# Patient Record
Sex: MALE | Race: WHITE | NOT HISPANIC OR LATINO | Employment: OTHER | URBAN - METROPOLITAN AREA
[De-identification: names, ages, dates, MRNs, and addresses within clinical notes are randomized per-mention and may not be internally consistent; named-entity substitution may affect disease eponyms.]

---

## 2018-05-08 ENCOUNTER — OFFICE VISIT (OUTPATIENT)
Dept: PODIATRY | Facility: CLINIC | Age: 68
End: 2018-05-08
Payer: MEDICARE

## 2018-05-08 VITALS
WEIGHT: 250 LBS | HEIGHT: 67 IN | DIASTOLIC BLOOD PRESSURE: 49 MMHG | BODY MASS INDEX: 39.24 KG/M2 | TEMPERATURE: 99 F | SYSTOLIC BLOOD PRESSURE: 117 MMHG | HEART RATE: 62 BPM

## 2018-05-08 DIAGNOSIS — E11.49 TYPE II DIABETES MELLITUS WITH NEUROLOGICAL MANIFESTATIONS: Primary | ICD-10-CM

## 2018-05-08 DIAGNOSIS — I87.2 VENOUS INSUFFICIENCY: ICD-10-CM

## 2018-05-08 DIAGNOSIS — L85.3 DRY SKIN DERMATITIS: ICD-10-CM

## 2018-05-08 PROCEDURE — 99999 PR PBB SHADOW E&M-NEW PATIENT-LVL III: CPT | Mod: PBBFAC,,, | Performed by: PODIATRIST

## 2018-05-08 PROCEDURE — 99203 OFFICE O/P NEW LOW 30 MIN: CPT | Mod: PBBFAC | Performed by: PODIATRIST

## 2018-05-08 PROCEDURE — 99214 OFFICE O/P EST MOD 30 MIN: CPT | Mod: S$PBB,,, | Performed by: PODIATRIST

## 2018-05-11 PROBLEM — E11.49 TYPE II DIABETES MELLITUS WITH NEUROLOGICAL MANIFESTATIONS: Status: ACTIVE | Noted: 2018-05-11

## 2018-05-11 NOTE — PROGRESS NOTES
Subjective:      Patient ID: Olu Gant is a 67 y.o. male.    Chief Complaint: Diabetic Foot Exam   patient presents for follow-up due to type 2 diabetes with neuropathy.  Reports he has been taking better care of his feet, applying lotion for dry skin.  Patient relates diabetic nerve pain in his feet is well-controlled with gabapentin majority of time.  States he was just recently diagnosed with cancer of the right kidney.  They noticed this while doing tests to put him on the kidney transplant list.  Patient has been on dialysis since 2013.  He is scheduled to have the right kidney removed along with the gallbladder and exploratory surgery on 5/22/18.    ROS     Constitutional   Constitutional: Well-developed, well-nourished, no distress, no fever, no night          sweats, no significant weight gain, no significant          weight loss, no exercise intolerance     Eyes         Eyes: Yes eye problems/glaucoma     ENMT   Ears: no difficulty hearing, no ear pain   Nose: no frequent nosebleeds, no nose/sinus problems   Mouth/Throat: no sore throat, no bleeding gums, no snoring, no dry mouth, no            mouth ulcers, no oral abnormalities, no teeth problems     Cardiovascular          Cardiovascular: no chest pain, no arm pain on exertion, no shortness of breath                  when walking, no palpitations    Respiratory           Respiratory: no cough, no wheezing, no congestion    Gastrointestinal   Gastrointestinal: no abdominal pain, no vomiting, normal appetite, no diarrhea,                no vomitting    Genitourinary   Genitourinary: DIALYSIS     Musculoskeletal        Musculoskeletal: no muscle aches, no muscle weakness, no arthralgias/joint pain,                   no back pain, no swelling in the extremities    Integumentary   Skin: no abnormal mole, no jaundice, no rashes     Neurologic          Neurologic: no loss of consciousness, no weakness, no seizures,                   no dizziness, no  headaches, NEUROPATHY     Psychiatric   Psych: no depression, no sleep disturbances    Endocrine   Endocrine: no fatigue, DIABETES     Hematologic/Lymphatic         No bruising     Allergic/Immunologic    Allergy/Immunologic: no runny nose, no sinus pressure, no itching, no hives,              no  frequent sneezing             Objective:      Physical Exam  Vascular   Arterial Pulses Right: dorsalis pedis 2/4, posterior tibialis nonpalpable   Arterial Pulses Left: dorsalis pedis 2/4, posterior tibialis nonpalpable   Chronic peripheral venous insufficiency, edema much improved    Varicosities Right: capillary refill test normal   Varicosities Left: capillary refill test normal (pedal skin temperature is warm bilateral)   Foot Right: shiny, atrophic skin changes,  Foot Left: shiny, atrophic skin changes     Integumentary   Skin dorsal feet and lower legs is discolored, scarred, fragile due to venous insufficiency, several years of chronic edema, varicose veins. Chronic dry skin dermatitis bilateral lower legs and feet, much improved. Decreased hyperkeratotic tissue, cracking, small fissures medial heels, stable.  Upon debridement no skin opening, no discoloration, no erythema or edema. Onychomycosis bilateral. No skin breaks, bruises, abrasions bilateral feet)     Neurological   Neurological Right: gross sensation intact, previous monofilament testing revealed diminished sensation right hallux, intact elsewhere bilateral feet.  Patient experiences paresthesias and symptoms of diabetic neuropathy on Lyrica and gabapentin)   Neurological Left: gross sensation intact, parasthesias   Manual Muscle Test Right: plantarflexors 5/5 (movement against resistance), dorsiflexors 5/5 (movement against resistance), invertors 5/5 (movement against resistance), evertors 5/5 (movement against resistance)   Manual Muscle Test Left: plantarflexors 5/5 (movement against resistance), dorsiflexors 5/5 (movement against resistance),  invertors 5/5 (movement against resistance), evertors 5/5 (movement against resistance)   Difficulty walking, using cane, obesity, limited mobility    Musculoskeletal   Muscle Strength and Tone Right: poor tone   Muscle Strength and Tone Left: poor tone   Joints, Bones, and Muscles Right: pes planus   Joints, Bones, and Muscles Left: pes planus           Assessment:       Encounter Diagnoses   Name Primary?    Type II diabetes mellitus with neurological manifestations Yes    Dry skin dermatitis     Venous insufficiency          Plan:       Olu was seen today for diabetic foot exam.    Diagnoses and all orders for this visit:    Type II diabetes mellitus with neurological manifestations    Dry skin dermatitis    Venous insufficiency        Diabetic pedal exam performed today.  Had a lengthy discussion involving diabetic education.  Reviewed lack of sensation right great toe.  Reviewed signs and symptoms and progression of neuropathy.  Discussed the need for continued tight control of glucose/diabetes to avoid further progression of neuropathy regarding feet.  Reviewed appropriate shoes for both indoors and outdoors.  Had a lengthy discussion regarding appropriate maintenance for skin and nails and potential complications.  Advised patient skin is much improved on both feet.  When it out to patient's callus skin on the heels needs special attention when applying lotion, monitor these areas closely.  Advised patient do condition of his skin on his feet and lower legs and previous history of ulcers he still remains at risk and needs to check his feet and legs daily. Instructed patient to contact the office with any area of redness or swelling which has not improved within 3 days.  I counseled the patient on his conditions, their implications and medical management.  Instructed patient to contact the office with any changes, questions, concerns, worsening of symptoms. Patient/family verbalized understanding.   Total  face to face time, exam, assessment, treatment, discussion, 25 minutes, more than half this time spent on consultation and coordination of care.   Follow up 3 months

## 2018-08-07 ENCOUNTER — OFFICE VISIT (OUTPATIENT)
Dept: PODIATRY | Facility: CLINIC | Age: 68
End: 2018-08-07
Payer: MEDICARE

## 2018-08-07 VITALS
DIASTOLIC BLOOD PRESSURE: 47 MMHG | OXYGEN SATURATION: 93 % | BODY MASS INDEX: 38.13 KG/M2 | HEIGHT: 67 IN | SYSTOLIC BLOOD PRESSURE: 84 MMHG | WEIGHT: 242.94 LBS | RESPIRATION RATE: 20 BRPM | HEART RATE: 71 BPM | TEMPERATURE: 98 F

## 2018-08-07 DIAGNOSIS — R23.4 FISSURE IN SKIN OF FOOT: ICD-10-CM

## 2018-08-07 DIAGNOSIS — E11.49 TYPE II DIABETES MELLITUS WITH NEUROLOGICAL MANIFESTATIONS: Primary | ICD-10-CM

## 2018-08-07 DIAGNOSIS — L85.3 DRY SKIN DERMATITIS: ICD-10-CM

## 2018-08-07 DIAGNOSIS — B35.1 ONYCHOMYCOSIS DUE TO DERMATOPHYTE: ICD-10-CM

## 2018-08-07 PROCEDURE — 99214 OFFICE O/P EST MOD 30 MIN: CPT | Mod: S$PBB,,, | Performed by: PODIATRIST

## 2018-08-07 PROCEDURE — 99214 OFFICE O/P EST MOD 30 MIN: CPT | Mod: PBBFAC | Performed by: PODIATRIST

## 2018-08-07 PROCEDURE — 99999 PR PBB SHADOW E&M-EST. PATIENT-LVL IV: CPT | Mod: PBBFAC,,, | Performed by: PODIATRIST

## 2018-08-13 NOTE — PROGRESS NOTES
Subjective:      Patient ID: Olu Gant is a 68 y.o. male.    Chief Complaint: Diabetic Foot Exam  Patient on dialysis presents for follow-up due to type II diabetes with neuropathy.  Reports he is applying lotion to his feet still on a regular basis but has noticed some arch cracks on both heels.  He had surgery in May to remove right kidney due to cancer, along with   Cholecystectomy and exploratory surgery and states surgery went very well, was told all cancer was removed.  He is still currently on the kidney transplant list and is slowly moving up, hopeful he will be getting a transplant soon.    ROS     Constitutional   well-developed, well-nourished, no distress     Cardiovascular          no chest pain, no shortness of breath    Respiratory          no cough, no congestion    Genitourinary   DIALYSIS     Musculoskeletal        SOME muscle aches, YES arthralgias/joint claude    Neurologic          NEUROPATHY     Endocrine           DIABETES            Objective:      Physical Exam  Vascular   Arterial Pulses Right: dorsalis pedis 2/4, posterior tibialis nonpalpable   Arterial Pulses Left: dorsalis pedis 2/4, posterior tibialis nonpalpable   Chronic peripheral venous insufficiency, edema much improved    Varicosities Right: capillary refill test normal   Varicosities Left: capillary refill test normal (pedal skin temperature is warm bilateral)   Foot Right: shiny, atrophic skin changes,  Foot Left: shiny, atrophic skin changes     Integumentary   Skin dorsal feet and lower legs is discolored, scarred, fragile due to venous insufficiency, chronic edema, varicose veins.  Edema has improved considerably over the last year with dialysis, however damage to the skin remains, stable at this time.  Chronic dry skin dermatitis bilateral lower legs and feet, improved.Hyperkeratotic tissue, cracking, increased fissures medial heels, stable.  Upon debridement no skin opening, no discoloration, no erythema or edema.    Onychomycosis bilateral, several tender due to thickness, some reduced, no evidence of ingrown nail or subungual abscess.      Neurological   Neurological: intact,  Parasthesias,  taking gabapentin    Difficulty walking, using cane, obesity, limited mobility    Musculoskeletal   Muscle Strength and Tone:  Strength and tome are poor    Joints, Bones, and Muscles: pes planus           Assessment:       Encounter Diagnoses   Name Primary?    Type II diabetes mellitus with neurological manifestations Yes    Dry skin dermatitis     Fissure in skin of foot     Onychomycosis due to dermatophyte          Plan:       Olu was seen today for diabetic foot exam.    Diagnoses and all orders for this visit:    Type II diabetes mellitus with neurological manifestations    Dry skin dermatitis    Fissure in skin of foot    Onychomycosis due to dermatophyte        Diabetic pedal exam performed.  Reviewed diabetic education.  Reviewed neuropathy.  Discussed the need for continued tight control of glucose/diabetes. Explained well overall improvement in skin since he has applied moisturizer fissures on the heels are slightly worse, longer, no deeper.  Reassured patient no evidence of redness or bleeding, but he has still apply a thicker, treatment type lotion to his heels.  Recommended anything specifically for diabetic /feet.  Encouraged patient to have his wife check his feet for him on a regular basis.  Reviewed potential complications due to color / damage/dryness of skin on feet and legs.  Reviewed better maintenance of nails and potential complications.  Reviewed appropriate shoes for both indoors and outdoors.  Instructed patient to contact the office with any area of redness or swelling which has not improved within 3 days.  I counseled the patient on his conditions, their implications and medical management.  Instructed patient to contact the office with any changes, questions, concerns, worsening of symptoms. Patient  verbalized understanding.   Total face to face time, exam, assessment, treatment, discussion, documentation 25 minutes, more than half this time spent on consultation and coordination of care.   Follow up 3 months.    This note was created using [employer] voice recognition software that occasionally misinterpreted phrases or words.

## 2018-10-08 ENCOUNTER — TELEPHONE (OUTPATIENT)
Dept: PODIATRY | Facility: CLINIC | Age: 68
End: 2018-10-08

## 2018-10-08 NOTE — TELEPHONE ENCOUNTER
----- Message from Trisha Galeana sent at 10/8/2018  2:07 PM CDT -----  Type:  Patient Returning Call    Who Called:  Patient  Who Left Message for Patient:  Not stated  Does the patient know what this is regarding?:  Rescheduling his appointment from tomorrow  Best Call Back Number:  [phone]  Additional Information:  Office needed to reschedule from tomorrow.

## 2018-10-11 ENCOUNTER — OFFICE VISIT (OUTPATIENT)
Dept: PODIATRY | Facility: CLINIC | Age: 68
End: 2018-10-11
Payer: MEDICARE

## 2018-10-11 VITALS
DIASTOLIC BLOOD PRESSURE: 54 MMHG | HEART RATE: 76 BPM | SYSTOLIC BLOOD PRESSURE: 97 MMHG | BODY MASS INDEX: 32.8 KG/M2 | HEIGHT: 67 IN | WEIGHT: 209 LBS

## 2018-10-11 DIAGNOSIS — B35.1 ONYCHOMYCOSIS DUE TO DERMATOPHYTE: ICD-10-CM

## 2018-10-11 DIAGNOSIS — R23.4 FISSURE IN SKIN OF FOOT: ICD-10-CM

## 2018-10-11 DIAGNOSIS — L85.3 DRY SKIN DERMATITIS: ICD-10-CM

## 2018-10-11 DIAGNOSIS — E11.49 TYPE II DIABETES MELLITUS WITH NEUROLOGICAL MANIFESTATIONS: Primary | ICD-10-CM

## 2018-10-11 PROCEDURE — 99213 OFFICE O/P EST LOW 20 MIN: CPT | Mod: PBBFAC | Performed by: PODIATRIST

## 2018-10-11 PROCEDURE — 99999 PR PBB SHADOW E&M-EST. PATIENT-LVL III: CPT | Mod: PBBFAC,,, | Performed by: PODIATRIST

## 2018-10-11 PROCEDURE — 99214 OFFICE O/P EST MOD 30 MIN: CPT | Mod: S$PBB,,, | Performed by: PODIATRIST

## 2018-10-17 NOTE — PROGRESS NOTES
"Subjective:      Patient ID: Olu Gant is a 68 y.o. male.    Chief Complaint: Follow-up; Diabetes Mellitus; and Nail Problem  Patient on dialysis presents for follow-up due to type II diabetes with neuropathy.    Reports neuropathy pain is getting "bad" in his fingers and feet, getting worse. Relates his neurologist,   Dr. Farmer, has him on the highest dose of Lyrica he can tolerate without "doping me up".  Feels   diabetes is fairly well controlled.  Relates glucose ranges between 125-175        ROS     Constitutional   Very pleasant, obese, well oriented, no distress     Cardiovascular          no chest pain, no shortness of breath    Respiratory          no cough, no congestion    Genitourinary   DIALYSIS     Musculoskeletal        SOME muscle aches, YES arthralgias/joint claude    Neurologic          NEUROPATHY     Endocrine           DIABETES            Objective:      Physical Exam  Vascular   Arterial Pulses Right: dorsalis pedis 1/4, posterior tibialis nonpalpable   Arterial Pulses Left: dorsalis pedis 1/4, posterior tibialis nonpalpable   Chronic peripheral venous insufficiency, edema stable  Capillary refill test normal   Pedal skin temperature is warm bilateral   Shiny, atrophic skin changes    Integumentary   Skin lower legs is discolored, scarred, fragile due to venous insufficiency, chronic edema,              varicose veins.   Chronic dry skin dermatitis bilateral lower legs and feet, unchanged. Hyperkeratotic tissue,              cracking, peeling, fissures medial heels, stable.  Upon debridement no skin opening, no              discoloration, no erythema or edema.   Onychomycosis bilateral, both hallux are severely dystrophic, several tender due to thickness,              few reduced, no evidence of ingrown nail or subungual abscess.      Neurological   Neurological: intact,  Parasthesias,  taking gabapentin    Difficulty walking, using cane, obesity, limited mobility    Musculoskeletal   Muscle " Strength and Tone:  Strength and tome are poor    Joints, Bones, and Muscles: pes planus   Limited mobility, difficulty walking, antalgic gait  Presents in appropriate tennis shoes          Assessment:       Encounter Diagnoses   Name Primary?    Type II diabetes mellitus with neurological manifestations Yes    Fissure in skin of foot     Dry skin dermatitis     Onychomycosis due to dermatophyte          Plan:       Olu was seen today for follow-up, diabetes mellitus and nail problem.    Diagnoses and all orders for this visit:    Type II diabetes mellitus with neurological manifestations    Fissure in skin of foot    Dry skin dermatitis    Onychomycosis due to dermatophyte        Diabetic pedal exam performed.  Reviewed diabetic education and neuropathy.    Discussed tighter control of glucose/diabetes.   Reviewed application of moisturizer,  preferably diabetic foot came cream to fissures on the heels.  Reviewed potential complications due to color / damage/dryness of skin on feet and legs.  Reviewed better maintenance of nails and skin and potential complications.  Reviewed appropriate shoes for both indoors and outdoors.  Instructed patient to contact the office with   any area of redness or swelling which has not improved within 3 days.  I counseled the patient on his conditions, their implications and medical management.  Instructed patient to contact the office with any changes, questions, concerns, worsening of symptoms.   Patient verbalized understanding.   Total face to face time, exam, assessment, treatment, discussion, documentation 25 minutes, more than   half this time spent on consultation and coordination of care.   Follow up 3 months.    This note was created using [employer] voice recognition software that occasionally misinterpreted phrases   or words.

## 2018-12-13 ENCOUNTER — OFFICE VISIT (OUTPATIENT)
Dept: PODIATRY | Facility: CLINIC | Age: 68
End: 2018-12-13
Payer: MEDICARE

## 2018-12-13 VITALS
SYSTOLIC BLOOD PRESSURE: 95 MMHG | TEMPERATURE: 98 F | OXYGEN SATURATION: 99 % | HEIGHT: 67 IN | DIASTOLIC BLOOD PRESSURE: 51 MMHG | HEART RATE: 72 BPM | BODY MASS INDEX: 32.8 KG/M2 | RESPIRATION RATE: 18 BRPM | WEIGHT: 209 LBS

## 2018-12-13 DIAGNOSIS — M79.2 CHRONIC PERIPHERAL NEUROPATHIC PAIN: Primary | ICD-10-CM

## 2018-12-13 DIAGNOSIS — L85.3 DRY SKIN DERMATITIS: ICD-10-CM

## 2018-12-13 DIAGNOSIS — G89.29 CHRONIC PERIPHERAL NEUROPATHIC PAIN: Primary | ICD-10-CM

## 2018-12-13 DIAGNOSIS — R52 PAIN MANAGEMENT: ICD-10-CM

## 2018-12-13 DIAGNOSIS — E11.49 TYPE II DIABETES MELLITUS WITH NEUROLOGICAL MANIFESTATIONS: ICD-10-CM

## 2018-12-13 PROCEDURE — 99213 OFFICE O/P EST LOW 20 MIN: CPT | Mod: PBBFAC | Performed by: PODIATRIST

## 2018-12-13 PROCEDURE — 99999 PR PBB SHADOW E&M-EST. PATIENT-LVL III: CPT | Mod: PBBFAC,,, | Performed by: PODIATRIST

## 2018-12-13 PROCEDURE — 99215 OFFICE O/P EST HI 40 MIN: CPT | Mod: S$PBB,,, | Performed by: PODIATRIST

## 2018-12-13 RX ORDER — HYDROCODONE BITARTRATE AND ACETAMINOPHEN 7.5; 325 MG/1; MG/1
1 TABLET ORAL NIGHTLY
Qty: 60 TABLET | Refills: 0 | Status: SHIPPED | OUTPATIENT
Start: 2018-12-13 | End: 2019-01-02 | Stop reason: SDUPTHER

## 2018-12-21 NOTE — PROGRESS NOTES
Subjective:      Patient ID: Oul Gant is a 68 y.o. male.    Chief Complaint: Diabetic Foot Exam  Patient presents for follow-up due to time to diabetes with neuropathy.  Patient relates nerve pain in   his feet has escalated.  He has been on his feet a lot recently going to hospital and taking care of   his wife who suffered a heart attack with coronary bypass surgery.  She is now at home and he has   continued to take care of her.  He also goes to dialysis 3 times a week.  Repeat reports increased   especially at night. States his nephrologists wanted his neurologist to discuss pain management.   Inquires about taking pain medication only at night. Sees Dr. Farmer who has him on 150 mg Lyrica   3 times a day.  Reports diabetes  remains well controlled.  No changes in his medications or health since last visit.        ROS     Constitutional   Very pleasant, obese, well oriented, no distress     Cardiovascular          no chest pain, no shortness of breath    Respiratory          no cough, no congestion    Genitourinary   DIALYSIS     Musculoskeletal        SOME muscle aches, YES arthralgias/joint pain    Neurologic         NEUROPATHY     Endocrine           DIABETES            Objective:      Physical Exam  Vascular   Arterial Pulses Right: dorsalis pedis 1/4, posterior tibialis nonpalpable   Arterial Pulses Left: dorsalis pedis 1/4, posterior tibialis nonpalpable   Chronic peripheral venous insufficiency, edema stable  Capillary refill test normal   Pedal skin temperature is warm bilatera    Integumentary   Skin lower legs is discolored, scarred, fragile due to venous insufficiency, chronic edema,              varicose veins.   Chronic dry skin dermatitis bilateral lower legs and feet, unchanged. Hyperkeratotic tissue,              cracking, peeling, fissures medial heels, stable.  Upon debridement no skin opening, no              discoloration, no erythema or edema.   Onychomycosis bilateral, both hallux are  severely dystrophic, no evidence of subungual abscess.      Neurological   Gross sensation intact, increased parasthesias      Musculoskeletal   Muscle Strength and Tone:  poor    Joints, Bones, and Muscles: pes planus   Limited mobility, difficulty walking, antalgic gait  Presents in appropriate tennis shoes  Dfficulty walking, using cane, obesity, limited mobility          Assessment:       Encounter Diagnoses   Name Primary?    Chronic peripheral neuropathic pain Yes    Type II diabetes mellitus with neurological manifestations     Pain management     Dry skin dermatitis          Plan:       Olu was seen today for diabetic foot exam.    Diagnoses and all orders for this visit:    Chronic peripheral neuropathic pain    Type II diabetes mellitus with neurological manifestations    Pain management    Dry skin dermatitis    Other orders  -     HYDROcodone-acetaminophen (NORCO) 7.5-325 mg per tablet; Take 1 tablet by mouth nightly.      Advised patient to make sure his nephrologist knows he is taking 450 mg of Lyrica day.   Advised patient   this is an excessive amount and if he takes pain medication at night and maybe he can remove the at 3rd  dose of Lyrica.  We had a lengthy discussion regarding pain management, taking hydrocodone at night  for increased nerve pain throughout the day.  We discussed dangers of these medications, overuse and   abuse.  Explained medication must be taken as directed and will not be filled sooner than 2 months.    Patient related he was in understanding and agreement with treatment plan and did want to pursue Pain   Management today.  Pain management contract was reviewed at length with patient  Diabetic pedal exam performed.  Reviewed diabetic education,  signs and symptoms of neuropathy.    Discussed benefit of tight control of glucose/diabetes regarding potential foot problems especially   neuropathy.  Reviewed appropriate shoes,  especially indoors, no flat shoes, slippers or  walking in sock   or bare feet.  Discussed maintenance of skin and nails and potential complications.  Reviewed need for   daily foot checks and instructed patient to contact the office with any area of redness or swelling which   has not improved within 3 days.  Counseled the patient on his conditions, their implications and medical management.  Instructed patient to contact the office with any changes, questions, concerns, worsening of symptoms.   Patient verbalized understanding.   Total face to face time, exam, assessment, treatment, discussion, documentation 40 minutes, more than   half this time spent on consultation and coordination of care.   Follow up 2 months.    This note was created using [employer] voice recognition software that occasionally misinterpreted phrases   or words.

## 2018-12-27 ENCOUNTER — OFFICE VISIT (OUTPATIENT)
Dept: PODIATRY | Facility: CLINIC | Age: 68
End: 2018-12-27
Payer: MEDICARE

## 2018-12-27 VITALS
SYSTOLIC BLOOD PRESSURE: 119 MMHG | HEIGHT: 67 IN | TEMPERATURE: 99 F | BODY MASS INDEX: 32.8 KG/M2 | HEART RATE: 61 BPM | WEIGHT: 209 LBS | DIASTOLIC BLOOD PRESSURE: 66 MMHG

## 2018-12-27 DIAGNOSIS — S99.922A INJURY OF TOE ON LEFT FOOT, INITIAL ENCOUNTER: ICD-10-CM

## 2018-12-27 DIAGNOSIS — M79.2 CHRONIC PERIPHERAL NEUROPATHIC PAIN: ICD-10-CM

## 2018-12-27 DIAGNOSIS — E11.49 TYPE II DIABETES MELLITUS WITH NEUROLOGICAL MANIFESTATIONS: ICD-10-CM

## 2018-12-27 DIAGNOSIS — G89.29 CHRONIC PERIPHERAL NEUROPATHIC PAIN: ICD-10-CM

## 2018-12-27 PROCEDURE — 99213 OFFICE O/P EST LOW 20 MIN: CPT | Mod: PBBFAC | Performed by: PODIATRIST

## 2018-12-27 PROCEDURE — 99999 PR PBB SHADOW E&M-EST. PATIENT-LVL III: ICD-10-PCS | Mod: PBBFAC,,, | Performed by: PODIATRIST

## 2018-12-27 PROCEDURE — 99999 PR PBB SHADOW E&M-EST. PATIENT-LVL III: CPT | Mod: PBBFAC,,, | Performed by: PODIATRIST

## 2018-12-27 PROCEDURE — 99213 PR OFFICE/OUTPT VISIT, EST, LEVL III, 20-29 MIN: ICD-10-PCS | Mod: S$PBB,,, | Performed by: PODIATRIST

## 2018-12-27 PROCEDURE — 99213 OFFICE O/P EST LOW 20 MIN: CPT | Mod: S$PBB,,, | Performed by: PODIATRIST

## 2018-12-27 RX ORDER — DICLOFENAC SODIUM 10 MG/G
2 GEL TOPICAL 2 TIMES DAILY
Qty: 100 G | Refills: 1 | Status: SHIPPED | OUTPATIENT
Start: 2018-12-27 | End: 2019-12-31 | Stop reason: SDUPTHER

## 2018-12-27 RX ORDER — HYDROCODONE BITARTRATE AND ACETAMINOPHEN 7.5; 325 MG/1; MG/1
1 TABLET ORAL EVERY 6 HOURS PRN
Qty: 7 TABLET | Refills: 0 | Status: SHIPPED | OUTPATIENT
Start: 2018-12-27 | End: 2019-01-03

## 2019-01-06 NOTE — PROGRESS NOTES
Subjective:      Patient ID: Olu Gant is a 68 y.o. male.    Chief Complaint: Diabetes Mellitus and Foot Pain  Patient presents with concern regarding bruise left 5th digit,  does not recall injury, noticed   discoloration, no drainage.  Also relates he has not been able to get his pain medications   since last visit due to prior authorization by his insurance.  Relates pain at night has been   moderate to severe.  Pain level at time of visit today 4/10      ROS     Constitutional   Very pleasant, obese, well oriented, no distress     Cardiovascular          no chest pain, no shortness of breath    Respiratory          no cough, no congestion    Genitourinary   DIALYSIS     Musculoskeletal        SOME muscle aches, YES arthralgias/joint pain    Neurologic         NEUROPATHY     Endocrine           DIABETES          Objective:      Physical Exam  Vascular   Arterial Pulses Right: dorsalis pedis 1/4, posterior tibialis nonpalpable   Arterial Pulses Left: dorsalis pedis 1/4, posterior tibialis nonpalpable   Chronic peripheral venous insufficiency, edema stable  Capillary refill test normal   Pedal skin temperature is warm bilatera    Integumentary   Small area of ecchymosis medial dorsal 5th digital, mildly tender, no skin break, edema or         erythema   Discolored skin lower legs, scarred, fragile due to venous insufficiency, chronic edema,              varicose veins.   Chronic dry skin dermatitis bilateral lower legs and feet      Neurological   Gross sensation intact, increased parasthesia's, painful  Diabetic neuropathy      Musculoskeletal   Muscle Strength and Tone:  poor    Joints, Bones, and Muscles: pes planus   Limited mobility, difficulty walking, antalgic gait  Presents in appropriate tennis shoes  Dfficulty walking, using cane, obesity, limited mobility          Assessment:       Encounter Diagnoses   Name Primary?    Injury of toe on left foot, initial encounter     Chronic peripheral neuropathic  pain     Type II diabetes mellitus with neurological manifestations          Plan:       Olu was seen today for diabetes mellitus and foot pain.    Diagnoses and all orders for this visit:    Injury of toe on left foot, initial encounter    Chronic peripheral neuropathic pain    Type II diabetes mellitus with neurological manifestations    Other orders  -     HYDROcodone-acetaminophen (NORCO) 7.5-325 mg per tablet; Take 1 tablet by mouth every 6 (six) hours as needed for Pain.  -     diclofenac sodium (VOLTAREN) 1 % Gel; Apply 2 g topically 2 (two) times daily.      Nurse contacted patient's pharmacy to okay 1 week of hydrocodone while he is waiting for prior authorization  on the previous prescription we dispensed.  Instructed patient to take as directed.  Reassured patient there no complications, but obviously injury occurred to 5th digit left foot.  Explained to  patient he needs to have his wife check this area daily.  Prescribed diclofenac, instructed patient to apply twice daily, 1 application should be before bed at night.    Again instructed patient to notify his nephrologist regarding medications.  Counseled the patient on his conditions, their implications and medical management.  Instructed patient to contact the office with any changes, questions, concerns, worsening of symptoms.   Patient verbalized understanding.   Total face to face time, exam, assessment, treatment, discussion, documentation 15 minutes, more than   half this time spent on consultation and coordination of care.   Follow up 2 months.    This note was created using [employer] voice recognition software that occasionally misinterpreted phrases   or words.

## 2019-02-14 ENCOUNTER — OFFICE VISIT (OUTPATIENT)
Dept: PODIATRY | Facility: CLINIC | Age: 69
End: 2019-02-14
Payer: MEDICARE

## 2019-02-14 VITALS
TEMPERATURE: 98 F | HEIGHT: 67 IN | RESPIRATION RATE: 18 BRPM | HEART RATE: 75 BPM | BODY MASS INDEX: 32.8 KG/M2 | DIASTOLIC BLOOD PRESSURE: 54 MMHG | SYSTOLIC BLOOD PRESSURE: 96 MMHG | WEIGHT: 209 LBS | OXYGEN SATURATION: 99 %

## 2019-02-14 DIAGNOSIS — L85.3 DRY SKIN DERMATITIS: ICD-10-CM

## 2019-02-14 DIAGNOSIS — E11.49 TYPE II DIABETES MELLITUS WITH NEUROLOGICAL MANIFESTATIONS: ICD-10-CM

## 2019-02-14 DIAGNOSIS — R52 PAIN MANAGEMENT: ICD-10-CM

## 2019-02-14 DIAGNOSIS — M79.2 CHRONIC PERIPHERAL NEUROPATHIC PAIN: Primary | ICD-10-CM

## 2019-02-14 DIAGNOSIS — B35.1 ONYCHOMYCOSIS DUE TO DERMATOPHYTE: ICD-10-CM

## 2019-02-14 DIAGNOSIS — G89.29 CHRONIC PERIPHERAL NEUROPATHIC PAIN: Primary | ICD-10-CM

## 2019-02-14 PROCEDURE — 99999 PR PBB SHADOW E&M-EST. PATIENT-LVL III: CPT | Mod: PBBFAC,,, | Performed by: PODIATRIST

## 2019-02-14 PROCEDURE — 99213 OFFICE O/P EST LOW 20 MIN: CPT | Mod: PBBFAC | Performed by: PODIATRIST

## 2019-02-14 PROCEDURE — 99999 PR PBB SHADOW E&M-EST. PATIENT-LVL III: ICD-10-PCS | Mod: PBBFAC,,, | Performed by: PODIATRIST

## 2019-02-14 PROCEDURE — 99214 OFFICE O/P EST MOD 30 MIN: CPT | Mod: S$PBB,,, | Performed by: PODIATRIST

## 2019-02-14 PROCEDURE — 99214 PR OFFICE/OUTPT VISIT, EST, LEVL IV, 30-39 MIN: ICD-10-PCS | Mod: S$PBB,,, | Performed by: PODIATRIST

## 2019-02-14 RX ORDER — HYDROCODONE BITARTRATE AND ACETAMINOPHEN 7.5; 325 MG/1; MG/1
1 TABLET ORAL EVERY 12 HOURS PRN
Qty: 40 TABLET | Refills: 0 | Status: SHIPPED | OUTPATIENT
Start: 2019-02-14 | End: 2019-03-26 | Stop reason: SDUPTHER

## 2019-02-15 ENCOUNTER — TELEPHONE (OUTPATIENT)
Dept: PODIATRY | Facility: CLINIC | Age: 69
End: 2019-02-15

## 2019-02-15 NOTE — PROGRESS NOTES
Subjective:      Patient ID: Olu Gant is a 68 y.o. male.    Chief Complaint: Follow-up  Patient presents  with complaint of pain in both feet.  He has history of diabetic neuropathy.  Patient   was started on pain management last visit and instructed to take 1 each evening.  Relates he was  Never able to get this filled, got 7 tablets while waiting on PA. As far as he knows it was never   approved from [facility].  He never received a phone call from his pharmacy.  He   relates days he goes to dialysis the pain is twice is bad, always increased at night, difficulty sleeping.  Saw L Cooksey, NP 1/2/2019      ROS     Constitutional   very pleasant, obese, well oriented, no distress     Cardiovascular          no chest pain, no shortness of breath    Respiratory          no cough, no congestion    Genitourinary   DIALYSIS     Musculoskeletal        SOME muscle aches, YES arthralgias/joint pain    Neurologic         NEUROPATHY     Endocrine          DIABETES          Objective:      Physical Exam  Vascular   Arterial Pulses Right: dorsalis pedis 1/4, posterior tibialis nonpalpable   Arterial Pulses Left: dorsalis pedis 1/4, posterior tibialis nonpalpable   Chronic peripheral venous insufficiency, edema stable  Capillary refill test normal   Pedal skin temperature is warm bilatera    Integumentary   Discolored very dry skin lower legs, scarred, fragile due to venous insufficiency, chronic edema,         varicose veins.   Chronic dry skin dermatitis bilateral lower legs and feet, mild fissures, cracking medial heels, no        complications at this time, at high risk      Neurological   Gross sensation intact, increased parasthesia's, painful diabetic neuropathy, taking Lyrica      Musculoskeletal   Muscle Strength and Tone:  poor    Joints, Bones, and Muscles: pes planus   Limited mobility, difficulty walking, antalgic gait  Presents in appropriate tennis shoes  Dfficulty walking, using cane, obesity,  limited mobility          Assessment:       Encounter Diagnoses   Name Primary?    Chronic peripheral neuropathic pain Yes    Type II diabetes mellitus with neurological manifestations     Pain management     Dry skin dermatitis     Onychomycosis due to dermatophyte          Plan:       Olu was seen today for follow-up.    Diagnoses and all orders for this visit:    Chronic peripheral neuropathic pain    Type II diabetes mellitus with neurological manifestations    Pain management    Dry skin dermatitis    Onychomycosis due to dermatophyte    Other orders  -     HYDROcodone-acetaminophen (NORCO) 7.5-325 mg per tablet; Take 1 tablet by mouth every 12 (twelve) hours as needed for Pain. Take 1 daily in the evening on non-dialysis days, take 1 every 12 hours on dialysis days      Diabetic pedal exam performed.  Reviewed diabetic education,  neuropathy. Discussed benefit   of continued tight control of glucose/diabetes regarding potential foot problems.  Reviewed   appropriate shoes,  especially indoors to protect feet, no flat shoes, slippers or walking in sock   or bare feet.  Discussed maintenance of dry skin and nails and potential complications.  Advised   patient he has to start applying lotion to both feet especially bottom and heels, at least every other   day avoid getting between the toes.  Reviewed need for daily foot checks and instructed patient to contact the office with any area of   redness or swelling which has not improved within 3 days.  Nurse tried a nurse verified a prior authorization was approved and would contact his pharmacy   at 10:00 a.m. When the open.  In the meantime I discussed with patient increasing his pain   medications slightly so he is able to take it every 12 hr on dialysis days.  I instructed patient on   how to take medication and new prescription was dispensed for 40 tabs monthly.  Patient was in   understanding and agreement with treatment plan  Counseled the patient on  his conditions, their implications and medical management.  Instructed patient to contact the office with any changes, questions, concerns, worsening of   symptoms. Patient verbalized understanding.   Total face to face time, exam, assessment, treatment, discussion, documentation 25 minutes,   more than half this time spent on consultation and coordination of care.   Follow up 2 months.    This note was created using [employer] voice recognition software that occasionally misinterpreted   phrases or words.

## 2019-02-15 NOTE — TELEPHONE ENCOUNTER
----- Message from Ashley Jacobsen sent at 2/15/2019  8:46 AM CST -----   [place]  With  [employer] calling about  Increase  The   quality  On narco  //please call  For  Details [phone]

## 2019-03-22 ENCOUNTER — TELEPHONE (OUTPATIENT)
Dept: PODIATRY | Facility: CLINIC | Age: 69
End: 2019-03-22

## 2019-03-22 NOTE — TELEPHONE ENCOUNTER
----- Message from Maribel Grider sent at 3/22/2019  3:14 PM CDT -----  Contact: Patient  Type:  RX Refill Request    Who Called:  Patient  Refill or New Rx:  Refill  RX Name and Strength:    HYDROcodone-acetaminophen (NORCO) 7.5-325 mg per tablet  How is the patient currently taking it? (ex. 1XDay):    Is this a 30 day or 90 day RX:    Preferred Pharmacy with phone number:    [facility] - [place], [place] - [address]  [address] [address]  Phone: [phone] Fax: [phone]  Local or Mail Order:  Local  Ordering Provider:  Cassandra Jackson Call Back Number:  [phone]  Additional Information:  If you call it in today it needs to be called in to [facility] in [place].

## 2019-03-26 DIAGNOSIS — M79.2 CHRONIC PERIPHERAL NEUROPATHIC PAIN: Primary | ICD-10-CM

## 2019-03-26 DIAGNOSIS — G89.29 CHRONIC PERIPHERAL NEUROPATHIC PAIN: Primary | ICD-10-CM

## 2019-03-26 RX ORDER — HYDROCODONE BITARTRATE AND ACETAMINOPHEN 7.5; 325 MG/1; MG/1
1 TABLET ORAL EVERY 12 HOURS PRN
Qty: 40 TABLET | Refills: 0 | Status: SHIPPED | OUTPATIENT
Start: 2019-03-26 | End: 2019-04-16 | Stop reason: SDUPTHER

## 2019-04-16 ENCOUNTER — OFFICE VISIT (OUTPATIENT)
Dept: PODIATRY | Facility: CLINIC | Age: 69
End: 2019-04-16
Payer: MEDICARE

## 2019-04-16 VITALS
HEART RATE: 54 BPM | RESPIRATION RATE: 18 BRPM | WEIGHT: 209 LBS | TEMPERATURE: 98 F | BODY MASS INDEX: 32.8 KG/M2 | SYSTOLIC BLOOD PRESSURE: 129 MMHG | DIASTOLIC BLOOD PRESSURE: 61 MMHG | OXYGEN SATURATION: 99 % | HEIGHT: 67 IN

## 2019-04-16 DIAGNOSIS — L85.3 DRY SKIN DERMATITIS: ICD-10-CM

## 2019-04-16 DIAGNOSIS — M79.2 CHRONIC PERIPHERAL NEUROPATHIC PAIN: ICD-10-CM

## 2019-04-16 DIAGNOSIS — G89.29 CHRONIC PERIPHERAL NEUROPATHIC PAIN: ICD-10-CM

## 2019-04-16 DIAGNOSIS — B35.1 ONYCHOMYCOSIS DUE TO DERMATOPHYTE: ICD-10-CM

## 2019-04-16 DIAGNOSIS — R52 PAIN MANAGEMENT: ICD-10-CM

## 2019-04-16 DIAGNOSIS — E11.49 TYPE II DIABETES MELLITUS WITH NEUROLOGICAL MANIFESTATIONS: Primary | ICD-10-CM

## 2019-04-16 PROCEDURE — 99214 OFFICE O/P EST MOD 30 MIN: CPT | Mod: S$PBB,,, | Performed by: PODIATRIST

## 2019-04-16 PROCEDURE — 99999 PR PBB SHADOW E&M-EST. PATIENT-LVL III: ICD-10-PCS | Mod: PBBFAC,,, | Performed by: PODIATRIST

## 2019-04-16 PROCEDURE — 99999 PR PBB SHADOW E&M-EST. PATIENT-LVL III: CPT | Mod: PBBFAC,,, | Performed by: PODIATRIST

## 2019-04-16 PROCEDURE — 99214 PR OFFICE/OUTPT VISIT, EST, LEVL IV, 30-39 MIN: ICD-10-PCS | Mod: S$PBB,,, | Performed by: PODIATRIST

## 2019-04-16 PROCEDURE — 99213 OFFICE O/P EST LOW 20 MIN: CPT | Mod: PBBFAC | Performed by: PODIATRIST

## 2019-04-16 RX ORDER — FLUTICASONE PROPIONATE 50 MCG
SPRAY, SUSPENSION (ML) NASAL
Refills: 0 | COMMUNITY
Start: 2019-03-03 | End: 2020-06-02

## 2019-04-16 RX ORDER — HYDROCODONE BITARTRATE AND ACETAMINOPHEN 7.5; 325 MG/1; MG/1
1 TABLET ORAL DAILY
Qty: 42 TABLET | Refills: 0 | Status: SHIPPED | OUTPATIENT
Start: 2019-04-16 | End: 2019-05-14

## 2019-04-16 NOTE — PROGRESS NOTES
"Subjective:      Patient ID: Olu Gant is a 68 y.o. male.    Chief Complaint: Diabetic Foot Exam  Patient presents for follow-up diabetic neuropathy pain.  He reports pain medication has been a "life saver".  He is taking the max dose agreed by his nephrologist and neurologist of Lyrica 150 mg 3 times a day.   Reports usual increased pain on days following dialysis.  On those days he starts to experience heightened pain around 2pm,  pain medication has helped him to remain active.   Patient reports diabetes has remained well controlled, goes to dialysis 3 times a week. Last saw L Cooksey, NP 1/2/2019  Was hospitalized for pneumonia 01/02/2019    ROS     Constitutional   very pleasant, obese, well oriented, no distress     Cardiovascular          no chest pain, no shortness of breath    Respiratory          no cough, no congestion    Genitourinary   DIALYSIS     Musculoskeletal        SOME muscle aches, YES arthralgias/joint pain    Neurologic         NEUROPATHY     Endocrine          DIABETES          Objective:      Physical Exam  Vascular   Arterial Pulses Right: dorsalis pedis 1/4, posterior tibialis 1/4, normal CFT   Arterial Pulses Left: dorsalis pedis 1/4, posterior tibialis 1/4, normal CFT   No lower extremity edema bilateral  Pedal skin temperature is warm bilateral    Integumentary   Chronic dry skin dermatitis bilateral lower legs and feet improved, stable, mild fissures, cracking medial heels, nocomplications   Tender dystrophic onychomycosis, several including hallux bilateral, thickness reduced, no evidence of infection, ingrown nail or subungual abscess       Neurological   Gross sensation intact, increased parasthesia's, painful diabetic neuropathy      Musculoskeletal   Muscle Strength and Tone:  poor    Joints, Bones, and Muscles: pes planus   Limited mobility, difficulty walking, antalgic gait  Presents in appropriate tennis shoes  Dfficulty walking, using cane, obesity, limited " mobility    Comprehensive metabolic panel    Ref Range & Units 3mo ago 5yr ago   Glucose 65 - 99 mg/dL 136High   162High  R      BUN, Bld 7 - 25 mg/dL 27High   64High  R   Creatinine 0.70 - 1.25 mg/dL 5.69High   5.9High  R      eGFR if non African American > OR = 60 mL/min/1.73m2 9Low   9.3Abnormal  R, CM   eGFR if African American > OR = 60 mL/min/1.73m2 11Low   10.7Abnormal  R   BUN/Creatinine Ratio 6 - 22 (calc) 5Low      Sodium 135 - 146 mmol/L 138  139 R   Potassium 3.5 - 5.3 mmol/L 4.5  3.7 R   Chloride 98 - 110 mmol/L 96Low   96 R   CO2 20 - 32 mmol/L 32  31High  R   Calcium 8.6 - 10.3 mg/dL 8.5Low   9.4 R   Total Protein 6.1 - 8.1 g/dL 6.3  7.6 R   Albumin 3.6 - 5.1 g/dL 3.6  3.2Low  R   Globulin, Total 1.9 - 3.7 g/dL (calc) 2.7     Albumin/Globulin Ratio 1.0 - 2.5 (calc) 1.3     Total Bilirubin 0.2 - 1.2 mg/dL 0.3  0.5 R, CM   Alkaline Phosphatase 40 - 115 U/L 108  123 R   AST 10 - 35 U/L 16  11 R   ALT 9 - 46 U/L 15  10 R                   Assessment:       Encounter Diagnoses   Name Primary?    Type II diabetes mellitus with neurological manifestations Yes    Chronic peripheral neuropathic pain     Pain management     Dry skin dermatitis     Onychomycosis due to dermatophyte          Plan:       Olu was seen today for diabetic foot exam.    Diagnoses and all orders for this visit:    Type II diabetes mellitus with neurological manifestations    Chronic peripheral neuropathic pain    Pain management    Dry skin dermatitis    Onychomycosis due to dermatophyte    Other orders  -     HYDROcodone-acetaminophen (NORCO) 7.5-325 mg per tablet; Take 1 tablet by mouth Daily. Take 1 daily in the evening on non-dialysis days, take 1 every 12 hours on dialysis days      Reviewed neuropathic pain with patient.  Advised Lyrica 150 mg 3 times a day is over the recommended dose and symptoms unfortunately cannot be any better controlled with addition of medication even if he were able to get approval through his  nephrologist.  We reviewed pain management, taking pain medication each evening and twice on dialysis days, one around 2:00 p.m. and then in the evening before bed.  Diabetic foot exam performed.  We reviewed diabetic education.  Encouraged patient to continue tight control of glucose / diabetes and benefits of this.  Advised patient he needs to take better care of skin on the heels of his feet, check his feet and legs daily.  Refill Norco 7.5 mg 1 tablet taken on non dialysis days, 2 on dialysis days.  We reviewed pain management, taking medication as prescribed   is consistent  Patient was in understanding and agreement with treatment plan  Counseled the patient on his conditions, their implications and medical management.  Instructed patient to contact the office with any changes, questions, concerns, worsening of symptoms. Patient verbalized understanding.   Total face to face time, exam, assessment, treatment, discussion, documentation 25 minutes, more than half this time spent on consultation and coordination of care.   Follow up 2 months.    This note was created using [employer] voice recognition software that occasionally misinterpreted phrases or words.

## 2019-06-20 ENCOUNTER — OFFICE VISIT (OUTPATIENT)
Dept: PODIATRY | Facility: CLINIC | Age: 69
End: 2019-06-20
Payer: MEDICARE

## 2019-06-20 VITALS
SYSTOLIC BLOOD PRESSURE: 145 MMHG | HEIGHT: 66 IN | OXYGEN SATURATION: 97 % | TEMPERATURE: 98 F | WEIGHT: 234.56 LBS | DIASTOLIC BLOOD PRESSURE: 64 MMHG | HEART RATE: 73 BPM | RESPIRATION RATE: 20 BRPM | BODY MASS INDEX: 37.69 KG/M2

## 2019-06-20 DIAGNOSIS — R52 PAIN MANAGEMENT: ICD-10-CM

## 2019-06-20 DIAGNOSIS — G89.29 CHRONIC PERIPHERAL NEUROPATHIC PAIN: Primary | ICD-10-CM

## 2019-06-20 DIAGNOSIS — R23.4 FISSURE IN SKIN OF FOOT: ICD-10-CM

## 2019-06-20 DIAGNOSIS — B35.1 ONYCHOMYCOSIS DUE TO DERMATOPHYTE: ICD-10-CM

## 2019-06-20 DIAGNOSIS — M79.2 CHRONIC PERIPHERAL NEUROPATHIC PAIN: Primary | ICD-10-CM

## 2019-06-20 DIAGNOSIS — L85.3 DRY SKIN DERMATITIS: ICD-10-CM

## 2019-06-20 DIAGNOSIS — E11.49 TYPE II DIABETES MELLITUS WITH NEUROLOGICAL MANIFESTATIONS: ICD-10-CM

## 2019-06-20 PROCEDURE — 99999 PR PBB SHADOW E&M-EST. PATIENT-LVL III: ICD-10-PCS | Mod: PBBFAC,,, | Performed by: PODIATRIST

## 2019-06-20 PROCEDURE — 99213 OFFICE O/P EST LOW 20 MIN: CPT | Mod: PBBFAC | Performed by: PODIATRIST

## 2019-06-20 PROCEDURE — 99999 PR PBB SHADOW E&M-EST. PATIENT-LVL III: CPT | Mod: PBBFAC,,, | Performed by: PODIATRIST

## 2019-06-20 PROCEDURE — 99214 OFFICE O/P EST MOD 30 MIN: CPT | Mod: S$PBB,,, | Performed by: PODIATRIST

## 2019-06-20 PROCEDURE — 99214 PR OFFICE/OUTPT VISIT, EST, LEVL IV, 30-39 MIN: ICD-10-PCS | Mod: S$PBB,,, | Performed by: PODIATRIST

## 2019-06-20 RX ORDER — HYDROCODONE BITARTRATE AND ACETAMINOPHEN 7.5; 325 MG/1; MG/1
1 TABLET ORAL EVERY 12 HOURS PRN
Qty: 45 TABLET | Refills: 0 | Status: SHIPPED | OUTPATIENT
Start: 2019-06-20 | End: 2019-08-04

## 2019-06-20 NOTE — LETTER
June 21, 2019      Brianna Elaine III, MD  [address]  [facility] [place] [address]           [facility]  [address] [facility] [place] [address]  Phone: [phone]  Fax: [phone]          Patient: Olu Gant   MR Number: [id number]   YOB: 1950   Date of Visit: 6/20/2019       Dear Dr. Brianna Elaine III:    Thank you for referring Olu Gant to me for evaluation. Attached you will find relevant portions of my assessment and plan of care.    If you have questions, please do not hesitate to call me. I look forward to following Olu Gant along with you.    Sincerely,    Anuradha Trujillo, DPGLORIA    Enclosure  CC:  No Recipients    If you would like to receive this communication electronically, please contact [email or web address] or [phone] to request more information on [employer] Link access.    For providers and/or their staff who would like to refer a patient to [facility], please contact us through our one-stop-shop provider referral line, [facility], at [phone].    If you feel you have received this communication in error or would no longer like to receive these types of communications, please e-mail [email or web address]

## 2019-06-22 NOTE — PROGRESS NOTES
Subjective:      Patient ID: Olu Gant is a 69 y.o. male.    Chief Complaint: Diabetic Foot Exam  Patient presents for follow-up diabetic neuropathy pain.  Patient reports no change, pain medication very helpful at night, otherwise he can get to sleep.  He has been taking 2 on dialysis days, feels the pain medication is only thing that helps.  He is taking 450 mg Lyrica daily, patient is not confident it is helping at all.  Does relate having pacemaker placement 2 weeks ago, due to low pulse, states procedure went well.  Reports no changes in medication, dialysis, diabetes, unchanged in the last 4 weeks  Saw Dr Bunch 4/23      ROS     Constitutional   very pleasant, obese, well oriented, no distress     Cardiovascular          no chest pain, no shortness of breath    Respiratory          no cough, no congestion    Genitourinary   DIALYSIS     Musculoskeletal        SOME muscle aches, YES arthralgias/joint pain    Neurologic         NEUROPATHY     Endocrine          DIABETES          Objective:      Physical Exam  Vascular   Arterial Pulses Right: dorsalis pedis 1/4, posterior tibialis 1/4, normal CFT   Arterial Pulses Left: dorsalis pedis 1/4, posterior tibialis 1/4, normal CFT   No lower extremity edema bilateral  Pedal skin temperature is warm bilateral    Integumentary   Chronic dry skin dermatitis bilateral lower legs and feet  Has progressed somewhat with small fissures plantar posterior aspect of both heels.  These are stable with no erythema, but high risk for complications.  Skin texture lower legs remains much improved  Tender dystrophic onychomycosis, several nails with damage to nail bed, thickness reduced, no evidence of ingrown nail or subungual abscess       Neurological   Gross sensation intact, increased parasthesia's, painful diabetic neuropathy      Musculoskeletal   Muscle Strength and Tone:  poor    Joints, Bones, and Muscles: pes planus   Limited mobility, difficulty walking, antalgic  gait  Limited mobility  Obisity  Presents in appropriate tennis shoes  Dfficulty walking, using cane      Comprehensive metabolic panel    Ref Range & Units 5mo ago   Glucose 65 - 99 mg/dL 136High        BUN, Bld 7 - 25 mg/dL 27High     Creatinine 0.70 - 1.25 mg/dL 5.69High        eGFR if non African American > OR = 60 mL/min/1.73m2 9Low     eGFR if African American > OR = 60 mL/min/1.73m2 11Low     BUN/Creatinine Ratio 6 - 22 (calc) 5Low     Sodium 135 - 146 mmol/L 138    Potassium 3.5 - 5.3 mmol/L 4.5    Chloride 98 - 110 mmol/L 96Low     CO2 20 - 32 mmol/L 32    Calcium 8.6 - 10.3 mg/dL 8.5Low     Total Protein 6.1 - 8.1 g/dL 6.3    Albumin 3.6 - 5.1 g/dL 3.6    Globulin, Total 1.9 - 3.7 g/dL (calc) 2.7    Albumin/Globulin Ratio 1.0 - 2.5 (calc) 1.3    Total Bilirubin 0.2 - 1.2 mg/dL 0.3    Alkaline Phosphatase 40 - 115 U/L 108    AST 10 - 35 U/L 16    ALT 9 - 46 U/L 15                    Assessment:       Encounter Diagnoses   Name Primary?    Chronic peripheral neuropathic pain Yes    Type II diabetes mellitus with neurological manifestations     Pain management     Dry skin dermatitis     Fissure in skin of foot     Onychomycosis due to dermatophyte          Plan:       Olu was seen today for diabetic foot exam.    Diagnoses and all orders for this visit:    Chronic peripheral neuropathic pain    Type II diabetes mellitus with neurological manifestations    Pain management    Dry skin dermatitis    Fissure in skin of foot    Onychomycosis due to dermatophyte    Other orders  -     HYDROcodone-acetaminophen (NORCO) 7.5-325 mg per tablet; Take 1 tablet by mouth every 12 (twelve) hours as needed for Pain (1 daily, 2 Dialysis days).      Reviewed neuropathy, diabetes pain management.  We discussed possibly decreasing the Lyrica and increasing pain medication.  Patient verbalized understanding, stated he would stay with current treatment plan for few more months.  Diabetic foot exam performed.   Reviewed  diabetic education.  Encouraged patient to continue tight control of glucose / diabetes and benefits of this.    Advised patient progression of dry skin and cracks in heels, he needs to start applying diabetic lotion ever other day to feet and legs, have his wife help and inspect his feet.    checked  Refill Norco 7.5 mg 1 tablet taken on non dialysis days, 2 on dialysis days.  Instructed patient to contact the office with any changes which have not improved in 2-3 days.  Patient was in understanding and agreement with treatment plan  Counseled the patient on his conditions, their implications and medical management.  Instructed patient to contact the office with any changes, questions, concerns, worsening of symptoms. Patient verbalized understanding.   Total face to face time, exam, assessment, treatment, discussion, documentation 25 minutes, more than half this time spent on consultation and coordination of care.   Follow up 2 months.      This note was created using [employer] voice recognition software that occasionally misinterpreted phrases or words.

## 2019-08-22 ENCOUNTER — OFFICE VISIT (OUTPATIENT)
Dept: PODIATRY | Facility: CLINIC | Age: 69
End: 2019-08-22
Payer: MEDICARE

## 2019-08-22 VITALS
RESPIRATION RATE: 19 BRPM | TEMPERATURE: 98 F | OXYGEN SATURATION: 98 % | BODY MASS INDEX: 37.93 KG/M2 | SYSTOLIC BLOOD PRESSURE: 116 MMHG | HEART RATE: 93 BPM | DIASTOLIC BLOOD PRESSURE: 67 MMHG | WEIGHT: 236 LBS | HEIGHT: 66 IN

## 2019-08-22 DIAGNOSIS — L85.3 DRY SKIN DERMATITIS: ICD-10-CM

## 2019-08-22 DIAGNOSIS — R52 PAIN MANAGEMENT: ICD-10-CM

## 2019-08-22 DIAGNOSIS — M79.2 CHRONIC PERIPHERAL NEUROPATHIC PAIN: ICD-10-CM

## 2019-08-22 DIAGNOSIS — E11.49 TYPE II DIABETES MELLITUS WITH NEUROLOGICAL MANIFESTATIONS: Primary | ICD-10-CM

## 2019-08-22 DIAGNOSIS — G89.29 CHRONIC PERIPHERAL NEUROPATHIC PAIN: ICD-10-CM

## 2019-08-22 PROCEDURE — 99999 PR PBB SHADOW E&M-EST. PATIENT-LVL III: CPT | Mod: PBBFAC,,, | Performed by: PODIATRIST

## 2019-08-22 PROCEDURE — 99213 OFFICE O/P EST LOW 20 MIN: CPT | Mod: PBBFAC | Performed by: PODIATRIST

## 2019-08-22 PROCEDURE — 99214 PR OFFICE/OUTPT VISIT, EST, LEVL IV, 30-39 MIN: ICD-10-PCS | Mod: S$PBB,,, | Performed by: PODIATRIST

## 2019-08-22 PROCEDURE — 99214 OFFICE O/P EST MOD 30 MIN: CPT | Mod: S$PBB,,, | Performed by: PODIATRIST

## 2019-08-22 PROCEDURE — 99999 PR PBB SHADOW E&M-EST. PATIENT-LVL III: ICD-10-PCS | Mod: PBBFAC,,, | Performed by: PODIATRIST

## 2019-08-22 RX ORDER — HYDROCODONE BITARTRATE AND ACETAMINOPHEN 7.5; 325 MG/1; MG/1
1 TABLET ORAL EVERY 12 HOURS PRN
Qty: 42 TABLET | Refills: 0 | Status: SHIPPED | OUTPATIENT
Start: 2019-08-22 | End: 2019-09-12

## 2019-08-22 NOTE — LETTER
August 24, 2019      MD Liz Ball III[address]  [facility] [place] [address]           [facility]ound [facility]  [address] [place] [address]  Phone: [phone]  Fax: [phone]          Patient: Olu Gant   MR Number: [id number]   YOB: 1950   Date of Visit: 8/22/2019       Dear Dr. Brianna Elaine III:    Thank you for referring Olu Gant to me for evaluation. Attached you will find relevant portions of my assessment and plan of care.    If you have questions, please do not hesitate to call me. I look forward to following Olu Gant along with you.    Sincerely,    Anuradha Trujillo, DPGLORIA    Enclosure  CC:  No Recipients    If you would like to receive this communication electronically, please contact [email or web address] or [phone] to request more information on [employer] Link access.    For providers and/or their staff who would like to refer a patient to [facility], please contact us through our one-stop-shop provider referral line, [facility], at [phone].    If you feel you have received this communication in error or would no longer like to receive these types of communications, please e-mail [email or web address]

## 2019-08-23 ENCOUNTER — HOSPITAL ENCOUNTER (OUTPATIENT)
Facility: HOSPITAL | Age: 69
Discharge: HOME | End: 2019-08-24
Attending: EMERGENCY MEDICINE | Admitting: INTERNAL MEDICINE
Payer: MEDICARE

## 2019-08-23 DIAGNOSIS — I63.9 ISCHEMIC CEREBROVASCULAR ACCIDENT (CVA): ICD-10-CM

## 2019-08-23 DIAGNOSIS — I63.9 STROKE: ICD-10-CM

## 2019-08-23 DIAGNOSIS — G45.9 TIA (TRANSIENT ISCHEMIC ATTACK): ICD-10-CM

## 2019-08-23 DIAGNOSIS — R47.01 APHASIA: ICD-10-CM

## 2019-08-23 LAB
ALBUMIN SERPL BCP-MCNC: 4.1 G/DL (ref 3.5–5.2)
ALP SERPL-CCNC: 107 U/L (ref 55–135)
ALT SERPL W/O P-5'-P-CCNC: 14 U/L (ref 10–44)
ANION GAP SERPL CALC-SCNC: 15 MMOL/L (ref 8–16)
APTT PPP: 34.9 SEC (ref 26.2–34.7)
AST SERPL-CCNC: 14 U/L (ref 10–40)
BASOPHILS # BLD AUTO: 0.09 K/UL (ref 0–0.2)
BASOPHILS NFR BLD: 1.5 % (ref 0–1.9)
BILIRUB SERPL-MCNC: 0.8 MG/DL (ref 0.1–1)
BNP SERPL-MCNC: 41 PG/ML (ref 0–99)
BUN SERPL-MCNC: 33 MG/DL (ref 8–23)
CALCIUM SERPL-MCNC: 9.1 MG/DL (ref 8.7–10.5)
CHLORIDE SERPL-SCNC: 91 MMOL/L (ref 95–110)
CHOLEST SERPL-MCNC: 200 MG/DL (ref 120–199)
CHOLEST/HDLC SERPL: 7.7 {RATIO} (ref 2–5)
CO2 SERPL-SCNC: 28 MMOL/L (ref 23–29)
CREAT SERPL-MCNC: 6.3 MG/DL (ref 0.5–1.4)
DIFFERENTIAL METHOD: ABNORMAL
EOSINOPHIL # BLD AUTO: 0.2 K/UL (ref 0–0.5)
EOSINOPHIL NFR BLD: 2.7 % (ref 0–8)
ERYTHROCYTE [DISTWIDTH] IN BLOOD BY AUTOMATED COUNT: 15.1 % (ref 11.5–14.5)
EST. GFR  (AFRICAN AMERICAN): 9.5 ML/MIN/1.73 M^2
EST. GFR  (NON AFRICAN AMERICAN): 8.2 ML/MIN/1.73 M^2
GLUCOSE SERPL-MCNC: 168 MG/DL (ref 70–110)
GLUCOSE SERPL-MCNC: 169 MG/DL (ref 70–110)
HCT VFR BLD AUTO: 38.4 % (ref 40–54)
HDLC SERPL-MCNC: 26 MG/DL (ref 40–75)
HDLC SERPL: 13 % (ref 20–50)
HGB BLD-MCNC: 12.3 G/DL (ref 14–18)
IMM GRANULOCYTES # BLD AUTO: 0.06 K/UL (ref 0–0.04)
IMM GRANULOCYTES NFR BLD AUTO: 1 % (ref 0–0.5)
INR PPP: 1
LDLC SERPL CALC-MCNC: ABNORMAL MG/DL (ref 63–159)
LYMPHOCYTES # BLD AUTO: 1.4 K/UL (ref 1–4.8)
LYMPHOCYTES NFR BLD: 24.2 % (ref 18–48)
MCH RBC QN AUTO: 31.7 PG (ref 27–31)
MCHC RBC AUTO-ENTMCNC: 32 G/DL (ref 32–36)
MCV RBC AUTO: 99 FL (ref 82–98)
MONOCYTES # BLD AUTO: 0.7 K/UL (ref 0.3–1)
MONOCYTES NFR BLD: 12.3 % (ref 4–15)
NEUTROPHILS # BLD AUTO: 3.5 K/UL (ref 1.8–7.7)
NEUTROPHILS NFR BLD: 58.3 % (ref 38–73)
NONHDLC SERPL-MCNC: 174 MG/DL
NRBC BLD-RTO: 0 /100 WBC
PLATELET # BLD AUTO: 164 K/UL (ref 150–350)
PMV BLD AUTO: 11 FL (ref 9.2–12.9)
POTASSIUM SERPL-SCNC: 4 MMOL/L (ref 3.5–5.1)
PROT SERPL-MCNC: 7.5 G/DL (ref 6–8.4)
PROTHROMBIN TIME: 13.2 SEC (ref 11.7–14)
RBC # BLD AUTO: 3.88 M/UL (ref 4.6–6.2)
SODIUM SERPL-SCNC: 134 MMOL/L (ref 136–145)
TRIGL SERPL-MCNC: 427 MG/DL (ref 30–150)
TROPONIN I SERPL DL<=0.01 NG/ML-MCNC: 0.05 NG/ML (ref 0.02–0.04)
TSH SERPL DL<=0.005 MIU/L-ACNC: 2.33 UIU/ML (ref 0.34–5.6)
WBC # BLD AUTO: 5.94 K/UL (ref 3.9–12.7)

## 2019-08-23 PROCEDURE — 83880 ASSAY OF NATRIURETIC PEPTIDE: CPT

## 2019-08-23 PROCEDURE — G0508 PR CRITICAL CARE TELEHLTH INITIAL CONSULT 60MIN: ICD-10-PCS | Mod: GT,,, | Performed by: PSYCHIATRY & NEUROLOGY

## 2019-08-23 PROCEDURE — 80061 LIPID PANEL: CPT

## 2019-08-23 PROCEDURE — 85025 COMPLETE CBC W/AUTO DIFF WBC: CPT

## 2019-08-23 PROCEDURE — 84484 ASSAY OF TROPONIN QUANT: CPT

## 2019-08-23 PROCEDURE — G0508 CRIT CARE TELEHEA CONSULT 60: HCPCS | Mod: GT,,, | Performed by: PSYCHIATRY & NEUROLOGY

## 2019-08-23 PROCEDURE — 99285 EMERGENCY DEPT VISIT HI MDM: CPT | Mod: 25

## 2019-08-23 PROCEDURE — 25500020 PHARM REV CODE 255: Performed by: EMERGENCY MEDICINE

## 2019-08-23 PROCEDURE — 85610 PROTHROMBIN TIME: CPT

## 2019-08-23 PROCEDURE — 84443 ASSAY THYROID STIM HORMONE: CPT

## 2019-08-23 PROCEDURE — 85730 THROMBOPLASTIN TIME PARTIAL: CPT

## 2019-08-23 PROCEDURE — 93005 ELECTROCARDIOGRAM TRACING: CPT

## 2019-08-23 PROCEDURE — 80053 COMPREHEN METABOLIC PANEL: CPT

## 2019-08-23 RX ORDER — IODIXANOL 320 MG/ML
100 INJECTION, SOLUTION INTRAVASCULAR
Status: COMPLETED | OUTPATIENT
Start: 2019-08-23 | End: 2019-08-23

## 2019-08-23 RX ADMIN — IODIXANOL 100 ML: 320 INJECTION, SOLUTION INTRAVASCULAR at 10:08

## 2019-08-24 ENCOUNTER — CLINICAL SUPPORT (OUTPATIENT)
Dept: CARDIOLOGY | Facility: HOSPITAL | Age: 69
End: 2019-08-24
Attending: FAMILY MEDICINE
Payer: MEDICARE

## 2019-08-24 VITALS
HEART RATE: 60 BPM | OXYGEN SATURATION: 95 % | BODY MASS INDEX: 36.09 KG/M2 | WEIGHT: 229.94 LBS | DIASTOLIC BLOOD PRESSURE: 74 MMHG | RESPIRATION RATE: 19 BRPM | TEMPERATURE: 96 F | SYSTOLIC BLOOD PRESSURE: 133 MMHG | HEIGHT: 67 IN

## 2019-08-24 VITALS — HEIGHT: 67 IN | BODY MASS INDEX: 36.09 KG/M2 | WEIGHT: 229.94 LBS

## 2019-08-24 PROBLEM — Z86.73 OLD LACUNAR STROKE WITHOUT LATE EFFECT: Status: ACTIVE | Noted: 2019-08-24

## 2019-08-24 PROBLEM — I63.9 STROKE: Status: ACTIVE | Noted: 2019-08-24

## 2019-08-24 PROBLEM — E04.1 RIGHT THYROID NODULE: Status: ACTIVE | Noted: 2019-08-24

## 2019-08-24 PROBLEM — G62.9 NEUROPATHY: Status: ACTIVE | Noted: 2019-08-24

## 2019-08-24 PROBLEM — I63.9 ISCHEMIC CEREBROVASCULAR ACCIDENT (CVA): Status: ACTIVE | Noted: 2019-08-24

## 2019-08-24 LAB
AORTIC ROOT ANNULUS: 3.6 CM
AORTIC VALVE CUSP SEPERATION: 1.87 CM
AV INDEX (PROSTH): 0.57
AV MEAN GRADIENT: 9 MMHG
AV PEAK GRADIENT: 17 MMHG
AV VALVE AREA: 2.21 CM2
AV VELOCITY RATIO: 52.63
BSA FOR ECHO PROCEDURE: 2.22 M2
CV ECHO LV RWT: 0.53 CM
DOP CALC AO PEAK VEL: 2.09 M/S
DOP CALC AO VTI: 45.02 CM
DOP CALC LVOT AREA: 3.9 CM2
DOP CALC LVOT DIAMETER: 2.23 CM
DOP CALC LVOT PEAK VEL: 110 M/S
DOP CALC LVOT STROKE VOLUME: 99.51 CM3
DOP CALCLVOT PEAK VEL VTI: 25.49 CM
E WAVE DECELERATION TIME: 256.92 MSEC
E/A RATIO: 0.69
E/E' RATIO: 10.29 M/S
ECHO LV POSTERIOR WALL: 1.43 CM (ref 0.6–1.1)
ESTIMATED AVG GLUCOSE: 143 MG/DL (ref 68–131)
FRACTIONAL SHORTENING: 31 % (ref 28–44)
GLUCOSE SERPL-MCNC: 157 MG/DL (ref 70–110)
GLUCOSE SERPL-MCNC: 184 MG/DL (ref 70–110)
GLUCOSE SERPL-MCNC: 199 MG/DL (ref 70–110)
HBA1C MFR BLD HPLC: 6.6 % (ref 4.5–6.2)
INTERVENTRICULAR SEPTUM: 1.42 CM (ref 0.6–1.1)
IVRT: 82.88 MSEC
LEFT ATRIUM SIZE: 4.93 CM
LEFT INTERNAL DIMENSION IN SYSTOLE: 3.72 CM (ref 2.1–4)
LEFT VENTRICLE MASS INDEX: 156 G/M2
LEFT VENTRICULAR INTERNAL DIMENSION IN DIASTOLE: 5.38 CM (ref 3.5–6)
LEFT VENTRICULAR MASS: 334.83 G
LV LATERAL E/E' RATIO: 9 M/S
LV SEPTAL E/E' RATIO: 12 M/S
MV PEAK A VEL: 1.04 M/S
MV PEAK E VEL: 0.72 M/S
PISA TR MAX VEL: 2.84 M/S
PV PEAK VELOCITY: 105.11 CM/S
RA PRESSURE: 3 MMHG
RIGHT VENTRICULAR END-DIASTOLIC DIMENSION: 261 CM
TDI LATERAL: 0.08 M/S
TDI SEPTAL: 0.06 M/S
TDI: 0.07 M/S
TR MAX PG: 32 MMHG
TV REST PULMONARY ARTERY PRESSURE: 35 MMHG

## 2019-08-24 PROCEDURE — 25000003 PHARM REV CODE 250: Performed by: INTERNAL MEDICINE

## 2019-08-24 PROCEDURE — 83036 HEMOGLOBIN GLYCOSYLATED A1C: CPT

## 2019-08-24 PROCEDURE — 94760 N-INVAS EAR/PLS OXIMETRY 1: CPT

## 2019-08-24 PROCEDURE — 96372 THER/PROPH/DIAG INJ SC/IM: CPT | Mod: 59

## 2019-08-24 PROCEDURE — 93306 TTE W/DOPPLER COMPLETE: CPT

## 2019-08-24 PROCEDURE — 82962 GLUCOSE BLOOD TEST: CPT | Mod: 91

## 2019-08-24 PROCEDURE — G0378 HOSPITAL OBSERVATION PER HR: HCPCS

## 2019-08-24 PROCEDURE — 94761 N-INVAS EAR/PLS OXIMETRY MLT: CPT

## 2019-08-24 PROCEDURE — 63600175 PHARM REV CODE 636 W HCPCS: Performed by: INTERNAL MEDICINE

## 2019-08-24 PROCEDURE — 36415 COLL VENOUS BLD VENIPUNCTURE: CPT

## 2019-08-24 PROCEDURE — 92523 SPEECH SOUND LANG COMPREHEN: CPT

## 2019-08-24 PROCEDURE — 82962 GLUCOSE BLOOD TEST: CPT

## 2019-08-24 RX ORDER — LEVOTHYROXINE SODIUM 100 UG/1
100 TABLET ORAL
Status: DISCONTINUED | OUTPATIENT
Start: 2019-08-24 | End: 2019-08-24 | Stop reason: HOSPADM

## 2019-08-24 RX ORDER — ROSUVASTATIN CALCIUM 10 MG/1
10 TABLET, COATED ORAL NIGHTLY
Status: DISCONTINUED | OUTPATIENT
Start: 2019-08-24 | End: 2019-08-24 | Stop reason: HOSPADM

## 2019-08-24 RX ORDER — LABETALOL HYDROCHLORIDE 5 MG/ML
10 INJECTION, SOLUTION INTRAVENOUS EVERY 4 HOURS PRN
Status: DISCONTINUED | OUTPATIENT
Start: 2019-08-24 | End: 2019-08-24 | Stop reason: HOSPADM

## 2019-08-24 RX ORDER — ROSUVASTATIN CALCIUM 10 MG/1
10 TABLET, COATED ORAL NIGHTLY
Qty: 30 TABLET | Refills: 0 | Status: SHIPPED | OUTPATIENT
Start: 2019-08-24 | End: 2019-12-23

## 2019-08-24 RX ORDER — AMLODIPINE BESYLATE 5 MG/1
5 TABLET ORAL 2 TIMES DAILY
Status: DISCONTINUED | OUTPATIENT
Start: 2019-08-24 | End: 2019-08-24 | Stop reason: HOSPADM

## 2019-08-24 RX ORDER — SODIUM CHLORIDE 0.9 % (FLUSH) 0.9 %
10 SYRINGE (ML) INJECTION
Status: DISCONTINUED | OUTPATIENT
Start: 2019-08-24 | End: 2019-08-24 | Stop reason: HOSPADM

## 2019-08-24 RX ORDER — ASPIRIN 81 MG/1
81 TABLET ORAL DAILY
Status: DISCONTINUED | OUTPATIENT
Start: 2019-08-24 | End: 2019-08-24 | Stop reason: HOSPADM

## 2019-08-24 RX ORDER — ATORVASTATIN CALCIUM 40 MG/1
40 TABLET, FILM COATED ORAL DAILY
Status: DISCONTINUED | OUTPATIENT
Start: 2019-08-24 | End: 2019-08-24 | Stop reason: SDUPTHER

## 2019-08-24 RX ORDER — HEPARIN SODIUM 5000 [USP'U]/ML
5000 INJECTION, SOLUTION INTRAVENOUS; SUBCUTANEOUS EVERY 8 HOURS
Status: DISCONTINUED | OUTPATIENT
Start: 2019-08-24 | End: 2019-08-24 | Stop reason: HOSPADM

## 2019-08-24 RX ORDER — PANTOPRAZOLE SODIUM 40 MG/1
40 TABLET, DELAYED RELEASE ORAL
Status: DISCONTINUED | OUTPATIENT
Start: 2019-08-24 | End: 2019-08-24 | Stop reason: HOSPADM

## 2019-08-24 RX ORDER — HYDROCODONE BITARTRATE AND ACETAMINOPHEN 7.5; 325 MG/1; MG/1
1 TABLET ORAL EVERY 8 HOURS PRN
Status: DISCONTINUED | OUTPATIENT
Start: 2019-08-24 | End: 2019-08-24 | Stop reason: HOSPADM

## 2019-08-24 RX ORDER — ALLOPURINOL 100 MG/1
100 TABLET ORAL DAILY
Status: DISCONTINUED | OUTPATIENT
Start: 2019-08-24 | End: 2019-08-24 | Stop reason: HOSPADM

## 2019-08-24 RX ORDER — ASPIRIN 81 MG/1
81 TABLET ORAL DAILY
Qty: 30 TABLET | Refills: 0 | Status: SHIPPED | OUTPATIENT
Start: 2019-08-25 | End: 2020-04-27

## 2019-08-24 RX ORDER — ASPIRIN 81 MG/1
81 TABLET ORAL DAILY
Status: DISCONTINUED | OUTPATIENT
Start: 2019-08-24 | End: 2019-08-24 | Stop reason: SDUPTHER

## 2019-08-24 RX ORDER — ASPIRIN 325 MG
325 TABLET ORAL
Status: ACTIVE | OUTPATIENT
Start: 2019-08-24 | End: 2019-08-24

## 2019-08-24 RX ORDER — ATORVASTATIN CALCIUM 40 MG/1
40 TABLET, FILM COATED ORAL DAILY
Status: DISCONTINUED | OUTPATIENT
Start: 2019-08-24 | End: 2019-08-24

## 2019-08-24 RX ORDER — PREGABALIN 75 MG/1
75 CAPSULE ORAL 2 TIMES DAILY
Status: DISCONTINUED | OUTPATIENT
Start: 2019-08-24 | End: 2019-08-24 | Stop reason: HOSPADM

## 2019-08-24 RX ORDER — SEVELAMER CARBONATE 800 MG/1
800 TABLET, FILM COATED ORAL
Status: DISCONTINUED | OUTPATIENT
Start: 2019-08-24 | End: 2019-08-24 | Stop reason: HOSPADM

## 2019-08-24 RX ADMIN — SEVELAMER CARBONATE 800 MG: 800 TABLET, FILM COATED ORAL at 04:08

## 2019-08-24 RX ADMIN — SEVELAMER CARBONATE 800 MG: 800 TABLET, FILM COATED ORAL at 12:08

## 2019-08-24 RX ADMIN — LEVOTHYROXINE SODIUM 100 MCG: 0.1 TABLET ORAL at 05:08

## 2019-08-24 RX ADMIN — ALLOPURINOL 100 MG: 100 TABLET ORAL at 09:08

## 2019-08-24 RX ADMIN — HEPARIN SODIUM 5000 UNITS: 5000 INJECTION INTRAVENOUS; SUBCUTANEOUS at 04:08

## 2019-08-24 RX ADMIN — AMLODIPINE BESYLATE 5 MG: 5 TABLET ORAL at 09:08

## 2019-08-24 RX ADMIN — ASPIRIN 81 MG: 81 TABLET, COATED ORAL at 09:08

## 2019-08-24 RX ADMIN — PANTOPRAZOLE SODIUM 40 MG: 40 TABLET, DELAYED RELEASE ORAL at 05:08

## 2019-08-24 RX ADMIN — PREGABALIN 75 MG: 75 CAPSULE ORAL at 09:08

## 2019-08-24 RX ADMIN — HEPARIN SODIUM 5000 UNITS: 5000 INJECTION INTRAVENOUS; SUBCUTANEOUS at 05:08

## 2019-08-24 RX ADMIN — ATORVASTATIN CALCIUM 40 MG: 40 TABLET, FILM COATED ORAL at 09:08

## 2019-08-24 NOTE — HOSPITAL COURSE
69 y.o. M with pmh of ESRD on HD,  type 2 DM, hypothyroidism, Obesity, sleep apnea, s/p pacemaker in situ, chronic anemia from ESRD admitted for suspected TIA with symptoms of aphasia.  During hospitalization symptoms improved.  Swallow study was unremarkable.  Imaging of the head did not find acute cause of patient's aphasia.  Neurology evaluated patient and did not recommend MRI at this time in the setting of patient's pacemaker.  Of note, echo with bubble study found patent foramen ovale.  Ultrasound lower extremities was negative.  Patient previously was on atorvastatin however developed myalgias of the lower extremities. Patient was stable at discharge with instructions to follow up with Neurology and PCP for follow-up of thyroid nodule.    General: Patient resting comfortably in no acute distress. Appears as stated age. Calm  Eyes: EOM intact. No conjunctivae injection. No scleral icterus.  ENT: Hearing grossly intact. No discharge from ears. No nasal discharge.   CVS: RRR. No LE edema BL.  Lungs: CTA BL, no wheezing or crackles. Good breath sounds. No accessory muscle use. No acute respiratory distress  Neuro: AOx3. GCS 15. Cranial nerves grossly intact. Moves all extremities equally. Follows commands. Responds appropriately

## 2019-08-24 NOTE — ED PROVIDER NOTES
"Encounter Date: 8/23/2019       History     Chief Complaint   Patient presents with    Aphasia     slurred speech starting today at 2030     Olu Gant is a 69 y.o. male with history of end-stage renal disease, diabetes, hypertension, hypothyroidism, atrial fibrillation requiring pacemaker presenting here today with slurred speech beginning early prior to arrival. Onset time at approx 20:00-20:30. No report of weakness, syncope, sensation loss, ataxia. His wife reports that he had dialysis today (Monday Wednesday Friday) without complication.  No previous history of strokes, no previous history of acute myocardial infarctions.  The patient does have a history of atrial fibrillation for which he has a pacemaker now.    The history is provided by the patient and the spouse.   Cerebrovascular Accident   The primary symptoms include speech change. Primary symptoms do not include headaches, syncope, loss of consciousness, seizures, dizziness, paresthesias, focal weakness, loss of sensation, fever, nausea or vomiting. The symptoms began just prior to arrival. The symptoms are unchanged.   The speech change is unchanged.   Additional symptoms do not include weakness.     Review of patient's allergies indicates:  No Known Allergies  Past Medical History:   Diagnosis Date    Asbestos exposure - 1973 2/18/2014    Benign hypertension with ESRD (end-stage renal disease) 2/18/2014    Diabetes type 2 - since 1996 2/18/2014    DIALYSIS 2013    ESRD (end stage renal disease) - initiated dialysis 05/29/2013 2/18/2014    Gout, arthritis 2/18/2014    Hypothyroidism 2/18/2014    Irregular heart rhythm - unsure of Afib vs. A flutter 2/18/2014    Obesity 2/18/2014    Secondary hyperparathyroidism, renal 2/18/2014    Sleep apnea on Bipap 2/18/2014     Past Surgical History:   Procedure Laterality Date    AV FISTULA PLACEMENT      COLON SURGERY  02/2017    resection for "ischemic colon"    INGUINAL HERNIA REPAIR      " bilaterally    pace maker      [email or web address]    TONSILLECTOMY       Family History   Problem Relation Age of Onset    Heart disease Mother     Diabetes Mother     Hypertension Mother     Diabetes Father     Stroke Father     Alcohol abuse Brother     Kidney disease Neg Hx     Cancer Neg Hx      Social History     Tobacco Use    Smoking status: Never Smoker    Smokeless tobacco: Never Used   Substance Use Topics    Alcohol use: No    Drug use: No     Review of Systems   Constitutional: Negative for fever.   HENT: Negative for congestion, facial swelling, mouth sores, sore throat and trouble swallowing.    Eyes: Negative for pain, discharge and visual disturbance.   Respiratory: Negative for chest tightness and shortness of breath.    Cardiovascular: Negative for chest pain and syncope.   Gastrointestinal: Negative for abdominal pain, nausea and vomiting.   Genitourinary:        Anuric   Musculoskeletal: Negative for back pain.   Skin: Negative for rash.   Neurological: Positive for speech change and speech difficulty. Negative for dizziness, tremors, focal weakness, seizures, loss of consciousness, syncope, weakness, light-headedness, numbness, headaches and paresthesias.   Hematological: Does not bruise/bleed easily.   Psychiatric/Behavioral: Positive for confusion.       Physical Exam     Initial Vitals [08/23/19 2155]   BP Pulse Resp Temp SpO2   125/68 85 18 98 °F (36.7 °C) 99 %      MAP       --         Physical Exam    Nursing note and vitals reviewed.  Constitutional: He appears well-developed and well-nourished. He is not diaphoretic. No distress.   HENT:   Head: Normocephalic and atraumatic.   Right Ear: External ear normal.   Left Ear: External ear normal.   Nose: Nose normal.   Mouth/Throat: Oropharynx is clear and moist.   Tongue appears to possibly be swollen   Eyes: EOM are normal. Right eye exhibits no discharge. Left eye exhibits no discharge.   Neck: Normal range of motion. Neck supple. No  JVD present.   Cardiovascular: Normal rate. Exam reveals distant heart sounds.    Left AV fistula with palpable thrill   Pulmonary/Chest: No stridor. No respiratory distress. He has no wheezes. He has no rales.   Pacemaker present to the right upper chest   Abdominal: Soft. He exhibits no distension. There is no tenderness. There is no guarding.   Musculoskeletal: Normal range of motion. He exhibits no edema or tenderness.   Neurological: He is alert. He has normal strength. No cranial nerve deficit. GCS score is 15. GCS eye subscore is 4. GCS verbal subscore is 5. GCS motor subscore is 6.   Dysarthric speech.  Word-finding issues.  Patient oriented to person.  Place.  Difficulty with year.    Skin: Skin is warm and dry.         ED Course   Procedures  Labs Reviewed   CBC W/ AUTO DIFFERENTIAL - Abnormal; Notable for the following components:       Result Value    RBC 3.88 (*)     Hemoglobin 12.3 (*)     Hematocrit 38.4 (*)     Mean Corpuscular Volume 99 (*)     Mean Corpuscular Hemoglobin 31.7 (*)     RDW 15.1 (*)     Immature Granulocytes 1.0 (*)     Immature Grans (Abs) 0.06 (*)     All other components within normal limits   COMPREHENSIVE METABOLIC PANEL - Abnormal; Notable for the following components:    Sodium 134 (*)     Chloride 91 (*)     Glucose 169 (*)     All other components within normal limits   APTT - Abnormal; Notable for the following components:    aPTT 34.9 (*)     All other components within normal limits   POCT GLUCOSE - Abnormal; Notable for the following components:    POC Glucose 168 (*)     All other components within normal limits   PROTIME-INR   TSH   LIPID PANEL   TROPONIN I   B-TYPE NATRIURETIC PEPTIDE   POCT GLUCOSE MONITORING CONTINUOUS     EKG Readings: (Independently Interpreted)   Initial Reading: No STEMI.   Paced rhythm at 60 at 60 beats per minute with T-wave inversions in 1 and aVL with no ST elevation or depression       Imaging Results          CTA Head and Neck (xpd) (In  process)                CT Head Without Contrast (In process)                  Medical Decision Making:   Initial Assessment:   Olu Gant is a 69 y.o. male end-stage renal disease here with dysarthria and partial aphasia concerning for possible CVA.  Vital signs are stable, borderline desats with oxygen satting 94% on room air.  Afebrile.  Differential Diagnosis:   Cerebrovascular accident, TIA, dysarthria, aphasia, less likely would be an allergic reaction or medication reaction.  The patient is not on an ACE-inhibitor.  Clinical Tests:   Lab Tests: Ordered and Reviewed  Radiological Study: Ordered and Reviewed  Medical Tests: Ordered and Reviewed  ED Management:  Telestroke activated. Dr Cardenas evaluated the patient. I give a NIH stroke score of 2 for dysarthria and aphasia. Decision made to not give TPA at this point.     Additional MDM:     NIH Stroke Scale:   Level of consciousness = 0 - alert  LOC commands = 0 - performs both correctly  Best gaze = 0 - normal  Visual = 0 - no visual loss  Facial palsy = 1 - minor  Motor left arm =  0 - no drift  Motor right arm =  0 - no drift  Motor left leg = 0 - no drift  Motor right leg =  0 - no drift  Limb ataxia = 0 - absent  Sensory = 0 - normal  Best language = 1 - mild to moderate aphasia  Dysarthria = 1 - mild to moderate dysarthria  Extinction and inattention = 0 - no neglect           Attending Attestation:   Physician Attestation Statement for Resident:  As the supervising MD   Physician Attestation Statement: I have personally seen and examined this patient.   I agree with the above history. -:   As the supervising MD I agree with the above PE.    As the supervising MD I agree with the above treatment, course, plan, and disposition.            I saw and examined the patient.  I have reviewed and agree with the resident's findings, including all diagnostic interpretations and plans as written.  I was present for the key portions of the separately billed  procedures.    69-year-old male presents with acute onset of aphasia.  No other focal neurological deficits on exam.  The patient was immediately stroke activated upon arrival to the emergency department.  Tele Stroke was contacted.  Dr. Cardenas evaluated the patient.  He states that he is not a tPA candidate given his end-stage renal disease status.  The patient was sent for CT as well as CTA head and neck.  Upon return from CT the patient's dysarthria and aphasia had completely resolved.  CT head and CTA is are negative for acute abnormalities.  Upon re-evaluation the patient has mild dysarthria but his aphasia has resolved.  Given his waxing and waning symptoms he again is not a candidate for tPA.  He will be admitted for further evaluation of his dysarthria and aphasia.  Hospitalist service to admit.    Brandie Maya MD  Emergency Medicine  08/24/2019 2:48 AM          ED Course as of Aug 23 2317   Fri Aug 23, 2019   2248 Cholesterol(!): 200 [NS]   2248 Triglycerides(!): 427 [NS]   2248 HDL(!): 26 [NS]   2248 Troponin I(!!): 0.052 [NS]   2303 There had been a brief improvement in his dysarthria however it is returning now after returning from the CAT scan a    [NS]      ED Course User Index  [NS] Yosi Major MD     Clinical Impression:       ICD-10-CM ICD-9-CM   1. Stroke I63.9 434.91   2. Aphasia R47.01 784.3   3. Ischemic cerebrovascular accident (CVA) I63.9 434.91                                Brandie Maya MD  08/24/19 0245

## 2019-08-24 NOTE — PLAN OF CARE
Problem: Adult Inpatient Plan of Care  Goal: Absence of Hospital-Acquired Illness or Injury  Outcome: Ongoing (interventions implemented as appropriate)  Pt has no injury at this time    Intervention: Prevent Skin Injury  Pt skin assesed  Intervention: Prevent Infection  Pt educated

## 2019-08-24 NOTE — ED NOTES
PT RESTING IN BED WITHOUT COMPLAINTS. PATIENT'S SPEECH A LITTLE SLURRED AT THIS TIME. SPEECH WAS CLEAR AFTER PT RETURNED FROM CT SCAN EARLIER. WILL CONTINUE TO MONITOR.

## 2019-08-24 NOTE — CONSULTS
[facility]  Neurology  Consult Note    Patient Name: Olu Gant  MRN: [id number]  Admission Date: 8/23/2019  Hospital Length of Stay: 0 days  Code Status: Full Code   Attending Provider: Thiago Arcos MD   Consulting Provider: TRAE Gill  Primary Care Physician: Brianna Elaine III, MD  Principal Problem:Ischemic cerebrovascular accident (CVA)    Consults  Subjective:     Chief Complaint:  Speech disturbance    HPI: Patient seen and examined    Patient is a 68 y/o male with a PMH of: ESRD on HD,  type 2 DM, hypothyroidism, Obesity, sleep apnea, s/p pacemaker in situ, chronic anemia, neuropathy        All medication reviewed      Patient presented with: speech difficulty.  wife describes as slurred speech and word finding difficulty, right facial droop, that was noticed at 8 PM on 8/23/19. No h/o weakness, vision changes, abnormal sensations, urinary or bladder problems.  Patient's symptoms initially improved in ED but recurred.   In the ER, tele stroke was activated, tpa was not recommended due to heparin that he received with dialysis.  Patient not on aspirin at home, was not on statin due to unclear reasons.  Upon exam spouse states pt is speaking better but still a little slurred. Spouse & pt states the event lasted about 6 hrs or so           CRT:6.3H  LDL:123  A1c:6.6    Brain imaging:  CT head: IMPRESSION:  1. No acute intracranial abnormality.  2. Chronic small vessel ischemic changes.  3. Old right caudate body lacunar infarct    CTA head/neck:   1. Mild calcified plaque involving bilateral carotid bulbs and proximal internal carotid arteries, but no internal carotid artery hemodynamically significant stenosis throughout the neck.  2. Patent bilateral vertebral arteries, with mild calcified plaque involving left vertebral artery V4 segment.  3. Mild calcified plaque involving intracranial bilateral internal carotid arteries, without other significant abnormality of the major  "intracranial arteries.  4. 11 x 5 mm exophytic relatively slightly hypodense focus along posterior aspect of right thyroid lobe. Although this could represent an exophytic thyroid nodule, in the appropriate clinical setting (hypercalcemia) parathyroid adenoma can be considered.    Echo: P        Past Medical History:   Diagnosis Date    Asbestos exposure - 1973 2/18/2014    Benign hypertension with ESRD (end-stage renal disease) 2/18/2014    Diabetes type 2 - since 1996 2/18/2014    DIALYSIS 2013    ESRD (end stage renal disease) - initiated dialysis 05/29/2013 2/18/2014    Gout, arthritis 2/18/2014    Hypothyroidism 2/18/2014    Irregular heart rhythm - unsure of Afib vs. A flutter 2/18/2014    Obesity 2/18/2014    Secondary hyperparathyroidism, renal 2/18/2014    Sleep apnea on Bipap 2/18/2014       Past Surgical History:   Procedure Laterality Date    AV FISTULA PLACEMENT      COLON SURGERY  02/2017    resection for "ischemic colon"    INGUINAL HERNIA REPAIR      bilaterally    pace maker      [email or web address]    TONSILLECTOMY         Review of patient's allergies indicates:  No Known Allergies    Current Neurological Medications: per MAR    No current facility-administered medications on file prior to encounter.      Current Outpatient Medications on File Prior to Encounter   Medication Sig    allopurinol (ZYLOPRIM) 100 MG tablet Take 100 mg by mouth once daily.    amLODIPine (NORVASC) 5 MG tablet Take 5 mg by mouth 2 (two) times daily.    levothyroxine (SYNTHROID) 100 MCG tablet Take 100 mcg by mouth once daily.    pantoprazole (PROTONIX) 40 MG tablet Take 40 mg by mouth once daily.    pregabalin (LYRICA) 150 MG capsule Take 150 mg by mouth 3 (three) times daily.    sevelamer HCl (RENAGEL) 800 MG Tab Take 800 mg by mouth 3 (three) times daily with meals.    diclofenac sodium (VOLTAREN) 1 % Gel Apply 2 g topically 2 (two) times daily.    ergocalciferol (ERGOCALCIFEROL) 50,000 unit Cap Take 50,000 " Units by mouth every 30 days.     fluticasone (FLONASE) 50 mcg/actuation nasal spray SHAKE LQ AND U 1 SPR IEN D    HYDROcodone-acetaminophen (NORCO) 7.5-325 mg per tablet Take 1 tablet by mouth every 12 (twelve) hours as needed for Pain. Take 1 daily, 2 on dialysis days    nitroGLYCERIN (NITROSTAT) 0.4 MG SL tablet Place 0.4 mg under the tongue every 5 (five) minutes as needed for Chest pain.      Family History     Problem Relation (Age of Onset)    Alcohol abuse Brother    Diabetes Mother, Father    Heart disease Mother    Hypertension Mother    Stroke Father        Tobacco Use    Smoking status: Never Smoker    Smokeless tobacco: Never Used   Substance and Sexual Activity    Alcohol use: No    Drug use: No    Sexual activity: Not on file     Review of Systems   Constitutional: Negative.    HENT: Negative.    Eyes: Negative.    Respiratory: Negative.    Cardiovascular: Negative.    Gastrointestinal: Negative.    Genitourinary: Negative.    Musculoskeletal: Negative.    Neurological: Positive for speech difficulty. Negative for dizziness, tremors, seizures, syncope, facial asymmetry, weakness, light-headedness, numbness and headaches.   Psychiatric/Behavioral: Negative.      Objective:     Vital Signs (Most Recent):  Temp: 98 °F (36.7 °C) (08/24/19 0737)  Pulse: 83 (08/24/19 0737)  Resp: 20 (08/24/19 0737)  BP: 137/78 (08/24/19 0737)  SpO2: 100 % (08/24/19 0737) Vital Signs (24h Range):  Temp:  [97.5 °F (36.4 °C)-98 °F (36.7 °C)] 98 °F (36.7 °C)  Pulse:  [60-85] 83  Resp:  [13-20] 20  SpO2:  [91 %-100 %] 100 %  BP: (104-143)/(50-78) 137/78     Weight: 104.3 kg (229 lb 15 oz)  Body mass index is 36.01 kg/m².    Physical Exam   Constitutional: He is oriented to person, place, and time. He appears well-developed and well-nourished.   HENT:   Head: Normocephalic and atraumatic.   Eyes: Pupils are equal, round, and reactive to light. EOM are normal.   Neck: Normal range of motion. Neck supple.   Cardiovascular:  Normal rate.   Pulmonary/Chest: Effort normal.   Abdominal: Soft.   Musculoskeletal: Normal range of motion.   Neurological: He is oriented to person, place, and time. He has normal strength.   Skin: Skin is warm and dry.   Psychiatric: He has a normal mood and affect. His speech is slurred.   Nursing note and vitals reviewed.      NEUROLOGICAL EXAMINATION:     MENTAL STATUS   Oriented to person, place, and time.   Speech: slurred (Mildly dysarthric - improving)    CRANIAL NERVES     CN II   Visual fields full to confrontation.     CN III, IV, VI   Pupils are equal, round, and reactive to light.  Extraocular motions are normal.     MOTOR EXAM     Strength   Strength 5/5 throughout.     SENSORY EXAM   Light touch normal.       Significant Labs: All pertinent lab results from the past 24 hours have been reviewed.    Significant Imaging: I have reviewed and interpreted all pertinent imaging results/findings within the past 24 hours.    Assessment and Plan:   CVA vs TIA   - r/o stroke   - pt presented with speech disturbance; much improved today   - not tPA candidate d/t recent heparin fropm HD   - unable to obtain MRI d/t pacer   - CT shows old lacunar stroke   - Echo with bubble   - Neuro CHks   - PT/OT/ST evals   - OK for ASA 81mg PO QD & statin for stroke prevention    H/O cva   - CT shows right caudate lacunar infarct   - without prev deficits; incidental   - ASA/statin for stroke prevention    Thyroid nodule   - incidental finding   - 11 x 5 mm exophytic relatively slightly hypodense focus along posterior aspect of right thyroid lobe.   - pt to f/u with MD as outpt      Stroke education was provided.  If patient has acute neurological changes including weakness, confusion, speech changes, facial droop, difficulty walking, and sensory changes immediately call 911.  Follow up Neurology in 2 weeks at [phone].  Medication side effects discussed with the patient and/or caregiver.                  Active Diagnoses:     Diagnosis Date Noted POA    PRINCIPAL PROBLEM:  Ischemic cerebrovascular accident (CVA) [I63.9] 08/24/2019 Yes    Neuropathy [G62.9] 08/24/2019 Yes    Old lacunar stroke without late effect [Z86.73] 08/24/2019 Not Applicable    Right thyroid nodule [E04.1] 08/24/2019 Yes      Problems Resolved During this Admission:       VTE Risk Mitigation (From admission, onward)        Ordered     heparin (porcine) injection 5,000 Units  Every 8 hours      08/24/19 0139     IP VTE HIGH RISK PATIENT  Once      08/24/19 0139     Place sequential compression device  Until discontinued      08/24/19 0139     Place JESSICA hose  Until discontinued      08/24/19 0139          Thank you for your consult. I will follow-up with patient. Please contact us if you have any additional questions.    Zoey Guadalupe, KAROLYNP  Neurology  [facility]

## 2019-08-24 NOTE — SUBJECTIVE & OBJECTIVE
"  Woke up with symptoms?: no    Recent bleeding noted: no  Does the patient take any Blood Thinners? yes  Medications: heparin on HD      Past Medical History: hypertension and kidney problems/dialysis    Past Surgical History: none    Family History: no relevant history    Social History: no smoking, no drinking, no drugs    Allergies: No Known Allergies     Review of Systems   Constitutional: Negative for chills and fatigue.   HENT: Negative for nosebleeds and sore throat.    Eyes: Negative for photophobia and visual disturbance.   Respiratory: Negative for cough and shortness of breath.    Cardiovascular: Negative for chest pain and palpitations.   Gastrointestinal: Negative for abdominal pain, blood in stool, constipation, diarrhea, nausea and vomiting.   Endocrine: Negative for cold intolerance and heat intolerance.   Genitourinary: Negative for hematuria.   Musculoskeletal: Negative for arthralgias, back pain, joint swelling and neck pain.   Skin: Negative for color change and rash.   Neurological: Negative for light-headedness and headaches.   Psychiatric/Behavioral: Negative for confusion and hallucinations.     Objective:   Vitals: Blood pressure 125/68, pulse 85, temperature 98 °F (36.7 °C), temperature source Oral, resp. rate 18, height 5' 7" (1.702 m), weight 107.5 kg (237 lb), SpO2 99 %.     CT READ: No    Physical Exam   Constitutional: He appears well-developed and well-nourished.   HENT:   Head: Normocephalic and atraumatic.   Right Ear: External ear normal.   Left Ear: External ear normal.   Eyes: Conjunctivae and EOM are normal.   Neck: Normal range of motion. No tracheal deviation present.   Pulmonary/Chest: Effort normal. No respiratory distress.   Abdominal: He exhibits no distension.   Musculoskeletal: Normal range of motion.   Neurological: He is alert.   Skin: Skin is dry.   Psychiatric: He has a normal mood and affect. His behavior is normal. Judgment and thought content normal.   Vitals " reviewed.

## 2019-08-24 NOTE — HPI
68 yo male with complaints of difficulty speaking that began suddenly at around 8:30 pm. No associated weakness or gaze deviation. No clear triggers, has not had in the past. Has not improved. Had HD today.

## 2019-08-24 NOTE — PT/OT/SLP EVAL
"Speech Language Pathology Evaluation      Patient Name:  Olu Gant   MRN:  [id number]  Admitting Diagnosis: Ischemic cerebrovascular accident (CVA)    Recommendations:                  General Recommendations:  Follow-up not indicated  General Precautions: Standard,    Communication strategies:  none    History:     Past Medical History:   Diagnosis Date    Asbestos exposure - 1973 2/18/2014    Benign hypertension with ESRD (end-stage renal disease) 2/18/2014    Diabetes type 2 - since 1996 2/18/2014    DIALYSIS 2013    ESRD (end stage renal disease) - initiated dialysis 05/29/2013 2/18/2014    Gout, arthritis 2/18/2014    Hypothyroidism 2/18/2014    Irregular heart rhythm - unsure of Afib vs. A flutter 2/18/2014    Obesity 2/18/2014    Secondary hyperparathyroidism, renal 2/18/2014    Sleep apnea on Bipap 2/18/2014       Past Surgical History:   Procedure Laterality Date    AV FISTULA PLACEMENT      COLON SURGERY  02/2017    resection for "ischemic colon"    INGUINAL HERNIA REPAIR      bilaterally    pace maker      [email or web address]    TONSILLECTOMY         Social History: Patient lives with spouse.    Prior Intubation HX:  None this admit    Modified Barium Swallow: none within epic system    Chest X-Rays: Completed 8/24- unremarkable    Prior diet: regular/thin.    Subjective     "I'm pretty much back to normal"  Wife at bedside promotes acute slurring which has since resolved   Patient goals: none stated      Pain/Comfort:  · Pain Rating 1: 0/10    Objective:     Cognitive Status:  Behavioral Observations: alert and appropriate  Memory   Immediate: Intact as demonstrated by Pt's ability to repeat 5/5 numerical items & 5/5 unrelated words    Short-term: Intact; Pt able to recall 3/3 unrelated items ind'ly.    Long-term: Intact; Pt provided detailed biographical and medical history   Orientation: orientedx4  Attention: Intact as indicated by Pt's engagement t/o session without redirection or cueing " warranted; No evident deficits   Problem Solving: Intact; Pt provided appropriate responses to judgement/safety situations and completed time and money word problems with 100% acc. Compare/contrast: 100% acc   Pragmatics: WNL  Executive Function: Appearing intact; no evident deficits noted which affected Pt's ability to initiate and complete tasks within evaluation     Receptive Language:   Comprehension: WFL  · Follows complex commands   · Answers complex Y/N questions     Pragmatics: WNL    Expressive Language:  Verbal:    · Automatic Speech: 100% acc across counting 1-10 and stating MARA  · Repetition: 3/3 unrelated items w/ 100% acc   · Naming: confrontational namin% acc of common objects present at bedside  · Conversational speech: Fluent and appropriate at the conversational level without evidence of word retrieval, semantic or syntactic error       Motor Speech:  · No evidence of Apraxia of Speech or Dysarthria  · Notable hyponasality which appears consistent with Pt dx of sleep apnea; Pt reports this is consistent with baseline     Voice: WNL    Visual-Spatial: WNL  · Attending to R and L planes w/out evidence of inattention, neglect or preferential gaze       Oral Musculature Evaluation  · Oral Musculature: WFL  · Dentition: present and adequate  · Secretion Management: adequate  · Mucosal Quality: good  · Mandibular Strength and Mobility: WFL  · Oral Labial Strength and Mobility: WFL  · Lingual Strength and Mobility: WFL  · Velar Elevation: WFL  · Buccal Strength and Mobility: WFL  · Volitional Cough: (Present; dry in nature)  · Volitional Swallow: (hyolaryngeal movement present to digital palpation)  · Voice Prior to PO Intake: (Clear; average intensity. No dysphonia )    Assessment:     Olu Gant is a 69 y.o. male with an SLP diagnosis of dysarthria which as since been resolved. Hyponasality which Pt reports is baseline noted which is consistent with Pt's dx of sleep apnea.     Plan:   · Plan of  Care reviewed with:  patient, spouse   · SLP Follow-Up:  No       Discharge recommendations:  Discharge Facility/Level of Care Needs: home     Time Tracking:     SLP Treatment Date:   08/24/19  Speech Start Time:  1222  Speech Stop Time:  1231     Speech Total Time (min):  9 min    Billable Minutes: Ashlee Newell CCC-SLP  08/24/2019

## 2019-08-24 NOTE — CONSULTS
[facility]  Vascular Neurology  Comprehensive Stroke Center  Tele-Consultation Note      Inpatient consult to Telemedicine-Stroke  Consult performed by: Landon Cardenas MD  Consult ordered by: Brandie Maya MD  Reason for consult: aphasia          Consulting Provider: BRANDIE MAYA  Current Providers  No providers found    Patient Location:  [facility] EMERGENCY DEPARTMENT Emergency Department  Spoke hospital nurse at bedside with patient assisting consultant.     Patient information was obtained from patient.         Assessment/Plan:     STROKE DOCUMENTATION     Acute Stroke Times:   Acute Stroke Times   Symptom Onset Time: 2030  Stroke Team Called Time: 2201  Stroke Team Arrival Time: 2205    NIH Scale:  1a. Level of Consciousness: 0-->Alert, keenly responsive  1b. LOC Questions: 1-->Answers one question correctly  1c. LOC Commands: 0-->Performs both tasks correctly  2. Best Gaze: 0-->Normal  3. Visual: 0-->No visual loss  4. Facial Palsy: 0-->Normal symmetrical movements  5a. Motor Arm, Left: 0-->No drift, limb holds 90 (or 45) degrees for full 10 secs  5b. Motor Arm, Right: 0-->No drift, limb holds 90 (or 45) degrees for full 10 secs  6a. Motor Leg, Left: 0-->No drift, leg holds 30 degree position for full 5 secs  6b. Motor Leg, Right: 0-->No drift, leg holds 30 degree position for full 5 secs  7. Limb Ataxia: 0-->Absent  8. Sensory: 0-->Normal, no sensory loss  9. Best Language: 2-->Severe aphasia, all communication is through fragmentary expression, great need for inference, questioning, and guessing by the listener. Range of information that can be exchanged is limited, listener carries burden of. . . (see row details)  10. Dysarthria: 0-->Normal  11. Extinction and Inattention (formerly Neglect): 0-->No abnormality  Total (NIH Stroke Scale): 3     Modified [place] Coma Scale:    ABCD2 Score:    [id number] Score:   HAS -BLED Score:   ICH Score:   Hunt  "& Hall Classification:       Diagnoses:   * Aphasia  68 yo male with severe difficulty expressing himself.  No associated weakness but concern for embolic event to distal MCA territory on the left.  Not tPA candidate since he received heparin today.  Plan for CTA to rule out any "intervenable" abnormalities, if none found admit for workup.        Blood pressure 125/68, pulse 85, temperature 98 °F (36.7 °C), temperature source Oral, resp. rate 18, height 5' 7" (1.702 m), weight 107.5 kg (237 lb), SpO2 99 %.  Alteplase Eligible?: No  Alteplase Recommendation: Alteplase not recommended due to Full dose anticoagulation   Possible Interventional Revascularization Candidate? No; No large vessel occlusion    Disposition Recommendation: do not transfer    Subjective:     History of Present Illness:  68 yo male with complaints of difficulty speaking that began suddenly at around 8:30 pm. No associated weakness or gaze deviation. No clear triggers, has not had in the past. Has not improved. Had HD today.      Woke up with symptoms?: no    Recent bleeding noted: no  Does the patient take any Blood Thinners? yes  Medications: heparin on HD      Past Medical History: hypertension and kidney problems/dialysis    Past Surgical History: none    Family History: no relevant history    Social History: no smoking, no drinking, no drugs    Allergies: No Known Allergies     Review of Systems   Constitutional: Negative for chills and fatigue.   HENT: Negative for nosebleeds and sore throat.    Eyes: Negative for photophobia and visual disturbance.   Respiratory: Negative for cough and shortness of breath.    Cardiovascular: Negative for chest pain and palpitations.   Gastrointestinal: Negative for abdominal pain, blood in stool, constipation, diarrhea, nausea and vomiting.   Endocrine: Negative for cold intolerance and heat intolerance.   Genitourinary: Negative for hematuria.   Musculoskeletal: Negative for arthralgias, back pain, joint " "swelling and neck pain.   Skin: Negative for color change and rash.   Neurological: Negative for light-headedness and headaches.   Psychiatric/Behavioral: Negative for confusion and hallucinations.     Objective:   Vitals: Blood pressure 125/68, pulse 85, temperature 98 °F (36.7 °C), temperature source Oral, resp. rate 18, height 5' 7" (1.702 m), weight 107.5 kg (237 lb), SpO2 99 %.     CT READ: No    Physical Exam   Constitutional: He appears well-developed and well-nourished.   HENT:   Head: Normocephalic and atraumatic.   Right Ear: External ear normal.   Left Ear: External ear normal.   Eyes: Conjunctivae and EOM are normal.   Neck: Normal range of motion. No tracheal deviation present.   Pulmonary/Chest: Effort normal. No respiratory distress.   Abdominal: He exhibits no distension.   Musculoskeletal: Normal range of motion.   Neurological: He is alert.   Skin: Skin is dry.   Psychiatric: He has a normal mood and affect. His behavior is normal. Judgment and thought content normal.   Vitals reviewed.            Recommended the emergency room physician to have a brief discussion with the patient and/or family if available regarding the risks and benefits of treatment, and to briefly document the occurrence of that discussion in his clinical encounter note.     The attending portion of this evaluation, treatment, and documentation was performed per Landon Cardenas MD via audiovisual.    Billing code:  (time dependent stroke, complex case, unstable patient, hemorrhages, any intervention, some mimics)    · This patient has a very critical neurological condition/illness, with very high morbidity and mortality.  · There is a very high probability for acute neurological change leading to clinical and possibly life-threatening deterioration requiring highest level of physician preparedness for urgent intervention.  · There is possibility that this condition will require treatment with high risk medications as " quickly as possible.  · There is also a possibility that the patient may benefit from further, more advance complex therapies (e.g. endovascular therapy) that will require prompt diagnosis and care.  · Care was coordinated with other physicians involved in the patient's care.  · Radiologic studies and laboratory data were reviewed and interpreted, and plan of care was re-assessed based on the results.  · Diagnosis, treatment options and prognosis may have been discussed with the patient and/or family members or caregiver.  · Further advanced medical management and further evaluation is warranted for his care.      In your opinion, this was a: Tier 1 Van Negative    Consult End Time: 10:20 PM     Landon Cardenas MD  [facility]  Vascular Neurology   [facility]

## 2019-08-24 NOTE — DISCHARGE SUMMARY
[facility] Medicine  Discharge Summary      Patient Name: Olu Gant  MRN: [id number]  Admission Date: 8/23/2019  Hospital Length of Stay: 0 days  Discharge Date and Time:  08/24/2019 4:00 PM  Attending Physician: Thiago Arcos MD   Discharging Provider: Thiago Arcos MD  Primary Care Provider: Brianna Elaine III, MD      HPI:   Mr. Gant is a 69 y.o. M with pmh of ESRD on HD,  type 2 DM, hypothyroidism, Obesity, sleep apnea, s/p pacemaker in situ, chronic anemia from ESRD, neuropathy presented with speech difficulty.  H/o speech difficulty, wife describes as slurred speech and word finding difficulty, right facial droop, that was noticed at 8 PM on 8/23/19, persistent, no known aggravating or relieving factors. No h/o weakness, vision changes, abnormal sensations, urinary or bladder problems.  No known prior h/o stroke, MI, PAD  Patient's symptoms initially improved in ED but recurred and now persistent.  In the ER, tele stroke was activated, tpa was not recommended due to heparin that he received with dialysis.  Patient not on aspirin at home, was not on statin due to unclear reasons.  CT head without acute abnormality, prior right caudate lacunar infarct.  Wife at bedside reports h/o a.fib, was on amio in the past, discontinued after pacemaker placement.  CTA head has atherosclerosis but no acute thrombosis or hemodynamically significant stenosis.    PSH: Bowel surgery for ischemic bowel, nephrectomy, left AV fistula.  No h/o tobacco, alcohol or drug abuse    * No surgery found *      Hospital Course:   69 y.o. M with pmh of ESRD on HD,  type 2 DM, hypothyroidism, Obesity, sleep apnea, s/p pacemaker in situ, chronic anemia from ESRD admitted for suspected TIA with symptoms of aphasia.  During hospitalization symptoms improved.  Swallow study was unremarkable.  Imaging of the head did not find acute cause of patient's aphasia.  Neurology evaluated patient and did not recommend MRI  at this time in the setting of patient's pacemaker.  Of note, echo with bubble study found patent foramen ovale.  Ultrasound lower extremities was negative.  Patient previously was on atorvastatin however developed myalgias of the lower extremities. Patient was stable at discharge with instructions to follow up with Neurology and PCP for follow-up of thyroid nodule.    General: Patient resting comfortably in no acute distress. Appears as stated age. Calm  Eyes: EOM intact. No conjunctivae injection. No scleral icterus.  ENT: Hearing grossly intact. No discharge from ears. No nasal discharge.   CVS: RRR. No LE edema BL.  Lungs: CTA BL, no wheezing or crackles. Good breath sounds. No accessory muscle use. No acute respiratory distress  Neuro: AOx3. GCS 15. Cranial nerves grossly intact. Moves all extremities equally. Follows commands. Responds appropriately      Consults:   Consults (From admission, onward)        Status Ordering Provider     Inpatient consult to Hospitalist  Once     Provider:  Nickie Atwood MD    Acknowledged SKY COONEY     Inpatient consult to Neurology  Once     Provider:  Vinod Angulo MD    Acknowledged ADILIA MCGINNIS     Inpatient consult to Telemedicine-Stroke  Once     Provider:  (Not yet assigned)    DONNA Linn          No new Assessment & Plan notes have been filed under this hospital service since the last note was generated.  Service: Hospital Medicine    Final Active Diagnoses:    Diagnosis Date Noted POA    PRINCIPAL PROBLEM:  Ischemic cerebrovascular accident (CVA) [I63.9] 08/24/2019 Yes    Neuropathy [G62.9] 08/24/2019 Yes    Old lacunar stroke without late effect [Z86.73] 08/24/2019 Not Applicable    Right thyroid nodule [E04.1] 08/24/2019 Yes      Problems Resolved During this Admission:       Discharged Condition: stable    Disposition: Home or Self Care    Follow Up:  Follow-up Information     Vinod Angulo MD In 2 weeks.     Specialties:  Vascular Neurology, Neurology  Why:  For check up s/p hospital discharge  Contact information:  [address] [facility]  [address]  [facility] [address]  [phone]             Brianna Elaine III, MD In 1 week.    Specialties:  Internal Medicine, Cardiology  Why:  Thyroid nodule, For check up s/p hospital discharge  Contact information:  [address]  [place] [address] [phone]                 Patient Instructions:      Diet Cardiac     Notify your health care provider if you experience any of the following:  temperature >100.4     Notify your health care provider if you experience any of the following:  persistent nausea and vomiting or diarrhea     Notify your health care provider if you experience any of the following:  severe uncontrolled pain     Notify your health care provider if you experience any of the following:  redness, tenderness, or signs of infection (pain, swelling, redness, odor or green/yellow discharge around incision site)     Notify your health care provider if you experience any of the following:  difficulty breathing or increased cough     Notify your health care provider if you experience any of the following:  severe persistent headache     Notify your health care provider if you experience any of the following:  worsening rash     Notify your health care provider if you experience any of the following:  persistent dizziness, light-headedness, or visual disturbances     Notify your health care provider if you experience any of the following:  increased confusion or weakness     Activity as tolerated       Significant Diagnostic Studies: Labs:   CMP   Recent Labs   Lab 08/23/19 2209   *   K 4.0   CL 91*   CO2 28   *   BUN 33*   CREATININE 6.3*   CALCIUM 9.1   PROT 7.5   ALBUMIN 4.1   BILITOT 0.8   ALKPHOS 107   AST 14   ALT 14   ANIONGAP 15   ESTGFRAFRICA 9.5*   EGFRNONAA 8.2*   , CBC   Recent Labs   Lab 08/23/19 2209   WBC 5.94   HGB 12.3*   HCT  38.4*      , Lipid Panel   Lab Results   Component Value Date    CHOL 200 (H) 08/23/2019    HDL 26 (L) 08/23/2019    LDLCALC Invalid, Trig>400.0 08/23/2019    TRIG 427 (H) 08/23/2019    CHOLHDL 13.0 (L) 08/23/2019    and A1C:   Recent Labs   Lab 08/07/19  1055 08/24/19  0322   HGBA1C 6.6* 6.6*       Pending Diagnostic Studies:     None         Medications:  Reconciled Home Medications:      Medication List      START taking these medications    aspirin 81 MG EC tablet  Commonly known as:  ECOTRIN  Take 1 tablet (81 mg total) by mouth once daily.  Start taking on:  8/25/2019     rosuvastatin 10 MG tablet  Commonly known as:  CRESTOR  Take 1 tablet (10 mg total) by mouth every evening.        CONTINUE taking these medications    allopurinol 100 MG tablet  Commonly known as:  ZYLOPRIM  Take 100 mg by mouth once daily.     amLODIPine 5 MG tablet  Commonly known as:  NORVASC  Take 5 mg by mouth 2 (two) times daily.     ergocalciferol 50,000 unit Cap  Commonly known as:  ERGOCALCIFEROL  Take 50,000 Units by mouth every 30 days.     fluticasone propionate 50 mcg/actuation nasal spray  Commonly known as:  FLONASE  SHAKE LQ AND U 1 SPR IEN D     HYDROcodone-acetaminophen 7.5-325 mg per tablet  Commonly known as:  NORCO  Take 1 tablet by mouth every 12 (twelve) hours as needed for Pain. Take 1 daily, 2 on dialysis days     levothyroxine 100 MCG tablet  Commonly known as:  SYNTHROID  Take 100 mcg by mouth once daily.     nitroGLYCERIN 0.4 MG SL tablet  Commonly known as:  NITROSTAT  Place 0.4 mg under the tongue every 5 (five) minutes as needed for Chest pain.     pantoprazole 40 MG tablet  Commonly known as:  PROTONIX  Take 40 mg by mouth once daily.     pregabalin 150 MG capsule  Commonly known as:  LYRICA  Take 150 mg by mouth 3 (three) times daily.     sevelamer HCl 800 MG Tab  Commonly known as:  RENAGEL  Take 800 mg by mouth 3 (three) times daily with meals.        ASK your doctor about these medications     diclofenac sodium 1 % Gel  Commonly known as:  VOLTAREN  Apply 2 g topically 2 (two) times daily.          CTA head and neck    CTA BRAIN:  VASCULAR FINDINGS:  Calcified plaque affects cavernous and supraclinoid segments of bilateral intracranial internal carotid arteries without significant stenosis. Bilateral anterior cerebral, middle cerebral, and posterior cerebral arteries are patent. Persistent fetal origin of left posterior cerebral artery, a normal variant, is noted. The basilar artery is patent    Visualized opacified dural venous sinuses appear unremarkable.    NONVASCULAR FINDINGS:  No midline shift. No abnormal intra-axial or extra-axial enhancement.    CTA NECK:  VASCULAR FINDINGS:  3 branch vessel aortic arch anatomy is present.    Left common carotid artery contains trace calcified plaque distally near the carotid bulb. Calcified plaque in left carotid bulb continues into proximal left internal and external carotid arteries resulting in less than 20% narrowing of left ICA origin. Cervical left ICA and left ECA and its branches are patent. Left vertebral artery is patent, with calcified plaque affecting the V4 segment near level of foramen magnum and resulting in no significant stenosis.    Right brachiocephalic artery is widely patent. Scattered calcified plaque in right common carotid artery continues into right carotid bulb and proximal right internal carotid artery. Partially calcified plaque in proximal right ICA results in up to 30% narrowing. Cervical right ICA is otherwise widely patent, as is the right ECA and its branches. Right vertebral artery is widely patent.    NONVASCULAR FINDINGS:  Cervical soft tissues show an 11 x 5 mm exophytic relatively slightly hypodense focus along posterior right thyroid lobe (series 4 image 95). Cervical soft tissues otherwise unremarkable. Visualized lung apices are clear. Degenerative changes affect the spine.    IMPRESSION:    1. Mild calcified plaque  involving bilateral carotid bulbs and proximal internal carotid arteries, but no internal carotid artery hemodynamically significant stenosis throughout the neck.  2. Patent bilateral vertebral arteries, with mild calcified plaque involving left vertebral artery V4 segment.  3. Mild calcified plaque involving intracranial bilateral internal carotid arteries, without other significant abnormality of the major intracranial arteries.  4. 11 x 5 mm exophytic relatively slightly hypodense focus along posterior aspect of right thyroid lobe. Although this could represent an exophytic thyroid nodule, in the appropriate clinical setting (hypercalcemia) parathyroid adenoma can be considered.    Indwelling Lines/Drains at time of discharge:   Lines/Drains/Airways          None          Time spent on the discharge of patient: 35 minutes  Patient was seen and examined on the date of discharge and determined to be suitable for discharge.         Thiago Arcos MD  Department of Hospital Medicine  [facility]

## 2019-08-24 NOTE — ASSESSMENT & PLAN NOTE
"70 yo male with severe difficulty expressing himself.  No associated weakness but concern for embolic event to distal MCA territory on the left.  Not tPA candidate since he received heparin today.  Plan for CTA to rule out any "intervenable" abnormalities, if none found admit for workup.  "

## 2019-08-24 NOTE — NURSING
Discharge instructions reviewed with pt and spouse. Questions answered. Discharge home via w/c to vehicle. Accompanied by spouse.

## 2019-08-24 NOTE — ASSESSMENT & PLAN NOTE
Presenting with aphasia and dysarthria - persistent.  R/o left MCA stroke  Repeat CT head in AM  Discussed with wife to bring details of pacemaker  Pacemaker interrogation for atrial flutter or fib  Continue aspirin 81 daily  Start lipitor 40 daily  Repeat lipid profile in AM  Consult neurology

## 2019-08-24 NOTE — PLAN OF CARE
Problem: SLP Goal  Goal: SLP Goal  Outcome: Outcome(s) achieved Date Met: 08/24/19  Pt currently with no acute changes in speech, language, cognition or swallow function. Pt and spouse report symptoms have resolved.

## 2019-08-24 NOTE — ED NOTES
PT PRESENTED TO ER WITH APHASIA AND SLURRED SPEECH. LAST KNOWN WELL TIME WAS 2030. NO OTHER DEFICITS NOTED. WIFE AT BEDSIDE. PT PLACED ON CARDIAC MONITOR. NO C/O PAIN OR DISCOMFORT AT THIS TIME. DR. ERICKSON AT BEDSIDE.

## 2019-08-24 NOTE — ED NOTES
PT RETURNED FROM CT SCAN. PLACED ON CARDIAC MONITOR. PT SPEECH NORMAL AT THIS TIME. APHASIA HAS RESOLVED.

## 2019-08-24 NOTE — PROGRESS NOTES
Subjective:      Patient ID: Olu Gant is a 69 y.o. male.    Chief Complaint: Diabetic Foot Exam  Patient presents for follow-up chronic neuropathic pain Secondary to type 2 diabetes.  Patient reports he has been doing fairly well taking two pain pills on dialysis days, 1 on other days along with 150mg three times/day.  Still reports quite a bit more pain on dialysis days. Has been on kidney transplant list for years.  Patient had pacemaker placement since last visit.  States his heart rate would drop quite a bit especially when on dialysis.  At one point  Heart rate was down in the 20s and he instructed nurse to disconnect him.  Reports feeling so much better since he received a pacemaker.      ROS  Constitutional   very pleasant, obese, well oriented, no distress     Cardiovascular          PACEMAKER, no chest pain, no shortness of breath    Respiratory          no cough, no congestion    Genitourinary   DIALYSIS     Musculoskeletal        SOME muscle aches, YES arthralgias/joint pain    Neurologic         NEUROPATHY     Endocrine          DIABETES          Objective:      Physical Exam   Cardiovascular:   Pulses:       Dorsalis pedis pulses are 1+ on the right side, and 1+ on the left side.        Posterior tibial pulses are 1+ on the right side, and 1+ on the left side.   Musculoskeletal:        Right foot: There is decreased range of motion.        Left foot: There is decreased range of motion.   Feet:   Right Foot:   Protective Sensation: 4 sites tested. 0 sites sensed.   Skin Integrity: Positive for dry skin.   Left Foot:   Protective Sensation: 4 sites tested. 0 sites sensed.   Skin Integrity: Positive for dry skin.   Vascular   Normal CFT bilateral   No lower extremity edema bilateral  Pedal skin temperature is warm bilateral    Integumentary   Chronic dry skin bilateral lower legs, increased plantar-posterior heels with small cracks and fissures, stable with no erythema, remain at high risk for  complications  Web spaces and heels are clear  Dystrophic, discolored onychomycosis, several nails with damage to nail bed, thickness reduced, no evidence of ingrown nail or subungual abscess       Neurological   Gross sensation intact, increased parasthesia's, painful diabetic neuropathy      Musculoskeletal   Muscle Strength and Tone:  poor    Joints, Bones, and Muscles: pes planus   Difficulty walking, antalgic gait, uses cane  Limited mobility, can not reach, treat or inspect feet  Presents in appropriate tennis shoes    Hemoglobin A1c       Ref Range & Units 2wk ago    Hemoglobin A1C <5.7 % of total Hgb 6.6High                    Assessment:       Encounter Diagnoses   Name Primary?    Type II diabetes mellitus with neurological manifestations Yes    Chronic peripheral neuropathic pain     Pain management     Dry skin dermatitis          Plan:       Olu was seen today for diabetic foot exam.    Diagnoses and all orders for this visit:    Type II diabetes mellitus with neurological manifestations    Chronic peripheral neuropathic pain    Pain management    Dry skin dermatitis    Other orders  -     HYDROcodone-acetaminophen (NORCO) 7.5-325 mg per tablet; Take 1 tablet by mouth every 12 (twelve) hours as needed for Pain. Take 1 daily, 2 on dialysis days      Long discussion and reviewed diabetic education, diabetes,  neuropathy pain, pain management.   Reviewed benefits ontinue tight control of glucose / diabetes and benefits of this.    Reviewed better care of dry skin have his wife help and inspect his feet.    checked  Refill Norco 7.5 mg 1 tablet taken on non dialysis days, 2 on dialysis days.  Instructed patient to contact the office with any changes which have not improved in 2-3 days.  Patient was in understanding and agreement with treatment plan  Counseled the patient on his conditions, their implications and medical management.  Instructed patient to contact the office with any changes,  questions, concerns, worsening of symptoms. Patient verbalized understanding.   Total face to face time, exam, assessment, treatment, discussion, documentation 25 minutes, more than half this time spent on consultation and coordination of care.   Follow up 2 months.      This note was created using [employer] voice recognition software that occasionally misinterpreted phrases or words.

## 2019-08-24 NOTE — HPI
Mr. Gant is a 69 y.o. M with pmh of ESRD on HD,  type 2 DM, hypothyroidism, Obesity, sleep apnea, s/p pacemaker in situ, chronic anemia from ESRD, neuropathy presented with speech difficulty.  H/o speech difficulty, wife describes as slurred speech and word finding difficulty, right facial droop, that was noticed at 8 PM on 8/23/19, persistent, no known aggravating or relieving factors. No h/o weakness, vision changes, abnormal sensations, urinary or bladder problems.  No known prior h/o stroke, MI, PAD  Patient's symptoms initially improved in ED but recurred and now persistent.  In the ER, tele stroke was activated, tpa was not recommended due to heparin that he received with dialysis.  Patient not on aspirin at home, was not on statin due to unclear reasons.  CT head without acute abnormality, prior right caudate lacunar infarct.  Wife at bedside reports h/o a.fib, was on amio in the past, discontinued after pacemaker placement.  CTA head has atherosclerosis but no acute thrombosis or hemodynamically significant stenosis.    PSH: Bowel surgery for ischemic bowel, nephrectomy, left AV fistula.  No h/o tobacco, alcohol or drug abuse

## 2019-08-24 NOTE — SUBJECTIVE & OBJECTIVE
"Past Medical History:   Diagnosis Date    Asbestos exposure - 1973 2/18/2014    Benign hypertension with ESRD (end-stage renal disease) 2/18/2014    Diabetes type 2 - since 1996 2/18/2014    DIALYSIS 2013    ESRD (end stage renal disease) - initiated dialysis 05/29/2013 2/18/2014    Gout, arthritis 2/18/2014    Hypothyroidism 2/18/2014    Irregular heart rhythm - unsure of Afib vs. A flutter 2/18/2014    Obesity 2/18/2014    Secondary hyperparathyroidism, renal 2/18/2014    Sleep apnea on Bipap 2/18/2014       Past Surgical History:   Procedure Laterality Date    AV FISTULA PLACEMENT      COLON SURGERY  02/2017    resection for "ischemic colon"    INGUINAL HERNIA REPAIR      bilaterally    pace maker      [email or web address]    TONSILLECTOMY         Review of patient's allergies indicates:  No Known Allergies    No current facility-administered medications on file prior to encounter.      Current Outpatient Medications on File Prior to Encounter   Medication Sig    allopurinol (ZYLOPRIM) 100 MG tablet Take 100 mg by mouth once daily.    amLODIPine (NORVASC) 5 MG tablet Take 5 mg by mouth 2 (two) times daily.    levothyroxine (SYNTHROID) 100 MCG tablet Take 100 mcg by mouth once daily.    pantoprazole (PROTONIX) 40 MG tablet Take 40 mg by mouth once daily.    pregabalin (LYRICA) 150 MG capsule Take 150 mg by mouth 3 (three) times daily.    sevelamer HCl (RENAGEL) 800 MG Tab Take 800 mg by mouth 3 (three) times daily with meals.    diclofenac sodium (VOLTAREN) 1 % Gel Apply 2 g topically 2 (two) times daily.    ergocalciferol (ERGOCALCIFEROL) 50,000 unit Cap Take 50,000 Units by mouth every 30 days.     fluticasone (FLONASE) 50 mcg/actuation nasal spray SHAKE LQ AND U 1 SPR IEN D    HYDROcodone-acetaminophen (NORCO) 7.5-325 mg per tablet Take 1 tablet by mouth every 12 (twelve) hours as needed for Pain. Take 1 daily, 2 on dialysis days    nitroGLYCERIN (NITROSTAT) 0.4 MG SL tablet Place 0.4 mg under " the tongue every 5 (five) minutes as needed for Chest pain.     Family History     Problem Relation (Age of Onset)    Alcohol abuse Brother    Diabetes Mother, Father    Heart disease Mother    Hypertension Mother    Stroke Father        Tobacco Use    Smoking status: Never Smoker    Smokeless tobacco: Never Used   Substance and Sexual Activity    Alcohol use: No    Drug use: No    Sexual activity: Not on file     Review of Systems  Objective:     Vital Signs (Most Recent):  Temp: 98 °F (36.7 °C) (08/23/19 2155)  Pulse: 66 (08/24/19 0018)  Resp: 17 (08/24/19 0018)  BP: (!) 121/58 (08/24/19 0000)  SpO2: (!) 94 % (08/24/19 0018) Vital Signs (24h Range):  Temp:  [98 °F (36.7 °C)] 98 °F (36.7 °C)  Pulse:  [60-85] 66  Resp:  [13-20] 17  SpO2:  [93 %-99 %] 94 %  BP: (104-143)/(50-75) 121/58     Weight: 107.5 kg (237 lb)  Body mass index is 37.12 kg/m².    Rest of the 10 point review of systems is negative except as mentioned above.      General: Patient resting comfortably in no acute distress.  Eyes: PERRLA. No conjunctivae pallor. No scleral icterus.  ENT: OMM. No pharyngeal erythema.  Neck: Supple. No adenopathy.  Lungs: CTA. Good air entry.  Cor: Regular rate and rhythm. No murmurs. No pedal edema.  Abd: Soft. Nontender. No HSM. BS (+)  Musculoskeletal: No arthropathy, deformity.  Skin: No rashes, swelling, or erythema.  Neuro: A&O x3. Patient moving all extremities equally  Ext: No clubbing. No cyanosis. Peripheral pulses +     Significant Labs:   BMP:   Recent Labs   Lab 08/23/19 2209   *   *   K 4.0   CL 91*   CO2 28   BUN 33*   CREATININE 6.3*   CALCIUM 9.1     CBC:   Recent Labs   Lab 08/23/19 2209   WBC 5.94   HGB 12.3*   HCT 38.4*        Lipid Panel:   Recent Labs   Lab 08/23/19 2209   CHOL 200*   HDL 26*   LDLCALC Invalid, Trig>400.0   TRIG 427*   CHOLHDL 13.0*       Significant Imaging: As mentioned above

## 2019-08-24 NOTE — ASSESSMENT & PLAN NOTE
- no evidence of hypercalcemia at this time on HD  - will need further workup outpatient with his PCP

## 2019-08-24 NOTE — H&P
[facility] Medicine  History & Physical    Patient Name: Olu Gant  MRN: [id number]  Admission Date: 8/23/2019  Attending Physician: Brandie Maya MD   Primary Care Provider: Brianna Elaine III, MD         Patient information was obtained from patient and ER records and wife.     Subjective:     Principal Problem:Ischemic cerebrovascular accident (CVA)    Chief Complaint:   Chief Complaint   Patient presents with    Aphasia     slurred speech starting today at 2030        HPI: Mr. Gant is a 69 y.o. M with pmh of ESRD on HD,  type 2 DM, hypothyroidism, Obesity, sleep apnea, s/p pacemaker in situ, chronic anemia from ESRD, neuropathy presented with speech difficulty.  H/o speech difficulty, wife describes as slurred speech and word finding difficulty, right facial droop, that was noticed at 8 PM on 8/23/19, persistent, no known aggravating or relieving factors. No h/o weakness, vision changes, abnormal sensations, urinary or bladder problems.  No known prior h/o stroke, MI, PAD  Patient's symptoms initially improved in ED but recurred and now persistent.  In the ER, tele stroke was activated, tpa was not recommended due to heparin that he received with dialysis.  Patient not on aspirin at home, was not on statin due to unclear reasons.  CT head without acute abnormality, prior right caudate lacunar infarct.  Wife at bedside reports h/o a.fib, was on amio in the past, discontinued after pacemaker placement.  CTA head has atherosclerosis but no acute thrombosis or hemodynamically significant stenosis.    PSH: Bowel surgery for ischemic bowel, nephrectomy, left AV fistula.  No h/o tobacco, alcohol or drug abuse    Past Medical History:   Diagnosis Date    Asbestos exposure - 1973 2/18/2014    Benign hypertension with ESRD (end-stage renal disease) 2/18/2014    Diabetes type 2 - since 1996 2/18/2014    DIALYSIS 2013    ESRD (end stage renal disease) - initiated dialysis 05/29/2013  "2/18/2014    Gout, arthritis 2/18/2014    Hypothyroidism 2/18/2014    Irregular heart rhythm - unsure of Afib vs. A flutter 2/18/2014    Obesity 2/18/2014    Secondary hyperparathyroidism, renal 2/18/2014    Sleep apnea on Bipap 2/18/2014       Past Surgical History:   Procedure Laterality Date    AV FISTULA PLACEMENT      COLON SURGERY  02/2017    resection for "ischemic colon"    INGUINAL HERNIA REPAIR      bilaterally    pace maker      [email or web address]    TONSILLECTOMY         Review of patient's allergies indicates:  No Known Allergies    No current facility-administered medications on file prior to encounter.      Current Outpatient Medications on File Prior to Encounter   Medication Sig    allopurinol (ZYLOPRIM) 100 MG tablet Take 100 mg by mouth once daily.    amLODIPine (NORVASC) 5 MG tablet Take 5 mg by mouth 2 (two) times daily.    levothyroxine (SYNTHROID) 100 MCG tablet Take 100 mcg by mouth once daily.    pantoprazole (PROTONIX) 40 MG tablet Take 40 mg by mouth once daily.    pregabalin (LYRICA) 150 MG capsule Take 150 mg by mouth 3 (three) times daily.    sevelamer HCl (RENAGEL) 800 MG Tab Take 800 mg by mouth 3 (three) times daily with meals.    diclofenac sodium (VOLTAREN) 1 % Gel Apply 2 g topically 2 (two) times daily.    ergocalciferol (ERGOCALCIFEROL) 50,000 unit Cap Take 50,000 Units by mouth every 30 days.     fluticasone (FLONASE) 50 mcg/actuation nasal spray SHAKE LQ AND U 1 SPR IEN D    HYDROcodone-acetaminophen (NORCO) 7.5-325 mg per tablet Take 1 tablet by mouth every 12 (twelve) hours as needed for Pain. Take 1 daily, 2 on dialysis days    nitroGLYCERIN (NITROSTAT) 0.4 MG SL tablet Place 0.4 mg under the tongue every 5 (five) minutes as needed for Chest pain.     Family History     Problem Relation (Age of Onset)    Alcohol abuse Brother    Diabetes Mother, Father    Heart disease Mother    Hypertension Mother    Stroke Father        Tobacco Use    Smoking status: Never " Smoker    Smokeless tobacco: Never Used   Substance and Sexual Activity    Alcohol use: No    Drug use: No    Sexual activity: Not on file     Review of Systems  Objective:     Vital Signs (Most Recent):  Temp: 98 °F (36.7 °C) (08/23/19 2155)  Pulse: 66 (08/24/19 0018)  Resp: 17 (08/24/19 0018)  BP: (!) 121/58 (08/24/19 0000)  SpO2: (!) 94 % (08/24/19 0018) Vital Signs (24h Range):  Temp:  [98 °F (36.7 °C)] 98 °F (36.7 °C)  Pulse:  [60-85] 66  Resp:  [13-20] 17  SpO2:  [93 %-99 %] 94 %  BP: (104-143)/(50-75) 121/58     Weight: 107.5 kg (237 lb)  Body mass index is 37.12 kg/m².    Rest of the 10 point review of systems is negative except as mentioned above.      General: Patient resting comfortably in no acute distress.  Eyes: PERRLA. No conjunctivae pallor. No scleral icterus.  ENT: OMM. No pharyngeal erythema.  Neck: Supple. No adenopathy.  Lungs: CTA. Good air entry.  Cor: Regular rate and rhythm. No murmurs. No pedal edema.  Abd: Soft. Nontender. No HSM. BS (+)  Musculoskeletal: No arthropathy, deformity.  Skin: No rashes, swelling, or erythema.  Neuro: A&O x3. Patient moving all extremities equally  Ext: No clubbing. No cyanosis. Peripheral pulses +     Significant Labs:   BMP:   Recent Labs   Lab 08/23/19 2209   *   *   K 4.0   CL 91*   CO2 28   BUN 33*   CREATININE 6.3*   CALCIUM 9.1     CBC:   Recent Labs   Lab 08/23/19 2209   WBC 5.94   HGB 12.3*   HCT 38.4*        Lipid Panel:   Recent Labs   Lab 08/23/19 2209   CHOL 200*   HDL 26*   LDLCALC Invalid, Trig>400.0   TRIG 427*   CHOLHDL 13.0*       Significant Imaging: As mentioned above    Assessment/Plan:     * Ischemic cerebrovascular accident (CVA)  Presenting with aphasia and dysarthria - persistent.  R/o left MCA stroke  Repeat CT head in AM  Discussed with wife to bring details of pacemaker  Pacemaker interrogation for atrial flutter or fib  Continue aspirin 81 daily  Start lipitor 40 daily  Repeat lipid profile in AM  Consult  neurology      Neuropathy  Patient taking high doses of lyrica. Decrease dose to 75 mg BID      Old lacunar stroke without late effect  Incidental finding from CT scan      VTE Risk Mitigation (From admission, onward)    None             Nickie Atwood MD  Department of Hospital Medicine   [facility]

## 2019-08-24 NOTE — PLAN OF CARE
08/24/19 1110   MACIAS Message   Medicare Outpatient and Observation Notification regarding financial responsibility Given to patient/caregiver;Signed/date by patient/caregiver;Explained to patient/caregiver   Date MACIAS was signed 08/24/19   Time MACIAS was signed 1109

## 2019-10-22 ENCOUNTER — OFFICE VISIT (OUTPATIENT)
Dept: PODIATRY | Facility: CLINIC | Age: 69
End: 2019-10-22
Payer: MEDICARE

## 2019-10-22 VITALS
BODY MASS INDEX: 36.57 KG/M2 | HEART RATE: 85 BPM | DIASTOLIC BLOOD PRESSURE: 60 MMHG | OXYGEN SATURATION: 97 % | HEIGHT: 67 IN | TEMPERATURE: 97 F | WEIGHT: 233 LBS | SYSTOLIC BLOOD PRESSURE: 106 MMHG

## 2019-10-22 DIAGNOSIS — G89.29 CHRONIC PERIPHERAL NEUROPATHIC PAIN: Primary | ICD-10-CM

## 2019-10-22 DIAGNOSIS — L85.3 DRY SKIN DERMATITIS: ICD-10-CM

## 2019-10-22 DIAGNOSIS — B35.1 ONYCHOMYCOSIS DUE TO DERMATOPHYTE: ICD-10-CM

## 2019-10-22 DIAGNOSIS — M79.2 CHRONIC PERIPHERAL NEUROPATHIC PAIN: Primary | ICD-10-CM

## 2019-10-22 DIAGNOSIS — E11.49 TYPE II DIABETES MELLITUS WITH NEUROLOGICAL MANIFESTATIONS: ICD-10-CM

## 2019-10-22 DIAGNOSIS — R52 PAIN MANAGEMENT: ICD-10-CM

## 2019-10-22 PROCEDURE — 99999 PR PBB SHADOW E&M-EST. PATIENT-LVL III: CPT | Mod: PBBFAC,,, | Performed by: PODIATRIST

## 2019-10-22 PROCEDURE — 99214 PR OFFICE/OUTPT VISIT, EST, LEVL IV, 30-39 MIN: ICD-10-PCS | Mod: S$PBB,,, | Performed by: PODIATRIST

## 2019-10-22 PROCEDURE — 99214 OFFICE O/P EST MOD 30 MIN: CPT | Mod: S$PBB,,, | Performed by: PODIATRIST

## 2019-10-22 PROCEDURE — 99213 OFFICE O/P EST LOW 20 MIN: CPT | Mod: PBBFAC | Performed by: PODIATRIST

## 2019-10-22 PROCEDURE — 99999 PR PBB SHADOW E&M-EST. PATIENT-LVL III: ICD-10-PCS | Mod: PBBFAC,,, | Performed by: PODIATRIST

## 2019-10-22 RX ORDER — HYDROCODONE BITARTRATE AND ACETAMINOPHEN 7.5; 325 MG/1; MG/1
1 TABLET ORAL EVERY 6 HOURS PRN
Qty: 42 TABLET | Refills: 0 | Status: SHIPPED | OUTPATIENT
Start: 2019-10-22 | End: 2019-10-22 | Stop reason: SDUPTHER

## 2019-10-22 RX ORDER — BLOOD-GLUCOSE METER
EACH MISCELLANEOUS
Refills: 0 | COMMUNITY
Start: 2019-08-26 | End: 2021-02-23 | Stop reason: SDUPTHER

## 2019-10-22 RX ORDER — MECLIZINE HYDROCHLORIDE 25 MG/1
TABLET ORAL
Refills: 0 | COMMUNITY
Start: 2019-08-26 | End: 2019-12-23

## 2019-10-22 RX ORDER — HYDROCODONE BITARTRATE AND ACETAMINOPHEN 7.5; 325 MG/1; MG/1
1 TABLET ORAL EVERY 6 HOURS PRN
Qty: 42 TABLET | Refills: 0 | Status: SHIPPED | OUTPATIENT
Start: 2019-10-22 | End: 2019-11-19

## 2019-10-22 NOTE — LETTER
October 23, 2019      MD Liz Ball III[address]  [facility] [place] [address]           [facility]  [address] [facility] [place] [address]  Phone: [phone]  Fax: [phone]          Patient: Olu Gant   MR Number: [id number]   YOB: 1950   Date of Visit: 10/22/2019       Dear Dr. Brianna Elaine III:    Thank you for referring Olu Gant to me for evaluation. Attached you will find relevant portions of my assessment and plan of care.    If you have questions, please do not hesitate to call me. I look forward to following Olu Gant along with you.    Sincerely,    Anuradha Trujillo, DPGLORIA    Enclosure  CC:  No Recipients    If you would like to receive this communication electronically, please contact [email or web address] or [phone] to request more information on [employer] Link access.    For providers and/or their staff who would like to refer a patient to [facility], please contact us through our one-stop-shop provider referral line, [facility], at [phone].    If you feel you have received this communication in error or would no longer like to receive these types of communications, please e-mail [email or web address]

## 2019-10-23 NOTE — PROGRESS NOTES
"Subjective:       Patient ID: Olu Gant is a 69 y.o. male.    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   Patient reports he is doing well, drained on days he goes to dialysis.  He reports pain continues to be more severe on days he goes to dialysis, 2 pain pills on those days has been very helpful.  He tried decreasing the Lyrica and had increased pain as well.  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    Past Medical History:   Diagnosis Date    Asbestos exposure - 1973 2/18/2014    Benign hypertension with ESRD (end-stage renal disease) 2/18/2014    Diabetes type 2 - since 1996 2/18/2014    DIALYSIS 2013    ESRD (end stage renal disease) - initiated dialysis 05/29/2013 2/18/2014    Gout, arthritis 2/18/2014    Hypothyroidism 2/18/2014    Irregular heart rhythm - unsure of Afib vs. A flutter 2/18/2014    Obesity 2/18/2014    Secondary hyperparathyroidism, renal 2/18/2014    Sleep apnea on Bipap 2/18/2014     Past Surgical History:   Procedure Laterality Date    AV FISTULA PLACEMENT      COLON SURGERY  02/2017    resection for "ischemic colon"    INGUINAL HERNIA REPAIR      bilaterally    pace maker      [email or web address]    TONSILLECTOMY           Current Outpatient Medications   Medication Sig Dispense Refill    allopurinol (ZYLOPRIM) 100 MG tablet Take 100 mg by mouth once daily.      amLODIPine (NORVASC) 5 MG tablet Take 5 mg by mouth 2 (two) times daily.      diclofenac sodium (VOLTAREN) 1 % Gel Apply 2 g topically 2 (two) times daily. 100 g 1    ergocalciferol (ERGOCALCIFEROL) 50,000 unit Cap Take 50,000 Units by mouth every 30 days.       fluticasone (FLONASE) 50 mcg/actuation nasal spray SHAKE LQ AND U 1 SPR IEN D  0    LEVEMIR U-100 INSULIN 100 unit/mL " "injection Inject 20 Units into the skin nightly as needed.  0    levothyroxine (SYNTHROID) 100 MCG tablet Take 1 tablet (100 mcg total) by mouth once daily. 90 tablet 3    meclizine (ANTIVERT) 25 mg tablet TAKE 1 TABLET BY MOUTH ONCE DAILY FOR DIZZINESS  0    nitroGLYCERIN (NITROSTAT) 0.4 MG SL tablet Place 0.4 mg under the tongue every 5 (five) minutes as needed for Chest pain.      NOVOLOG U-100 INSULIN ASPART 100 unit/mL injection Inject 14-20 Units into the skin 3 (three) times daily as needed.  0    ONETOUCH VERIO Strp USE 1 STRIP TO CHECK GLUCOSE 3 TO 4 TIMES DAILY AS DIRECTED  0    pantoprazole (PROTONIX) 40 MG tablet Take 1 tablet (40 mg total) by mouth once daily. 90 tablet 3    pregabalin (LYRICA) 150 MG capsule Take 150 mg by mouth 3 (three) times daily.      sevelamer HCl (RENAGEL) 800 MG Tab Take 800 mg by mouth 3 (three) times daily with meals.      aspirin (ECOTRIN) 81 MG EC tablet Take 1 tablet (81 mg total) by mouth once daily. 30 tablet 0    HYDROcodone-acetaminophen (NORCO) 7.5-325 mg per tablet Take 1 tablet by mouth every 6 (six) hours as needed for Pain. 42 tablet 0    rosuvastatin (CRESTOR) 10 MG tablet Take 1 tablet (10 mg total) by mouth every evening. 30 tablet 0     No current facility-administered medications for this visit.      Review of patient's allergies indicates:  No Known Allergies    Review of Systems   Constitutional: Negative for activity change and fatigue.   HENT: Negative for congestion.    Respiratory: Negative for cough and shortness of breath.    Cardiovascular: Negative for leg swelling.   Musculoskeletal: Positive for gait problem.   Neurological: Positive for numbness.       Objective:      Vitals:    10/22/19 0814   BP: 106/60   Pulse: 85   Temp: 97.3 °F (36.3 °C)   TempSrc: Oral   SpO2: 97%   Weight: 105.7 kg (233 lb)   Height: 5' 7" (1.702 m)     Physical Exam   Constitutional: He appears well-developed and well-nourished. No distress.   Cardiovascular: "   Pulses:       Dorsalis pedis pulses are 2+ on the right side, and 2+ on the left side.        Posterior tibial pulses are 1+ on the right side, and 1+ on the left side.   Musculoskeletal:        Right foot: There is decreased range of motion.        Left foot: There is decreased range of motion.   Feet:   Right Foot:   Protective Sensation: 6 sites tested. 1 site sensed.  Skin Integrity: Positive for dry skin.   Left Foot:   Protective Sensation: 6 sites tested. 1 site sensed.  Skin Integrity: Positive for dry skin.   Vitals reviewed.  Vascular   Normal CFT bilateral   No lower extremity edema bilateral  Pedal skin temperature is warm bilateral    Integumentary   Chronic dry skin bilateral lower legs, plantar-posterior heels with small cracks and fissures, stable with no erythema, remain at high risk for complications  Web spaces and heels are clear  Dystrophic, discolored onychomycosis, several nails with damage to nail bed, thickness reduced, no evidence of ingrown nail or subungual abscess, at risk       Neurological   Gross sensation intact, positive parasthesia's, painful diabetic neuropathy      Musculoskeletal   Muscle Strength and Tone:  poor    Joints, Bones, and Muscles: pes planus   Difficulty walking, antalgic gait, uses cane  Limited mobility, can not reach, treat or inspect feet  Presents in appropriate tennis shoes      Hemoglobin A1c    Ref Range & Units 2mo ago  (8/24/19) 2mo ago  (8/7/19)      Hemoglobin A1C 4.5 - 6.2 % 6.6High   6.6High  R, CM        Estimated Avg Glucose 68 - 131 mg/dL 143High                      Assessment:       1. Chronic peripheral neuropathic pain    2. Type II diabetes mellitus with neurological manifestations    3. Pain management    4. Onychomycosis due to dermatophyte    5. Dry skin dermatitis        Plan:           REFILL NORCO 7.5, 1 DAILY, 2 DIALYSIS DAYS    Lengthy review of diabetic education, diabetes, neuropathy pain, pain management.   Reviewed benefits  continue tight control of glucose / diabetes and benefits of this.    Reviewed better care of dry skin, it is crucial that he start treating this, have his wife apply a Gold Bond for diabetics every other day, avoid getting between the toes    Reviewed better maintenance of nails, discoloration, thickness, potential complications.  Reviewed possible complications specially due to lack toes   checked  Refill Norco 7.5 mg 1 tablet taken on non dialysis days, 2 on dialysis days.  Instructed patient to contact the office with any changes which have not improved in 2-3 days.  Patient was in understanding and agreement with treatment plan  Counseled the patient on his conditions, their implications and medical management.  Instructed patient to contact the office with any changes, questions, concerns, worsening of symptoms. Patient verbalized understanding.   Total face to face time, exam, assessment, treatment, discussion, documentation 25 minutes, more than half this time spent on consultation and coordination of care.   Follow up 2 months.      This note was created using [employer] voice recognition software that occasionally misinterpreted phrases or words.

## 2019-12-11 ENCOUNTER — TELEPHONE (OUTPATIENT)
Dept: PODIATRY | Facility: CLINIC | Age: 69
End: 2019-12-11

## 2019-12-11 DIAGNOSIS — G89.29 CHRONIC PERIPHERAL NEUROPATHIC PAIN: Primary | ICD-10-CM

## 2019-12-11 DIAGNOSIS — M79.2 CHRONIC PERIPHERAL NEUROPATHIC PAIN: Primary | ICD-10-CM

## 2019-12-11 RX ORDER — HYDROCODONE BITARTRATE AND ACETAMINOPHEN 7.5; 325 MG/1; MG/1
1 TABLET ORAL EVERY 12 HOURS PRN
Qty: 42 TABLET | Refills: 0 | Status: SHIPPED | OUTPATIENT
Start: 2019-12-11 | End: 2020-01-28 | Stop reason: SDUPTHER

## 2019-12-11 NOTE — TELEPHONE ENCOUNTER
----- Message from Krupa Peterson sent at 12/11/2019  4:34 PM CST -----  Contact: Maggie  wife  Type:  Patient Returning Call    Who Called:  Maggie  Who Left Message for Patient:  Maren  Does the patient know what this is regarding?:  refill  Best Call Back Number:  [phone]  Additional Information:  Sent skypchamp n/a.  Pls call pt to adv

## 2019-12-11 NOTE — TELEPHONE ENCOUNTER
Pt came into office requesting refill on pain medication. Pt last seen in October 2019. Pt need appt for med refill?

## 2019-12-17 ENCOUNTER — OFFICE VISIT (OUTPATIENT)
Dept: PODIATRY | Facility: CLINIC | Age: 69
End: 2019-12-17
Payer: MEDICARE

## 2019-12-17 VITALS
TEMPERATURE: 98 F | BODY MASS INDEX: 36.57 KG/M2 | HEART RATE: 69 BPM | WEIGHT: 233 LBS | DIASTOLIC BLOOD PRESSURE: 60 MMHG | SYSTOLIC BLOOD PRESSURE: 115 MMHG | OXYGEN SATURATION: 98 % | HEIGHT: 67 IN

## 2019-12-17 DIAGNOSIS — E11.49 TYPE II DIABETES MELLITUS WITH NEUROLOGICAL MANIFESTATIONS: Primary | ICD-10-CM

## 2019-12-17 DIAGNOSIS — B35.1 ONYCHOMYCOSIS DUE TO DERMATOPHYTE: ICD-10-CM

## 2019-12-17 DIAGNOSIS — M79.2 CHRONIC PERIPHERAL NEUROPATHIC PAIN: ICD-10-CM

## 2019-12-17 DIAGNOSIS — R23.4 FISSURE IN SKIN OF FOOT: ICD-10-CM

## 2019-12-17 DIAGNOSIS — G89.29 CHRONIC PERIPHERAL NEUROPATHIC PAIN: ICD-10-CM

## 2019-12-17 PROCEDURE — 99214 OFFICE O/P EST MOD 30 MIN: CPT | Mod: S$PBB,,, | Performed by: PODIATRIST

## 2019-12-17 PROCEDURE — 1159F PR MEDICATION LIST DOCUMENTED IN MEDICAL RECORD: ICD-10-PCS | Mod: ,,, | Performed by: PODIATRIST

## 2019-12-17 PROCEDURE — 99999 PR PBB SHADOW E&M-EST. PATIENT-LVL III: ICD-10-PCS | Mod: PBBFAC,,, | Performed by: PODIATRIST

## 2019-12-17 PROCEDURE — 1159F MED LIST DOCD IN RCRD: CPT | Mod: ,,, | Performed by: PODIATRIST

## 2019-12-17 PROCEDURE — 99213 OFFICE O/P EST LOW 20 MIN: CPT | Mod: PBBFAC | Performed by: PODIATRIST

## 2019-12-17 PROCEDURE — 99999 PR PBB SHADOW E&M-EST. PATIENT-LVL III: CPT | Mod: PBBFAC,,, | Performed by: PODIATRIST

## 2019-12-17 PROCEDURE — 1126F PR PAIN SEVERITY QUANTIFIED, NO PAIN PRESENT: ICD-10-PCS | Mod: ,,, | Performed by: PODIATRIST

## 2019-12-17 PROCEDURE — 1126F AMNT PAIN NOTED NONE PRSNT: CPT | Mod: ,,, | Performed by: PODIATRIST

## 2019-12-17 PROCEDURE — 99214 PR OFFICE/OUTPT VISIT, EST, LEVL IV, 30-39 MIN: ICD-10-PCS | Mod: S$PBB,,, | Performed by: PODIATRIST

## 2019-12-18 NOTE — PROGRESS NOTES
"Subjective:       Patient ID: Olu Gant is a 69 y.o. male.    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   Patient reports he has been doing well, takes two hydrocodone on dialysis days, 1 on days inbetween.  He states this has helped a lot, by no means has it resolved his pain, always has severe pain following dialysis.   He is not aware of his  A1c, glucose was 160 this morning.      Past Medical History:   Diagnosis Date    Asbestos exposure - 1973 2/18/2014    Benign hypertension with ESRD (end-stage renal disease) 2/18/2014    Diabetes type 2 - since 1996 2/18/2014    DIALYSIS 2013    ESRD (end stage renal disease) - initiated dialysis 05/29/2013 2/18/2014    Gout, arthritis 2/18/2014    Hypothyroidism 2/18/2014    Irregular heart rhythm - unsure of Afib vs. A flutter 2/18/2014    Obesity 2/18/2014    Secondary hyperparathyroidism, renal 2/18/2014    Sleep apnea on Bipap 2/18/2014     Past Surgical History:   Procedure Laterality Date    AV FISTULA PLACEMENT      COLON SURGERY  02/2017    resection for "ischemic colon"    INGUINAL HERNIA REPAIR      bilaterally    pace maker      [email or web address]    TONSILLECTOMY           Current Outpatient Medications   Medication Sig Dispense Refill    allopurinol (ZYLOPRIM) 100 MG tablet Take 100 mg by mouth once daily.      amLODIPine (NORVASC) 5 MG tablet Take 5 mg by mouth 2 (two) times daily.      diclofenac sodium (VOLTAREN) 1 % Gel Apply 2 g topically 2 (two) times daily. 100 g 1    ergocalciferol (ERGOCALCIFEROL) 50,000 unit Cap Take 50,000 Units by mouth every 30 days.       fluticasone (FLONASE) 50 mcg/actuation nasal spray SHAKE LQ AND U 1 SPR IEN D  0    HYDROcodone-acetaminophen (NORCO) 7.5-325 mg per tablet Take 1 tablet by mouth every 12 (twelve) hours as needed for Pain. 42 tablet 0    LEVEMIR U-100 INSULIN 100 unit/mL injection Inject 20 Units into the skin " "nightly as needed.  0    levothyroxine (SYNTHROID) 100 MCG tablet Take 1 tablet (100 mcg total) by mouth once daily. 90 tablet 3    meclizine (ANTIVERT) 25 mg tablet TAKE 1 TABLET BY MOUTH ONCE DAILY FOR DIZZINESS  0    nitroGLYCERIN (NITROSTAT) 0.4 MG SL tablet Place 0.4 mg under the tongue every 5 (five) minutes as needed for Chest pain.      NOVOLOG U-100 INSULIN ASPART 100 unit/mL injection Inject 14-20 Units into the skin 3 (three) times daily as needed.  0    ONETOUCH VERIO Strp USE 1 STRIP TO CHECK GLUCOSE 3 TO 4 TIMES DAILY AS DIRECTED  0    pantoprazole (PROTONIX) 40 MG tablet Take 1 tablet (40 mg total) by mouth once daily. 90 tablet 3    pregabalin (LYRICA) 150 MG capsule Take 150 mg by mouth 3 (three) times daily.      sevelamer HCl (RENAGEL) 800 MG Tab Take 800 mg by mouth 3 (three) times daily with meals.      venlafaxine (EFFEXOR) 37.5 MG Tab Take 1/2 tablet by mouth twice daily for neuropahty 30 tablet 1    aspirin (ECOTRIN) 81 MG EC tablet Take 1 tablet (81 mg total) by mouth once daily. 30 tablet 0    rosuvastatin (CRESTOR) 10 MG tablet Take 1 tablet (10 mg total) by mouth every evening. 30 tablet 0     No current facility-administered medications for this visit.      Review of patient's allergies indicates:  No Known Allergies    Review of Systems   Constitutional: Negative for fever.   HENT: Negative for congestion.    Respiratory: Negative for cough and shortness of breath.    Cardiovascular: Negative for leg swelling.   Musculoskeletal: Positive for gait problem.   Neurological: Positive for numbness.   All other systems reviewed and are negative.      Objective:      Vitals:    12/17/19 0807   BP: 115/60   Pulse: 69   Temp: 97.5 °F (36.4 °C)   TempSrc: Oral   SpO2: 98%   Weight: 105.7 kg (233 lb)   Height: 5' 7" (1.702 m)     Physical Exam   Constitutional: He appears well-developed and well-nourished. No distress.   Cardiovascular:   Pulses:       Dorsalis pedis pulses are 2+ on " the right side, and 2+ on the left side.        Posterior tibial pulses are 1+ on the right side, and 1+ on the left side.   Pulmonary/Chest: Effort normal.   Musculoskeletal:        Right foot: There is decreased range of motion.        Left foot: There is decreased range of motion.   Feet:   Right Foot:   Protective Sensation: 4 sites tested. 2 sites sensed.   Skin Integrity: Positive for dry skin.   Left Foot:   Protective Sensation: 4 sites tested. 2 sites sensed.   Skin Integrity: Positive for dry skin.   Skin: Skin is dry. Capillary refill takes 2 to 3 seconds.   Psychiatric: He has a normal mood and affect.   Vitals reviewed.  Vascular   Normal CFT bilateral   No lower extremity edema bilateral  Pedal skin temperature is warm, discolored skin lower extremities bilateral    Integumentary   Chronic dry skin bilateral lower legs, plantar-posterior and medial heels with small cracks, stable, no erythema, remain at risk for complications  Web spaces clear  Heels are clear  Dystrophic, discolored onychomycosis, several nails with damage to nail bed, thickness reduced, no evidence of ingrown nail or subungual abscess, at risk       Neurological   Gross sensation dimished, positive parasthesia's, painful diabetic neuropathy      Musculoskeletal   Muscle Strength and Tone:  poor    Joints, Bones, and Muscles: pes planus   Difficulty walking, antalgic gait, uses cane  Limited mobility, can not reach, treat or inspect feet  Presents in appropriate tennis shoes    Hemoglobin A1c    Ref Range & Units 3wk ago 3mo ago 4mo ago    Hemoglobin A1C <5.7 % of total Hgb 6.9High   6.6High  R, CM 6.6High  CM                 Assessment:       1. Type II diabetes mellitus with neurological manifestations    2. Chronic peripheral neuropathic pain    3. Fissure in skin of foot    4. Onychomycosis due to dermatophyte        Plan:         Reviewed A1c  Review of diabetic education, diabetes, neuropathy pain,  lack of sensation in feet and  condition of skin.   Reviewed benefits continue tight control of glucose / diabetes   Reviewed better care of dry skin, potential complications   Reviewed maintenance of nails, discoloration, thickness, potential complications.  Reviewed possible complications specially due to lack toes  Instructed patient to contact the office with any changes which have not improved in 2-3 days.  Patient was in understanding and agreement with treatment plan  Counseled the patient on his conditions, their implications and medical management.  Instructed patient to contact the office with any changes, questions, concerns, worsening of symptoms. Patient verbalized understanding.   Total face to face time, exam, assessment, treatment, discussion, documentation 25 minutes, more than half this time spent on consultation and coordination of care.   Follow up 3 months.      This note was created using [employer] voice recognition software that occasionally misinterpreted phrases or words.

## 2020-01-28 ENCOUNTER — TELEPHONE (OUTPATIENT)
Dept: PODIATRY | Facility: CLINIC | Age: 70
End: 2020-01-28

## 2020-01-28 DIAGNOSIS — G89.29 CHRONIC PERIPHERAL NEUROPATHIC PAIN: Primary | ICD-10-CM

## 2020-01-28 DIAGNOSIS — R52 PAIN MANAGEMENT: ICD-10-CM

## 2020-01-28 DIAGNOSIS — M79.2 CHRONIC PERIPHERAL NEUROPATHIC PAIN: Primary | ICD-10-CM

## 2020-01-28 RX ORDER — HYDROCODONE BITARTRATE AND ACETAMINOPHEN 7.5; 325 MG/1; MG/1
1 TABLET ORAL EVERY 12 HOURS PRN
Qty: 42 TABLET | Refills: 0 | Status: SHIPPED | OUTPATIENT
Start: 2020-01-28 | End: 2020-04-08 | Stop reason: SDUPTHER

## 2020-01-28 NOTE — TELEPHONE ENCOUNTER
Pt. Request refill Norco.    Last ordered on:    12/11/2019    Norco 7.5-325mg  One po q12 hours prn  #42    Pharmacy:  [facility] [place].

## 2020-01-28 NOTE — TELEPHONE ENCOUNTER
----- Message from Shira Mcneil sent at 1/28/2020 10:40 AM CST -----  Contact: Patient  Stated he needs a refill- [facility]

## 2020-02-26 ENCOUNTER — TELEPHONE (OUTPATIENT)
Dept: PODIATRY | Facility: CLINIC | Age: 70
End: 2020-02-26

## 2020-02-26 NOTE — TELEPHONE ENCOUNTER
----- Message from Rupa Horton sent at 2/26/2020 11:35 AM CST -----  Contact: Patient  Patient called in regards to his appt on 3/17. Says he needs to be seen much sooner- supposed to be seen every 2 months, not 3. I told him my next date for scheduling was 3/10 and he said that was still too far out and he needs to be seen this week or next    Please call Mr. Raines to discuss, thank you!  [phone]

## 2020-03-03 ENCOUNTER — OFFICE VISIT (OUTPATIENT)
Dept: PODIATRY | Facility: CLINIC | Age: 70
End: 2020-03-03
Payer: MEDICARE

## 2020-03-03 VITALS
HEIGHT: 67 IN | WEIGHT: 234 LBS | DIASTOLIC BLOOD PRESSURE: 70 MMHG | OXYGEN SATURATION: 95 % | HEART RATE: 82 BPM | BODY MASS INDEX: 36.73 KG/M2 | TEMPERATURE: 99 F | SYSTOLIC BLOOD PRESSURE: 131 MMHG

## 2020-03-03 DIAGNOSIS — R23.4 FISSURE IN SKIN OF FOOT: ICD-10-CM

## 2020-03-03 DIAGNOSIS — E11.49 TYPE II DIABETES MELLITUS WITH NEUROLOGICAL MANIFESTATIONS: Primary | ICD-10-CM

## 2020-03-03 DIAGNOSIS — G89.29 CHRONIC PERIPHERAL NEUROPATHIC PAIN: ICD-10-CM

## 2020-03-03 DIAGNOSIS — R52 PAIN MANAGEMENT: ICD-10-CM

## 2020-03-03 DIAGNOSIS — M79.2 CHRONIC PERIPHERAL NEUROPATHIC PAIN: ICD-10-CM

## 2020-03-03 DIAGNOSIS — B35.1 ONYCHOMYCOSIS OF TOENAIL: ICD-10-CM

## 2020-03-03 PROCEDURE — 99213 OFFICE O/P EST LOW 20 MIN: CPT | Mod: PBBFAC | Performed by: PODIATRIST

## 2020-03-03 PROCEDURE — 99999 PR PBB SHADOW E&M-EST. PATIENT-LVL III: CPT | Mod: PBBFAC,,, | Performed by: PODIATRIST

## 2020-03-03 PROCEDURE — 99214 PR OFFICE/OUTPT VISIT, EST, LEVL IV, 30-39 MIN: ICD-10-PCS | Mod: S$PBB,,, | Performed by: PODIATRIST

## 2020-03-03 PROCEDURE — 99999 PR PBB SHADOW E&M-EST. PATIENT-LVL III: ICD-10-PCS | Mod: PBBFAC,,, | Performed by: PODIATRIST

## 2020-03-03 PROCEDURE — 99214 OFFICE O/P EST MOD 30 MIN: CPT | Mod: S$PBB,,, | Performed by: PODIATRIST

## 2020-03-03 RX ORDER — HYDROCODONE BITARTRATE AND ACETAMINOPHEN 7.5; 325 MG/1; MG/1
1 TABLET ORAL EVERY 12 HOURS PRN
Qty: 42 TABLET | Refills: 0 | Status: CANCELLED | OUTPATIENT
Start: 2020-03-03

## 2020-03-03 RX ORDER — HYDROCODONE BITARTRATE AND ACETAMINOPHEN 7.5; 325 MG/1; MG/1
1 TABLET ORAL EVERY 12 HOURS PRN
Qty: 42 TABLET | Refills: 0 | Status: SHIPPED | OUTPATIENT
Start: 2020-03-03 | End: 2020-04-02

## 2020-03-03 NOTE — LETTER
March 4, 2020      MD Mayi Ball III, Dr  [facility] [place] [address]           [facility]  [address] [place] [address]  Phone: [phone]  Fax: [phone]          Patient: Olu Gant   MR Number: [id number]   YOB: 1950   Date of Visit: 3/3/2020       Dear Dr. Brianna Elaine III:    Thank you for referring Olu Gant to me for evaluation. Attached you will find relevant portions of my assessment and plan of care.    If you have questions, please do not hesitate to call me. I look forward to following Olu Gant along with you.    Sincerely,    Anuradha Trujillo, DPGLORIA    Enclosure  CC:  No Recipients    If you would like to receive this communication electronically, please contact [email or web address] or [phone] to request more information on [employer] [facility] access.    For providers and/or their staff who would like to refer a patient to [facility], please contact us through our one-stop-shop provider referral line, [facility], at [phone].    If you feel you have received this communication in error or would no longer like to receive these types of communications, please e-mail [email or web address]

## 2020-03-04 NOTE — PROGRESS NOTES
"Subjective:       Patient ID: Olu Gant is a 69 y.o. male.    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   Patient reports no changes in diabetes, fairly well controlled, glucose averages about 140.  Reports he has been doing well, has continued to take two hydrocodone on dialysis days, 1 on other days.  His neurologist has him on 150 mg Lyrica 3 times daily.   Patient and wife confirmed they feel these medications have finally controlled his pain specially on dialysis days.  He used to have so much pain and feel completely wiped out on dialysis days, he was not able to do anything reports pain medication Lyrica have helped significantly.       Past Medical History:   Diagnosis Date    Asbestos exposure - 1973 2/18/2014    Benign hypertension with ESRD (end-stage renal disease) 2/18/2014    Diabetes type 2 - since 1996 2/18/2014    DIALYSIS 2013    ESRD (end stage renal disease) - initiated dialysis 05/29/2013 2/18/2014    Gout, arthritis 2/18/2014    Hypothyroidism 2/18/2014    Irregular heart rhythm - unsure of Afib vs. A flutter 2/18/2014    Obesity 2/18/2014    Secondary hyperparathyroidism, renal 2/18/2014    Sleep apnea on Bipap 2/18/2014     Past Surgical History:   Procedure Laterality Date    AV FISTULA PLACEMENT      COLON SURGERY  02/2017    resection for "ischemic colon"    INGUINAL HERNIA REPAIR      bilaterally    pace maker      [email or web address]    TONSILLECTOMY           Current Outpatient Medications   Medication Sig Dispense Refill    allopurinol (ZYLOPRIM) 100 MG tablet Take 100 mg by mouth once daily.      amLODIPine (NORVASC) 5 MG tablet Take 5 mg by mouth 2 (two) times daily.      diclofenac sodium (VOLTAREN) 1 % Gel Apply very small amount to left foot once to twice daily as needed for pain 100 g 1    ergocalciferol (ERGOCALCIFEROL) 50,000 unit Cap Take 50,000 Units by mouth every 30 " days.       fluticasone (FLONASE) 50 mcg/actuation nasal spray SHAKE LQ AND U 1 SPR IEN D  0    HYDROcodone-acetaminophen (NORCO) 7.5-325 mg per tablet Take 1 tablet by mouth every 12 (twelve) hours as needed for Pain. 42 tablet 0    LEVEMIR U-100 INSULIN 100 unit/mL injection Inject 20 Units into the skin nightly as needed.  0    levothyroxine (SYNTHROID) 100 MCG tablet Take 1 tablet (100 mcg total) by mouth once daily. 90 tablet 3    nitroGLYCERIN (NITROSTAT) 0.4 MG SL tablet Place 0.4 mg under the tongue every 5 (five) minutes as needed for Chest pain.      NOVOLOG U-100 INSULIN ASPART 100 unit/mL injection Inject 14-20 Units into the skin 3 (three) times daily as needed.  0    ONETOUCH VERIO Strp USE 1 STRIP TO CHECK GLUCOSE 3 TO 4 TIMES DAILY AS DIRECTED  0    pantoprazole (PROTONIX) 40 MG tablet Take 1 tablet (40 mg total) by mouth once daily. 90 tablet 3    pregabalin (LYRICA) 150 MG capsule Take 150 mg by mouth 3 (three) times daily.      sevelamer HCl (RENAGEL) 800 MG Tab Take 800 mg by mouth 3 (three) times daily with meals.      venlafaxine (EFFEXOR) 37.5 MG Tab Take 1/2 tablet by mouth twice daily for neuropahty 90 tablet 1    aspirin (ECOTRIN) 81 MG EC tablet Take 1 tablet (81 mg total) by mouth once daily. 30 tablet 0    HYDROcodone-acetaminophen (NORCO) 7.5-325 mg per tablet Take 1 tablet by mouth every 12 (twelve) hours as needed for Pain. 1 daily, 2 dialysis days 42 tablet 0     No current facility-administered medications for this visit.      Review of patient's allergies indicates:  No Known Allergies    Review of Systems   Constitutional: Negative for fever.   HENT: Negative for congestion.    Respiratory: Negative for cough and shortness of breath.    Cardiovascular: Negative for leg swelling.   Musculoskeletal: Positive for gait problem.   Neurological: Positive for numbness.   All other systems reviewed and are negative.      Objective:      Vitals:    03/03/20 0849   BP: 131/70  "  Pulse: 82   Temp: 98.6 °F (37 °C)   TempSrc: Oral   SpO2: 95%   Weight: 106.1 kg (234 lb)   Height: 5' 7" (1.702 m)     Physical Exam   Constitutional: He appears well-developed and well-nourished. No distress.   Cardiovascular:   Pulses:       Dorsalis pedis pulses are 2+ on the right side, and 2+ on the left side.        Posterior tibial pulses are 1+ on the right side, and 1+ on the left side.   Pulmonary/Chest: Effort normal.   Musculoskeletal:        Right foot: There is decreased range of motion.        Left foot: There is decreased range of motion.   Feet:   Right Foot:   Protective Sensation: 6 sites tested. 3 sites sensed.   Skin Integrity: Positive for dry skin.   Left Foot:   Protective Sensation: 6 sites tested. 3 sites sensed.   Skin Integrity: Positive for dry skin.   Skin: Skin is dry. Capillary refill takes 2 to 3 seconds.   Psychiatric: He has a normal mood and affect.   Vitals reviewed.  Vascular   Normal CFT bilateral   No lower extremity edema bilateral  Pedal skin temperature is warm, discolored skin lower extremities bilateral    Integumentary   Chronic dry skin bilateral lower legs, plantar-posterior and medial heels with small cracks, no change, no erythema, remains at risk for complications  Web spaces clear  Heels are clear  Dystrophic, discolored, raised onychomycosis, with damage to nail bed, thickness reduced, no evidence of ingrown nail or subungual abscess       Neurological   Gross sensation diminished  throughout digits, intact mid and rear foot with monofilament testing today,  positive parasthesia's, painful diabetic neuropathy      Musculoskeletal   Muscle Strength and Tone:  poor    Joints, Bones, and Muscles: pes planus   Difficulty walking, antalgic gait, uses cane  Limited mobility, can not reach, treat or inspect feet  Presents in  New, light weight black leather  tennis shoes which fit very well    Hemoglobin A1c    Ref Range & Units 3mo ago   Hemoglobin A1C <5.7 % of " total Hgb 6.9High                  Assessment:       1. Type II diabetes mellitus with neurological manifestations    2. Chronic peripheral neuropathic pain    3. Pain management    4. Fissure in skin of foot    5. Onychomycosis of toenail        Plan:         HYDROCODONE 7.5 MG TAKE 1 DAILY, TWO ON DIALYSIS DAYS      Diabetic foot exam performed, results reviewed results monofilament testing, lack of sensation through to digits  Reviewed A1c  Review of diabetic education, neuropathy pain   Reviewed benefits of tighter control of glucose / diabetes through diet  Reviewed better care of dry skin, potential complications due to cracks in heels.  Instructed patient to apply [place] [employer] for diabetic to feet every other day, avoid getting between the toes   Reviewed maintenance of nails, discoloration, thickness, potential complications  Instructed patient/wife check feet daily, contact the office with any changes which have not improved in 2-3 days.  Reviewed hydrocodone 7 point fill a 7.5 mg, take to on dialysis days, 1 non dialysis days   is consistent  Refill dispensed today  Patient was in understanding and agreement with treatment plan  Counseled the patient on his conditions, their implications and medical management.  Instructed patient to contact the office with any changes, questions, concerns, worsening of symptoms. Patient verbalized understanding.   Total face to face time, exam, assessment, treatment, discussion, documentation 25 minutes, more than half this time spent on consultation and coordination of care.   Follow up 3 months.      This note was created using [employer] voice recognition software that occasionally misinterpreted phrases or words.

## 2020-04-07 ENCOUNTER — TELEPHONE (OUTPATIENT)
Dept: PODIATRY | Facility: CLINIC | Age: 70
End: 2020-04-07

## 2020-04-07 NOTE — TELEPHONE ENCOUNTER
----- Message from Shayla Sandoval sent at 4/7/2020  3:21 PM CDT -----  Type:  RX Refill Request    Who Called: Patient  RX Name and Strength:  HYDROcodone-acetaminophen (NORCO) 7.5-325 mg per tablet  Preferred Pharmacy with phone number:  [facility]  Best Call Back Number:  [phone]  Additional Information:

## 2020-04-08 DIAGNOSIS — G89.29 CHRONIC PERIPHERAL NEUROPATHIC PAIN: Primary | ICD-10-CM

## 2020-04-08 DIAGNOSIS — R52 PAIN MANAGEMENT: ICD-10-CM

## 2020-04-08 DIAGNOSIS — M79.2 CHRONIC PERIPHERAL NEUROPATHIC PAIN: Primary | ICD-10-CM

## 2020-04-08 RX ORDER — HYDROCODONE BITARTRATE AND ACETAMINOPHEN 7.5; 325 MG/1; MG/1
1 TABLET ORAL EVERY 12 HOURS PRN
Qty: 42 TABLET | Refills: 0 | Status: SHIPPED | OUTPATIENT
Start: 2020-04-08 | End: 2020-04-29

## 2020-05-15 ENCOUNTER — LAB VISIT (OUTPATIENT)
Dept: LAB | Facility: HOSPITAL | Age: 70
End: 2020-05-15
Attending: TRANSPLANT SURGERY
Payer: MEDICARE

## 2020-05-15 DIAGNOSIS — Z94.0 KIDNEY REPLACED BY TRANSPLANT: Primary | ICD-10-CM

## 2020-05-15 LAB
ALBUMIN SERPL BCP-MCNC: 3.7 G/DL (ref 3.5–5.2)
ALP SERPL-CCNC: 88 U/L (ref 55–135)
ALT SERPL W/O P-5'-P-CCNC: 24 U/L (ref 10–44)
ANION GAP SERPL CALC-SCNC: 12 MMOL/L (ref 8–16)
AST SERPL-CCNC: 20 U/L (ref 10–40)
BACTERIA #/AREA URNS HPF: ABNORMAL /HPF
BASOPHILS # BLD AUTO: 0 K/UL (ref 0–0.2)
BASOPHILS NFR BLD: 0 % (ref 0–1.9)
BILIRUB SERPL-MCNC: 0.6 MG/DL (ref 0.1–1)
BILIRUB UR QL STRIP: NEGATIVE
BUN SERPL-MCNC: 96 MG/DL (ref 8–23)
CALCIUM SERPL-MCNC: 9.3 MG/DL (ref 8.7–10.5)
CHLORIDE SERPL-SCNC: 100 MMOL/L (ref 95–110)
CLARITY UR: CLEAR
CO2 SERPL-SCNC: 25 MMOL/L (ref 23–29)
COLOR UR: YELLOW
CREAT SERPL-MCNC: 5.5 MG/DL (ref 0.5–1.4)
DIFFERENTIAL METHOD: ABNORMAL
EOSINOPHIL # BLD AUTO: 0 K/UL (ref 0–0.5)
EOSINOPHIL NFR BLD: 0.4 % (ref 0–8)
ERYTHROCYTE [DISTWIDTH] IN BLOOD BY AUTOMATED COUNT: 14.5 % (ref 11.5–14.5)
EST. GFR  (AFRICAN AMERICAN): 11.2 ML/MIN/1.73 M^2
EST. GFR  (NON AFRICAN AMERICAN): 9.7 ML/MIN/1.73 M^2
GLUCOSE SERPL-MCNC: 173 MG/DL (ref 70–110)
GLUCOSE UR QL STRIP: NEGATIVE
HCT VFR BLD AUTO: 30.3 % (ref 40–54)
HGB BLD-MCNC: 9.6 G/DL (ref 14–18)
HGB UR QL STRIP: ABNORMAL
HYALINE CASTS #/AREA URNS LPF: 0 /LPF
IMM GRANULOCYTES # BLD AUTO: 0.27 K/UL (ref 0–0.04)
IMM GRANULOCYTES NFR BLD AUTO: 5.5 % (ref 0–0.5)
KETONES UR QL STRIP: NEGATIVE
LEUKOCYTE ESTERASE UR QL STRIP: ABNORMAL
LYMPHOCYTES # BLD AUTO: 0.2 K/UL (ref 1–4.8)
LYMPHOCYTES NFR BLD: 3.1 % (ref 18–48)
MAGNESIUM SERPL-MCNC: 1.9 MG/DL (ref 1.6–2.6)
MCH RBC QN AUTO: 29.8 PG (ref 27–31)
MCHC RBC AUTO-ENTMCNC: 31.7 G/DL (ref 32–36)
MCV RBC AUTO: 94 FL (ref 82–98)
MICROSCOPIC COMMENT: ABNORMAL
MONOCYTES # BLD AUTO: 0.5 K/UL (ref 0.3–1)
MONOCYTES NFR BLD: 10.4 % (ref 4–15)
NEUTROPHILS # BLD AUTO: 4 K/UL (ref 1.8–7.7)
NEUTROPHILS NFR BLD: 80.6 % (ref 38–73)
NITRITE UR QL STRIP: NEGATIVE
NRBC BLD-RTO: 0 /100 WBC
PH UR STRIP: 7 [PH] (ref 5–8)
PHOSPHATE SERPL-MCNC: 6 MG/DL (ref 2.7–4.5)
PLATELET # BLD AUTO: 172 K/UL (ref 150–350)
PMV BLD AUTO: 10.9 FL (ref 9.2–12.9)
POTASSIUM SERPL-SCNC: 5.1 MMOL/L (ref 3.5–5.1)
PROT SERPL-MCNC: 6.3 G/DL (ref 6–8.4)
PROT UR QL STRIP: ABNORMAL
RBC # BLD AUTO: 3.22 M/UL (ref 4.6–6.2)
RBC #/AREA URNS HPF: 100 /HPF (ref 0–4)
SODIUM SERPL-SCNC: 137 MMOL/L (ref 136–145)
SP GR UR STRIP: 1.01 (ref 1–1.03)
TACROLIMUS BLD-MCNC: 21.5 NG/ML (ref 5–15)
URN SPEC COLLECT METH UR: ABNORMAL
UROBILINOGEN UR STRIP-ACNC: NEGATIVE EU/DL
WBC # BLD AUTO: 4.9 K/UL (ref 3.9–12.7)
WBC #/AREA URNS HPF: 6 /HPF (ref 0–5)

## 2020-05-15 PROCEDURE — 80197 ASSAY OF TACROLIMUS: CPT

## 2020-05-15 PROCEDURE — 85027 COMPLETE CBC AUTOMATED: CPT

## 2020-05-15 PROCEDURE — 36415 COLL VENOUS BLD VENIPUNCTURE: CPT

## 2020-05-15 PROCEDURE — 85007 BL SMEAR W/DIFF WBC COUNT: CPT

## 2020-05-15 PROCEDURE — 87522 HEPATITIS C REVRS TRNSCRPJ: CPT

## 2020-05-15 PROCEDURE — 83735 ASSAY OF MAGNESIUM: CPT

## 2020-05-15 PROCEDURE — 80053 COMPREHEN METABOLIC PANEL: CPT

## 2020-05-15 PROCEDURE — 81000 URINALYSIS NONAUTO W/SCOPE: CPT

## 2020-05-15 PROCEDURE — 84100 ASSAY OF PHOSPHORUS: CPT

## 2020-05-18 ENCOUNTER — LAB VISIT (OUTPATIENT)
Dept: LAB | Facility: HOSPITAL | Age: 70
End: 2020-05-18
Attending: FAMILY MEDICINE
Payer: MEDICARE

## 2020-05-18 DIAGNOSIS — Z94.0 KIDNEY REPLACED BY TRANSPLANT: Primary | ICD-10-CM

## 2020-05-18 LAB
ALBUMIN SERPL BCP-MCNC: 3.7 G/DL (ref 3.5–5.2)
ALP SERPL-CCNC: 96 U/L (ref 55–135)
ALT SERPL W/O P-5'-P-CCNC: 47 U/L (ref 10–44)
ANION GAP SERPL CALC-SCNC: 10 MMOL/L (ref 8–16)
AST SERPL-CCNC: 29 U/L (ref 10–40)
BACTERIA #/AREA URNS HPF: ABNORMAL /HPF
BASOPHILS # BLD AUTO: 0 K/UL (ref 0–0.2)
BASOPHILS NFR BLD: 0 % (ref 0–1.9)
BILIRUB SERPL-MCNC: 0.6 MG/DL (ref 0.1–1)
BILIRUB UR QL STRIP: NEGATIVE
BUN SERPL-MCNC: 66 MG/DL (ref 8–23)
CALCIUM SERPL-MCNC: 9.5 MG/DL (ref 8.7–10.5)
CHLORIDE SERPL-SCNC: 100 MMOL/L (ref 95–110)
CLARITY UR: CLEAR
CO2 SERPL-SCNC: 27 MMOL/L (ref 23–29)
COLOR UR: YELLOW
CREAT SERPL-MCNC: 3.9 MG/DL (ref 0.5–1.4)
DIFFERENTIAL METHOD: ABNORMAL
EOSINOPHIL # BLD AUTO: 0.1 K/UL (ref 0–0.5)
EOSINOPHIL NFR BLD: 0.6 % (ref 0–8)
ERYTHROCYTE [DISTWIDTH] IN BLOOD BY AUTOMATED COUNT: 14.6 % (ref 11.5–14.5)
EST. GFR  (AFRICAN AMERICAN): 16.9 ML/MIN/1.73 M^2
EST. GFR  (NON AFRICAN AMERICAN): 14.6 ML/MIN/1.73 M^2
GLUCOSE SERPL-MCNC: 180 MG/DL (ref 70–110)
GLUCOSE UR QL STRIP: ABNORMAL
HCT VFR BLD AUTO: 30.5 % (ref 40–54)
HCV RNA SERPL NAA+PROBE-LOG IU: 6.78 LOG (10) IU/ML
HCV RNA SERPL QL NAA+PROBE: DETECTED IU/ML
HCV RNA SPEC NAA+PROBE-ACNC: ABNORMAL IU/ML
HGB BLD-MCNC: 9.8 G/DL (ref 14–18)
HGB UR QL STRIP: ABNORMAL
HYALINE CASTS #/AREA URNS LPF: 0 /LPF
IMM GRANULOCYTES # BLD AUTO: 0.13 K/UL (ref 0–0.04)
IMM GRANULOCYTES NFR BLD AUTO: 1.6 % (ref 0–0.5)
KETONES UR QL STRIP: NEGATIVE
LEUKOCYTE ESTERASE UR QL STRIP: NEGATIVE
LYMPHOCYTES # BLD AUTO: 0.2 K/UL (ref 1–4.8)
LYMPHOCYTES NFR BLD: 2.9 % (ref 18–48)
MAGNESIUM SERPL-MCNC: 1.7 MG/DL (ref 1.6–2.6)
MCH RBC QN AUTO: 30.8 PG (ref 27–31)
MCHC RBC AUTO-ENTMCNC: 32.1 G/DL (ref 32–36)
MCV RBC AUTO: 96 FL (ref 82–98)
MICROSCOPIC COMMENT: ABNORMAL
MONOCYTES # BLD AUTO: 0.7 K/UL (ref 0.3–1)
MONOCYTES NFR BLD: 9 % (ref 4–15)
NEUTROPHILS # BLD AUTO: 7 K/UL (ref 1.8–7.7)
NEUTROPHILS NFR BLD: 85.9 % (ref 38–73)
NITRITE UR QL STRIP: NEGATIVE
NRBC BLD-RTO: 0 /100 WBC
PH UR STRIP: 8 [PH] (ref 5–8)
PHOSPHATE SERPL-MCNC: 4.4 MG/DL (ref 2.7–4.5)
PLATELET # BLD AUTO: 162 K/UL (ref 150–350)
PMV BLD AUTO: 11.1 FL (ref 9.2–12.9)
POTASSIUM SERPL-SCNC: 5.2 MMOL/L (ref 3.5–5.1)
PROT SERPL-MCNC: 6.5 G/DL (ref 6–8.4)
PROT UR QL STRIP: ABNORMAL
RBC # BLD AUTO: 3.18 M/UL (ref 4.6–6.2)
RBC #/AREA URNS HPF: 100 /HPF (ref 0–4)
SODIUM SERPL-SCNC: 137 MMOL/L (ref 136–145)
SP GR UR STRIP: 1.01 (ref 1–1.03)
SQUAMOUS #/AREA URNS HPF: 2 /HPF
URN SPEC COLLECT METH UR: ABNORMAL
UROBILINOGEN UR STRIP-ACNC: NEGATIVE EU/DL
WBC # BLD AUTO: 8.14 K/UL (ref 3.9–12.7)
WBC #/AREA URNS HPF: 3 /HPF (ref 0–5)

## 2020-05-18 PROCEDURE — 81000 URINALYSIS NONAUTO W/SCOPE: CPT

## 2020-05-18 PROCEDURE — 36415 COLL VENOUS BLD VENIPUNCTURE: CPT

## 2020-05-18 PROCEDURE — 84100 ASSAY OF PHOSPHORUS: CPT

## 2020-05-18 PROCEDURE — 80053 COMPREHEN METABOLIC PANEL: CPT

## 2020-05-18 PROCEDURE — 85025 COMPLETE CBC W/AUTO DIFF WBC: CPT

## 2020-05-18 PROCEDURE — 83735 ASSAY OF MAGNESIUM: CPT

## 2020-05-18 PROCEDURE — 80197 ASSAY OF TACROLIMUS: CPT

## 2020-05-18 PROCEDURE — 87522 HEPATITIS C REVRS TRNSCRPJ: CPT

## 2020-05-19 LAB — TACROLIMUS BLD-MCNC: 5.2 NG/ML (ref 5–15)

## 2020-05-20 ENCOUNTER — LAB VISIT (OUTPATIENT)
Dept: LAB | Facility: HOSPITAL | Age: 70
End: 2020-05-20
Attending: TRANSPLANT SURGERY
Payer: MEDICARE

## 2020-05-20 DIAGNOSIS — B19.20 UNSPECIFIED VIRAL HEPATITIS C WITHOUT HEPATIC COMA: Primary | ICD-10-CM

## 2020-05-20 PROCEDURE — 87902 NFCT AGT GNTYP ALYS HEP C: CPT

## 2020-05-22 ENCOUNTER — LAB VISIT (OUTPATIENT)
Dept: LAB | Facility: HOSPITAL | Age: 70
End: 2020-05-22
Attending: TRANSPLANT SURGERY
Payer: MEDICARE

## 2020-05-22 DIAGNOSIS — Z94.0 KIDNEY REPLACED BY TRANSPLANT: Primary | ICD-10-CM

## 2020-05-22 LAB
ALBUMIN SERPL BCP-MCNC: 3.5 G/DL (ref 3.5–5.2)
ALP SERPL-CCNC: 108 U/L (ref 55–135)
ALT SERPL W/O P-5'-P-CCNC: 55 U/L (ref 10–44)
ANION GAP SERPL CALC-SCNC: 11 MMOL/L (ref 8–16)
AST SERPL-CCNC: 29 U/L (ref 10–40)
BACTERIA #/AREA URNS HPF: ABNORMAL /HPF
BASOPHILS # BLD AUTO: 0.01 K/UL (ref 0–0.2)
BASOPHILS NFR BLD: 0.2 % (ref 0–1.9)
BILIRUB SERPL-MCNC: 0.4 MG/DL (ref 0.1–1)
BILIRUB UR QL STRIP: NEGATIVE
BUN SERPL-MCNC: 55 MG/DL (ref 8–23)
CALCIUM SERPL-MCNC: 9.2 MG/DL (ref 8.7–10.5)
CHLORIDE SERPL-SCNC: 98 MMOL/L (ref 95–110)
CLARITY UR: CLEAR
CO2 SERPL-SCNC: 27 MMOL/L (ref 23–29)
COLOR UR: YELLOW
CREAT SERPL-MCNC: 3.6 MG/DL (ref 0.5–1.4)
DIFFERENTIAL METHOD: ABNORMAL
EOSINOPHIL # BLD AUTO: 0.1 K/UL (ref 0–0.5)
EOSINOPHIL NFR BLD: 0.8 % (ref 0–8)
ERYTHROCYTE [DISTWIDTH] IN BLOOD BY AUTOMATED COUNT: 14.8 % (ref 11.5–14.5)
EST. GFR  (AFRICAN AMERICAN): 18.7 ML/MIN/1.73 M^2
EST. GFR  (NON AFRICAN AMERICAN): 16.1 ML/MIN/1.73 M^2
GLUCOSE SERPL-MCNC: 260 MG/DL (ref 70–110)
GLUCOSE UR QL STRIP: ABNORMAL
HCT VFR BLD AUTO: 28 % (ref 40–54)
HGB BLD-MCNC: 9 G/DL (ref 14–18)
HGB UR QL STRIP: ABNORMAL
HYALINE CASTS #/AREA URNS LPF: 0 /LPF
IMM GRANULOCYTES # BLD AUTO: 0.05 K/UL (ref 0–0.04)
IMM GRANULOCYTES NFR BLD AUTO: 0.8 % (ref 0–0.5)
KETONES UR QL STRIP: NEGATIVE
LEUKOCYTE ESTERASE UR QL STRIP: NEGATIVE
LYMPHOCYTES # BLD AUTO: 0.2 K/UL (ref 1–4.8)
LYMPHOCYTES NFR BLD: 2.9 % (ref 18–48)
MAGNESIUM SERPL-MCNC: 1.6 MG/DL (ref 1.6–2.6)
MCH RBC QN AUTO: 30.6 PG (ref 27–31)
MCHC RBC AUTO-ENTMCNC: 32.1 G/DL (ref 32–36)
MCV RBC AUTO: 95 FL (ref 82–98)
MICROSCOPIC COMMENT: ABNORMAL
MONOCYTES # BLD AUTO: 0.5 K/UL (ref 0.3–1)
MONOCYTES NFR BLD: 7.7 % (ref 4–15)
NEUTROPHILS # BLD AUTO: 5.9 K/UL (ref 1.8–7.7)
NEUTROPHILS NFR BLD: 87.6 % (ref 38–73)
NITRITE UR QL STRIP: NEGATIVE
NRBC BLD-RTO: 0 /100 WBC
PH UR STRIP: >8 [PH] (ref 5–8)
PHOSPHATE SERPL-MCNC: 3.6 MG/DL (ref 2.7–4.5)
PLATELET # BLD AUTO: 150 K/UL (ref 150–350)
PMV BLD AUTO: 10.8 FL (ref 9.2–12.9)
POTASSIUM SERPL-SCNC: 4 MMOL/L (ref 3.5–5.1)
PROT SERPL-MCNC: 6 G/DL (ref 6–8.4)
PROT UR QL STRIP: ABNORMAL
RBC # BLD AUTO: 2.94 M/UL (ref 4.6–6.2)
RBC #/AREA URNS HPF: 10 /HPF (ref 0–4)
SODIUM SERPL-SCNC: 136 MMOL/L (ref 136–145)
SP GR UR STRIP: 1.01 (ref 1–1.03)
SQUAMOUS #/AREA URNS HPF: ABNORMAL /HPF
TRICHOMONAS UR QL MICRO: ABNORMAL
URN SPEC COLLECT METH UR: ABNORMAL
UROBILINOGEN UR STRIP-ACNC: NEGATIVE EU/DL
WBC # BLD AUTO: 6.66 K/UL (ref 3.9–12.7)
WBC #/AREA URNS HPF: 12 /HPF (ref 0–5)

## 2020-05-22 PROCEDURE — 83735 ASSAY OF MAGNESIUM: CPT

## 2020-05-22 PROCEDURE — 81000 URINALYSIS NONAUTO W/SCOPE: CPT

## 2020-05-22 PROCEDURE — 36415 COLL VENOUS BLD VENIPUNCTURE: CPT

## 2020-05-22 PROCEDURE — 85025 COMPLETE CBC W/AUTO DIFF WBC: CPT

## 2020-05-22 PROCEDURE — 80197 ASSAY OF TACROLIMUS: CPT

## 2020-05-22 PROCEDURE — 84100 ASSAY OF PHOSPHORUS: CPT

## 2020-05-22 PROCEDURE — 80053 COMPREHEN METABOLIC PANEL: CPT

## 2020-05-22 PROCEDURE — 87522 HEPATITIS C REVRS TRNSCRPJ: CPT

## 2020-05-23 LAB
HCV RNA SERPL NAA+PROBE-LOG IU: 5.01 LOG (10) IU/ML
HCV RNA SERPL QL NAA+PROBE: DETECTED IU/ML
HCV RNA SPEC NAA+PROBE-ACNC: ABNORMAL IU/ML
MAYO MISCELLANEOUS RESULT (REF): NORMAL
TACROLIMUS BLD-MCNC: 8.6 NG/ML (ref 5–15)

## 2020-05-25 ENCOUNTER — LAB VISIT (OUTPATIENT)
Dept: LAB | Facility: HOSPITAL | Age: 70
End: 2020-05-25
Attending: TRANSPLANT SURGERY
Payer: MEDICARE

## 2020-05-25 DIAGNOSIS — Z94.0 KIDNEY REPLACED BY TRANSPLANT: ICD-10-CM

## 2020-05-25 LAB
ALBUMIN SERPL BCP-MCNC: 3.5 G/DL (ref 3.5–5.2)
ALP SERPL-CCNC: 102 U/L (ref 55–135)
ALT SERPL W/O P-5'-P-CCNC: 52 U/L (ref 10–44)
ANION GAP SERPL CALC-SCNC: 10 MMOL/L (ref 8–16)
AST SERPL-CCNC: 19 U/L (ref 10–40)
BACTERIA #/AREA URNS HPF: ABNORMAL /HPF
BASOPHILS # BLD AUTO: 0.01 K/UL (ref 0–0.2)
BASOPHILS NFR BLD: 0.2 % (ref 0–1.9)
BILIRUB SERPL-MCNC: 0.7 MG/DL (ref 0.1–1)
BILIRUB UR QL STRIP: NEGATIVE
BUN SERPL-MCNC: 59 MG/DL (ref 8–23)
CALCIUM SERPL-MCNC: 9.7 MG/DL (ref 8.7–10.5)
CHLORIDE SERPL-SCNC: 101 MMOL/L (ref 95–110)
CLARITY UR: CLEAR
CO2 SERPL-SCNC: 24 MMOL/L (ref 23–29)
COLOR UR: YELLOW
CREAT SERPL-MCNC: 3.4 MG/DL (ref 0.5–1.4)
DIFFERENTIAL METHOD: ABNORMAL
EOSINOPHIL # BLD AUTO: 0.1 K/UL (ref 0–0.5)
EOSINOPHIL NFR BLD: 1.3 % (ref 0–8)
ERYTHROCYTE [DISTWIDTH] IN BLOOD BY AUTOMATED COUNT: 14.6 % (ref 11.5–14.5)
EST. GFR  (AFRICAN AMERICAN): 20 ML/MIN/1.73 M^2
EST. GFR  (NON AFRICAN AMERICAN): 17.3 ML/MIN/1.73 M^2
GLUCOSE SERPL-MCNC: 210 MG/DL (ref 70–110)
GLUCOSE UR QL STRIP: ABNORMAL
HCT VFR BLD AUTO: 28.8 % (ref 40–54)
HGB BLD-MCNC: 9.4 G/DL (ref 14–18)
HGB UR QL STRIP: ABNORMAL
IMM GRANULOCYTES # BLD AUTO: 0.04 K/UL (ref 0–0.04)
IMM GRANULOCYTES NFR BLD AUTO: 0.7 % (ref 0–0.5)
KETONES UR QL STRIP: NEGATIVE
LEUKOCYTE ESTERASE UR QL STRIP: NEGATIVE
LYMPHOCYTES # BLD AUTO: 0.3 K/UL (ref 1–4.8)
LYMPHOCYTES NFR BLD: 4.5 % (ref 18–48)
MAGNESIUM SERPL-MCNC: 1.5 MG/DL (ref 1.6–2.6)
MCH RBC QN AUTO: 31 PG (ref 27–31)
MCHC RBC AUTO-ENTMCNC: 32.6 G/DL (ref 32–36)
MCV RBC AUTO: 95 FL (ref 82–98)
MICROSCOPIC COMMENT: ABNORMAL
MONOCYTES # BLD AUTO: 0.4 K/UL (ref 0.3–1)
MONOCYTES NFR BLD: 7.6 % (ref 4–15)
NEUTROPHILS # BLD AUTO: 4.7 K/UL (ref 1.8–7.7)
NEUTROPHILS NFR BLD: 85.7 % (ref 38–73)
NITRITE UR QL STRIP: NEGATIVE
NRBC BLD-RTO: 0 /100 WBC
PH UR STRIP: 7 [PH] (ref 5–8)
PHOSPHATE SERPL-MCNC: 4.1 MG/DL (ref 2.7–4.5)
PLATELET # BLD AUTO: 158 K/UL (ref 150–350)
PMV BLD AUTO: 10.6 FL (ref 9.2–12.9)
POTASSIUM SERPL-SCNC: 3.8 MMOL/L (ref 3.5–5.1)
PROT SERPL-MCNC: 6.2 G/DL (ref 6–8.4)
PROT UR QL STRIP: NEGATIVE
RBC # BLD AUTO: 3.03 M/UL (ref 4.6–6.2)
RBC #/AREA URNS HPF: 5 /HPF (ref 0–4)
SODIUM SERPL-SCNC: 135 MMOL/L (ref 136–145)
SP GR UR STRIP: 1.01 (ref 1–1.03)
SQUAMOUS #/AREA URNS HPF: 2 /HPF
URN SPEC COLLECT METH UR: ABNORMAL
UROBILINOGEN UR STRIP-ACNC: NEGATIVE EU/DL
WBC # BLD AUTO: 5.52 K/UL (ref 3.9–12.7)
WBC #/AREA URNS HPF: 2 /HPF (ref 0–5)

## 2020-05-25 PROCEDURE — 85025 COMPLETE CBC W/AUTO DIFF WBC: CPT

## 2020-05-25 PROCEDURE — 80053 COMPREHEN METABOLIC PANEL: CPT

## 2020-05-25 PROCEDURE — 81000 URINALYSIS NONAUTO W/SCOPE: CPT

## 2020-05-25 PROCEDURE — 83735 ASSAY OF MAGNESIUM: CPT

## 2020-05-25 PROCEDURE — 80197 ASSAY OF TACROLIMUS: CPT

## 2020-05-25 PROCEDURE — 36415 COLL VENOUS BLD VENIPUNCTURE: CPT

## 2020-05-25 PROCEDURE — 84100 ASSAY OF PHOSPHORUS: CPT

## 2020-05-25 PROCEDURE — 87522 HEPATITIS C REVRS TRNSCRPJ: CPT

## 2020-05-26 LAB — TACROLIMUS BLD-MCNC: 12.7 NG/ML (ref 5–15)

## 2020-05-27 ENCOUNTER — LAB VISIT (OUTPATIENT)
Dept: LAB | Facility: HOSPITAL | Age: 70
End: 2020-05-27
Attending: TRANSPLANT SURGERY
Payer: MEDICARE

## 2020-05-27 DIAGNOSIS — Z94.0 KIDNEY REPLACED BY TRANSPLANT: ICD-10-CM

## 2020-05-27 LAB
ALBUMIN SERPL BCP-MCNC: 3.7 G/DL (ref 3.5–5.2)
ALP SERPL-CCNC: 99 U/L (ref 55–135)
ALT SERPL W/O P-5'-P-CCNC: 55 U/L (ref 10–44)
ANION GAP SERPL CALC-SCNC: 11 MMOL/L (ref 8–16)
AST SERPL-CCNC: 27 U/L (ref 10–40)
BASOPHILS # BLD AUTO: 0 K/UL (ref 0–0.2)
BASOPHILS NFR BLD: 0 % (ref 0–1.9)
BILIRUB SERPL-MCNC: 0.7 MG/DL (ref 0.1–1)
BILIRUB UR QL STRIP: NEGATIVE
BUN SERPL-MCNC: 43 MG/DL (ref 8–23)
CALCIUM SERPL-MCNC: 9.7 MG/DL (ref 8.7–10.5)
CHLORIDE SERPL-SCNC: 100 MMOL/L (ref 95–110)
CLARITY UR: CLEAR
CO2 SERPL-SCNC: 26 MMOL/L (ref 23–29)
COLOR UR: YELLOW
CREAT SERPL-MCNC: 3 MG/DL (ref 0.5–1.4)
DIFFERENTIAL METHOD: ABNORMAL
EOSINOPHIL # BLD AUTO: 0.1 K/UL (ref 0–0.5)
EOSINOPHIL NFR BLD: 1.1 % (ref 0–8)
ERYTHROCYTE [DISTWIDTH] IN BLOOD BY AUTOMATED COUNT: 14.6 % (ref 11.5–14.5)
EST. GFR  (AFRICAN AMERICAN): 23.3 ML/MIN/1.73 M^2
EST. GFR  (NON AFRICAN AMERICAN): 20.1 ML/MIN/1.73 M^2
GLUCOSE SERPL-MCNC: 164 MG/DL (ref 70–110)
GLUCOSE UR QL STRIP: ABNORMAL
HCT VFR BLD AUTO: 29.6 % (ref 40–54)
HGB BLD-MCNC: 9.6 G/DL (ref 14–18)
HGB UR QL STRIP: ABNORMAL
IMM GRANULOCYTES # BLD AUTO: 0.03 K/UL (ref 0–0.04)
IMM GRANULOCYTES NFR BLD AUTO: 0.6 % (ref 0–0.5)
KETONES UR QL STRIP: NEGATIVE
LEUKOCYTE ESTERASE UR QL STRIP: NEGATIVE
LYMPHOCYTES # BLD AUTO: 0.3 K/UL (ref 1–4.8)
LYMPHOCYTES NFR BLD: 5.6 % (ref 18–48)
MAGNESIUM SERPL-MCNC: 1.7 MG/DL (ref 1.6–2.6)
MCH RBC QN AUTO: 30.8 PG (ref 27–31)
MCHC RBC AUTO-ENTMCNC: 32.4 G/DL (ref 32–36)
MCV RBC AUTO: 95 FL (ref 82–98)
MICROSCOPIC COMMENT: NORMAL
MONOCYTES # BLD AUTO: 0.5 K/UL (ref 0.3–1)
MONOCYTES NFR BLD: 10.3 % (ref 4–15)
NEUTROPHILS # BLD AUTO: 3.9 K/UL (ref 1.8–7.7)
NEUTROPHILS NFR BLD: 82.4 % (ref 38–73)
NITRITE UR QL STRIP: NEGATIVE
NRBC BLD-RTO: 0 /100 WBC
PH UR STRIP: 7 [PH] (ref 5–8)
PHOSPHATE SERPL-MCNC: 3.7 MG/DL (ref 2.7–4.5)
PLATELET # BLD AUTO: 170 K/UL (ref 150–350)
PMV BLD AUTO: 10.3 FL (ref 9.2–12.9)
POTASSIUM SERPL-SCNC: 3.6 MMOL/L (ref 3.5–5.1)
PROT SERPL-MCNC: 6.2 G/DL (ref 6–8.4)
PROT UR QL STRIP: NEGATIVE
RBC # BLD AUTO: 3.12 M/UL (ref 4.6–6.2)
RBC #/AREA URNS HPF: 3 /HPF (ref 0–4)
SODIUM SERPL-SCNC: 137 MMOL/L (ref 136–145)
SP GR UR STRIP: 1.01 (ref 1–1.03)
URN SPEC COLLECT METH UR: ABNORMAL
UROBILINOGEN UR STRIP-ACNC: NEGATIVE EU/DL
WBC # BLD AUTO: 4.68 K/UL (ref 3.9–12.7)

## 2020-05-27 PROCEDURE — 36415 COLL VENOUS BLD VENIPUNCTURE: CPT

## 2020-05-27 PROCEDURE — 81000 URINALYSIS NONAUTO W/SCOPE: CPT

## 2020-05-27 PROCEDURE — 84100 ASSAY OF PHOSPHORUS: CPT

## 2020-05-27 PROCEDURE — 80053 COMPREHEN METABOLIC PANEL: CPT

## 2020-05-27 PROCEDURE — 80197 ASSAY OF TACROLIMUS: CPT

## 2020-05-27 PROCEDURE — 87522 HEPATITIS C REVRS TRNSCRPJ: CPT

## 2020-05-27 PROCEDURE — 83735 ASSAY OF MAGNESIUM: CPT

## 2020-05-27 PROCEDURE — 85025 COMPLETE CBC W/AUTO DIFF WBC: CPT

## 2020-05-28 ENCOUNTER — TELEPHONE (OUTPATIENT)
Dept: PODIATRY | Facility: CLINIC | Age: 70
End: 2020-05-28

## 2020-05-28 LAB — TACROLIMUS BLD-MCNC: 11.7 NG/ML (ref 5–15)

## 2020-05-28 NOTE — TELEPHONE ENCOUNTER
----- Message from Kiah Pompa sent at 5/28/2020  4:01 PM CDT -----  Contact: wife  Type: Needs Medical Advice  Who Called:   Best Call Back Number: [phone]  Additional Information: The patient said she would like a call back to speak to the nurse about some issues her  is having thanks.

## 2020-05-29 ENCOUNTER — LAB VISIT (OUTPATIENT)
Dept: LAB | Facility: HOSPITAL | Age: 70
End: 2020-05-29
Attending: TRANSPLANT SURGERY
Payer: MEDICARE

## 2020-05-29 DIAGNOSIS — Z94.0 KIDNEY REPLACED BY TRANSPLANT: ICD-10-CM

## 2020-05-29 LAB
ALBUMIN SERPL BCP-MCNC: 3.5 G/DL (ref 3.5–5.2)
ALP SERPL-CCNC: 113 U/L (ref 55–135)
ALT SERPL W/O P-5'-P-CCNC: 61 U/L (ref 10–44)
ANION GAP SERPL CALC-SCNC: 11 MMOL/L (ref 8–16)
AST SERPL-CCNC: 25 U/L (ref 10–40)
BASOPHILS # BLD AUTO: 0.01 K/UL (ref 0–0.2)
BASOPHILS NFR BLD: 0.2 % (ref 0–1.9)
BILIRUB SERPL-MCNC: 0.6 MG/DL (ref 0.1–1)
BILIRUB UR QL STRIP: NEGATIVE
BUN SERPL-MCNC: 59 MG/DL (ref 8–23)
CALCIUM SERPL-MCNC: 9.6 MG/DL (ref 8.7–10.5)
CHLORIDE SERPL-SCNC: 98 MMOL/L (ref 95–110)
CLARITY UR: CLEAR
CO2 SERPL-SCNC: 26 MMOL/L (ref 23–29)
COLOR UR: YELLOW
CREAT SERPL-MCNC: 3.8 MG/DL (ref 0.5–1.4)
DIFFERENTIAL METHOD: ABNORMAL
EOSINOPHIL # BLD AUTO: 0.1 K/UL (ref 0–0.5)
EOSINOPHIL NFR BLD: 1.1 % (ref 0–8)
ERYTHROCYTE [DISTWIDTH] IN BLOOD BY AUTOMATED COUNT: 14.3 % (ref 11.5–14.5)
EST. GFR  (AFRICAN AMERICAN): 17.5 ML/MIN/1.73 M^2
EST. GFR  (NON AFRICAN AMERICAN): 15.1 ML/MIN/1.73 M^2
GLUCOSE SERPL-MCNC: 182 MG/DL (ref 70–110)
GLUCOSE UR QL STRIP: ABNORMAL
HCT VFR BLD AUTO: 29.4 % (ref 40–54)
HCV RNA SERPL NAA+PROBE-LOG IU: 5.78 LOG (10) IU/ML
HCV RNA SERPL NAA+PROBE-LOG IU: 6.3 LOG (10) IU/ML
HCV RNA SERPL QL NAA+PROBE: DETECTED IU/ML
HCV RNA SERPL QL NAA+PROBE: DETECTED IU/ML
HCV RNA SPEC NAA+PROBE-ACNC: ABNORMAL IU/ML
HCV RNA SPEC NAA+PROBE-ACNC: ABNORMAL IU/ML
HGB BLD-MCNC: 9.7 G/DL (ref 14–18)
HGB UR QL STRIP: ABNORMAL
IMM GRANULOCYTES # BLD AUTO: 0.04 K/UL (ref 0–0.04)
IMM GRANULOCYTES NFR BLD AUTO: 0.9 % (ref 0–0.5)
KETONES UR QL STRIP: NEGATIVE
LEUKOCYTE ESTERASE UR QL STRIP: NEGATIVE
LYMPHOCYTES # BLD AUTO: 0.2 K/UL (ref 1–4.8)
LYMPHOCYTES NFR BLD: 5.2 % (ref 18–48)
MAGNESIUM SERPL-MCNC: 1.5 MG/DL (ref 1.6–2.6)
MCH RBC QN AUTO: 30.7 PG (ref 27–31)
MCHC RBC AUTO-ENTMCNC: 33 G/DL (ref 32–36)
MCV RBC AUTO: 93 FL (ref 82–98)
MONOCYTES # BLD AUTO: 0.6 K/UL (ref 0.3–1)
MONOCYTES NFR BLD: 12 % (ref 4–15)
NEUTROPHILS # BLD AUTO: 3.7 K/UL (ref 1.8–7.7)
NEUTROPHILS NFR BLD: 80.6 % (ref 38–73)
NITRITE UR QL STRIP: NEGATIVE
NRBC BLD-RTO: 0 /100 WBC
PH UR STRIP: 8 [PH] (ref 5–8)
PHOSPHATE SERPL-MCNC: 4.2 MG/DL (ref 2.7–4.5)
PLATELET # BLD AUTO: 179 K/UL (ref 150–350)
PMV BLD AUTO: 10.6 FL (ref 9.2–12.9)
POTASSIUM SERPL-SCNC: 3.4 MMOL/L (ref 3.5–5.1)
PROT SERPL-MCNC: 5.8 G/DL (ref 6–8.4)
PROT UR QL STRIP: NEGATIVE
RBC # BLD AUTO: 3.16 M/UL (ref 4.6–6.2)
SODIUM SERPL-SCNC: 135 MMOL/L (ref 136–145)
SP GR UR STRIP: 1.01 (ref 1–1.03)
URN SPEC COLLECT METH UR: ABNORMAL
UROBILINOGEN UR STRIP-ACNC: NEGATIVE EU/DL
WBC # BLD AUTO: 4.6 K/UL (ref 3.9–12.7)

## 2020-05-29 PROCEDURE — 84100 ASSAY OF PHOSPHORUS: CPT

## 2020-05-29 PROCEDURE — 83735 ASSAY OF MAGNESIUM: CPT

## 2020-05-29 PROCEDURE — 81003 URINALYSIS AUTO W/O SCOPE: CPT

## 2020-05-29 PROCEDURE — 80197 ASSAY OF TACROLIMUS: CPT

## 2020-05-29 PROCEDURE — 87522 HEPATITIS C REVRS TRNSCRPJ: CPT

## 2020-05-29 PROCEDURE — 80053 COMPREHEN METABOLIC PANEL: CPT

## 2020-05-29 PROCEDURE — 85025 COMPLETE CBC W/AUTO DIFF WBC: CPT

## 2020-05-29 PROCEDURE — 36415 COLL VENOUS BLD VENIPUNCTURE: CPT

## 2020-05-30 LAB — TACROLIMUS BLD-MCNC: 11 NG/ML (ref 5–15)

## 2020-06-01 ENCOUNTER — LAB VISIT (OUTPATIENT)
Dept: LAB | Facility: HOSPITAL | Age: 70
End: 2020-06-01
Attending: TRANSPLANT SURGERY
Payer: MEDICARE

## 2020-06-01 DIAGNOSIS — Z94.0 KIDNEY REPLACED BY TRANSPLANT: ICD-10-CM

## 2020-06-01 LAB
ALBUMIN SERPL BCP-MCNC: 3.6 G/DL (ref 3.5–5.2)
ALP SERPL-CCNC: 114 U/L (ref 55–135)
ALT SERPL W/O P-5'-P-CCNC: 47 U/L (ref 10–44)
ANION GAP SERPL CALC-SCNC: 10 MMOL/L (ref 8–16)
AST SERPL-CCNC: 14 U/L (ref 10–40)
BASOPHILS # BLD AUTO: 0 K/UL (ref 0–0.2)
BASOPHILS NFR BLD: 0 % (ref 0–1.9)
BILIRUB SERPL-MCNC: 0.7 MG/DL (ref 0.1–1)
BILIRUB UR QL STRIP: NEGATIVE
BUN SERPL-MCNC: 69 MG/DL (ref 8–23)
CALCIUM SERPL-MCNC: 9.9 MG/DL (ref 8.7–10.5)
CHLORIDE SERPL-SCNC: 103 MMOL/L (ref 95–110)
CLARITY UR: CLEAR
CO2 SERPL-SCNC: 23 MMOL/L (ref 23–29)
COLOR UR: YELLOW
CREAT SERPL-MCNC: 3.6 MG/DL (ref 0.5–1.4)
DIFFERENTIAL METHOD: ABNORMAL
EOSINOPHIL # BLD AUTO: 0 K/UL (ref 0–0.5)
EOSINOPHIL NFR BLD: 0.8 % (ref 0–8)
ERYTHROCYTE [DISTWIDTH] IN BLOOD BY AUTOMATED COUNT: 14.2 % (ref 11.5–14.5)
EST. GFR  (AFRICAN AMERICAN): 18.7 ML/MIN/1.73 M^2
EST. GFR  (NON AFRICAN AMERICAN): 16.1 ML/MIN/1.73 M^2
GLUCOSE SERPL-MCNC: 207 MG/DL (ref 70–110)
GLUCOSE UR QL STRIP: ABNORMAL
HCT VFR BLD AUTO: 31.7 % (ref 40–54)
HCV RNA SERPL NAA+PROBE-LOG IU: 5.28 LOG (10) IU/ML
HCV RNA SERPL NAA+PROBE-LOG IU: 6.88 LOG (10) IU/ML
HCV RNA SERPL QL NAA+PROBE: DETECTED IU/ML
HCV RNA SERPL QL NAA+PROBE: DETECTED IU/ML
HCV RNA SPEC NAA+PROBE-ACNC: ABNORMAL IU/ML
HCV RNA SPEC NAA+PROBE-ACNC: ABNORMAL IU/ML
HGB BLD-MCNC: 10.2 G/DL (ref 14–18)
HGB UR QL STRIP: ABNORMAL
IMM GRANULOCYTES # BLD AUTO: 0.06 K/UL (ref 0–0.04)
IMM GRANULOCYTES NFR BLD AUTO: 1.2 % (ref 0–0.5)
KETONES UR QL STRIP: NEGATIVE
LEUKOCYTE ESTERASE UR QL STRIP: NEGATIVE
LYMPHOCYTES # BLD AUTO: 0.3 K/UL (ref 1–4.8)
LYMPHOCYTES NFR BLD: 5.5 % (ref 18–48)
MAGNESIUM SERPL-MCNC: 1.7 MG/DL (ref 1.6–2.6)
MCH RBC QN AUTO: 30.4 PG (ref 27–31)
MCHC RBC AUTO-ENTMCNC: 32.2 G/DL (ref 32–36)
MCV RBC AUTO: 95 FL (ref 82–98)
MONOCYTES # BLD AUTO: 0.5 K/UL (ref 0.3–1)
MONOCYTES NFR BLD: 9.7 % (ref 4–15)
NEUTROPHILS # BLD AUTO: 4.2 K/UL (ref 1.8–7.7)
NEUTROPHILS NFR BLD: 82.8 % (ref 38–73)
NITRITE UR QL STRIP: NEGATIVE
NRBC BLD-RTO: 0 /100 WBC
PH UR STRIP: 7 [PH] (ref 5–8)
PHOSPHATE SERPL-MCNC: 3.4 MG/DL (ref 2.7–4.5)
PLATELET # BLD AUTO: 213 K/UL (ref 150–350)
PMV BLD AUTO: 10.3 FL (ref 9.2–12.9)
POTASSIUM SERPL-SCNC: 4.1 MMOL/L (ref 3.5–5.1)
PROT SERPL-MCNC: 6.1 G/DL (ref 6–8.4)
PROT UR QL STRIP: NEGATIVE
RBC # BLD AUTO: 3.35 M/UL (ref 4.6–6.2)
SODIUM SERPL-SCNC: 136 MMOL/L (ref 136–145)
SP GR UR STRIP: 1.01 (ref 1–1.03)
URN SPEC COLLECT METH UR: ABNORMAL
UROBILINOGEN UR STRIP-ACNC: NEGATIVE EU/DL
WBC # BLD AUTO: 5.05 K/UL (ref 3.9–12.7)

## 2020-06-01 PROCEDURE — 80197 ASSAY OF TACROLIMUS: CPT

## 2020-06-01 PROCEDURE — 85025 COMPLETE CBC W/AUTO DIFF WBC: CPT

## 2020-06-01 PROCEDURE — 36415 COLL VENOUS BLD VENIPUNCTURE: CPT

## 2020-06-01 PROCEDURE — 81003 URINALYSIS AUTO W/O SCOPE: CPT

## 2020-06-01 PROCEDURE — 80053 COMPREHEN METABOLIC PANEL: CPT

## 2020-06-01 PROCEDURE — 83735 ASSAY OF MAGNESIUM: CPT

## 2020-06-01 PROCEDURE — 84100 ASSAY OF PHOSPHORUS: CPT

## 2020-06-01 PROCEDURE — 87522 HEPATITIS C REVRS TRNSCRPJ: CPT

## 2020-06-02 LAB — TACROLIMUS BLD-MCNC: 10.3 NG/ML (ref 5–15)

## 2020-06-03 ENCOUNTER — LAB VISIT (OUTPATIENT)
Dept: LAB | Facility: HOSPITAL | Age: 70
End: 2020-06-03
Attending: TRANSPLANT SURGERY
Payer: MEDICARE

## 2020-06-03 DIAGNOSIS — Z94.0 KIDNEY REPLACED BY TRANSPLANT: ICD-10-CM

## 2020-06-03 LAB
ALBUMIN SERPL BCP-MCNC: 3.7 G/DL (ref 3.5–5.2)
ALP SERPL-CCNC: 115 U/L (ref 55–135)
ALT SERPL W/O P-5'-P-CCNC: 56 U/L (ref 10–44)
ANION GAP SERPL CALC-SCNC: 10 MMOL/L (ref 8–16)
AST SERPL-CCNC: 24 U/L (ref 10–40)
BACTERIA #/AREA URNS HPF: ABNORMAL /HPF
BASOPHILS # BLD AUTO: 0.01 K/UL (ref 0–0.2)
BASOPHILS NFR BLD: 0.2 % (ref 0–1.9)
BILIRUB SERPL-MCNC: 0.6 MG/DL (ref 0.1–1)
BILIRUB UR QL STRIP: NEGATIVE
BUN SERPL-MCNC: 65 MG/DL (ref 8–23)
CALCIUM SERPL-MCNC: 10.1 MG/DL (ref 8.7–10.5)
CHLORIDE SERPL-SCNC: 106 MMOL/L (ref 95–110)
CLARITY UR: CLEAR
CO2 SERPL-SCNC: 21 MMOL/L (ref 23–29)
COLOR UR: YELLOW
CREAT SERPL-MCNC: 3.2 MG/DL (ref 0.5–1.4)
DIFFERENTIAL METHOD: ABNORMAL
EOSINOPHIL # BLD AUTO: 0 K/UL (ref 0–0.5)
EOSINOPHIL NFR BLD: 0.7 % (ref 0–8)
ERYTHROCYTE [DISTWIDTH] IN BLOOD BY AUTOMATED COUNT: 14.6 % (ref 11.5–14.5)
EST. GFR  (AFRICAN AMERICAN): 21.4 ML/MIN/1.73 M^2
EST. GFR  (NON AFRICAN AMERICAN): 18.5 ML/MIN/1.73 M^2
GLUCOSE SERPL-MCNC: 166 MG/DL (ref 70–110)
GLUCOSE UR QL STRIP: ABNORMAL
HCT VFR BLD AUTO: 32.1 % (ref 40–54)
HGB BLD-MCNC: 10.3 G/DL (ref 14–18)
HGB UR QL STRIP: ABNORMAL
IMM GRANULOCYTES # BLD AUTO: 0.08 K/UL (ref 0–0.04)
IMM GRANULOCYTES NFR BLD AUTO: 1.4 % (ref 0–0.5)
KETONES UR QL STRIP: NEGATIVE
LEUKOCYTE ESTERASE UR QL STRIP: NEGATIVE
LYMPHOCYTES # BLD AUTO: 0.3 K/UL (ref 1–4.8)
LYMPHOCYTES NFR BLD: 4.8 % (ref 18–48)
MAGNESIUM SERPL-MCNC: 1.6 MG/DL (ref 1.6–2.6)
MCH RBC QN AUTO: 30.7 PG (ref 27–31)
MCHC RBC AUTO-ENTMCNC: 32.1 G/DL (ref 32–36)
MCV RBC AUTO: 96 FL (ref 82–98)
MICROSCOPIC COMMENT: ABNORMAL
MONOCYTES # BLD AUTO: 0.5 K/UL (ref 0.3–1)
MONOCYTES NFR BLD: 8.9 % (ref 4–15)
NEUTROPHILS # BLD AUTO: 4.7 K/UL (ref 1.8–7.7)
NEUTROPHILS NFR BLD: 84 % (ref 38–73)
NITRITE UR QL STRIP: NEGATIVE
NRBC BLD-RTO: 0 /100 WBC
PH UR STRIP: 6 [PH] (ref 5–8)
PHOSPHATE SERPL-MCNC: 4 MG/DL (ref 2.7–4.5)
PLATELET # BLD AUTO: 229 K/UL (ref 150–350)
PMV BLD AUTO: 10 FL (ref 9.2–12.9)
POTASSIUM SERPL-SCNC: 4.3 MMOL/L (ref 3.5–5.1)
PROT SERPL-MCNC: 6.2 G/DL (ref 6–8.4)
PROT UR QL STRIP: NEGATIVE
RBC # BLD AUTO: 3.36 M/UL (ref 4.6–6.2)
RBC #/AREA URNS HPF: 5 /HPF (ref 0–4)
SODIUM SERPL-SCNC: 137 MMOL/L (ref 136–145)
SP GR UR STRIP: 1.01 (ref 1–1.03)
SQUAMOUS #/AREA URNS HPF: 1 /HPF
URN SPEC COLLECT METH UR: ABNORMAL
UROBILINOGEN UR STRIP-ACNC: NEGATIVE EU/DL
WBC # BLD AUTO: 5.61 K/UL (ref 3.9–12.7)
WBC #/AREA URNS HPF: 3 /HPF (ref 0–5)

## 2020-06-03 PROCEDURE — 83735 ASSAY OF MAGNESIUM: CPT

## 2020-06-03 PROCEDURE — 80053 COMPREHEN METABOLIC PANEL: CPT

## 2020-06-03 PROCEDURE — 84100 ASSAY OF PHOSPHORUS: CPT

## 2020-06-03 PROCEDURE — 87522 HEPATITIS C REVRS TRNSCRPJ: CPT

## 2020-06-03 PROCEDURE — 81000 URINALYSIS NONAUTO W/SCOPE: CPT

## 2020-06-03 PROCEDURE — 85025 COMPLETE CBC W/AUTO DIFF WBC: CPT

## 2020-06-03 PROCEDURE — 80197 ASSAY OF TACROLIMUS: CPT

## 2020-06-04 ENCOUNTER — OFFICE VISIT (OUTPATIENT)
Dept: PODIATRY | Facility: CLINIC | Age: 70
End: 2020-06-04
Payer: MEDICARE

## 2020-06-04 VITALS
DIASTOLIC BLOOD PRESSURE: 78 MMHG | WEIGHT: 216 LBS | HEIGHT: 67 IN | OXYGEN SATURATION: 98 % | SYSTOLIC BLOOD PRESSURE: 135 MMHG | RESPIRATION RATE: 19 BRPM | HEART RATE: 63 BPM | BODY MASS INDEX: 33.9 KG/M2 | TEMPERATURE: 98 F

## 2020-06-04 DIAGNOSIS — L85.3 DRY SKIN: ICD-10-CM

## 2020-06-04 DIAGNOSIS — M79.2 CHRONIC PERIPHERAL NEUROPATHIC PAIN: ICD-10-CM

## 2020-06-04 DIAGNOSIS — G89.29 CHRONIC PERIPHERAL NEUROPATHIC PAIN: ICD-10-CM

## 2020-06-04 DIAGNOSIS — B35.1 ONYCHOMYCOSIS OF TOENAIL: ICD-10-CM

## 2020-06-04 DIAGNOSIS — E11.49 TYPE II DIABETES MELLITUS WITH NEUROLOGICAL MANIFESTATIONS: Primary | ICD-10-CM

## 2020-06-04 LAB
HCV RNA SERPL NAA+PROBE-LOG IU: 6.97 LOG (10) IU/ML
HCV RNA SERPL QL NAA+PROBE: DETECTED IU/ML
HCV RNA SPEC NAA+PROBE-ACNC: ABNORMAL IU/ML
TACROLIMUS BLD-MCNC: 13.2 NG/ML (ref 5–15)

## 2020-06-04 PROCEDURE — 99999 PR PBB SHADOW E&M-EST. PATIENT-LVL III: ICD-10-PCS | Mod: PBBFAC,,, | Performed by: PODIATRIST

## 2020-06-04 PROCEDURE — 99214 PR OFFICE/OUTPT VISIT, EST, LEVL IV, 30-39 MIN: ICD-10-PCS | Mod: S$PBB,,, | Performed by: PODIATRIST

## 2020-06-04 PROCEDURE — 99213 OFFICE O/P EST LOW 20 MIN: CPT | Mod: PBBFAC | Performed by: PODIATRIST

## 2020-06-04 PROCEDURE — 99999 PR PBB SHADOW E&M-EST. PATIENT-LVL III: CPT | Mod: PBBFAC,,, | Performed by: PODIATRIST

## 2020-06-04 PROCEDURE — 99214 OFFICE O/P EST MOD 30 MIN: CPT | Mod: S$PBB,,, | Performed by: PODIATRIST

## 2020-06-04 RX ORDER — DOCUSATE SODIUM 100 MG/1
CAPSULE, LIQUID FILLED ORAL
COMMUNITY
Start: 2020-06-03 | End: 2021-05-03

## 2020-06-04 RX ORDER — POLYETHYLENE GLYCOL 3350 17 G/17G
POWDER, FOR SOLUTION ORAL
COMMUNITY
Start: 2020-06-03 | End: 2022-11-04

## 2020-06-04 RX ORDER — MULTIVITAMIN
1 TABLET ORAL DAILY
COMMUNITY
Start: 2020-06-03 | End: 2020-09-10

## 2020-06-04 RX ORDER — OXYCODONE AND ACETAMINOPHEN 5; 325 MG/1; MG/1
TABLET ORAL
COMMUNITY
Start: 2020-05-11 | End: 2021-02-12 | Stop reason: ALTCHOICE

## 2020-06-04 RX ORDER — CALCIUM ACETATE 667 MG/1
CAPSULE ORAL
COMMUNITY
Start: 2020-05-12 | End: 2020-09-10

## 2020-06-04 RX ORDER — ONDANSETRON 4 MG/1
TABLET, ORALLY DISINTEGRATING ORAL
COMMUNITY
Start: 2020-05-11 | End: 2021-02-12 | Stop reason: ALTCHOICE

## 2020-06-04 RX ORDER — ASPIRIN 81 MG/1
81 TABLET ORAL DAILY
COMMUNITY
Start: 2020-06-03 | End: 2022-03-27 | Stop reason: SDUPTHER

## 2020-06-04 NOTE — LETTER
June 7, 2020      Brianna Elaine III, MD  [address]  [facility] [place] [address]           [facility]  [address] [facility] [place] [address]  Phone: [phone]  Fax: [phone]          Patient: Olu Gant Jr.   MR Number: [id number]   YOB: 1950   Date of Visit: 6/4/2020       Dear Dr. Brianna Elaine III:    Thank you for referring Olu Gant to me for evaluation. Attached you will find relevant portions of my assessment and plan of care.    If you have questions, please do not hesitate to call me. I look forward to following Olu Gant along with you.    Sincerely,    Anuradha Trujillo, ROSIE    Enclosure  CC:  No Recipients    If you would like to receive this communication electronically, please contact [email or web address] or [phone] to request more information on [employer] access.    For providers and/or their staff who would like to refer a patient to [facility], please contact us through our one-stop-shop provider referral line, [facility], at [phone].    If you feel you have received this communication in error or would no longer like to receive these types of communications, please e-mail [email or web address]

## 2020-06-05 ENCOUNTER — LAB VISIT (OUTPATIENT)
Dept: LAB | Facility: HOSPITAL | Age: 70
End: 2020-06-05
Attending: TRANSPLANT SURGERY
Payer: MEDICARE

## 2020-06-05 DIAGNOSIS — Z94.0 KIDNEY REPLACED BY TRANSPLANT: Primary | ICD-10-CM

## 2020-06-05 LAB
ALBUMIN SERPL BCP-MCNC: 3.7 G/DL (ref 3.5–5.2)
ALP SERPL-CCNC: 110 U/L (ref 55–135)
ALT SERPL W/O P-5'-P-CCNC: 53 U/L (ref 10–44)
ANION GAP SERPL CALC-SCNC: 7 MMOL/L (ref 8–16)
AST SERPL-CCNC: 19 U/L (ref 10–40)
BACTERIA #/AREA URNS HPF: ABNORMAL /HPF
BASOPHILS # BLD AUTO: 0 K/UL (ref 0–0.2)
BASOPHILS NFR BLD: 0 % (ref 0–1.9)
BILIRUB SERPL-MCNC: 0.8 MG/DL (ref 0.1–1)
BILIRUB UR QL STRIP: NEGATIVE
BUN SERPL-MCNC: 62 MG/DL (ref 8–23)
CALCIUM SERPL-MCNC: 9.9 MG/DL (ref 8.7–10.5)
CHLORIDE SERPL-SCNC: 111 MMOL/L (ref 95–110)
CLARITY UR: CLEAR
CO2 SERPL-SCNC: 20 MMOL/L (ref 23–29)
COLOR UR: YELLOW
CREAT SERPL-MCNC: 3.4 MG/DL (ref 0.5–1.4)
DIFFERENTIAL METHOD: ABNORMAL
EOSINOPHIL # BLD AUTO: 0 K/UL (ref 0–0.5)
EOSINOPHIL NFR BLD: 0.7 % (ref 0–8)
ERYTHROCYTE [DISTWIDTH] IN BLOOD BY AUTOMATED COUNT: 14.6 % (ref 11.5–14.5)
EST. GFR  (AFRICAN AMERICAN): 19.8 ML/MIN/1.73 M^2
EST. GFR  (NON AFRICAN AMERICAN): 17.2 ML/MIN/1.73 M^2
GLUCOSE SERPL-MCNC: 164 MG/DL (ref 70–110)
GLUCOSE UR QL STRIP: ABNORMAL
HCT VFR BLD AUTO: 31.9 % (ref 40–54)
HGB BLD-MCNC: 10.1 G/DL (ref 14–18)
HGB UR QL STRIP: ABNORMAL
IMM GRANULOCYTES # BLD AUTO: 0.13 K/UL (ref 0–0.04)
IMM GRANULOCYTES NFR BLD AUTO: 2.2 % (ref 0–0.5)
KETONES UR QL STRIP: NEGATIVE
LEUKOCYTE ESTERASE UR QL STRIP: NEGATIVE
LYMPHOCYTES # BLD AUTO: 0.3 K/UL (ref 1–4.8)
LYMPHOCYTES NFR BLD: 5.5 % (ref 18–48)
MAGNESIUM SERPL-MCNC: 1.5 MG/DL (ref 1.6–2.6)
MCH RBC QN AUTO: 30.6 PG (ref 27–31)
MCHC RBC AUTO-ENTMCNC: 31.7 G/DL (ref 32–36)
MCV RBC AUTO: 97 FL (ref 82–98)
MICROSCOPIC COMMENT: ABNORMAL
MONOCYTES # BLD AUTO: 0.7 K/UL (ref 0.3–1)
MONOCYTES NFR BLD: 10.9 % (ref 4–15)
NEUTROPHILS # BLD AUTO: 4.9 K/UL (ref 1.8–7.7)
NEUTROPHILS NFR BLD: 80.7 % (ref 38–73)
NITRITE UR QL STRIP: NEGATIVE
NRBC BLD-RTO: 0 /100 WBC
PH UR STRIP: 7 [PH] (ref 5–8)
PHOSPHATE SERPL-MCNC: 3.7 MG/DL (ref 2.7–4.5)
PLATELET # BLD AUTO: 225 K/UL (ref 150–350)
PMV BLD AUTO: 10.1 FL (ref 9.2–12.9)
POTASSIUM SERPL-SCNC: 4.9 MMOL/L (ref 3.5–5.1)
PROT SERPL-MCNC: 6.1 G/DL (ref 6–8.4)
PROT UR QL STRIP: NEGATIVE
RBC # BLD AUTO: 3.3 M/UL (ref 4.6–6.2)
RBC #/AREA URNS HPF: 10 /HPF (ref 0–4)
SODIUM SERPL-SCNC: 138 MMOL/L (ref 136–145)
SP GR UR STRIP: 1.01 (ref 1–1.03)
URN SPEC COLLECT METH UR: ABNORMAL
UROBILINOGEN UR STRIP-ACNC: NEGATIVE EU/DL
WBC # BLD AUTO: 6.03 K/UL (ref 3.9–12.7)
WBC #/AREA URNS HPF: 2 /HPF (ref 0–5)
YEAST URNS QL MICRO: ABNORMAL

## 2020-06-05 PROCEDURE — 81000 URINALYSIS NONAUTO W/SCOPE: CPT

## 2020-06-05 PROCEDURE — 85025 COMPLETE CBC W/AUTO DIFF WBC: CPT

## 2020-06-05 PROCEDURE — 84100 ASSAY OF PHOSPHORUS: CPT

## 2020-06-05 PROCEDURE — 80053 COMPREHEN METABOLIC PANEL: CPT

## 2020-06-05 PROCEDURE — 83735 ASSAY OF MAGNESIUM: CPT

## 2020-06-05 PROCEDURE — 36415 COLL VENOUS BLD VENIPUNCTURE: CPT

## 2020-06-05 PROCEDURE — 80197 ASSAY OF TACROLIMUS: CPT

## 2020-06-05 PROCEDURE — 87522 HEPATITIS C REVRS TRNSCRPJ: CPT

## 2020-06-06 LAB — TACROLIMUS BLD-MCNC: 13.7 NG/ML (ref 5–15)

## 2020-06-08 ENCOUNTER — LAB VISIT (OUTPATIENT)
Dept: LAB | Facility: HOSPITAL | Age: 70
End: 2020-06-08
Attending: TRANSPLANT SURGERY
Payer: MEDICARE

## 2020-06-08 DIAGNOSIS — Z94.0 KIDNEY REPLACED BY TRANSPLANT: ICD-10-CM

## 2020-06-08 LAB
ALBUMIN SERPL BCP-MCNC: 3.8 G/DL (ref 3.5–5.2)
ALP SERPL-CCNC: 120 U/L (ref 55–135)
ALT SERPL W/O P-5'-P-CCNC: 50 U/L (ref 10–44)
ANION GAP SERPL CALC-SCNC: 7 MMOL/L (ref 8–16)
AST SERPL-CCNC: 22 U/L (ref 10–40)
BACTERIA #/AREA URNS HPF: ABNORMAL /HPF
BASOPHILS # BLD AUTO: 0.01 K/UL (ref 0–0.2)
BASOPHILS NFR BLD: 0.1 % (ref 0–1.9)
BILIRUB SERPL-MCNC: 0.6 MG/DL (ref 0.1–1)
BILIRUB UR QL STRIP: NEGATIVE
BUN SERPL-MCNC: 65 MG/DL (ref 8–23)
CALCIUM SERPL-MCNC: 10.2 MG/DL (ref 8.7–10.5)
CHLORIDE SERPL-SCNC: 112 MMOL/L (ref 95–110)
CLARITY UR: CLEAR
CO2 SERPL-SCNC: 18 MMOL/L (ref 23–29)
COLOR UR: YELLOW
CREAT SERPL-MCNC: 3.4 MG/DL (ref 0.5–1.4)
DIFFERENTIAL METHOD: ABNORMAL
EOSINOPHIL # BLD AUTO: 0 K/UL (ref 0–0.5)
EOSINOPHIL NFR BLD: 0.2 % (ref 0–8)
ERYTHROCYTE [DISTWIDTH] IN BLOOD BY AUTOMATED COUNT: 14.3 % (ref 11.5–14.5)
EST. GFR  (AFRICAN AMERICAN): 19.8 ML/MIN/1.73 M^2
EST. GFR  (NON AFRICAN AMERICAN): 17.2 ML/MIN/1.73 M^2
GLUCOSE SERPL-MCNC: 160 MG/DL (ref 70–110)
GLUCOSE UR QL STRIP: ABNORMAL
HCT VFR BLD AUTO: 32.5 % (ref 40–54)
HCV RNA SERPL NAA+PROBE-LOG IU: 5.26 LOG (10) IU/ML
HCV RNA SERPL QL NAA+PROBE: DETECTED IU/ML
HCV RNA SPEC NAA+PROBE-ACNC: ABNORMAL IU/ML
HGB BLD-MCNC: 10.6 G/DL (ref 14–18)
HGB UR QL STRIP: ABNORMAL
IMM GRANULOCYTES # BLD AUTO: 0.17 K/UL (ref 0–0.04)
IMM GRANULOCYTES NFR BLD AUTO: 2.1 % (ref 0–0.5)
KETONES UR QL STRIP: NEGATIVE
LEUKOCYTE ESTERASE UR QL STRIP: NEGATIVE
LYMPHOCYTES # BLD AUTO: 0.3 K/UL (ref 1–4.8)
LYMPHOCYTES NFR BLD: 3.4 % (ref 18–48)
MAGNESIUM SERPL-MCNC: 1.4 MG/DL (ref 1.6–2.6)
MCH RBC QN AUTO: 31.1 PG (ref 27–31)
MCHC RBC AUTO-ENTMCNC: 32.6 G/DL (ref 32–36)
MCV RBC AUTO: 95 FL (ref 82–98)
MICROSCOPIC COMMENT: ABNORMAL
MONOCYTES # BLD AUTO: 0.6 K/UL (ref 0.3–1)
MONOCYTES NFR BLD: 6.8 % (ref 4–15)
NEUTROPHILS # BLD AUTO: 7.2 K/UL (ref 1.8–7.7)
NEUTROPHILS NFR BLD: 87.4 % (ref 38–73)
NITRITE UR QL STRIP: POSITIVE
NON-SQ EPI CELLS #/AREA URNS HPF: 1 /HPF
NRBC BLD-RTO: 0 /100 WBC
OTHER ELEMENTS URNS MICRO: ABNORMAL
PH UR STRIP: 6 [PH] (ref 5–8)
PHOSPHATE SERPL-MCNC: 3.4 MG/DL (ref 2.7–4.5)
PLATELET # BLD AUTO: 197 K/UL (ref 150–350)
PMV BLD AUTO: 10.3 FL (ref 9.2–12.9)
POTASSIUM SERPL-SCNC: 5.7 MMOL/L (ref 3.5–5.1)
PROT SERPL-MCNC: 6.2 G/DL (ref 6–8.4)
PROT UR QL STRIP: NEGATIVE
RBC # BLD AUTO: 3.41 M/UL (ref 4.6–6.2)
RBC #/AREA URNS HPF: 30 /HPF (ref 0–4)
SODIUM SERPL-SCNC: 137 MMOL/L (ref 136–145)
SP GR UR STRIP: 1.01 (ref 1–1.03)
URN SPEC COLLECT METH UR: ABNORMAL
UROBILINOGEN UR STRIP-ACNC: NEGATIVE EU/DL
WBC # BLD AUTO: 8.24 K/UL (ref 3.9–12.7)
WBC #/AREA URNS HPF: 15 /HPF (ref 0–5)

## 2020-06-08 PROCEDURE — 87086 URINE CULTURE/COLONY COUNT: CPT

## 2020-06-08 PROCEDURE — 87186 SC STD MICRODIL/AGAR DIL: CPT

## 2020-06-08 PROCEDURE — 87077 CULTURE AEROBIC IDENTIFY: CPT

## 2020-06-08 PROCEDURE — 87088 URINE BACTERIA CULTURE: CPT

## 2020-06-08 PROCEDURE — 36415 COLL VENOUS BLD VENIPUNCTURE: CPT

## 2020-06-08 PROCEDURE — 80197 ASSAY OF TACROLIMUS: CPT

## 2020-06-08 PROCEDURE — 81000 URINALYSIS NONAUTO W/SCOPE: CPT

## 2020-06-08 PROCEDURE — 83735 ASSAY OF MAGNESIUM: CPT

## 2020-06-08 PROCEDURE — 80053 COMPREHEN METABOLIC PANEL: CPT

## 2020-06-08 PROCEDURE — 85025 COMPLETE CBC W/AUTO DIFF WBC: CPT

## 2020-06-08 PROCEDURE — 87522 HEPATITIS C REVRS TRNSCRPJ: CPT

## 2020-06-08 PROCEDURE — 84100 ASSAY OF PHOSPHORUS: CPT

## 2020-06-08 NOTE — PROGRESS NOTES
"Subjective:       Patient ID: Olu Gant Jr. is a 70 y.o. male.    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    Patient received a kidney 3 weeks ago, performed at [facility].  States it was pretty rough the last 2 weeks, slowly improving.  Reports kidney is working well, off dialysis, has lost some weight but feels pretty good today.  All medications have been taken over by his surgeon including pain wet occasion which she states is fairly well controlled at this time.       Past Medical History:   Diagnosis Date    Asbestos exposure - 1973 2/18/2014    Benign hypertension with ESRD (end-stage renal disease) 2/18/2014    Diabetes type 2 - since 1996 2/18/2014    DIALYSIS 2013    ESRD (end stage renal disease) - initiated dialysis 05/29/2013 2/18/2014    Gout, arthritis 2/18/2014    Hypothyroidism 2/18/2014    Irregular heart rhythm - unsure of Afib vs. A flutter 2/18/2014    Kidney transplanted 05/04/2020    Obesity 2/18/2014    Secondary hyperparathyroidism, renal 2/18/2014    Sleep apnea on Bipap 2/18/2014     Past Surgical History:   Procedure Laterality Date    AV FISTULA PLACEMENT      COLON SURGERY  02/2017    resection for "ischemic colon"    INGUINAL HERNIA REPAIR      bilaterally    pace maker      [email or web address]    TONSILLECTOMY           Current Outpatient Medications   Medication Sig Dispense Refill    allopurinol (ZYLOPRIM) 100 MG tablet Take 100 mg by mouth once daily.      amLODIPine (NORVASC) 5 MG tablet Take 5 mg by mouth 2 (two) times daily.      aspirin (ECOTRIN) 81 MG EC tablet Take 81 mg by mouth once daily.      calcium acetate,phosphat bind, (PHOSLO) 667 mg capsule       diclofenac sodium (VOLTAREN) 1 % Gel Apply very small amount to left foot once to twice daily as needed for pain 100 g 1    docusate sodium (COLACE) 100 MG capsule TAKE 1 CAPSULE BY MOUTH EVERY DAY FOR CONSTIPATION      " ergocalciferol (ERGOCALCIFEROL) 50,000 unit Cap Take 50,000 Units by mouth every 30 days.       famotidine (PEPCID) 20 MG tablet Take 20 mg by mouth once daily.      fluconazole (DIFLUCAN) 100 MG tablet Take 100 mg by mouth once daily.      furosemide (LASIX) 40 MG tablet Take 40 mg by mouth once daily.      insulin detemir U-100 (LEVEMIR FLEXTOUCH U-100 INSULN) 100 unit/mL (3 mL) SubQ InPn pen Inject 30 Units into the skin every evening. (Patient taking differently: Inject 25 Units into the skin every evening. ) 27 mL 3    levothyroxine (SYNTHROID) 100 MCG tablet Take 1 tablet (100 mcg total) by mouth once daily. 90 tablet 3    multivitamin (THERAGRAN) per tablet Take 1 tablet by mouth once daily.      multivitamin Tab TAKE 1 TABLET BY MOUTH EVERY DAY      mycophenolate (MYFORTIC) 360 MG TbEC Take 720 mg by mouth 2 (two) times daily.      nitroGLYCERIN (NITROSTAT) 0.4 MG SL tablet Place 0.4 mg under the tongue every 5 (five) minutes as needed for Chest pain.      NOVOLOG U-100 INSULIN ASPART 100 unit/mL injection Inject 14-20 Units into the skin as needed.   0    ondansetron (ZOFRAN-ODT) 4 MG TbDL       ONETOUCH VERIO Strp USE 1 STRIP TO CHECK GLUCOSE 3 TO 4 TIMES DAILY AS DIRECTED  0    oxyCODONE-acetaminophen (PERCOCET) 5-325 mg per tablet       polyethylene glycol (GLYCOLAX) 17 gram/dose powder MIX 1 CAPFUL (17 GRAMS) IN LIQUID AND DRINK ONCE DAILY AS DIRECTED FOR CONSTIPATION      predniSONE (DELTASONE) 5 MG tablet Take 15 mg by mouth once daily.      pregabalin (LYRICA) 150 MG capsule Take 150 mg by mouth 3 (three) times daily.      rosuvastatin (CRESTOR) 20 MG tablet Take 20 mg by mouth once daily.      sulfamethoxazole-trimethoprim 800-160mg (BACTRIM DS) 800-160 mg Tab Take 1 tablet by mouth once daily. Monday , Wednesday, Friday      tacrolimus XR, ENVARSUS, (TACROLIMUS XR, ENVARSUS, 1 MG ORAL TB24) 1 mg Tb24 Take 3 mg by mouth.      valGANciclovir (VALCYTE) 450 mg Tab Take 450 mg by  "mouth once daily. One pill on Monday and Thursday      venlafaxine (EFFEXOR) 37.5 MG Tab Take 1/2 tablet by mouth twice daily for neuropahty 90 tablet 1     No current facility-administered medications for this visit.      Review of patient's allergies indicates:  No Known Allergies    Review of Systems   Constitutional: Positive for appetite change. Negative for fever.        Weight loss   HENT: Negative for congestion.    Respiratory: Negative for cough and shortness of breath.    Cardiovascular: Negative for leg swelling.   Endocrine:        KIDNEY TRANSPLANT 5/4   Musculoskeletal: Positive for gait problem.        Walker   Neurological: Positive for numbness.   All other systems reviewed and are negative.      Objective:      Vitals:    06/04/20 1022   BP: 135/78   Pulse: 63   Resp: 19   Temp: 98.1 °F (36.7 °C)   TempSrc: Oral   SpO2: 98%   Weight: 98 kg (216 lb)   Height: 5' 7" (1.702 m)     Physical Exam   Constitutional: He appears well-developed and well-nourished. No distress.   Cardiovascular:   Pulses:       Dorsalis pedis pulses are 2+ on the right side, and 2+ on the left side.        Posterior tibial pulses are 1+ on the right side, and 1+ on the left side.   Pulmonary/Chest: Effort normal.   Musculoskeletal:        Right foot: There is decreased range of motion.        Left foot: There is decreased range of motion.   Feet:   Right Foot:   Protective Sensation: 4 sites tested. 2 sites sensed.   Skin Integrity: Positive for dry skin. Negative for skin breakdown or callus.   Left Foot:   Protective Sensation: 4 sites tested. 2 sites sensed.   Skin Integrity: Positive for dry skin. Negative for skin breakdown or callus.   Skin: Skin is dry. Capillary refill takes 2 to 3 seconds.   Psychiatric: He has a normal mood and affect.   Nursing note and vitals reviewed.  Vascular   Normal CFT bilateral   No lower extremity edema bilateral  Pedal skin temperature is warm, discolored skin lower extremities " bilateral    Integumentary         No skin breaks, bruises, abrasions bilateral feet  Chronic dry skin bilateral lower legs, plantar-posterior and medial heels with small cracks  Web spaces clear  Heels are clear  Dystrophic, discolored, raised onychomycosis.  Bilateral hallux most severe with damage to nail bed, thickness reduced, no evidence of ingrown nail or subungual abscess       Neurological   Gross sensation diminished bilateral feet     Musculoskeletal   Muscle Strength and Tone:  poor    Joints, Bones, and Muscles: pes planus   Difficulty walking, antalgic gait, uses cane  Limited mobility, can not reach, treat or inspect feet  Presents in appropriate tennis shoes     Hemoglobin A1c    Ref Range & Units 6mo ago 9mo ago     Hemoglobin A1C <5.7 % of total Hgb 6.9High   6.6High  R, CM                  Assessment:       1. Type II diabetes mellitus with neurological manifestations    2. Chronic peripheral neuropathic pain    3. Dry skin    4. Onychomycosis of toenail        Plan:         Review of diabetic education, neuropathy pain improved/controlled. Will monitor    Reviewed benefits of continued tight control of glucose / diabetes and we discussed how this is most likely much improved with his weight loss in change in diet  Reviewed better care of dry skin, potential complications  Reviewed maintenance of nails, thickness, potential complications   is consistent with 28 tabs dispensed treating msn  Advised patient obviously we want to control his pain with the least amount of medications possible and to contact our office if there is any change regarding his neuropathic pain  Patient was in understanding and agreement with treatment plan  Counseled the patient on his conditions, their implications and medical management.  Instructed patient to contact the office with any changes, questions, concerns, worsening of symptoms. Patient verbalized understanding.   Total face to face time, exam, assessment,  treatment, discussion, documentation 25 minutes, more than half this time spent on consultation and coordination of care.   Follow up 3 months.      This note was created using [employer] voice recognition software that occasionally misinterpreted phrases or words.

## 2020-06-09 LAB — TACROLIMUS BLD-MCNC: 15.4 NG/ML (ref 5–15)

## 2020-06-10 ENCOUNTER — LAB VISIT (OUTPATIENT)
Dept: LAB | Facility: HOSPITAL | Age: 70
End: 2020-06-10
Attending: TRANSPLANT SURGERY
Payer: MEDICARE

## 2020-06-10 DIAGNOSIS — Z94.0 KIDNEY REPLACED BY TRANSPLANT: ICD-10-CM

## 2020-06-10 LAB
ALBUMIN SERPL BCP-MCNC: 3.8 G/DL (ref 3.5–5.2)
ALP SERPL-CCNC: 112 U/L (ref 55–135)
ALT SERPL W/O P-5'-P-CCNC: 43 U/L (ref 10–44)
ANION GAP SERPL CALC-SCNC: 8 MMOL/L (ref 8–16)
AST SERPL-CCNC: 17 U/L (ref 10–40)
BACTERIA UR CULT: ABNORMAL
BASOPHILS # BLD AUTO: 0.01 K/UL (ref 0–0.2)
BASOPHILS NFR BLD: 0.1 % (ref 0–1.9)
BILIRUB SERPL-MCNC: 0.7 MG/DL (ref 0.1–1)
BILIRUB UR QL STRIP: NEGATIVE
BUN SERPL-MCNC: 78 MG/DL (ref 8–23)
CALCIUM SERPL-MCNC: 10.2 MG/DL (ref 8.7–10.5)
CHLORIDE SERPL-SCNC: 111 MMOL/L (ref 95–110)
CLARITY UR: CLEAR
CO2 SERPL-SCNC: 16 MMOL/L (ref 23–29)
COLOR UR: YELLOW
CREAT SERPL-MCNC: 3.3 MG/DL (ref 0.5–1.4)
DIFFERENTIAL METHOD: ABNORMAL
EOSINOPHIL # BLD AUTO: 0 K/UL (ref 0–0.5)
EOSINOPHIL NFR BLD: 0.2 % (ref 0–8)
ERYTHROCYTE [DISTWIDTH] IN BLOOD BY AUTOMATED COUNT: 14.1 % (ref 11.5–14.5)
EST. GFR  (AFRICAN AMERICAN): 20.6 ML/MIN/1.73 M^2
EST. GFR  (NON AFRICAN AMERICAN): 17.8 ML/MIN/1.73 M^2
GLUCOSE SERPL-MCNC: 159 MG/DL (ref 70–110)
GLUCOSE UR QL STRIP: ABNORMAL
HCT VFR BLD AUTO: 33.5 % (ref 40–54)
HGB BLD-MCNC: 10.8 G/DL (ref 14–18)
HGB UR QL STRIP: NEGATIVE
IMM GRANULOCYTES # BLD AUTO: 0.25 K/UL (ref 0–0.04)
IMM GRANULOCYTES NFR BLD AUTO: 2.7 % (ref 0–0.5)
KETONES UR QL STRIP: NEGATIVE
LEUKOCYTE ESTERASE UR QL STRIP: NEGATIVE
LYMPHOCYTES # BLD AUTO: 0.4 K/UL (ref 1–4.8)
LYMPHOCYTES NFR BLD: 4 % (ref 18–48)
MAGNESIUM SERPL-MCNC: 1.5 MG/DL (ref 1.6–2.6)
MCH RBC QN AUTO: 30.6 PG (ref 27–31)
MCHC RBC AUTO-ENTMCNC: 32.2 G/DL (ref 32–36)
MCV RBC AUTO: 95 FL (ref 82–98)
MONOCYTES # BLD AUTO: 0.6 K/UL (ref 0.3–1)
MONOCYTES NFR BLD: 6.3 % (ref 4–15)
NEUTROPHILS # BLD AUTO: 8.1 K/UL (ref 1.8–7.7)
NEUTROPHILS NFR BLD: 86.7 % (ref 38–73)
NITRITE UR QL STRIP: NEGATIVE
NRBC BLD-RTO: 0 /100 WBC
PH UR STRIP: 6 [PH] (ref 5–8)
PHOSPHATE SERPL-MCNC: 4.4 MG/DL (ref 2.7–4.5)
PLATELET # BLD AUTO: 192 K/UL (ref 150–350)
PMV BLD AUTO: 10.7 FL (ref 9.2–12.9)
POTASSIUM SERPL-SCNC: 6.3 MMOL/L (ref 3.5–5.1)
PROT SERPL-MCNC: 6.2 G/DL (ref 6–8.4)
PROT UR QL STRIP: NEGATIVE
RBC # BLD AUTO: 3.53 M/UL (ref 4.6–6.2)
SODIUM SERPL-SCNC: 135 MMOL/L (ref 136–145)
SP GR UR STRIP: 1.01 (ref 1–1.03)
URN SPEC COLLECT METH UR: ABNORMAL
UROBILINOGEN UR STRIP-ACNC: NEGATIVE EU/DL
WBC # BLD AUTO: 9.33 K/UL (ref 3.9–12.7)

## 2020-06-10 PROCEDURE — 83735 ASSAY OF MAGNESIUM: CPT

## 2020-06-10 PROCEDURE — 84100 ASSAY OF PHOSPHORUS: CPT

## 2020-06-10 PROCEDURE — 36415 COLL VENOUS BLD VENIPUNCTURE: CPT

## 2020-06-10 PROCEDURE — 87522 HEPATITIS C REVRS TRNSCRPJ: CPT

## 2020-06-10 PROCEDURE — 80197 ASSAY OF TACROLIMUS: CPT

## 2020-06-10 PROCEDURE — 80053 COMPREHEN METABOLIC PANEL: CPT

## 2020-06-10 PROCEDURE — 85025 COMPLETE CBC W/AUTO DIFF WBC: CPT

## 2020-06-10 PROCEDURE — 81003 URINALYSIS AUTO W/O SCOPE: CPT

## 2020-06-11 LAB
HCV RNA SERPL NAA+PROBE-LOG IU: 6.2 LOG (10) IU/ML
HCV RNA SERPL NAA+PROBE-LOG IU: 6.9 LOG (10) IU/ML
HCV RNA SERPL QL NAA+PROBE: DETECTED IU/ML
HCV RNA SERPL QL NAA+PROBE: DETECTED IU/ML
HCV RNA SPEC NAA+PROBE-ACNC: ABNORMAL IU/ML
HCV RNA SPEC NAA+PROBE-ACNC: ABNORMAL IU/ML
TACROLIMUS BLD-MCNC: 12.5 NG/ML (ref 5–15)

## 2020-06-12 ENCOUNTER — LAB VISIT (OUTPATIENT)
Dept: LAB | Facility: HOSPITAL | Age: 70
End: 2020-06-12
Attending: TRANSPLANT SURGERY
Payer: MEDICARE

## 2020-06-12 DIAGNOSIS — Z94.0 KIDNEY REPLACED BY TRANSPLANT: ICD-10-CM

## 2020-06-12 LAB
ALBUMIN SERPL BCP-MCNC: 3.7 G/DL (ref 3.5–5.2)
ALP SERPL-CCNC: 114 U/L (ref 55–135)
ALT SERPL W/O P-5'-P-CCNC: 39 U/L (ref 10–44)
ANION GAP SERPL CALC-SCNC: 8 MMOL/L (ref 8–16)
AST SERPL-CCNC: 18 U/L (ref 10–40)
BASOPHILS # BLD AUTO: 0.01 K/UL (ref 0–0.2)
BASOPHILS NFR BLD: 0.1 % (ref 0–1.9)
BILIRUB SERPL-MCNC: 0.7 MG/DL (ref 0.1–1)
BILIRUB UR QL STRIP: NEGATIVE
BUN SERPL-MCNC: 79 MG/DL (ref 8–23)
CALCIUM SERPL-MCNC: 10.1 MG/DL (ref 8.7–10.5)
CHLORIDE SERPL-SCNC: 109 MMOL/L (ref 95–110)
CLARITY UR: CLEAR
CO2 SERPL-SCNC: 17 MMOL/L (ref 23–29)
COLOR UR: YELLOW
CREAT SERPL-MCNC: 3.1 MG/DL (ref 0.5–1.4)
DIFFERENTIAL METHOD: ABNORMAL
EOSINOPHIL # BLD AUTO: 0 K/UL (ref 0–0.5)
EOSINOPHIL NFR BLD: 0.2 % (ref 0–8)
ERYTHROCYTE [DISTWIDTH] IN BLOOD BY AUTOMATED COUNT: 14.2 % (ref 11.5–14.5)
EST. GFR  (AFRICAN AMERICAN): 22.2 ML/MIN/1.73 M^2
EST. GFR  (NON AFRICAN AMERICAN): 19.2 ML/MIN/1.73 M^2
GLUCOSE SERPL-MCNC: 224 MG/DL (ref 70–110)
GLUCOSE UR QL STRIP: ABNORMAL
HCT VFR BLD AUTO: 32.8 % (ref 40–54)
HGB BLD-MCNC: 10.7 G/DL (ref 14–18)
HGB UR QL STRIP: NEGATIVE
IMM GRANULOCYTES # BLD AUTO: 0.18 K/UL (ref 0–0.04)
IMM GRANULOCYTES NFR BLD AUTO: 1.7 % (ref 0–0.5)
KETONES UR QL STRIP: NEGATIVE
LEUKOCYTE ESTERASE UR QL STRIP: NEGATIVE
LYMPHOCYTES # BLD AUTO: 0.3 K/UL (ref 1–4.8)
LYMPHOCYTES NFR BLD: 2.4 % (ref 18–48)
MAGNESIUM SERPL-MCNC: 1.6 MG/DL (ref 1.6–2.6)
MCH RBC QN AUTO: 30.4 PG (ref 27–31)
MCHC RBC AUTO-ENTMCNC: 32.6 G/DL (ref 32–36)
MCV RBC AUTO: 93 FL (ref 82–98)
MONOCYTES # BLD AUTO: 0.7 K/UL (ref 0.3–1)
MONOCYTES NFR BLD: 6.3 % (ref 4–15)
NEUTROPHILS # BLD AUTO: 9.3 K/UL (ref 1.8–7.7)
NEUTROPHILS NFR BLD: 89.3 % (ref 38–73)
NITRITE UR QL STRIP: NEGATIVE
NRBC BLD-RTO: 0 /100 WBC
PH UR STRIP: 6 [PH] (ref 5–8)
PHOSPHATE SERPL-MCNC: 4.6 MG/DL (ref 2.7–4.5)
PLATELET # BLD AUTO: 167 K/UL (ref 150–350)
PMV BLD AUTO: 10.5 FL (ref 9.2–12.9)
POTASSIUM SERPL-SCNC: 6 MMOL/L (ref 3.5–5.1)
PROT SERPL-MCNC: 6.3 G/DL (ref 6–8.4)
PROT UR QL STRIP: NEGATIVE
RBC # BLD AUTO: 3.52 M/UL (ref 4.6–6.2)
SODIUM SERPL-SCNC: 134 MMOL/L (ref 136–145)
SP GR UR STRIP: 1.01 (ref 1–1.03)
URN SPEC COLLECT METH UR: ABNORMAL
UROBILINOGEN UR STRIP-ACNC: NEGATIVE EU/DL
WBC # BLD AUTO: 10.45 K/UL (ref 3.9–12.7)

## 2020-06-12 PROCEDURE — 87522 HEPATITIS C REVRS TRNSCRPJ: CPT

## 2020-06-12 PROCEDURE — 80053 COMPREHEN METABOLIC PANEL: CPT

## 2020-06-12 PROCEDURE — 80197 ASSAY OF TACROLIMUS: CPT

## 2020-06-12 PROCEDURE — 84100 ASSAY OF PHOSPHORUS: CPT

## 2020-06-12 PROCEDURE — 85025 COMPLETE CBC W/AUTO DIFF WBC: CPT

## 2020-06-12 PROCEDURE — 83735 ASSAY OF MAGNESIUM: CPT

## 2020-06-12 PROCEDURE — 36415 COLL VENOUS BLD VENIPUNCTURE: CPT

## 2020-06-12 PROCEDURE — 81003 URINALYSIS AUTO W/O SCOPE: CPT

## 2020-06-13 LAB — TACROLIMUS BLD-MCNC: 11 NG/ML (ref 5–15)

## 2020-06-15 ENCOUNTER — LAB VISIT (OUTPATIENT)
Dept: LAB | Facility: HOSPITAL | Age: 70
End: 2020-06-15
Attending: TRANSPLANT SURGERY
Payer: MEDICARE

## 2020-06-15 DIAGNOSIS — Z94.0 KIDNEY REPLACED BY TRANSPLANT: ICD-10-CM

## 2020-06-15 LAB
ALBUMIN SERPL BCP-MCNC: 4.1 G/DL (ref 3.5–5.2)
ALP SERPL-CCNC: 119 U/L (ref 55–135)
ALT SERPL W/O P-5'-P-CCNC: 52 U/L (ref 10–44)
ANION GAP SERPL CALC-SCNC: 8 MMOL/L (ref 8–16)
AST SERPL-CCNC: 20 U/L (ref 10–40)
BASOPHILS # BLD AUTO: 0.01 K/UL (ref 0–0.2)
BASOPHILS NFR BLD: 0.1 % (ref 0–1.9)
BILIRUB SERPL-MCNC: 0.8 MG/DL (ref 0.1–1)
BUN SERPL-MCNC: 91 MG/DL (ref 8–23)
CALCIUM SERPL-MCNC: 10.5 MG/DL (ref 8.7–10.5)
CHLORIDE SERPL-SCNC: 108 MMOL/L (ref 95–110)
CO2 SERPL-SCNC: 17 MMOL/L (ref 23–29)
CREAT SERPL-MCNC: 4 MG/DL (ref 0.5–1.4)
DIFFERENTIAL METHOD: ABNORMAL
EOSINOPHIL # BLD AUTO: 0 K/UL (ref 0–0.5)
EOSINOPHIL NFR BLD: 0.1 % (ref 0–8)
ERYTHROCYTE [DISTWIDTH] IN BLOOD BY AUTOMATED COUNT: 14.2 % (ref 11.5–14.5)
EST. GFR  (AFRICAN AMERICAN): 16.3 ML/MIN/1.73 M^2
EST. GFR  (NON AFRICAN AMERICAN): 14.1 ML/MIN/1.73 M^2
GLUCOSE SERPL-MCNC: 139 MG/DL (ref 70–110)
HCT VFR BLD AUTO: 37 % (ref 40–54)
HGB BLD-MCNC: 11.8 G/DL (ref 14–18)
IMM GRANULOCYTES # BLD AUTO: 0.2 K/UL (ref 0–0.04)
IMM GRANULOCYTES NFR BLD AUTO: 1.5 % (ref 0–0.5)
LYMPHOCYTES # BLD AUTO: 0.4 K/UL (ref 1–4.8)
LYMPHOCYTES NFR BLD: 2.6 % (ref 18–48)
MAGNESIUM SERPL-MCNC: 1.7 MG/DL (ref 1.6–2.6)
MCH RBC QN AUTO: 30.3 PG (ref 27–31)
MCHC RBC AUTO-ENTMCNC: 31.9 G/DL (ref 32–36)
MCV RBC AUTO: 95 FL (ref 82–98)
MONOCYTES # BLD AUTO: 0.9 K/UL (ref 0.3–1)
MONOCYTES NFR BLD: 6.7 % (ref 4–15)
NEUTROPHILS # BLD AUTO: 11.9 K/UL (ref 1.8–7.7)
NEUTROPHILS NFR BLD: 89 % (ref 38–73)
NRBC BLD-RTO: 0 /100 WBC
PHOSPHATE SERPL-MCNC: 5.8 MG/DL (ref 2.7–4.5)
PLATELET # BLD AUTO: 173 K/UL (ref 150–350)
PMV BLD AUTO: 10.8 FL (ref 9.2–12.9)
POTASSIUM SERPL-SCNC: 6.1 MMOL/L (ref 3.5–5.1)
PROT SERPL-MCNC: 6.8 G/DL (ref 6–8.4)
RBC # BLD AUTO: 3.89 M/UL (ref 4.6–6.2)
SODIUM SERPL-SCNC: 133 MMOL/L (ref 136–145)
WBC # BLD AUTO: 13.38 K/UL (ref 3.9–12.7)

## 2020-06-15 PROCEDURE — 85025 COMPLETE CBC W/AUTO DIFF WBC: CPT

## 2020-06-15 PROCEDURE — 80197 ASSAY OF TACROLIMUS: CPT

## 2020-06-15 PROCEDURE — 87522 HEPATITIS C REVRS TRNSCRPJ: CPT

## 2020-06-15 PROCEDURE — 84100 ASSAY OF PHOSPHORUS: CPT

## 2020-06-15 PROCEDURE — 80053 COMPREHEN METABOLIC PANEL: CPT

## 2020-06-15 PROCEDURE — 36415 COLL VENOUS BLD VENIPUNCTURE: CPT

## 2020-06-15 PROCEDURE — 83735 ASSAY OF MAGNESIUM: CPT

## 2020-06-16 LAB
HCV RNA SERPL NAA+PROBE-LOG IU: 6.85 LOG (10) IU/ML
HCV RNA SERPL QL NAA+PROBE: DETECTED IU/ML
HCV RNA SPEC NAA+PROBE-ACNC: ABNORMAL IU/ML
TACROLIMUS BLD-MCNC: 13.1 NG/ML (ref 5–15)

## 2020-06-17 ENCOUNTER — LAB VISIT (OUTPATIENT)
Dept: LAB | Facility: HOSPITAL | Age: 70
End: 2020-06-17
Attending: TRANSPLANT SURGERY
Payer: MEDICARE

## 2020-06-17 DIAGNOSIS — Z94.0 KIDNEY REPLACED BY TRANSPLANT: ICD-10-CM

## 2020-06-17 LAB
ALBUMIN SERPL BCP-MCNC: 3.6 G/DL (ref 3.5–5.2)
ALP SERPL-CCNC: 104 U/L (ref 55–135)
ALT SERPL W/O P-5'-P-CCNC: 38 U/L (ref 10–44)
ANION GAP SERPL CALC-SCNC: 12 MMOL/L (ref 8–16)
AST SERPL-CCNC: 17 U/L (ref 10–40)
BACTERIA #/AREA URNS HPF: NORMAL /HPF
BASOPHILS # BLD AUTO: 0 K/UL (ref 0–0.2)
BASOPHILS NFR BLD: 0 % (ref 0–1.9)
BILIRUB SERPL-MCNC: 0.7 MG/DL (ref 0.1–1)
BILIRUB UR QL STRIP: NEGATIVE
BUN SERPL-MCNC: 69 MG/DL (ref 8–23)
CALCIUM SERPL-MCNC: 9.5 MG/DL (ref 8.7–10.5)
CHLORIDE SERPL-SCNC: 96 MMOL/L (ref 95–110)
CLARITY UR: ABNORMAL
CO2 SERPL-SCNC: 26 MMOL/L (ref 23–29)
COLOR UR: ABNORMAL
CREAT SERPL-MCNC: 4.4 MG/DL (ref 0.5–1.4)
DIFFERENTIAL METHOD: ABNORMAL
EOSINOPHIL # BLD AUTO: 0 K/UL (ref 0–0.5)
EOSINOPHIL NFR BLD: 0.2 % (ref 0–8)
ERYTHROCYTE [DISTWIDTH] IN BLOOD BY AUTOMATED COUNT: 14 % (ref 11.5–14.5)
EST. GFR  (AFRICAN AMERICAN): 14.5 ML/MIN/1.73 M^2
EST. GFR  (NON AFRICAN AMERICAN): 12.6 ML/MIN/1.73 M^2
GLUCOSE SERPL-MCNC: 127 MG/DL (ref 70–110)
GLUCOSE UR QL STRIP: NEGATIVE
HCT VFR BLD AUTO: 32.7 % (ref 40–54)
HCV RNA SERPL NAA+PROBE-LOG IU: 7.72 LOG (10) IU/ML
HCV RNA SERPL QL NAA+PROBE: DETECTED IU/ML
HCV RNA SPEC NAA+PROBE-ACNC: ABNORMAL IU/ML
HGB BLD-MCNC: 10.7 G/DL (ref 14–18)
HGB UR QL STRIP: NEGATIVE
IMM GRANULOCYTES # BLD AUTO: 0.09 K/UL (ref 0–0.04)
IMM GRANULOCYTES NFR BLD AUTO: 0.9 % (ref 0–0.5)
KETONES UR QL STRIP: NEGATIVE
LEUKOCYTE ESTERASE UR QL STRIP: ABNORMAL
LYMPHOCYTES # BLD AUTO: 0.4 K/UL (ref 1–4.8)
LYMPHOCYTES NFR BLD: 3.5 % (ref 18–48)
MAGNESIUM SERPL-MCNC: 1.6 MG/DL (ref 1.6–2.6)
MCH RBC QN AUTO: 30.4 PG (ref 27–31)
MCHC RBC AUTO-ENTMCNC: 32.7 G/DL (ref 32–36)
MCV RBC AUTO: 93 FL (ref 82–98)
MICROSCOPIC COMMENT: NORMAL
MONOCYTES # BLD AUTO: 0.8 K/UL (ref 0.3–1)
MONOCYTES NFR BLD: 7.8 % (ref 4–15)
NEUTROPHILS # BLD AUTO: 8.9 K/UL (ref 1.8–7.7)
NEUTROPHILS NFR BLD: 87.6 % (ref 38–73)
NITRITE UR QL STRIP: NEGATIVE
NRBC BLD-RTO: 0 /100 WBC
PH UR STRIP: 7 [PH] (ref 5–8)
PHOSPHATE SERPL-MCNC: 7.4 MG/DL (ref 2.7–4.5)
PLATELET # BLD AUTO: 158 K/UL (ref 150–350)
PMV BLD AUTO: 11.3 FL (ref 9.2–12.9)
POTASSIUM SERPL-SCNC: 4.6 MMOL/L (ref 3.5–5.1)
PROT SERPL-MCNC: 6.3 G/DL (ref 6–8.4)
PROT UR QL STRIP: NEGATIVE
RBC # BLD AUTO: 3.52 M/UL (ref 4.6–6.2)
RBC #/AREA URNS HPF: 1 /HPF (ref 0–4)
SODIUM SERPL-SCNC: 134 MMOL/L (ref 136–145)
SP GR UR STRIP: 1.01 (ref 1–1.03)
URN SPEC COLLECT METH UR: ABNORMAL
UROBILINOGEN UR STRIP-ACNC: NEGATIVE EU/DL
WBC # BLD AUTO: 10.1 K/UL (ref 3.9–12.7)
WBC #/AREA URNS HPF: 3 /HPF (ref 0–5)

## 2020-06-17 PROCEDURE — 80053 COMPREHEN METABOLIC PANEL: CPT

## 2020-06-17 PROCEDURE — 87522 HEPATITIS C REVRS TRNSCRPJ: CPT

## 2020-06-17 PROCEDURE — 80197 ASSAY OF TACROLIMUS: CPT

## 2020-06-17 PROCEDURE — 36415 COLL VENOUS BLD VENIPUNCTURE: CPT

## 2020-06-17 PROCEDURE — 84100 ASSAY OF PHOSPHORUS: CPT

## 2020-06-17 PROCEDURE — 85025 COMPLETE CBC W/AUTO DIFF WBC: CPT

## 2020-06-17 PROCEDURE — 83735 ASSAY OF MAGNESIUM: CPT

## 2020-06-17 PROCEDURE — 81000 URINALYSIS NONAUTO W/SCOPE: CPT

## 2020-06-18 LAB — TACROLIMUS BLD-MCNC: 12.2 NG/ML (ref 5–15)

## 2020-06-19 ENCOUNTER — LAB VISIT (OUTPATIENT)
Dept: LAB | Facility: HOSPITAL | Age: 70
End: 2020-06-19
Attending: TRANSPLANT SURGERY
Payer: MEDICARE

## 2020-06-19 DIAGNOSIS — Z94.0 KIDNEY REPLACED BY TRANSPLANT: ICD-10-CM

## 2020-06-19 LAB
ALBUMIN SERPL BCP-MCNC: 3.4 G/DL (ref 3.5–5.2)
ALP SERPL-CCNC: 98 U/L (ref 55–135)
ALT SERPL W/O P-5'-P-CCNC: 33 U/L (ref 10–44)
ANION GAP SERPL CALC-SCNC: 13 MMOL/L (ref 8–16)
AST SERPL-CCNC: 16 U/L (ref 10–40)
BACTERIA #/AREA URNS HPF: ABNORMAL /HPF
BASOPHILS # BLD AUTO: 0 K/UL (ref 0–0.2)
BASOPHILS NFR BLD: 0 % (ref 0–1.9)
BILIRUB SERPL-MCNC: 0.7 MG/DL (ref 0.1–1)
BILIRUB UR QL STRIP: NEGATIVE
BUN SERPL-MCNC: 68 MG/DL (ref 8–23)
CALCIUM SERPL-MCNC: 9.4 MG/DL (ref 8.7–10.5)
CHLORIDE SERPL-SCNC: 94 MMOL/L (ref 95–110)
CLARITY UR: CLEAR
CO2 SERPL-SCNC: 27 MMOL/L (ref 23–29)
COLOR UR: YELLOW
CREAT SERPL-MCNC: 5 MG/DL (ref 0.5–1.4)
DIFFERENTIAL METHOD: ABNORMAL
EOSINOPHIL # BLD AUTO: 0 K/UL (ref 0–0.5)
EOSINOPHIL NFR BLD: 0.4 % (ref 0–8)
ERYTHROCYTE [DISTWIDTH] IN BLOOD BY AUTOMATED COUNT: 13.9 % (ref 11.5–14.5)
EST. GFR  (AFRICAN AMERICAN): 12.5 ML/MIN/1.73 M^2
EST. GFR  (NON AFRICAN AMERICAN): 10.8 ML/MIN/1.73 M^2
GLUCOSE SERPL-MCNC: 146 MG/DL (ref 70–110)
GLUCOSE UR QL STRIP: NEGATIVE
HCT VFR BLD AUTO: 30.2 % (ref 40–54)
HCV RNA SERPL NAA+PROBE-LOG IU: 7.18 LOG (10) IU/ML
HCV RNA SERPL QL NAA+PROBE: DETECTED IU/ML
HCV RNA SPEC NAA+PROBE-ACNC: ABNORMAL IU/ML
HGB BLD-MCNC: 9.9 G/DL (ref 14–18)
HGB UR QL STRIP: NEGATIVE
HYALINE CASTS #/AREA URNS LPF: 0 /LPF
IMM GRANULOCYTES # BLD AUTO: 0.1 K/UL (ref 0–0.04)
IMM GRANULOCYTES NFR BLD AUTO: 1 % (ref 0–0.5)
KETONES UR QL STRIP: NEGATIVE
LEUKOCYTE ESTERASE UR QL STRIP: NEGATIVE
LYMPHOCYTES # BLD AUTO: 0.4 K/UL (ref 1–4.8)
LYMPHOCYTES NFR BLD: 3.7 % (ref 18–48)
MAGNESIUM SERPL-MCNC: 1.8 MG/DL (ref 1.6–2.6)
MCH RBC QN AUTO: 30.4 PG (ref 27–31)
MCHC RBC AUTO-ENTMCNC: 32.8 G/DL (ref 32–36)
MCV RBC AUTO: 93 FL (ref 82–98)
MICROSCOPIC COMMENT: ABNORMAL
MONOCYTES # BLD AUTO: 0.7 K/UL (ref 0.3–1)
MONOCYTES NFR BLD: 7.1 % (ref 4–15)
NEUTROPHILS # BLD AUTO: 8.4 K/UL (ref 1.8–7.7)
NEUTROPHILS NFR BLD: 87.8 % (ref 38–73)
NITRITE UR QL STRIP: NEGATIVE
NRBC BLD-RTO: 0 /100 WBC
PH UR STRIP: 7 [PH] (ref 5–8)
PHOSPHATE SERPL-MCNC: 7.8 MG/DL (ref 2.7–4.5)
PLATELET # BLD AUTO: 153 K/UL (ref 150–350)
PMV BLD AUTO: 10.7 FL (ref 9.2–12.9)
POTASSIUM SERPL-SCNC: 3.8 MMOL/L (ref 3.5–5.1)
PROT SERPL-MCNC: 5.8 G/DL (ref 6–8.4)
PROT UR QL STRIP: ABNORMAL
RBC # BLD AUTO: 3.26 M/UL (ref 4.6–6.2)
RBC #/AREA URNS HPF: 2 /HPF (ref 0–4)
SODIUM SERPL-SCNC: 134 MMOL/L (ref 136–145)
SP GR UR STRIP: 1.01 (ref 1–1.03)
URN SPEC COLLECT METH UR: ABNORMAL
UROBILINOGEN UR STRIP-ACNC: NEGATIVE EU/DL
WBC # BLD AUTO: 9.62 K/UL (ref 3.9–12.7)
WBC #/AREA URNS HPF: 2 /HPF (ref 0–5)

## 2020-06-19 PROCEDURE — 80197 ASSAY OF TACROLIMUS: CPT

## 2020-06-19 PROCEDURE — 36415 COLL VENOUS BLD VENIPUNCTURE: CPT

## 2020-06-19 PROCEDURE — 87522 HEPATITIS C REVRS TRNSCRPJ: CPT

## 2020-06-19 PROCEDURE — 85025 COMPLETE CBC W/AUTO DIFF WBC: CPT

## 2020-06-19 PROCEDURE — 81000 URINALYSIS NONAUTO W/SCOPE: CPT

## 2020-06-19 PROCEDURE — 80053 COMPREHEN METABOLIC PANEL: CPT

## 2020-06-19 PROCEDURE — 84100 ASSAY OF PHOSPHORUS: CPT

## 2020-06-19 PROCEDURE — 83735 ASSAY OF MAGNESIUM: CPT

## 2020-06-20 LAB — TACROLIMUS BLD-MCNC: 7.6 NG/ML (ref 5–15)

## 2020-06-22 LAB
HCV RNA SERPL NAA+PROBE-LOG IU: 7.71 LOG (10) IU/ML
HCV RNA SERPL QL NAA+PROBE: DETECTED IU/ML
HCV RNA SPEC NAA+PROBE-ACNC: ABNORMAL IU/ML

## 2020-06-23 LAB
HCV RNA SERPL NAA+PROBE-LOG IU: 7.55 LOG (10) IU/ML
HCV RNA SERPL QL NAA+PROBE: DETECTED IU/ML
HCV RNA SPEC NAA+PROBE-ACNC: ABNORMAL IU/ML

## 2020-07-03 ENCOUNTER — LAB VISIT (OUTPATIENT)
Dept: LAB | Facility: HOSPITAL | Age: 70
End: 2020-07-03
Attending: TRANSPLANT SURGERY
Payer: MEDICARE

## 2020-07-03 DIAGNOSIS — Z94.0 KIDNEY REPLACED BY TRANSPLANT: ICD-10-CM

## 2020-07-03 LAB
ALBUMIN SERPL BCP-MCNC: 2.8 G/DL (ref 3.5–5.2)
ALP SERPL-CCNC: 83 U/L (ref 55–135)
ALT SERPL W/O P-5'-P-CCNC: 23 U/L (ref 10–44)
AMORPH CRY URNS QL MICRO: ABNORMAL
ANION GAP SERPL CALC-SCNC: 8 MMOL/L (ref 8–16)
AST SERPL-CCNC: 20 U/L (ref 10–40)
BACTERIA #/AREA URNS HPF: ABNORMAL /HPF
BASOPHILS NFR BLD: 0 % (ref 0–1.9)
BILIRUB SERPL-MCNC: 0.4 MG/DL (ref 0.1–1)
BILIRUB UR QL STRIP: NEGATIVE
BUN SERPL-MCNC: 70 MG/DL (ref 8–23)
CALCIUM SERPL-MCNC: 9.2 MG/DL (ref 8.7–10.5)
CHLORIDE SERPL-SCNC: 110 MMOL/L (ref 95–110)
CLARITY UR: CLEAR
CO2 SERPL-SCNC: 20 MMOL/L (ref 23–29)
COLOR UR: YELLOW
CREAT SERPL-MCNC: 3.2 MG/DL (ref 0.5–1.4)
DIFFERENTIAL METHOD: ABNORMAL
EOSINOPHIL NFR BLD: 1 % (ref 0–8)
ERYTHROCYTE [DISTWIDTH] IN BLOOD BY AUTOMATED COUNT: 14.8 % (ref 11.5–14.5)
EST. GFR  (AFRICAN AMERICAN): 21.4 ML/MIN/1.73 M^2
EST. GFR  (NON AFRICAN AMERICAN): 18.5 ML/MIN/1.73 M^2
GLUCOSE SERPL-MCNC: 153 MG/DL (ref 70–110)
GLUCOSE UR QL STRIP: ABNORMAL
HCT VFR BLD AUTO: 24 % (ref 40–54)
HGB BLD-MCNC: 7.7 G/DL (ref 14–18)
HGB UR QL STRIP: NEGATIVE
IMM GRANULOCYTES # BLD AUTO: ABNORMAL K/UL
IMM GRANULOCYTES NFR BLD AUTO: ABNORMAL %
KETONES UR QL STRIP: NEGATIVE
LEUKOCYTE ESTERASE UR QL STRIP: ABNORMAL
LYMPHOCYTES NFR BLD: 9 % (ref 18–48)
MAGNESIUM SERPL-MCNC: 1.7 MG/DL (ref 1.6–2.6)
MCH RBC QN AUTO: 30.6 PG (ref 27–31)
MCHC RBC AUTO-ENTMCNC: 32.1 G/DL (ref 32–36)
MCV RBC AUTO: 95 FL (ref 82–98)
MICROSCOPIC COMMENT: ABNORMAL
MONOCYTES NFR BLD: 2 % (ref 4–15)
NEUTROPHILS NFR BLD: 87 % (ref 38–73)
NEUTS BAND NFR BLD MANUAL: 1 %
NITRITE UR QL STRIP: NEGATIVE
NRBC BLD-RTO: 0 /100 WBC
PH UR STRIP: 7 [PH] (ref 5–8)
PHOSPHATE SERPL-MCNC: 2.9 MG/DL (ref 2.7–4.5)
PLATELET # BLD AUTO: 157 K/UL (ref 150–350)
PMV BLD AUTO: 10.8 FL (ref 9.2–12.9)
POTASSIUM SERPL-SCNC: 4.2 MMOL/L (ref 3.5–5.1)
PROT SERPL-MCNC: 5 G/DL (ref 6–8.4)
PROT UR QL STRIP: NEGATIVE
RBC # BLD AUTO: 2.52 M/UL (ref 4.6–6.2)
RBC #/AREA URNS HPF: 2 /HPF (ref 0–4)
SODIUM SERPL-SCNC: 138 MMOL/L (ref 136–145)
SP GR UR STRIP: 1.01 (ref 1–1.03)
URN SPEC COLLECT METH UR: ABNORMAL
UROBILINOGEN UR STRIP-ACNC: NEGATIVE EU/DL
WBC # BLD AUTO: 6.9 K/UL (ref 3.9–12.7)
WBC #/AREA URNS HPF: 10 /HPF (ref 0–5)

## 2020-07-03 PROCEDURE — 80197 ASSAY OF TACROLIMUS: CPT

## 2020-07-03 PROCEDURE — 85027 COMPLETE CBC AUTOMATED: CPT

## 2020-07-03 PROCEDURE — 85007 BL SMEAR W/DIFF WBC COUNT: CPT

## 2020-07-03 PROCEDURE — 87522 HEPATITIS C REVRS TRNSCRPJ: CPT

## 2020-07-03 PROCEDURE — 84100 ASSAY OF PHOSPHORUS: CPT

## 2020-07-03 PROCEDURE — 81000 URINALYSIS NONAUTO W/SCOPE: CPT

## 2020-07-03 PROCEDURE — 80053 COMPREHEN METABOLIC PANEL: CPT

## 2020-07-03 PROCEDURE — 83735 ASSAY OF MAGNESIUM: CPT

## 2020-07-03 PROCEDURE — 36415 COLL VENOUS BLD VENIPUNCTURE: CPT

## 2020-07-04 LAB — TACROLIMUS BLD-MCNC: 5.6 NG/ML (ref 5–15)

## 2020-07-06 ENCOUNTER — LAB VISIT (OUTPATIENT)
Dept: LAB | Facility: HOSPITAL | Age: 70
End: 2020-07-06
Attending: TRANSPLANT SURGERY
Payer: MEDICARE

## 2020-07-06 DIAGNOSIS — Z94.0 KIDNEY REPLACED BY TRANSPLANT: ICD-10-CM

## 2020-07-06 LAB
ALBUMIN SERPL BCP-MCNC: 3 G/DL (ref 3.5–5.2)
ALP SERPL-CCNC: 103 U/L (ref 55–135)
ALT SERPL W/O P-5'-P-CCNC: 22 U/L (ref 10–44)
ANION GAP SERPL CALC-SCNC: 7 MMOL/L (ref 8–16)
ANISOCYTOSIS BLD QL SMEAR: SLIGHT
AST SERPL-CCNC: 17 U/L (ref 10–40)
BACTERIA #/AREA URNS HPF: ABNORMAL /HPF
BASOPHILS NFR BLD: 0 % (ref 0–1.9)
BILIRUB SERPL-MCNC: 0.5 MG/DL (ref 0.1–1)
BILIRUB UR QL STRIP: NEGATIVE
BUN SERPL-MCNC: 60 MG/DL (ref 8–23)
CALCIUM SERPL-MCNC: 9.5 MG/DL (ref 8.7–10.5)
CHLORIDE SERPL-SCNC: 110 MMOL/L (ref 95–110)
CLARITY UR: CLEAR
CO2 SERPL-SCNC: 21 MMOL/L (ref 23–29)
COLOR UR: YELLOW
CREAT SERPL-MCNC: 2.9 MG/DL (ref 0.5–1.4)
DIFFERENTIAL METHOD: ABNORMAL
EOSINOPHIL NFR BLD: 5 % (ref 0–8)
ERYTHROCYTE [DISTWIDTH] IN BLOOD BY AUTOMATED COUNT: 14.7 % (ref 11.5–14.5)
EST. GFR  (AFRICAN AMERICAN): 24.1 ML/MIN/1.73 M^2
EST. GFR  (NON AFRICAN AMERICAN): 20.8 ML/MIN/1.73 M^2
GLUCOSE SERPL-MCNC: 80 MG/DL (ref 70–110)
GLUCOSE UR QL STRIP: ABNORMAL
HCT VFR BLD AUTO: 24.6 % (ref 40–54)
HGB BLD-MCNC: 7.6 G/DL (ref 14–18)
HGB UR QL STRIP: ABNORMAL
HYALINE CASTS #/AREA URNS LPF: 0 /LPF
HYPOCHROMIA BLD QL SMEAR: ABNORMAL
IMM GRANULOCYTES # BLD AUTO: ABNORMAL K/UL (ref 0–0.04)
IMM GRANULOCYTES NFR BLD AUTO: ABNORMAL % (ref 0–0.5)
KETONES UR QL STRIP: NEGATIVE
LEUKOCYTE ESTERASE UR QL STRIP: ABNORMAL
LYMPHOCYTES NFR BLD: 8 % (ref 18–48)
MAGNESIUM SERPL-MCNC: 1.5 MG/DL (ref 1.6–2.6)
MCH RBC QN AUTO: 30.6 PG (ref 27–31)
MCHC RBC AUTO-ENTMCNC: 30.9 G/DL (ref 32–36)
MCV RBC AUTO: 99 FL (ref 82–98)
MICROSCOPIC COMMENT: ABNORMAL
MONOCYTES NFR BLD: 3 % (ref 4–15)
NEUTROPHILS NFR BLD: 79 % (ref 38–73)
NEUTS BAND NFR BLD MANUAL: 5 %
NITRITE UR QL STRIP: NEGATIVE
NRBC BLD-RTO: 0 /100 WBC
PH UR STRIP: 7 [PH] (ref 5–8)
PHOSPHATE SERPL-MCNC: 2.2 MG/DL (ref 2.7–4.5)
PLATELET # BLD AUTO: 146 K/UL (ref 150–350)
PLATELET BLD QL SMEAR: ABNORMAL
PMV BLD AUTO: 10.6 FL (ref 9.2–12.9)
POIKILOCYTOSIS BLD QL SMEAR: SLIGHT
POTASSIUM SERPL-SCNC: 4.3 MMOL/L (ref 3.5–5.1)
PROT SERPL-MCNC: 5 G/DL (ref 6–8.4)
PROT UR QL STRIP: ABNORMAL
RBC # BLD AUTO: 2.48 M/UL (ref 4.6–6.2)
RBC #/AREA URNS HPF: 3 /HPF (ref 0–4)
SCHISTOCYTES BLD QL SMEAR: PRESENT
SODIUM SERPL-SCNC: 138 MMOL/L (ref 136–145)
SP GR UR STRIP: 1.01 (ref 1–1.03)
SQUAMOUS #/AREA URNS HPF: 3 /HPF
TOXIC GRANULES BLD QL SMEAR: PRESENT
URN SPEC COLLECT METH UR: ABNORMAL
UROBILINOGEN UR STRIP-ACNC: NEGATIVE EU/DL
WBC # BLD AUTO: 3.33 K/UL (ref 3.9–12.7)
WBC #/AREA URNS HPF: 7 /HPF (ref 0–5)
WBC CLUMPS URNS QL MICRO: ABNORMAL

## 2020-07-06 PROCEDURE — 83735 ASSAY OF MAGNESIUM: CPT

## 2020-07-06 PROCEDURE — 81000 URINALYSIS NONAUTO W/SCOPE: CPT

## 2020-07-06 PROCEDURE — 36415 COLL VENOUS BLD VENIPUNCTURE: CPT

## 2020-07-06 PROCEDURE — 87522 HEPATITIS C REVRS TRNSCRPJ: CPT

## 2020-07-06 PROCEDURE — 80197 ASSAY OF TACROLIMUS: CPT

## 2020-07-06 PROCEDURE — 85027 COMPLETE CBC AUTOMATED: CPT

## 2020-07-06 PROCEDURE — 80053 COMPREHEN METABOLIC PANEL: CPT

## 2020-07-06 PROCEDURE — 84100 ASSAY OF PHOSPHORUS: CPT

## 2020-07-06 PROCEDURE — 85007 BL SMEAR W/DIFF WBC COUNT: CPT

## 2020-07-07 LAB — TACROLIMUS BLD-MCNC: 6.2 NG/ML (ref 5–15)

## 2020-07-08 ENCOUNTER — LAB VISIT (OUTPATIENT)
Dept: LAB | Facility: HOSPITAL | Age: 70
End: 2020-07-08
Attending: TRANSPLANT SURGERY
Payer: MEDICARE

## 2020-07-08 DIAGNOSIS — Z94.0 KIDNEY REPLACED BY TRANSPLANT: ICD-10-CM

## 2020-07-08 LAB
ALBUMIN SERPL BCP-MCNC: 3.1 G/DL (ref 3.5–5.2)
ALP SERPL-CCNC: 115 U/L (ref 55–135)
ALT SERPL W/O P-5'-P-CCNC: 25 U/L (ref 10–44)
ANION GAP SERPL CALC-SCNC: 7 MMOL/L (ref 8–16)
ANISOCYTOSIS BLD QL SMEAR: SLIGHT
AST SERPL-CCNC: 19 U/L (ref 10–40)
BASOPHILS # BLD AUTO: ABNORMAL K/UL (ref 0–0.2)
BASOPHILS NFR BLD: 2 % (ref 0–1.9)
BILIRUB SERPL-MCNC: 0.4 MG/DL (ref 0.1–1)
BILIRUB UR QL STRIP: NEGATIVE
BUN SERPL-MCNC: 52 MG/DL (ref 8–23)
CALCIUM SERPL-MCNC: 9.6 MG/DL (ref 8.7–10.5)
CHLORIDE SERPL-SCNC: 114 MMOL/L (ref 95–110)
CLARITY UR: CLEAR
CO2 SERPL-SCNC: 20 MMOL/L (ref 23–29)
COLOR UR: YELLOW
CREAT SERPL-MCNC: 2.8 MG/DL (ref 0.5–1.4)
DIFFERENTIAL METHOD: ABNORMAL
EOSINOPHIL # BLD AUTO: ABNORMAL K/UL (ref 0–0.5)
EOSINOPHIL NFR BLD: 2 % (ref 0–8)
ERYTHROCYTE [DISTWIDTH] IN BLOOD BY AUTOMATED COUNT: 14.6 % (ref 11.5–14.5)
EST. GFR  (AFRICAN AMERICAN): 25.1 ML/MIN/1.73 M^2
EST. GFR  (NON AFRICAN AMERICAN): 21.7 ML/MIN/1.73 M^2
GLUCOSE SERPL-MCNC: 116 MG/DL (ref 70–110)
GLUCOSE UR QL STRIP: ABNORMAL
HCT VFR BLD AUTO: 24.8 % (ref 40–54)
HCV RNA SERPL NAA+PROBE-LOG IU: 7.77 LOG (10) IU/ML
HCV RNA SERPL QL NAA+PROBE: DETECTED IU/ML
HCV RNA SPEC NAA+PROBE-ACNC: ABNORMAL IU/ML
HGB BLD-MCNC: 7.6 G/DL (ref 14–18)
HGB UR QL STRIP: NEGATIVE
IMM GRANULOCYTES # BLD AUTO: ABNORMAL K/UL (ref 0–0.04)
IMM GRANULOCYTES NFR BLD AUTO: ABNORMAL % (ref 0–0.5)
KETONES UR QL STRIP: NEGATIVE
LEUKOCYTE ESTERASE UR QL STRIP: ABNORMAL
LYMPHOCYTES # BLD AUTO: ABNORMAL K/UL (ref 1–4.8)
LYMPHOCYTES NFR BLD: 18 % (ref 18–48)
MAGNESIUM SERPL-MCNC: 1.5 MG/DL (ref 1.6–2.6)
MCH RBC QN AUTO: 30.8 PG (ref 27–31)
MCHC RBC AUTO-ENTMCNC: 30.6 G/DL (ref 32–36)
MCV RBC AUTO: 100 FL (ref 82–98)
METAMYELOCYTES NFR BLD MANUAL: 2 %
MICROSCOPIC COMMENT: NORMAL
MONOCYTES # BLD AUTO: ABNORMAL K/UL (ref 0.3–1)
MONOCYTES NFR BLD: 0 % (ref 4–15)
NEUTROPHILS NFR BLD: 74 % (ref 38–73)
NEUTS BAND NFR BLD MANUAL: 2 %
NITRITE UR QL STRIP: NEGATIVE
NRBC BLD-RTO: 0 /100 WBC
PH UR STRIP: 7 [PH] (ref 5–8)
PHOSPHATE SERPL-MCNC: 2.7 MG/DL (ref 2.7–4.5)
PLATELET # BLD AUTO: 155 K/UL (ref 150–350)
PMV BLD AUTO: 10.4 FL (ref 9.2–12.9)
POIKILOCYTOSIS BLD QL SMEAR: SLIGHT
POTASSIUM SERPL-SCNC: 4.7 MMOL/L (ref 3.5–5.1)
PROT SERPL-MCNC: 4.5 G/DL (ref 6–8.4)
PROT UR QL STRIP: ABNORMAL
RBC # BLD AUTO: 2.47 M/UL (ref 4.6–6.2)
SODIUM SERPL-SCNC: 141 MMOL/L (ref 136–145)
SP GR UR STRIP: 1.01 (ref 1–1.03)
URN SPEC COLLECT METH UR: ABNORMAL
UROBILINOGEN UR STRIP-ACNC: NEGATIVE EU/DL
WBC # BLD AUTO: 2 K/UL (ref 3.9–12.7)
WBC #/AREA URNS HPF: 2 /HPF (ref 0–5)

## 2020-07-08 PROCEDURE — 36415 COLL VENOUS BLD VENIPUNCTURE: CPT

## 2020-07-08 PROCEDURE — 83735 ASSAY OF MAGNESIUM: CPT

## 2020-07-08 PROCEDURE — 85007 BL SMEAR W/DIFF WBC COUNT: CPT

## 2020-07-08 PROCEDURE — 80053 COMPREHEN METABOLIC PANEL: CPT

## 2020-07-08 PROCEDURE — 80197 ASSAY OF TACROLIMUS: CPT

## 2020-07-08 PROCEDURE — 87522 HEPATITIS C REVRS TRNSCRPJ: CPT

## 2020-07-08 PROCEDURE — 85027 COMPLETE CBC AUTOMATED: CPT

## 2020-07-08 PROCEDURE — 81000 URINALYSIS NONAUTO W/SCOPE: CPT

## 2020-07-08 PROCEDURE — 84100 ASSAY OF PHOSPHORUS: CPT

## 2020-07-09 LAB
HCV RNA SERPL NAA+PROBE-LOG IU: 7.72 LOG (10) IU/ML
HCV RNA SERPL QL NAA+PROBE: DETECTED IU/ML
HCV RNA SPEC NAA+PROBE-ACNC: ABNORMAL IU/ML
TACROLIMUS BLD-MCNC: 6.9 NG/ML (ref 5–15)

## 2020-07-10 ENCOUNTER — LAB VISIT (OUTPATIENT)
Dept: LAB | Facility: HOSPITAL | Age: 70
End: 2020-07-10
Attending: TRANSPLANT SURGERY
Payer: MEDICARE

## 2020-07-10 DIAGNOSIS — Z94.0 KIDNEY REPLACED BY TRANSPLANT: ICD-10-CM

## 2020-07-10 LAB
ALBUMIN SERPL BCP-MCNC: 3.2 G/DL (ref 3.5–5.2)
ALP SERPL-CCNC: 114 U/L (ref 55–135)
ALT SERPL W/O P-5'-P-CCNC: 21 U/L (ref 10–44)
ANION GAP SERPL CALC-SCNC: 4 MMOL/L (ref 8–16)
AST SERPL-CCNC: 15 U/L (ref 10–40)
BASOPHILS # BLD AUTO: ABNORMAL K/UL (ref 0–0.2)
BASOPHILS NFR BLD: 0 % (ref 0–1.9)
BILIRUB SERPL-MCNC: 0.8 MG/DL (ref 0.1–1)
BILIRUB UR QL STRIP: NEGATIVE
BUN SERPL-MCNC: 47 MG/DL (ref 8–23)
CALCIUM SERPL-MCNC: 9.8 MG/DL (ref 8.7–10.5)
CHLORIDE SERPL-SCNC: 114 MMOL/L (ref 95–110)
CLARITY UR: CLEAR
CO2 SERPL-SCNC: 20 MMOL/L (ref 23–29)
COLOR UR: YELLOW
CREAT SERPL-MCNC: 2.6 MG/DL (ref 0.5–1.4)
DIFFERENTIAL METHOD: ABNORMAL
EOSINOPHIL # BLD AUTO: ABNORMAL K/UL (ref 0–0.5)
EOSINOPHIL NFR BLD: 1 % (ref 0–8)
ERYTHROCYTE [DISTWIDTH] IN BLOOD BY AUTOMATED COUNT: 14.5 % (ref 11.5–14.5)
EST. GFR  (AFRICAN AMERICAN): 27.5 ML/MIN/1.73 M^2
EST. GFR  (NON AFRICAN AMERICAN): 23.7 ML/MIN/1.73 M^2
GLUCOSE SERPL-MCNC: 115 MG/DL (ref 70–110)
GLUCOSE UR QL STRIP: ABNORMAL
HCT VFR BLD AUTO: 25 % (ref 40–54)
HGB BLD-MCNC: 7.8 G/DL (ref 14–18)
HGB UR QL STRIP: NEGATIVE
IMM GRANULOCYTES # BLD AUTO: ABNORMAL K/UL (ref 0–0.04)
IMM GRANULOCYTES NFR BLD AUTO: ABNORMAL % (ref 0–0.5)
KETONES UR QL STRIP: NEGATIVE
LEUKOCYTE ESTERASE UR QL STRIP: NEGATIVE
LYMPHOCYTES # BLD AUTO: ABNORMAL K/UL (ref 1–4.8)
LYMPHOCYTES NFR BLD: 14 % (ref 18–48)
MAGNESIUM SERPL-MCNC: 1.4 MG/DL (ref 1.6–2.6)
MCH RBC QN AUTO: 31 PG (ref 27–31)
MCHC RBC AUTO-ENTMCNC: 31.2 G/DL (ref 32–36)
MCV RBC AUTO: 99 FL (ref 82–98)
MONOCYTES # BLD AUTO: ABNORMAL K/UL (ref 0.3–1)
MONOCYTES NFR BLD: 5 % (ref 4–15)
NEUTROPHILS NFR BLD: 80 % (ref 38–73)
NITRITE UR QL STRIP: NEGATIVE
NRBC BLD-RTO: 0 /100 WBC
PH UR STRIP: 7 [PH] (ref 5–8)
PHOSPHATE SERPL-MCNC: 2.5 MG/DL (ref 2.7–4.5)
PLATELET # BLD AUTO: 169 K/UL (ref 150–350)
PMV BLD AUTO: 10.5 FL (ref 9.2–12.9)
POTASSIUM SERPL-SCNC: 4.7 MMOL/L (ref 3.5–5.1)
PROT SERPL-MCNC: 5.5 G/DL (ref 6–8.4)
PROT UR QL STRIP: NEGATIVE
RBC # BLD AUTO: 2.52 M/UL (ref 4.6–6.2)
SODIUM SERPL-SCNC: 138 MMOL/L (ref 136–145)
SP GR UR STRIP: 1.01 (ref 1–1.03)
URN SPEC COLLECT METH UR: ABNORMAL
UROBILINOGEN UR STRIP-ACNC: NEGATIVE EU/DL
WBC # BLD AUTO: 1.39 K/UL (ref 3.9–12.7)

## 2020-07-10 PROCEDURE — 85007 BL SMEAR W/DIFF WBC COUNT: CPT

## 2020-07-10 PROCEDURE — 85027 COMPLETE CBC AUTOMATED: CPT

## 2020-07-10 PROCEDURE — 83735 ASSAY OF MAGNESIUM: CPT

## 2020-07-10 PROCEDURE — 87522 HEPATITIS C REVRS TRNSCRPJ: CPT

## 2020-07-10 PROCEDURE — 81003 URINALYSIS AUTO W/O SCOPE: CPT

## 2020-07-10 PROCEDURE — 80197 ASSAY OF TACROLIMUS: CPT

## 2020-07-10 PROCEDURE — 36415 COLL VENOUS BLD VENIPUNCTURE: CPT

## 2020-07-10 PROCEDURE — 80053 COMPREHEN METABOLIC PANEL: CPT

## 2020-07-10 PROCEDURE — 84100 ASSAY OF PHOSPHORUS: CPT

## 2020-07-11 LAB — TACROLIMUS BLD-MCNC: 8.4 NG/ML (ref 5–15)

## 2020-07-13 ENCOUNTER — LAB VISIT (OUTPATIENT)
Dept: LAB | Facility: HOSPITAL | Age: 70
End: 2020-07-13
Attending: TRANSPLANT SURGERY
Payer: MEDICARE

## 2020-07-13 DIAGNOSIS — Z94.0 KIDNEY REPLACED BY TRANSPLANT: ICD-10-CM

## 2020-07-13 LAB
ALBUMIN SERPL BCP-MCNC: 3.4 G/DL (ref 3.5–5.2)
ALP SERPL-CCNC: 138 U/L (ref 55–135)
ALT SERPL W/O P-5'-P-CCNC: 22 U/L (ref 10–44)
ANION GAP SERPL CALC-SCNC: 9 MMOL/L (ref 8–16)
AST SERPL-CCNC: 15 U/L (ref 10–40)
BASOPHILS # BLD AUTO: ABNORMAL K/UL (ref 0–0.2)
BASOPHILS NFR BLD: 0 % (ref 0–1.9)
BILIRUB SERPL-MCNC: 0.5 MG/DL (ref 0.1–1)
BILIRUB UR QL STRIP: NEGATIVE
BUN SERPL-MCNC: 41 MG/DL (ref 8–23)
CALCIUM SERPL-MCNC: 10.1 MG/DL (ref 8.7–10.5)
CHLORIDE SERPL-SCNC: 113 MMOL/L (ref 95–110)
CLARITY UR: CLEAR
CO2 SERPL-SCNC: 19 MMOL/L (ref 23–29)
COLOR UR: YELLOW
CREAT SERPL-MCNC: 2.5 MG/DL (ref 0.5–1.4)
DIFFERENTIAL METHOD: ABNORMAL
EOSINOPHIL # BLD AUTO: ABNORMAL K/UL (ref 0–0.5)
EOSINOPHIL NFR BLD: 8 % (ref 0–8)
ERYTHROCYTE [DISTWIDTH] IN BLOOD BY AUTOMATED COUNT: 14.6 % (ref 11.5–14.5)
EST. GFR  (AFRICAN AMERICAN): 28.8 ML/MIN/1.73 M^2
EST. GFR  (NON AFRICAN AMERICAN): 24.9 ML/MIN/1.73 M^2
GLUCOSE SERPL-MCNC: 187 MG/DL (ref 70–110)
GLUCOSE UR QL STRIP: ABNORMAL
HCT VFR BLD AUTO: 27.5 % (ref 40–54)
HGB BLD-MCNC: 8.6 G/DL (ref 14–18)
HGB UR QL STRIP: NEGATIVE
IMM GRANULOCYTES # BLD AUTO: ABNORMAL K/UL (ref 0–0.04)
IMM GRANULOCYTES NFR BLD AUTO: ABNORMAL % (ref 0–0.5)
KETONES UR QL STRIP: NEGATIVE
LEUKOCYTE ESTERASE UR QL STRIP: NEGATIVE
LYMPHOCYTES # BLD AUTO: ABNORMAL K/UL (ref 1–4.8)
LYMPHOCYTES NFR BLD: 36 % (ref 18–48)
MAGNESIUM SERPL-MCNC: 1.2 MG/DL (ref 1.6–2.6)
MCH RBC QN AUTO: 30.6 PG (ref 27–31)
MCHC RBC AUTO-ENTMCNC: 31.3 G/DL (ref 32–36)
MCV RBC AUTO: 98 FL (ref 82–98)
METAMYELOCYTES NFR BLD MANUAL: 1 %
MONOCYTES # BLD AUTO: ABNORMAL K/UL (ref 0.3–1)
MONOCYTES NFR BLD: 3 % (ref 4–15)
NEUTROPHILS # BLD AUTO: ABNORMAL K/UL (ref 1.8–7.7)
NEUTROPHILS NFR BLD: 52 % (ref 38–73)
NITRITE UR QL STRIP: NEGATIVE
NRBC BLD-RTO: 0 /100 WBC
PH UR STRIP: 6 [PH] (ref 5–8)
PHOSPHATE SERPL-MCNC: 2 MG/DL (ref 2.7–4.5)
PLATELET # BLD AUTO: 175 K/UL (ref 150–350)
PMV BLD AUTO: 10 FL (ref 9.2–12.9)
POTASSIUM SERPL-SCNC: 4.5 MMOL/L (ref 3.5–5.1)
PROT SERPL-MCNC: 6.2 G/DL (ref 6–8.4)
PROT UR QL STRIP: NEGATIVE
RBC # BLD AUTO: 2.81 M/UL (ref 4.6–6.2)
SODIUM SERPL-SCNC: 141 MMOL/L (ref 136–145)
SP GR UR STRIP: 1.01 (ref 1–1.03)
URN SPEC COLLECT METH UR: ABNORMAL
UROBILINOGEN UR STRIP-ACNC: NEGATIVE EU/DL
WBC # BLD AUTO: 0.83 K/UL (ref 3.9–12.7)

## 2020-07-13 PROCEDURE — 83735 ASSAY OF MAGNESIUM: CPT

## 2020-07-13 PROCEDURE — 80053 COMPREHEN METABOLIC PANEL: CPT

## 2020-07-13 PROCEDURE — 84100 ASSAY OF PHOSPHORUS: CPT

## 2020-07-13 PROCEDURE — 85060 BLOOD SMEAR INTERPRETATION: CPT | Mod: ,,, | Performed by: PATHOLOGY

## 2020-07-13 PROCEDURE — 85007 BL SMEAR W/DIFF WBC COUNT: CPT

## 2020-07-13 PROCEDURE — 87522 HEPATITIS C REVRS TRNSCRPJ: CPT

## 2020-07-13 PROCEDURE — 81003 URINALYSIS AUTO W/O SCOPE: CPT

## 2020-07-13 PROCEDURE — 85060 PATHOLOGIST REVIEW: ICD-10-PCS | Mod: ,,, | Performed by: PATHOLOGY

## 2020-07-13 PROCEDURE — 80197 ASSAY OF TACROLIMUS: CPT

## 2020-07-13 PROCEDURE — 36415 COLL VENOUS BLD VENIPUNCTURE: CPT

## 2020-07-13 PROCEDURE — 85027 COMPLETE CBC AUTOMATED: CPT

## 2020-07-14 LAB
HCV RNA SERPL NAA+PROBE-LOG IU: 7.62 LOG (10) IU/ML
HCV RNA SERPL NAA+PROBE-LOG IU: 7.72 LOG (10) IU/ML
HCV RNA SERPL QL NAA+PROBE: DETECTED IU/ML
HCV RNA SERPL QL NAA+PROBE: DETECTED IU/ML
HCV RNA SPEC NAA+PROBE-ACNC: ABNORMAL IU/ML
HCV RNA SPEC NAA+PROBE-ACNC: ABNORMAL IU/ML
PATH REV BLD -IMP: NORMAL
PATH REV BLD -IMP: NORMAL
TACROLIMUS BLD-MCNC: 8.2 NG/ML (ref 5–15)

## 2020-07-16 LAB
HCV RNA SERPL NAA+PROBE-LOG IU: 7.47 LOG (10) IU/ML
HCV RNA SERPL QL NAA+PROBE: DETECTED IU/ML
HCV RNA SPEC NAA+PROBE-ACNC: ABNORMAL IU/ML

## 2020-07-27 ENCOUNTER — LAB VISIT (OUTPATIENT)
Dept: LAB | Facility: HOSPITAL | Age: 70
End: 2020-07-27
Attending: TRANSPLANT SURGERY
Payer: MEDICARE

## 2020-07-27 DIAGNOSIS — Z94.0 KIDNEY REPLACED BY TRANSPLANT: Primary | ICD-10-CM

## 2020-07-27 DIAGNOSIS — B19.20 UNSPECIFIED VIRAL HEPATITIS C WITHOUT HEPATIC COMA: ICD-10-CM

## 2020-07-27 LAB
ALBUMIN SERPL BCP-MCNC: 3 G/DL (ref 3.5–5.2)
ALP SERPL-CCNC: 119 U/L (ref 55–135)
ALT SERPL W/O P-5'-P-CCNC: 18 U/L (ref 10–44)
ANION GAP SERPL CALC-SCNC: 9 MMOL/L (ref 8–16)
ANISOCYTOSIS BLD QL SMEAR: SLIGHT
AST SERPL-CCNC: 16 U/L (ref 10–40)
BACTERIA #/AREA URNS HPF: ABNORMAL /HPF
BASOPHILS NFR BLD: 0 % (ref 0–1.9)
BILIRUB SERPL-MCNC: 0.6 MG/DL (ref 0.1–1)
BILIRUB UR QL STRIP: NEGATIVE
BUN SERPL-MCNC: 24 MG/DL (ref 8–23)
CALCIUM SERPL-MCNC: 8.5 MG/DL (ref 8.7–10.5)
CHLORIDE SERPL-SCNC: 109 MMOL/L (ref 95–110)
CLARITY UR: CLEAR
CO2 SERPL-SCNC: 22 MMOL/L (ref 23–29)
COLOR UR: YELLOW
CREAT SERPL-MCNC: 2.5 MG/DL (ref 0.5–1.4)
DACRYOCYTES BLD QL SMEAR: ABNORMAL
DIFFERENTIAL METHOD: ABNORMAL
EOSINOPHIL NFR BLD: 0 % (ref 0–8)
ERYTHROCYTE [DISTWIDTH] IN BLOOD BY AUTOMATED COUNT: 15.9 % (ref 11.5–14.5)
EST. GFR  (AFRICAN AMERICAN): 28.8 ML/MIN/1.73 M^2
EST. GFR  (NON AFRICAN AMERICAN): 24.9 ML/MIN/1.73 M^2
GIANT PLATELETS BLD QL SMEAR: PRESENT
GLUCOSE SERPL-MCNC: 148 MG/DL (ref 70–110)
GLUCOSE UR QL STRIP: ABNORMAL
HCT VFR BLD AUTO: 26.3 % (ref 40–54)
HGB BLD-MCNC: 7.9 G/DL (ref 14–18)
HGB UR QL STRIP: NEGATIVE
HYALINE CASTS #/AREA URNS LPF: ABNORMAL /LPF
IMM GRANULOCYTES # BLD AUTO: ABNORMAL K/UL (ref 0–0.04)
IMM GRANULOCYTES NFR BLD AUTO: ABNORMAL % (ref 0–0.5)
KETONES UR QL STRIP: NEGATIVE
LEUKOCYTE ESTERASE UR QL STRIP: NEGATIVE
LYMPHOCYTES NFR BLD: 12 % (ref 18–48)
MAGNESIUM SERPL-MCNC: 1.4 MG/DL (ref 1.6–2.6)
MCH RBC QN AUTO: 29.7 PG (ref 27–31)
MCHC RBC AUTO-ENTMCNC: 30 G/DL (ref 32–36)
MCV RBC AUTO: 99 FL (ref 82–98)
MICROSCOPIC COMMENT: ABNORMAL
MONOCYTES NFR BLD: 5 % (ref 4–15)
NEUTROPHILS NFR BLD: 66 % (ref 38–73)
NEUTS BAND NFR BLD MANUAL: 17 %
NITRITE UR QL STRIP: NEGATIVE
NON-SQ EPI CELLS #/AREA URNS HPF: 1 /HPF
NRBC BLD-RTO: 1 /100 WBC
OVALOCYTES BLD QL SMEAR: ABNORMAL
PH UR STRIP: 7 [PH] (ref 5–8)
PHOSPHATE SERPL-MCNC: 2.1 MG/DL (ref 2.7–4.5)
PLATELET # BLD AUTO: 187 K/UL (ref 150–350)
PLATELET BLD QL SMEAR: ABNORMAL
PMV BLD AUTO: 10.5 FL (ref 9.2–12.9)
POIKILOCYTOSIS BLD QL SMEAR: SLIGHT
POLYCHROMASIA BLD QL SMEAR: ABNORMAL
POTASSIUM SERPL-SCNC: 3.6 MMOL/L (ref 3.5–5.1)
PROT SERPL-MCNC: 5.7 G/DL (ref 6–8.4)
PROT UR QL STRIP: ABNORMAL
RBC # BLD AUTO: 2.66 M/UL (ref 4.6–6.2)
RBC #/AREA URNS HPF: 2 /HPF (ref 0–4)
SODIUM SERPL-SCNC: 140 MMOL/L (ref 136–145)
SP GR UR STRIP: 1.01 (ref 1–1.03)
SQUAMOUS #/AREA URNS HPF: 1 /HPF
URN SPEC COLLECT METH UR: ABNORMAL
UROBILINOGEN UR STRIP-ACNC: NEGATIVE EU/DL
WBC # BLD AUTO: 5.3 K/UL (ref 3.9–12.7)
WBC #/AREA URNS HPF: 4 /HPF (ref 0–5)
YEAST URNS QL MICRO: ABNORMAL

## 2020-07-27 PROCEDURE — 83735 ASSAY OF MAGNESIUM: CPT

## 2020-07-27 PROCEDURE — 85027 COMPLETE CBC AUTOMATED: CPT

## 2020-07-27 PROCEDURE — 87522 HEPATITIS C REVRS TRNSCRPJ: CPT

## 2020-07-27 PROCEDURE — 81000 URINALYSIS NONAUTO W/SCOPE: CPT

## 2020-07-27 PROCEDURE — 36415 COLL VENOUS BLD VENIPUNCTURE: CPT

## 2020-07-27 PROCEDURE — 85007 BL SMEAR W/DIFF WBC COUNT: CPT

## 2020-07-27 PROCEDURE — 80053 COMPREHEN METABOLIC PANEL: CPT

## 2020-07-27 PROCEDURE — 80197 ASSAY OF TACROLIMUS: CPT

## 2020-07-27 PROCEDURE — 84100 ASSAY OF PHOSPHORUS: CPT

## 2020-07-28 LAB — TACROLIMUS BLD-MCNC: 7.6 NG/ML (ref 5–15)

## 2020-07-30 LAB
HCV RNA SERPL NAA+PROBE-LOG IU: 7.72 LOG (10) IU/ML
HCV RNA SERPL QL NAA+PROBE: DETECTED IU/ML
HCV RNA SPEC NAA+PROBE-ACNC: ABNORMAL IU/ML

## 2020-07-31 ENCOUNTER — OFFICE VISIT (OUTPATIENT)
Dept: PODIATRY | Facility: CLINIC | Age: 70
End: 2020-07-31
Payer: MEDICARE

## 2020-07-31 VITALS
SYSTOLIC BLOOD PRESSURE: 117 MMHG | WEIGHT: 199 LBS | BODY MASS INDEX: 31.23 KG/M2 | HEART RATE: 80 BPM | DIASTOLIC BLOOD PRESSURE: 59 MMHG | TEMPERATURE: 98 F | HEIGHT: 67 IN

## 2020-07-31 DIAGNOSIS — M79.2 CHRONIC PERIPHERAL NEUROPATHIC PAIN: ICD-10-CM

## 2020-07-31 DIAGNOSIS — B35.1 ONYCHOMYCOSIS DUE TO DERMATOPHYTE: ICD-10-CM

## 2020-07-31 DIAGNOSIS — E11.49 TYPE II DIABETES MELLITUS WITH NEUROLOGICAL MANIFESTATIONS: ICD-10-CM

## 2020-07-31 DIAGNOSIS — G89.29 CHRONIC PERIPHERAL NEUROPATHIC PAIN: ICD-10-CM

## 2020-07-31 DIAGNOSIS — L97.511 SKIN ULCER OF RIGHT FOOT, LIMITED TO BREAKDOWN OF SKIN: Primary | ICD-10-CM

## 2020-07-31 PROCEDURE — 87070 CULTURE OTHR SPECIMN AEROBIC: CPT

## 2020-07-31 PROCEDURE — 87186 SC STD MICRODIL/AGAR DIL: CPT

## 2020-07-31 PROCEDURE — 87077 CULTURE AEROBIC IDENTIFY: CPT

## 2020-07-31 PROCEDURE — 99999 PR PBB SHADOW E&M-EST. PATIENT-LVL V: ICD-10-PCS | Mod: PBBFAC,,, | Performed by: PODIATRIST

## 2020-07-31 PROCEDURE — 99214 PR OFFICE/OUTPT VISIT, EST, LEVL IV, 30-39 MIN: ICD-10-PCS | Mod: S$PBB,,, | Performed by: PODIATRIST

## 2020-07-31 PROCEDURE — 99215 OFFICE O/P EST HI 40 MIN: CPT | Mod: PBBFAC | Performed by: PODIATRIST

## 2020-07-31 PROCEDURE — 99214 OFFICE O/P EST MOD 30 MIN: CPT | Mod: S$PBB,,, | Performed by: PODIATRIST

## 2020-07-31 PROCEDURE — 99999 PR PBB SHADOW E&M-EST. PATIENT-LVL V: CPT | Mod: PBBFAC,,, | Performed by: PODIATRIST

## 2020-07-31 RX ORDER — NYSTATIN 100000 [USP'U]/ML
SUSPENSION ORAL
COMMUNITY
Start: 2020-07-25 | End: 2021-02-12 | Stop reason: ALTCHOICE

## 2020-07-31 RX ORDER — SODIUM BICARBONATE 650 MG/1
650 TABLET ORAL 2 TIMES DAILY
COMMUNITY
Start: 2020-07-07 | End: 2022-03-27 | Stop reason: SDUPTHER

## 2020-07-31 RX ORDER — PANTOPRAZOLE SODIUM 20 MG/1
TABLET, DELAYED RELEASE ORAL
COMMUNITY
Start: 2020-07-25 | End: 2020-09-10

## 2020-07-31 RX ORDER — SODIUM POLYSTYRENE SULFONATE 15 G/60ML
SUSPENSION ORAL; RECTAL
COMMUNITY
Start: 2020-06-12 | End: 2023-04-13

## 2020-07-31 RX ORDER — LINEZOLID 600 MG/1
TABLET, FILM COATED ORAL
COMMUNITY
Start: 2020-07-25 | End: 2020-09-10

## 2020-07-31 RX ORDER — SUCRALFATE 1 G/10ML
SUSPENSION ORAL
COMMUNITY
Start: 2020-07-02 | End: 2020-09-10

## 2020-08-03 ENCOUNTER — LAB VISIT (OUTPATIENT)
Dept: LAB | Facility: HOSPITAL | Age: 70
End: 2020-08-03
Attending: TRANSPLANT SURGERY
Payer: MEDICARE

## 2020-08-03 DIAGNOSIS — Z94.0 KIDNEY REPLACED BY TRANSPLANT: Primary | ICD-10-CM

## 2020-08-03 LAB
ALBUMIN SERPL BCP-MCNC: 3.2 G/DL (ref 3.5–5.2)
ALBUMIN/CREAT UR: 71.8 UG/MG (ref 0–30)
ALP SERPL-CCNC: 117 U/L (ref 55–135)
ALT SERPL W/O P-5'-P-CCNC: 25 U/L (ref 10–44)
ANION GAP SERPL CALC-SCNC: 7 MMOL/L (ref 8–16)
AST SERPL-CCNC: 18 U/L (ref 10–40)
BACTERIA SPEC AEROBE CULT: ABNORMAL
BASOPHILS # BLD AUTO: 0.03 K/UL (ref 0–0.2)
BASOPHILS NFR BLD: 0.8 % (ref 0–1.9)
BILIRUB SERPL-MCNC: 0.4 MG/DL (ref 0.1–1)
BILIRUB UR QL STRIP: NEGATIVE
BUN SERPL-MCNC: 32 MG/DL (ref 8–23)
CALCIUM SERPL-MCNC: 9.6 MG/DL (ref 8.7–10.5)
CHLORIDE SERPL-SCNC: 112 MMOL/L (ref 95–110)
CLARITY UR: CLEAR
CO2 SERPL-SCNC: 20 MMOL/L (ref 23–29)
COLOR UR: YELLOW
CREAT SERPL-MCNC: 2.4 MG/DL (ref 0.5–1.4)
CREAT UR-MCNC: 62.7 MG/DL (ref 23–375)
CREAT UR-MCNC: 62.7 MG/DL (ref 23–375)
DIFFERENTIAL METHOD: ABNORMAL
EOSINOPHIL # BLD AUTO: 0 K/UL (ref 0–0.5)
EOSINOPHIL NFR BLD: 0.6 % (ref 0–8)
ERYTHROCYTE [DISTWIDTH] IN BLOOD BY AUTOMATED COUNT: 16.2 % (ref 11.5–14.5)
EST. GFR  (AFRICAN AMERICAN): 30.2 ML/MIN/1.73 M^2
EST. GFR  (NON AFRICAN AMERICAN): 26.2 ML/MIN/1.73 M^2
GLUCOSE SERPL-MCNC: 138 MG/DL (ref 70–110)
GLUCOSE UR QL STRIP: ABNORMAL
HCT VFR BLD AUTO: 24.7 % (ref 40–54)
HGB BLD-MCNC: 7.6 G/DL (ref 14–18)
HGB UR QL STRIP: NEGATIVE
IMM GRANULOCYTES # BLD AUTO: 0.03 K/UL (ref 0–0.04)
IMM GRANULOCYTES NFR BLD AUTO: 0.8 % (ref 0–0.5)
KETONES UR QL STRIP: NEGATIVE
LEUKOCYTE ESTERASE UR QL STRIP: NEGATIVE
LYMPHOCYTES # BLD AUTO: 0.3 K/UL (ref 1–4.8)
LYMPHOCYTES NFR BLD: 9.3 % (ref 18–48)
MAGNESIUM SERPL-MCNC: 1.4 MG/DL (ref 1.6–2.6)
MCH RBC QN AUTO: 30.2 PG (ref 27–31)
MCHC RBC AUTO-ENTMCNC: 30.8 G/DL (ref 32–36)
MCV RBC AUTO: 98 FL (ref 82–98)
MICROALBUMIN UR DL<=1MG/L-MCNC: 45 UG/ML
MONOCYTES # BLD AUTO: 0.3 K/UL (ref 0.3–1)
MONOCYTES NFR BLD: 7 % (ref 4–15)
NEUTROPHILS # BLD AUTO: 2.9 K/UL (ref 1.8–7.7)
NEUTROPHILS NFR BLD: 81.5 % (ref 38–73)
NITRITE UR QL STRIP: NEGATIVE
NRBC BLD-RTO: 1 /100 WBC
PH UR STRIP: 7 [PH] (ref 5–8)
PHOSPHATE SERPL-MCNC: 2.2 MG/DL (ref 2.7–4.5)
PLATELET # BLD AUTO: 202 K/UL (ref 150–350)
PMV BLD AUTO: 10.3 FL (ref 9.2–12.9)
POTASSIUM SERPL-SCNC: 5.1 MMOL/L (ref 3.5–5.1)
PROT SERPL-MCNC: 5.9 G/DL (ref 6–8.4)
PROT UR QL STRIP: ABNORMAL
PROT UR-MCNC: 32 MG/DL (ref 0–15)
PROT/CREAT UR: 0.51 MG/G{CREAT} (ref 0–0.2)
RBC # BLD AUTO: 2.52 M/UL (ref 4.6–6.2)
SODIUM SERPL-SCNC: 139 MMOL/L (ref 136–145)
SP GR UR STRIP: 1.01 (ref 1–1.03)
TACROLIMUS BLD-MCNC: 5.9 NG/ML (ref 5–15)
URN SPEC COLLECT METH UR: ABNORMAL
UROBILINOGEN UR STRIP-ACNC: NEGATIVE EU/DL
WBC # BLD AUTO: 3.56 K/UL (ref 3.9–12.7)

## 2020-08-03 PROCEDURE — 82043 UR ALBUMIN QUANTITATIVE: CPT

## 2020-08-03 PROCEDURE — 80197 ASSAY OF TACROLIMUS: CPT

## 2020-08-03 PROCEDURE — 80053 COMPREHEN METABOLIC PANEL: CPT

## 2020-08-03 PROCEDURE — 84100 ASSAY OF PHOSPHORUS: CPT

## 2020-08-03 PROCEDURE — 85025 COMPLETE CBC W/AUTO DIFF WBC: CPT

## 2020-08-03 PROCEDURE — 81003 URINALYSIS AUTO W/O SCOPE: CPT

## 2020-08-03 PROCEDURE — 82570 ASSAY OF URINE CREATININE: CPT

## 2020-08-03 PROCEDURE — 87799 DETECT AGENT NOS DNA QUANT: CPT

## 2020-08-03 PROCEDURE — 36415 COLL VENOUS BLD VENIPUNCTURE: CPT

## 2020-08-03 PROCEDURE — 83735 ASSAY OF MAGNESIUM: CPT

## 2020-08-03 NOTE — PROGRESS NOTES
"Subjective:       Patient ID: Olu Gant Jr. is a 70 y.o. male.    Chief Complaint: Diabetes Mellitus, Follow-up, and Foot Ulcer  Patient presents after his wife had come into the office enquiring the patient could be seen today due to a nonhealing ulcer on the right foot.   Patient feels this was caused by a tight hospital sock/ footie. He was hospitalized since our last visit 2 months ago.   His wife has been keeping the area clean and dry. Had kidney transplant at [facility] 5/4.  Has history of well-controlled type 2 diabetes, no previous ft sore/ulcers or infections        Past Medical History:   Diagnosis Date    Asbestos exposure - 1973 2/18/2014    Benign hypertension with ESRD (end-stage renal disease) 2/18/2014    Diabetes type 2 - since 1996 2/18/2014    DIALYSIS 2013    ESRD (end stage renal disease) - initiated dialysis 05/29/2013 2/18/2014    Gout, arthritis 2/18/2014    Hypothyroidism 2/18/2014    Irregular heart rhythm - unsure of Afib vs. A flutter 2/18/2014    Kidney transplanted 05/04/2020    Obesity 2/18/2014    Secondary hyperparathyroidism, renal 2/18/2014    Sleep apnea on Bipap 2/18/2014     Past Surgical History:   Procedure Laterality Date    AV FISTULA PLACEMENT      COLON SURGERY  02/2017    resection for "ischemic colon"    INGUINAL HERNIA REPAIR      bilaterally    pace maker      [email or web address]    TONSILLECTOMY           Current Outpatient Medications   Medication Sig Dispense Refill    allopurinoL (ZYLOPRIM) 100 MG tablet Take 1 tablet (100 mg total) by mouth once daily. 90 tablet 3    amLODIPine (NORVASC) 5 MG tablet Take 5 mg by mouth 2 (two) times daily.      aspirin (ECOTRIN) 81 MG EC tablet Take 81 mg by mouth once daily.      calcium acetate,phosphat bind, (PHOSLO) 667 mg capsule       diclofenac sodium (VOLTAREN) 1 % Gel Apply very small amount to left foot once to twice daily as needed for pain 100 g 1    docusate sodium (COLACE) 100 MG capsule TAKE 1 " CAPSULE BY MOUTH EVERY DAY FOR CONSTIPATION      ergocalciferol (ERGOCALCIFEROL) 50,000 unit Cap Take 50,000 Units by mouth every 30 days.       famotidine (PEPCID) 20 MG tablet Take 20 mg by mouth once daily.      fluconazole (DIFLUCAN) 100 MG tablet Take 100 mg by mouth once daily.      furosemide (LASIX) 40 MG tablet Take 40 mg by mouth once daily.      insulin detemir U-100 (LEVEMIR FLEXTOUCH U-100 INSULN) 100 unit/mL (3 mL) SubQ InPn pen Inject 30 Units into the skin every evening. (Patient taking differently: Inject 20 Units into the skin 2 (two) times a day. ) 27 mL 3    levothyroxine (SYNTHROID) 100 MCG tablet Take 1 tablet (100 mcg total) by mouth once daily. 90 tablet 3    linezolid (ZYVOX) 600 mg Tab       multivitamin (THERAGRAN) per tablet Take 1 tablet by mouth once daily.      multivitamin Tab TAKE 1 TABLET BY MOUTH EVERY DAY      mycophenolate (MYFORTIC) 360 MG TbEC Take 720 mg by mouth 2 (two) times daily.      nitroGLYCERIN (NITROSTAT) 0.4 MG SL tablet Place 0.4 mg under the tongue every 5 (five) minutes as needed for Chest pain.      NOVOLOG U-100 INSULIN ASPART 100 unit/mL injection Inject 14-20 Units into the skin as needed.   0    nystatin (MYCOSTATIN) 100,000 unit/mL suspension TAKE 5 ML BY MOUTH THREE TIMES DAILY      ondansetron (ZOFRAN-ODT) 4 MG TbDL       ONETOUCH VERIO Strp USE 1 STRIP TO CHECK GLUCOSE 3 TO 4 TIMES DAILY AS DIRECTED  0    oxyCODONE-acetaminophen (PERCOCET) 5-325 mg per tablet       pantoprazole (PROTONIX) 20 MG tablet       polyethylene glycol (GLYCOLAX) 17 gram/dose powder MIX 1 CAPFUL (17 GRAMS) IN LIQUID AND DRINK ONCE DAILY AS DIRECTED FOR CONSTIPATION      predniSONE (DELTASONE) 5 MG tablet Take 15 mg by mouth once daily.      pregabalin (LYRICA) 75 MG capsule Take 1 capsule (75 mg total) by mouth every evening. 90 capsule 1    rosuvastatin (CRESTOR) 20 MG tablet Take 20 mg by mouth once daily.      sodium bicarbonate 650 MG tablet Take 650 mg  "by mouth 2 (two) times daily.      sodium polystyrene (KAYEXALATE) 15 gram/60 mL Susp SHAKE LQ AND TK 60 ML PO NOW. REPEAT IN 6 H PO      SPS, WITH SORBITOL, 15-20 gram/60 mL Susp take 60 ML BY MOUTH as 1 DOSE now. REPEAT in 6 hours.      sucralfate (CARAFATE) 100 mg/mL suspension TAKE 1 GM BY MOUTH TWICE DAILY AT 10 00 AM AND 4 00 PM. PLEASE TAKE 2 HOURS BEFORE OR AFTER IMMUNOSUPPRESION      sulfamethoxazole-trimethoprim 800-160mg (BACTRIM DS) 800-160 mg Tab Take 1 tablet by mouth once daily. Monday , Wednesday, Friday      tacrolimus XR, ENVARSUS, (TACROLIMUS XR, ENVARSUS, 1 MG ORAL TB24) 1 mg Tb24 Take 3 mg by mouth.      valGANciclovir (VALCYTE) 450 mg Tab Take 450 mg by mouth once daily. One pill on Monday and Thursday      venlafaxine (EFFEXOR) 37.5 MG Tab Take 1/2 tablet by mouth once daily at bedtime 45 tablet 1     No current facility-administered medications for this visit.      Review of patient's allergies indicates:  No Known Allergies    Review of Systems   Constitutional: Negative for fever.   HENT: Negative for congestion.    Respiratory: Negative for cough and shortness of breath.    Cardiovascular: Negative for leg swelling.   Endocrine:        KIDNEY TRANSPLANT 5/4   Musculoskeletal: Positive for gait problem.        Walker   Skin: Positive for wound.   Neurological: Positive for numbness.   All other systems reviewed and are negative.      Objective:      Vitals:    07/31/20 1057   BP: (!) 117/59   Pulse: 80   Temp: 97.8 °F (36.6 °C)   Weight: 90.3 kg (199 lb)   Height: 5' 7" (1.702 m)     Physical Exam  Vitals signs and nursing note reviewed.   Constitutional:       General: He is not in acute distress.     Appearance: He is well-developed.   Cardiovascular:      Pulses:           Dorsalis pedis pulses are 1+ on the right side and 1+ on the left side.        Posterior tibial pulses are 1+ on the right side and 1+ on the left side.   Pulmonary:      Effort: Pulmonary effort is normal. "   Musculoskeletal:      Right foot: Decreased range of motion.      Left foot: Decreased range of motion.   Feet:      Right foot:      Protective Sensation: 4 sites tested. 3 sites sensed.      Skin integrity: Ulcer present. No erythema or warmth.      Toenail Condition: Fungal disease present.     Left foot:      Protective Sensation: 4 sites tested. 3 sites sensed.      Skin integrity: No erythema or warmth.      Toenail Condition: Fungal disease present.  Skin:     General: Skin is dry.      Capillary Refill: Capillary refill takes 2 to 3 seconds.   Neurological:      General: No focal deficit present.     Vascular   Normal CFT bilateral   No lower extremity edema bilateral  Pedal skin temperature is warm, discolored skin lower extremities bilateral    Integumentary   Ulcer approximately 4 cm diameter, scab is lifted off the proximal aspect of the wound with some granular and fibrous tissue.  Patient does have sensation intact in this region and wound is painful.  There is thick clear drainage in suspected Pseudomonas.  Upon debridement no tracking or undermining.   Wound localized erythema, no calor, superficial some peeling of surrounding skin.  Dystrophic, onychomycosis.  Bilateral hallux most severe with damage to nail bed, thickness reduced, no evidence of ingrown nail or subungual abscess       Neurological   Gross sensation diminished bilateral digits, intact area of ulcer right foot     Musculoskeletal   Muscle Strength and Tone:  poor    Joints, Bones, and Muscles: pes planus   Difficulty walking, antalgic gait, uses cane  Limited mobility, can not reach, treat or inspect feet  Presents in appropriate tennis shoes                 Assessment:       1. Skin ulcer of right foot, limited to breakdown of skin    2. Type II diabetes mellitus with neurological manifestations    3. Chronic peripheral neuropathic pain    4. Onychomycosis due to dermatophyte        Plan:         C&S WOUND/ULCER RIGHT  FOOT    Advised patient concern regarding infection, most likely due to moisture.  We discussed the need to culture the area and advised patient we will contact him with the results if he needs to start an oral antibiotic.  Wound Care today included debridement of ulcer, cleanse with saline, silver alginate well-padded dressing  Dispensed samples to patient of silver alginate, apply daily to keep the area dry  Explained to patient if the dressing id hears to the wound spray, dispensed sample today, to loosen up the bandage  Explained it is crucial he keep the area clean and dry and absolutely must use a water tight bag over the area to prevent any moisture from getting into the wound when showering/ bathing  Advised patient he is at risk for complications with his history of diabetes  Reviewed maintenance of nails, thickness, potential complications  Instructed patient have wife check feet daily and contact the office immediately with any changes  Patient was in understanding and agreement with treatment plan  Counseled the patient on his conditions, their implications and medical management.  Instructed patient to contact the office with any changes, questions, concerns, worsening of symptoms. Patient verbalized understanding.   Total face to face time, exam, assessment, treatment, discussion, documentation 25 minutes, more than half this time spent on consultation and coordination of care.   Follow up 1 week      This note was created using [employer] voice recognition software that occasionally misinterpreted phrases or words.

## 2020-08-04 ENCOUNTER — TELEPHONE (OUTPATIENT)
Dept: PODIATRY | Facility: CLINIC | Age: 70
End: 2020-08-04

## 2020-08-04 NOTE — TELEPHONE ENCOUNTER
Spoke with Sofia Hubbard from [facility] Transplant Coordinator.  Sofia stated we can call and they will let us know if medications are approved for his condition then doctor treating would prescribe and treat pt. Notified Dr. Anuradha Trujillo.

## 2020-08-04 NOTE — TELEPHONE ENCOUNTER
NATHANIELM for Sofia Hubbard transplant coordinator at [facility].  Dr. Anuradha Trujillo stated Doxy will not be effective for this culture, Cipro would be but due to his condition would be to harsh. She recommended (Invanz) Ertapenem. Instructed will continue to try and fax culture results and to please get back with us regarding the new antibiotic choice.

## 2020-08-04 NOTE — TELEPHONE ENCOUNTER
Spoke with patient he stated the wound to the right foot is much improved. Pt placed his wife on phone regarding culture results. All instructions reviewed and she verbalized understanding. She stated they are on the way to his kidney doctor right now and will find out if he is able to view in Epic or try portal. Instructed any problems to call office with information so we may contact kidney doctors nurse so that proper TX can be completed.

## 2020-08-04 NOTE — PROGRESS NOTES
Please call patient and ask how wound right foot is looking.  Advised him it did grow bacteria. If his nephrologist/ kidney transplant doctor is in epic they can see results or he can get it through the portal.  Advise patient to contact them so they can look at culture results and put him on appropriate antibiotic if necessary. Thanks

## 2020-08-04 NOTE — TELEPHONE ENCOUNTER
----- Message from Anuradha Trujillo DPM sent at 8/4/2020  8:41 AM CDT -----  Please call patient and ask how wound right foot is looking.  Advised him it did grow bacteria. If his nephrologist/ kidney transplant doctor is in epic they can see results or he can get it through the portal.  Advise patient to contact them so they can look at culture results and put him on appropriate antibiotic if necessary. Thanks

## 2020-08-04 NOTE — TELEPHONE ENCOUNTER
Spoke with Sofia she stated the results of the culture was received and she has spoke with one of the physicians regarding results. Patient is scheduled to be seen about this issue and culture results on 8/13/2021 for evaluation and treatment.

## 2020-08-04 NOTE — TELEPHONE ENCOUNTER
Wife stated they are seeing another provider today for kidney issues but did want us to reach out to his transplant coordinator Sofia Hubbard at [facility]  Phone number [phone]   Fax number [phone]    Left detailed voice message regarding pt and faxed culture results for review due to positive growth. (pt stated all treatments are to be prescribed by kidney doctors due to his condition)    LVM instructing Sofia to please call office so we are sure they received the lab results. Contact number provided.

## 2020-08-06 ENCOUNTER — LAB VISIT (OUTPATIENT)
Dept: LAB | Facility: HOSPITAL | Age: 70
End: 2020-08-06
Attending: TRANSPLANT SURGERY
Payer: MEDICARE

## 2020-08-06 DIAGNOSIS — Z94.0 KIDNEY REPLACED BY TRANSPLANT: Primary | ICD-10-CM

## 2020-08-06 LAB
ALBUMIN SERPL BCP-MCNC: 3.4 G/DL (ref 3.5–5.2)
ALP SERPL-CCNC: 133 U/L (ref 55–135)
ALT SERPL W/O P-5'-P-CCNC: 26 U/L (ref 10–44)
ANION GAP SERPL CALC-SCNC: 9 MMOL/L (ref 8–16)
AST SERPL-CCNC: 20 U/L (ref 10–40)
BASOPHILS # BLD AUTO: 0.03 K/UL (ref 0–0.2)
BASOPHILS NFR BLD: 1.1 % (ref 0–1.9)
BILIRUB SERPL-MCNC: 0.1 MG/DL (ref 0.1–1)
BKV DNA SERPL NAA+PROBE-ACNC: 461 COPIES/ML
BKV DNA SERPL NAA+PROBE-LOG#: 2.66 LOG (10) COPIES/ML
BKV DNA SERPL QL NAA+PROBE: DETECTED
BUN SERPL-MCNC: 42 MG/DL (ref 8–23)
CALCIUM SERPL-MCNC: 9.7 MG/DL (ref 8.7–10.5)
CHLORIDE SERPL-SCNC: 105 MMOL/L (ref 95–110)
CO2 SERPL-SCNC: 22 MMOL/L (ref 23–29)
CREAT SERPL-MCNC: 2.7 MG/DL (ref 0.5–1.4)
DIFFERENTIAL METHOD: ABNORMAL
EOSINOPHIL # BLD AUTO: 0.1 K/UL (ref 0–0.5)
EOSINOPHIL NFR BLD: 2.5 % (ref 0–8)
ERYTHROCYTE [DISTWIDTH] IN BLOOD BY AUTOMATED COUNT: 15.4 % (ref 11.5–14.5)
EST. GFR  (AFRICAN AMERICAN): 26.2 ML/MIN/1.73 M^2
EST. GFR  (NON AFRICAN AMERICAN): 22.7 ML/MIN/1.73 M^2
GLUCOSE SERPL-MCNC: 172 MG/DL (ref 70–110)
HCT VFR BLD AUTO: 22.9 % (ref 40–54)
HGB BLD-MCNC: 7.3 G/DL (ref 14–18)
IMM GRANULOCYTES # BLD AUTO: 0.02 K/UL (ref 0–0.04)
IMM GRANULOCYTES NFR BLD AUTO: 0.7 % (ref 0–0.5)
LYMPHOCYTES # BLD AUTO: 0.2 K/UL (ref 1–4.8)
LYMPHOCYTES NFR BLD: 8.1 % (ref 18–48)
MAGNESIUM SERPL-MCNC: 1.3 MG/DL (ref 1.6–2.6)
MCH RBC QN AUTO: 30.2 PG (ref 27–31)
MCHC RBC AUTO-ENTMCNC: 31.9 G/DL (ref 32–36)
MCV RBC AUTO: 95 FL (ref 82–98)
MONOCYTES # BLD AUTO: 0.4 K/UL (ref 0.3–1)
MONOCYTES NFR BLD: 13 % (ref 4–15)
NEUTROPHILS # BLD AUTO: 2.1 K/UL (ref 1.8–7.7)
NEUTROPHILS NFR BLD: 74.6 % (ref 38–73)
NRBC BLD-RTO: 1 /100 WBC
PHOSPHATE SERPL-MCNC: 1.8 MG/DL (ref 2.7–4.5)
PLATELET # BLD AUTO: 132 K/UL (ref 150–350)
PMV BLD AUTO: 10.5 FL (ref 9.2–12.9)
POTASSIUM SERPL-SCNC: 5.2 MMOL/L (ref 3.5–5.1)
PROT SERPL-MCNC: 6.2 G/DL (ref 6–8.4)
RBC # BLD AUTO: 2.42 M/UL (ref 4.6–6.2)
SODIUM SERPL-SCNC: 136 MMOL/L (ref 136–145)
WBC # BLD AUTO: 2.84 K/UL (ref 3.9–12.7)

## 2020-08-06 PROCEDURE — 80053 COMPREHEN METABOLIC PANEL: CPT

## 2020-08-06 PROCEDURE — 85025 COMPLETE CBC W/AUTO DIFF WBC: CPT

## 2020-08-06 PROCEDURE — 87799 DETECT AGENT NOS DNA QUANT: CPT

## 2020-08-06 PROCEDURE — 80197 ASSAY OF TACROLIMUS: CPT

## 2020-08-06 PROCEDURE — 84100 ASSAY OF PHOSPHORUS: CPT

## 2020-08-06 PROCEDURE — 83735 ASSAY OF MAGNESIUM: CPT

## 2020-08-07 ENCOUNTER — TELEPHONE (OUTPATIENT)
Dept: PODIATRY | Facility: CLINIC | Age: 70
End: 2020-08-07

## 2020-08-07 LAB — TACROLIMUS BLD-MCNC: 5.2 NG/ML (ref 5–15)

## 2020-08-07 NOTE — TELEPHONE ENCOUNTER
----- Message from Peter Stewart sent at 8/7/2020 10:06 AM CDT -----  Regarding: MISSED APPOINTMENT TODAY  PLEASE CALL ALEX LITTLE'S WIFE.  PATIENT'S PHYSICAL THERAPIST CAME TO THE HOUSE CONSEQUENTLY MISSED HIS APPOINTMENT TODAY.  PLEASE CALL WIFE @[phone]

## 2020-08-10 ENCOUNTER — LAB VISIT (OUTPATIENT)
Dept: LAB | Facility: HOSPITAL | Age: 70
End: 2020-08-10
Attending: TRANSPLANT SURGERY
Payer: MEDICARE

## 2020-08-10 DIAGNOSIS — Z94.0 KIDNEY REPLACED BY TRANSPLANT: ICD-10-CM

## 2020-08-10 DIAGNOSIS — Z94.0 TRANSPLANTED KIDNEY: Primary | ICD-10-CM

## 2020-08-10 LAB
ALBUMIN SERPL BCP-MCNC: 3.5 G/DL (ref 3.5–5.2)
ALP SERPL-CCNC: 163 U/L (ref 55–135)
ALT SERPL W/O P-5'-P-CCNC: 26 U/L (ref 10–44)
ANION GAP SERPL CALC-SCNC: 10 MMOL/L (ref 8–16)
AST SERPL-CCNC: 16 U/L (ref 10–40)
BASOPHILS # BLD AUTO: ABNORMAL K/UL (ref 0–0.2)
BASOPHILS NFR BLD: 1 % (ref 0–1.9)
BILIRUB SERPL-MCNC: 0.4 MG/DL (ref 0.1–1)
BILIRUB UR QL STRIP: NEGATIVE
BKV DNA SERPL NAA+PROBE-ACNC: 238 COPIES/ML
BKV DNA SERPL NAA+PROBE-LOG#: 2.38 LOG (10) COPIES/ML
BKV DNA SERPL QL NAA+PROBE: DETECTED
BUN SERPL-MCNC: 38 MG/DL (ref 8–23)
CALCIUM SERPL-MCNC: 10 MG/DL (ref 8.7–10.5)
CHLORIDE SERPL-SCNC: 106 MMOL/L (ref 95–110)
CLARITY UR: CLEAR
CO2 SERPL-SCNC: 22 MMOL/L (ref 23–29)
COLOR UR: YELLOW
CREAT SERPL-MCNC: 3.2 MG/DL (ref 0.5–1.4)
CREAT UR-MCNC: 66.2 MG/DL (ref 23–375)
DIFFERENTIAL METHOD: ABNORMAL
EOSINOPHIL # BLD AUTO: ABNORMAL K/UL (ref 0–0.5)
EOSINOPHIL NFR BLD: 1 % (ref 0–8)
ERYTHROCYTE [DISTWIDTH] IN BLOOD BY AUTOMATED COUNT: 15.8 % (ref 11.5–14.5)
EST. GFR  (AFRICAN AMERICAN): 21.4 ML/MIN/1.73 M^2
EST. GFR  (NON AFRICAN AMERICAN): 18.5 ML/MIN/1.73 M^2
GLUCOSE SERPL-MCNC: 255 MG/DL (ref 70–110)
GLUCOSE UR QL STRIP: ABNORMAL
HCT VFR BLD AUTO: 22.8 % (ref 40–54)
HGB BLD-MCNC: 7.2 G/DL (ref 14–18)
HGB UR QL STRIP: NEGATIVE
IMM GRANULOCYTES # BLD AUTO: ABNORMAL K/UL (ref 0–0.04)
IMM GRANULOCYTES NFR BLD AUTO: ABNORMAL % (ref 0–0.5)
KETONES UR QL STRIP: NEGATIVE
LEUKOCYTE ESTERASE UR QL STRIP: NEGATIVE
LYMPHOCYTES # BLD AUTO: ABNORMAL K/UL (ref 1–4.8)
LYMPHOCYTES NFR BLD: 17 % (ref 18–48)
MAGNESIUM SERPL-MCNC: 1.3 MG/DL (ref 1.6–2.6)
MCH RBC QN AUTO: 30.1 PG (ref 27–31)
MCHC RBC AUTO-ENTMCNC: 31.6 G/DL (ref 32–36)
MCV RBC AUTO: 95 FL (ref 82–98)
METAMYELOCYTES NFR BLD MANUAL: 3 %
MONOCYTES # BLD AUTO: ABNORMAL K/UL (ref 0.3–1)
MONOCYTES NFR BLD: 2 % (ref 4–15)
MYELOCYTES NFR BLD MANUAL: 2 %
NEUTROPHILS NFR BLD: 72 % (ref 38–73)
NEUTS BAND NFR BLD MANUAL: 2 %
NITRITE UR QL STRIP: NEGATIVE
NRBC BLD-RTO: 3 /100 WBC
PH UR STRIP: 7 [PH] (ref 5–8)
PHOSPHATE SERPL-MCNC: 2.1 MG/DL (ref 2.7–4.5)
PLATELET # BLD AUTO: 186 K/UL (ref 150–350)
PMV BLD AUTO: 10.6 FL (ref 9.2–12.9)
POTASSIUM SERPL-SCNC: 5.1 MMOL/L (ref 3.5–5.1)
PROT SERPL-MCNC: 5.7 G/DL (ref 6–8.4)
PROT UR QL STRIP: NEGATIVE
PROT UR-MCNC: 22 MG/DL (ref 0–15)
PROT/CREAT UR: 0.33 MG/G{CREAT} (ref 0–0.2)
RBC # BLD AUTO: 2.39 M/UL (ref 4.6–6.2)
SODIUM SERPL-SCNC: 138 MMOL/L (ref 136–145)
SP GR UR STRIP: 1.01 (ref 1–1.03)
URN SPEC COLLECT METH UR: ABNORMAL
UROBILINOGEN UR STRIP-ACNC: NEGATIVE EU/DL
WBC # BLD AUTO: 6.93 K/UL (ref 3.9–12.7)

## 2020-08-10 PROCEDURE — 80197 ASSAY OF TACROLIMUS: CPT

## 2020-08-10 PROCEDURE — 81003 URINALYSIS AUTO W/O SCOPE: CPT

## 2020-08-10 PROCEDURE — 85027 COMPLETE CBC AUTOMATED: CPT

## 2020-08-10 PROCEDURE — 87799 DETECT AGENT NOS DNA QUANT: CPT

## 2020-08-10 PROCEDURE — 84100 ASSAY OF PHOSPHORUS: CPT

## 2020-08-10 PROCEDURE — 36415 COLL VENOUS BLD VENIPUNCTURE: CPT

## 2020-08-10 PROCEDURE — 80053 COMPREHEN METABOLIC PANEL: CPT

## 2020-08-10 PROCEDURE — 83735 ASSAY OF MAGNESIUM: CPT

## 2020-08-10 PROCEDURE — 84156 ASSAY OF PROTEIN URINE: CPT

## 2020-08-10 PROCEDURE — 85007 BL SMEAR W/DIFF WBC COUNT: CPT

## 2020-08-11 LAB — TACROLIMUS BLD-MCNC: 6.5 NG/ML (ref 5–15)

## 2020-08-13 ENCOUNTER — LAB VISIT (OUTPATIENT)
Dept: LAB | Facility: HOSPITAL | Age: 70
End: 2020-08-13
Attending: TRANSPLANT SURGERY
Payer: MEDICARE

## 2020-08-13 DIAGNOSIS — Z94.0 KIDNEY REPLACED BY TRANSPLANT: ICD-10-CM

## 2020-08-13 LAB
ALBUMIN SERPL BCP-MCNC: 3.8 G/DL (ref 3.5–5.2)
ALP SERPL-CCNC: 141 U/L (ref 55–135)
ALT SERPL W/O P-5'-P-CCNC: 20 U/L (ref 10–44)
ANION GAP SERPL CALC-SCNC: 7 MMOL/L (ref 8–16)
AST SERPL-CCNC: 13 U/L (ref 10–40)
BASOPHILS # BLD AUTO: 0.02 K/UL (ref 0–0.2)
BASOPHILS NFR BLD: 0.5 % (ref 0–1.9)
BILIRUB SERPL-MCNC: 0.8 MG/DL (ref 0.1–1)
BILIRUB UR QL STRIP: NEGATIVE
BKV DNA SERPL NAA+PROBE-ACNC: 223 COPIES/ML
BKV DNA SERPL NAA+PROBE-ACNC: 223 COPIES/ML
BKV DNA SERPL NAA+PROBE-LOG#: 2.35 LOG (10) COPIES/ML
BKV DNA SERPL NAA+PROBE-LOG#: 2.35 LOG (10) COPIES/ML
BKV DNA SERPL QL NAA+PROBE: DETECTED
BKV DNA SERPL QL NAA+PROBE: DETECTED
BUN SERPL-MCNC: 40 MG/DL (ref 8–23)
CALCIUM SERPL-MCNC: 10 MG/DL (ref 8.7–10.5)
CHLORIDE SERPL-SCNC: 110 MMOL/L (ref 95–110)
CLARITY UR: CLEAR
CO2 SERPL-SCNC: 21 MMOL/L (ref 23–29)
COLOR UR: YELLOW
CREAT SERPL-MCNC: 3.2 MG/DL (ref 0.5–1.4)
CREAT UR-MCNC: 85.7 MG/DL (ref 23–375)
DIFFERENTIAL METHOD: ABNORMAL
EOSINOPHIL # BLD AUTO: 0.1 K/UL (ref 0–0.5)
EOSINOPHIL NFR BLD: 2.1 % (ref 0–8)
ERYTHROCYTE [DISTWIDTH] IN BLOOD BY AUTOMATED COUNT: 18.6 % (ref 11.5–14.5)
EST. GFR  (AFRICAN AMERICAN): 21.4 ML/MIN/1.73 M^2
EST. GFR  (NON AFRICAN AMERICAN): 18.5 ML/MIN/1.73 M^2
GLUCOSE SERPL-MCNC: 144 MG/DL (ref 70–110)
GLUCOSE UR QL STRIP: ABNORMAL
HCT VFR BLD AUTO: 25.5 % (ref 40–54)
HGB BLD-MCNC: 8.1 G/DL (ref 14–18)
HGB UR QL STRIP: NEGATIVE
IMM GRANULOCYTES # BLD AUTO: 0.16 K/UL (ref 0–0.04)
IMM GRANULOCYTES NFR BLD AUTO: 3.8 % (ref 0–0.5)
KETONES UR QL STRIP: NEGATIVE
LEUKOCYTE ESTERASE UR QL STRIP: NEGATIVE
LYMPHOCYTES # BLD AUTO: 0.5 K/UL (ref 1–4.8)
LYMPHOCYTES NFR BLD: 12.1 % (ref 18–48)
MAGNESIUM SERPL-MCNC: 1.3 MG/DL (ref 1.6–2.6)
MCH RBC QN AUTO: 31.2 PG (ref 27–31)
MCHC RBC AUTO-ENTMCNC: 31.8 G/DL (ref 32–36)
MCV RBC AUTO: 98 FL (ref 82–98)
MONOCYTES # BLD AUTO: 0.3 K/UL (ref 0.3–1)
MONOCYTES NFR BLD: 6.7 % (ref 4–15)
NEUTROPHILS # BLD AUTO: 3.1 K/UL (ref 1.8–7.7)
NEUTROPHILS NFR BLD: 74.8 % (ref 38–73)
NITRITE UR QL STRIP: NEGATIVE
NRBC BLD-RTO: 1 /100 WBC
PH UR STRIP: 7 [PH] (ref 5–8)
PHOSPHATE SERPL-MCNC: 3.1 MG/DL (ref 2.7–4.5)
PLATELET # BLD AUTO: 186 K/UL (ref 150–350)
PMV BLD AUTO: 10.5 FL (ref 9.2–12.9)
POTASSIUM SERPL-SCNC: 4.8 MMOL/L (ref 3.5–5.1)
PROT SERPL-MCNC: 5.8 G/DL (ref 6–8.4)
PROT UR QL STRIP: NEGATIVE
PROT UR-MCNC: 19 MG/DL (ref 0–15)
RBC # BLD AUTO: 2.6 M/UL (ref 4.6–6.2)
SODIUM SERPL-SCNC: 138 MMOL/L (ref 136–145)
SP GR UR STRIP: 1.02 (ref 1–1.03)
URN SPEC COLLECT METH UR: ABNORMAL
UROBILINOGEN UR STRIP-ACNC: NEGATIVE EU/DL
WBC # BLD AUTO: 4.2 K/UL (ref 3.9–12.7)

## 2020-08-13 PROCEDURE — 81003 URINALYSIS AUTO W/O SCOPE: CPT

## 2020-08-13 PROCEDURE — 82570 ASSAY OF URINE CREATININE: CPT

## 2020-08-13 PROCEDURE — 84100 ASSAY OF PHOSPHORUS: CPT

## 2020-08-13 PROCEDURE — 80053 COMPREHEN METABOLIC PANEL: CPT

## 2020-08-13 PROCEDURE — 80197 ASSAY OF TACROLIMUS: CPT

## 2020-08-13 PROCEDURE — 84156 ASSAY OF PROTEIN URINE: CPT

## 2020-08-13 PROCEDURE — 87799 DETECT AGENT NOS DNA QUANT: CPT

## 2020-08-13 PROCEDURE — 36415 COLL VENOUS BLD VENIPUNCTURE: CPT

## 2020-08-13 PROCEDURE — 83735 ASSAY OF MAGNESIUM: CPT

## 2020-08-13 PROCEDURE — 85025 COMPLETE CBC W/AUTO DIFF WBC: CPT

## 2020-08-14 ENCOUNTER — OFFICE VISIT (OUTPATIENT)
Dept: PODIATRY | Facility: CLINIC | Age: 70
End: 2020-08-14
Payer: MEDICARE

## 2020-08-14 VITALS
OXYGEN SATURATION: 99 % | BODY MASS INDEX: 31.23 KG/M2 | WEIGHT: 199 LBS | DIASTOLIC BLOOD PRESSURE: 77 MMHG | TEMPERATURE: 98 F | HEIGHT: 67 IN | HEART RATE: 99 BPM | SYSTOLIC BLOOD PRESSURE: 125 MMHG | RESPIRATION RATE: 19 BRPM

## 2020-08-14 DIAGNOSIS — M79.2 CHRONIC PERIPHERAL NEUROPATHIC PAIN: ICD-10-CM

## 2020-08-14 DIAGNOSIS — L97.511 SKIN ULCER OF RIGHT FOOT, LIMITED TO BREAKDOWN OF SKIN: Primary | ICD-10-CM

## 2020-08-14 DIAGNOSIS — G89.29 CHRONIC PERIPHERAL NEUROPATHIC PAIN: ICD-10-CM

## 2020-08-14 DIAGNOSIS — E11.49 TYPE II DIABETES MELLITUS WITH NEUROLOGICAL MANIFESTATIONS: ICD-10-CM

## 2020-08-14 LAB — TACROLIMUS BLD-MCNC: 6.3 NG/ML (ref 5–15)

## 2020-08-14 PROCEDURE — 99214 PR OFFICE/OUTPT VISIT, EST, LEVL IV, 30-39 MIN: ICD-10-PCS | Mod: S$PBB,,, | Performed by: PODIATRIST

## 2020-08-14 PROCEDURE — 99215 OFFICE O/P EST HI 40 MIN: CPT | Mod: PBBFAC | Performed by: PODIATRIST

## 2020-08-14 PROCEDURE — 99999 PR PBB SHADOW E&M-EST. PATIENT-LVL V: CPT | Mod: PBBFAC,,, | Performed by: PODIATRIST

## 2020-08-14 PROCEDURE — 99999 PR PBB SHADOW E&M-EST. PATIENT-LVL V: ICD-10-PCS | Mod: PBBFAC,,, | Performed by: PODIATRIST

## 2020-08-14 PROCEDURE — 99214 OFFICE O/P EST MOD 30 MIN: CPT | Mod: S$PBB,,, | Performed by: PODIATRIST

## 2020-08-14 NOTE — LETTER
August 16, 2020      MD Liz Ball III[address]  [facility] [place] [address]           [facility]  [address] [facility] [place] [address]  Phone: [phone]  Fax: [phone]          Patient: Olu Gant Jr.   MR Number: [id number]   YOB: 1950   Date of Visit: 8/14/2020       Dear Dr. Brianna Elaine III:    Thank you for referring Olu Gant to me for evaluation. Attached you will find relevant portions of my assessment and plan of care.    If you have questions, please do not hesitate to call me. I look forward to following Olu Gant along with you.    Sincerely,    Anuradha Trujillo, ROSIE    Enclosure  CC:  No Recipients    If you would like to receive this communication electronically, please contact [email or web address] or [phone] to request more information on [employer] Link access.    For providers and/or their staff who would like to refer a patient to [facility], please contact us through our one-stop-shop provider referral line, [facility], at [phone].    If you feel you have received this communication in error or would no longer like to receive these types of communications, please e-mail [email or web address]

## 2020-08-16 NOTE — PROGRESS NOTES
"Subjective:       Patient ID: Olu Gant Jr. is a 70 y.o. male.    Chief Complaint: Follow-up and Wound Check  Patient presents with his wife for follow-up ulcer dorsal right foot, been present for a few weeks.  We did contact patient's nephrologist regarding Pseudomonas infection, culture and sensitivity.  Relates they wanted to avoid oral antibiotics if possible and never prescribed anything for infection which seems to have resolved just using silver alginate in keeping the area clean and dry.  They have not noted any further redness around the ulceration.  Home health comes in once a week and wife changes bandage daily.  Patient states he has been doing very well after kidney transplant [facility] 5/4.  Had dialysis for a short period of time to "wake up" his kidney, was doing blood work 3 times a week and now is going twice a week.  Feeling well today      Past Medical History:   Diagnosis Date    Asbestos exposure - 1973 2/18/2014    Benign hypertension with ESRD (end-stage renal disease) 2/18/2014    Diabetes type 2 - since 1996 2/18/2014    DIALYSIS 2013    ESRD (end stage renal disease) - initiated dialysis 05/29/2013 2/18/2014    Gout, arthritis 2/18/2014    Hypothyroidism 2/18/2014    Irregular heart rhythm - unsure of Afib vs. A flutter 2/18/2014    Kidney transplanted 05/04/2020    Obesity 2/18/2014    Secondary hyperparathyroidism, renal 2/18/2014    Sleep apnea on Bipap 2/18/2014     Past Surgical History:   Procedure Laterality Date    AV FISTULA PLACEMENT      COLON SURGERY  02/2017    resection for "ischemic colon"    INGUINAL HERNIA REPAIR      bilaterally    pace maker      [email or web address]    TONSILLECTOMY           Current Outpatient Medications   Medication Sig Dispense Refill    allopurinoL (ZYLOPRIM) 100 MG tablet Take 1 tablet (100 mg total) by mouth once daily. 90 tablet 3    amLODIPine (NORVASC) 5 MG tablet Take 5 mg by mouth 2 (two) times daily.      aspirin (ECOTRIN) 81 " MG EC tablet Take 81 mg by mouth once daily.      calcium acetate,phosphat bind, (PHOSLO) 667 mg capsule       diclofenac sodium (VOLTAREN) 1 % Gel Apply very small amount to left foot once to twice daily as needed for pain 100 g 1    docusate sodium (COLACE) 100 MG capsule TAKE 1 CAPSULE BY MOUTH EVERY DAY FOR CONSTIPATION      ergocalciferol (ERGOCALCIFEROL) 50,000 unit Cap Take 50,000 Units by mouth every 30 days.       EUTHYROX 100 mcg tablet Take 1 tablet by mouth once daily 90 tablet 0    famotidine (PEPCID) 20 MG tablet Take 20 mg by mouth once daily.      fluconazole (DIFLUCAN) 100 MG tablet Take 100 mg by mouth once daily.      furosemide (LASIX) 40 MG tablet Take 40 mg by mouth once daily.      insulin detemir U-100 (LEVEMIR FLEXTOUCH U-100 INSULN) 100 unit/mL (3 mL) SubQ InPn pen Inject 30 Units into the skin every evening. (Patient taking differently: Inject 20 Units into the skin 2 (two) times a day. ) 27 mL 3    levothyroxine (SYNTHROID) 100 MCG tablet Take 1 tablet (100 mcg total) by mouth once daily. 90 tablet 3    linezolid (ZYVOX) 600 mg Tab       multivitamin (THERAGRAN) per tablet Take 1 tablet by mouth once daily.      multivitamin Tab TAKE 1 TABLET BY MOUTH EVERY DAY      mycophenolate (MYFORTIC) 360 MG TbEC Take 720 mg by mouth 2 (two) times daily.      nitroGLYCERIN (NITROSTAT) 0.4 MG SL tablet Place 0.4 mg under the tongue every 5 (five) minutes as needed for Chest pain.      NOVOLOG U-100 INSULIN ASPART 100 unit/mL injection Inject 14-20 Units into the skin as needed.   0    nystatin (MYCOSTATIN) 100,000 unit/mL suspension TAKE 5 ML BY MOUTH THREE TIMES DAILY      ondansetron (ZOFRAN-ODT) 4 MG TbDL       ONETOUCH VERIO Strp USE 1 STRIP TO CHECK GLUCOSE 3 TO 4 TIMES DAILY AS DIRECTED  0    oxyCODONE-acetaminophen (PERCOCET) 5-325 mg per tablet       pantoprazole (PROTONIX) 20 MG tablet       polyethylene glycol (GLYCOLAX) 17 gram/dose powder MIX 1 CAPFUL (17 GRAMS) IN  "LIQUID AND DRINK ONCE DAILY AS DIRECTED FOR CONSTIPATION      predniSONE (DELTASONE) 5 MG tablet Take 15 mg by mouth once daily.      pregabalin (LYRICA) 75 MG capsule Take 1 capsule (75 mg total) by mouth every evening. 90 capsule 1    rosuvastatin (CRESTOR) 20 MG tablet Take 20 mg by mouth once daily.      sodium bicarbonate 650 MG tablet Take 650 mg by mouth 2 (two) times daily.      sodium polystyrene (KAYEXALATE) 15 gram/60 mL Susp SHAKE LQ AND TK 60 ML PO NOW. REPEAT IN 6 H PO      SPS, WITH SORBITOL, 15-20 gram/60 mL Susp take 60 ML BY MOUTH as 1 DOSE now. REPEAT in 6 hours.      sucralfate (CARAFATE) 100 mg/mL suspension TAKE 1 GM BY MOUTH TWICE DAILY AT 10 00 AM AND 4 00 PM. PLEASE TAKE 2 HOURS BEFORE OR AFTER IMMUNOSUPPRESION      sulfamethoxazole-trimethoprim 800-160mg (BACTRIM DS) 800-160 mg Tab Take 1 tablet by mouth once daily. Monday , Wednesday, Friday      tacrolimus XR, ENVARSUS, (TACROLIMUS XR, ENVARSUS, 1 MG ORAL TB24) 1 mg Tb24 Take 3 mg by mouth.      valGANciclovir (VALCYTE) 450 mg Tab Take 450 mg by mouth once daily. One pill on Monday and Thursday      venlafaxine (EFFEXOR) 37.5 MG Tab Take 1/2 tablet by mouth once daily at bedtime 45 tablet 1     No current facility-administered medications for this visit.      Review of patient's allergies indicates:  No Known Allergies    Review of Systems   Constitutional: Negative for fever.   HENT: Negative for congestion.    Respiratory: Negative for cough and shortness of breath.    Cardiovascular: Negative for leg swelling.   Endocrine:        KIDNEY TRANSPLANT 5/4   Musculoskeletal: Positive for gait problem.        Walker   Skin: Positive for wound.   All other systems reviewed and are negative.      Objective:      Vitals:    08/14/20 0830   BP: 125/77   Pulse: 99   Resp: 19   Temp: 98 °F (36.7 °C)   TempSrc: Oral   SpO2: 99%   Weight: 90.3 kg (199 lb)   Height: 5' 7" (1.702 m)     Physical Exam  Vitals signs and nursing note reviewed. "   Constitutional:       General: He is not in acute distress.     Appearance: He is well-developed.   Cardiovascular:      Pulses:           Dorsalis pedis pulses are 1+ on the right side and 1+ on the left side.        Posterior tibial pulses are 1+ on the right side and 1+ on the left side.   Pulmonary:      Effort: Pulmonary effort is normal.   Musculoskeletal:      Right foot: Decreased range of motion.      Left foot: Decreased range of motion.   Feet:      Right foot:      Protective Sensation: 4 sites tested. 3 sites sensed.      Skin integrity: Ulcer present. No erythema or warmth.      Left foot:      Protective Sensation: 4 sites tested. 3 sites sensed.      Skin integrity: No erythema or warmth.   Skin:     General: Skin is dry.      Capillary Refill: Capillary refill takes 2 to 3 seconds.   Neurological:      General: No focal deficit present.     Vascular   Normal CFT bilateral   No lower extremity edema bilateral  Pedal skin temperature is warm, discolored skin lower extremities bilateral    Integumentary   Ulcer dorsal lateral right foot slightly smaller but much improved with resolved surrounding erythema, less edema, less peeling skin, minimal clear drainage and no calor.  Upon removing silver alginate this half of the ulceration is completely covered with fibrous tissue.  No tracking or undermining, no odor.  Remaining scab is intact.       Neurological   Gross sensation diminished bilateral digits, intact area of ulcer right foot     Musculoskeletal   Muscle Strength and Tone: Improving     Joints, Bones, and Muscles: pes planus   Difficulty walking, antalgic gait, uses cane  Limited mobility, can not reach, treat or inspect feet  Presents in appropriate tennis shoes           Assessment:       1. Skin ulcer of right foot, limited to breakdown of skin    2. Type II diabetes mellitus with neurological manifestations    3. Chronic peripheral neuropathic pain        Plan:         Advised patient  surrounding redness peeling have resolved, wound is dry and much improved.  Explained silver alginate is sticking to the fibrous tissue but not really cleaning the wound, in order to allow further scab and drying of the wound would just recommend gentle light cleansing with peroxide and a dry dressing.  If the peroxide is irritating the skin discontinue and just dry dressing daily.  If they notice some redness around the wound, resume silver alginate.  Have home health check weekly, wife will be checking and changing the bandage daily, contact the office immediately with any changes  Gauze, stockinette applied to ulcer   Reviewed diabetes, neuropathy, risks for complications  Patient was in understanding and agreement with treatment plan  Counseled the patient on his conditions, their implications and medical management.  Instructed patient to contact the office with any changes, questions, concerns, worsening of symptoms. Patient verbalized understanding.   Total face to face time, exam, assessment, treatment, discussion, documentation 25 minutes, more than half this time spent on consultation and coordination of care.   Follow up 2 months      This note was created using [employer] voice recognition software that occasionally misinterpreted phrases or words.

## 2020-08-17 ENCOUNTER — LAB VISIT (OUTPATIENT)
Dept: LAB | Facility: HOSPITAL | Age: 70
End: 2020-08-17
Attending: TRANSPLANT SURGERY
Payer: MEDICARE

## 2020-08-17 DIAGNOSIS — Z94.0 KIDNEY REPLACED BY TRANSPLANT: ICD-10-CM

## 2020-08-17 LAB
ALBUMIN SERPL BCP-MCNC: 3.8 G/DL (ref 3.5–5.2)
ALP SERPL-CCNC: 138 U/L (ref 55–135)
ALT SERPL W/O P-5'-P-CCNC: 15 U/L (ref 10–44)
ANION GAP SERPL CALC-SCNC: 8 MMOL/L (ref 8–16)
AST SERPL-CCNC: 11 U/L (ref 10–40)
BASOPHILS # BLD AUTO: 0.04 K/UL (ref 0–0.2)
BASOPHILS NFR BLD: 0.9 % (ref 0–1.9)
BILIRUB SERPL-MCNC: 0.7 MG/DL (ref 0.1–1)
BILIRUB UR QL STRIP: NEGATIVE
BKV DNA SERPL NAA+PROBE-ACNC: 223 COPIES/ML
BKV DNA SERPL NAA+PROBE-LOG#: 2.35 LOG (10) COPIES/ML
BKV DNA SERPL QL NAA+PROBE: DETECTED
BUN SERPL-MCNC: 35 MG/DL (ref 8–23)
CALCIUM SERPL-MCNC: 10.1 MG/DL (ref 8.7–10.5)
CHLORIDE SERPL-SCNC: 109 MMOL/L (ref 95–110)
CLARITY UR: CLEAR
CO2 SERPL-SCNC: 19 MMOL/L (ref 23–29)
COLOR UR: YELLOW
CREAT SERPL-MCNC: 2.2 MG/DL (ref 0.5–1.4)
CREAT UR-MCNC: 56 MG/DL (ref 23–375)
DIFFERENTIAL METHOD: ABNORMAL
EOSINOPHIL # BLD AUTO: 0.1 K/UL (ref 0–0.5)
EOSINOPHIL NFR BLD: 2.1 % (ref 0–8)
ERYTHROCYTE [DISTWIDTH] IN BLOOD BY AUTOMATED COUNT: 19.1 % (ref 11.5–14.5)
EST. GFR  (AFRICAN AMERICAN): 33.6 ML/MIN/1.73 M^2
EST. GFR  (NON AFRICAN AMERICAN): 29.1 ML/MIN/1.73 M^2
GLUCOSE SERPL-MCNC: 159 MG/DL (ref 70–110)
GLUCOSE UR QL STRIP: ABNORMAL
HCT VFR BLD AUTO: 27.3 % (ref 40–54)
HGB BLD-MCNC: 8.7 G/DL (ref 14–18)
HGB UR QL STRIP: NEGATIVE
IMM GRANULOCYTES # BLD AUTO: 0.03 K/UL (ref 0–0.04)
IMM GRANULOCYTES NFR BLD AUTO: 0.7 % (ref 0–0.5)
KETONES UR QL STRIP: NEGATIVE
LEUKOCYTE ESTERASE UR QL STRIP: NEGATIVE
LYMPHOCYTES # BLD AUTO: 0.4 K/UL (ref 1–4.8)
LYMPHOCYTES NFR BLD: 10.2 % (ref 18–48)
MAGNESIUM SERPL-MCNC: 1.5 MG/DL (ref 1.6–2.6)
MCH RBC QN AUTO: 31.6 PG (ref 27–31)
MCHC RBC AUTO-ENTMCNC: 31.9 G/DL (ref 32–36)
MCV RBC AUTO: 99 FL (ref 82–98)
MONOCYTES # BLD AUTO: 0.3 K/UL (ref 0.3–1)
MONOCYTES NFR BLD: 6.3 % (ref 4–15)
NEUTROPHILS # BLD AUTO: 3.4 K/UL (ref 1.8–7.7)
NEUTROPHILS NFR BLD: 79.8 % (ref 38–73)
NITRITE UR QL STRIP: NEGATIVE
NRBC BLD-RTO: 0 /100 WBC
PH UR STRIP: 6 [PH] (ref 5–8)
PHOSPHATE SERPL-MCNC: 3 MG/DL (ref 2.7–4.5)
PLATELET # BLD AUTO: 174 K/UL (ref 150–350)
PMV BLD AUTO: 10.5 FL (ref 9.2–12.9)
POTASSIUM SERPL-SCNC: 4.9 MMOL/L (ref 3.5–5.1)
PROT SERPL-MCNC: 6.5 G/DL (ref 6–8.4)
PROT UR QL STRIP: NEGATIVE
PROT UR-MCNC: 25 MG/DL (ref 0–15)
RBC # BLD AUTO: 2.75 M/UL (ref 4.6–6.2)
SODIUM SERPL-SCNC: 136 MMOL/L (ref 136–145)
SP GR UR STRIP: 1.01 (ref 1–1.03)
URN SPEC COLLECT METH UR: ABNORMAL
UROBILINOGEN UR STRIP-ACNC: NEGATIVE EU/DL
WBC # BLD AUTO: 4.31 K/UL (ref 3.9–12.7)

## 2020-08-17 PROCEDURE — 80197 ASSAY OF TACROLIMUS: CPT

## 2020-08-17 PROCEDURE — 83735 ASSAY OF MAGNESIUM: CPT

## 2020-08-17 PROCEDURE — 82570 ASSAY OF URINE CREATININE: CPT

## 2020-08-17 PROCEDURE — 85025 COMPLETE CBC W/AUTO DIFF WBC: CPT

## 2020-08-17 PROCEDURE — 84156 ASSAY OF PROTEIN URINE: CPT

## 2020-08-17 PROCEDURE — 87799 DETECT AGENT NOS DNA QUANT: CPT

## 2020-08-17 PROCEDURE — 80053 COMPREHEN METABOLIC PANEL: CPT

## 2020-08-17 PROCEDURE — 84100 ASSAY OF PHOSPHORUS: CPT

## 2020-08-17 PROCEDURE — 81003 URINALYSIS AUTO W/O SCOPE: CPT

## 2020-08-18 LAB — TACROLIMUS BLD-MCNC: 6.5 NG/ML (ref 5–15)

## 2020-08-20 ENCOUNTER — LAB VISIT (OUTPATIENT)
Dept: LAB | Facility: HOSPITAL | Age: 70
End: 2020-08-20
Attending: TRANSPLANT SURGERY
Payer: MEDICARE

## 2020-08-20 DIAGNOSIS — Z94.0 KIDNEY REPLACED BY TRANSPLANT: ICD-10-CM

## 2020-08-20 LAB
ALBUMIN SERPL BCP-MCNC: 3.8 G/DL (ref 3.5–5.2)
ALP SERPL-CCNC: 137 U/L (ref 55–135)
ALT SERPL W/O P-5'-P-CCNC: 15 U/L (ref 10–44)
ANION GAP SERPL CALC-SCNC: 5 MMOL/L (ref 8–16)
AST SERPL-CCNC: 12 U/L (ref 10–40)
BACTERIA #/AREA URNS HPF: ABNORMAL /HPF
BASOPHILS # BLD AUTO: 0.05 K/UL (ref 0–0.2)
BASOPHILS NFR BLD: 1.2 % (ref 0–1.9)
BILIRUB SERPL-MCNC: 0.3 MG/DL (ref 0.1–1)
BILIRUB UR QL STRIP: NEGATIVE
BKV DNA SERPL NAA+PROBE-ACNC: 187 COPIES/ML
BKV DNA SERPL NAA+PROBE-LOG#: 2.27 LOG (10) COPIES/ML
BKV DNA SERPL QL NAA+PROBE: DETECTED
BUN SERPL-MCNC: 36 MG/DL (ref 8–23)
CALCIUM SERPL-MCNC: 10.2 MG/DL (ref 8.7–10.5)
CHLORIDE SERPL-SCNC: 111 MMOL/L (ref 95–110)
CLARITY UR: CLEAR
CO2 SERPL-SCNC: 20 MMOL/L (ref 23–29)
COLOR UR: YELLOW
CREAT SERPL-MCNC: 2.5 MG/DL (ref 0.5–1.4)
CREAT UR-MCNC: 50.5 MG/DL (ref 23–375)
DIFFERENTIAL METHOD: ABNORMAL
EOSINOPHIL # BLD AUTO: 0.1 K/UL (ref 0–0.5)
EOSINOPHIL NFR BLD: 2.3 % (ref 0–8)
ERYTHROCYTE [DISTWIDTH] IN BLOOD BY AUTOMATED COUNT: 18.6 % (ref 11.5–14.5)
EST. GFR  (AFRICAN AMERICAN): 28.8 ML/MIN/1.73 M^2
EST. GFR  (NON AFRICAN AMERICAN): 24.9 ML/MIN/1.73 M^2
GLUCOSE SERPL-MCNC: 126 MG/DL (ref 70–110)
GLUCOSE UR QL STRIP: ABNORMAL
HCT VFR BLD AUTO: 26.8 % (ref 40–54)
HGB BLD-MCNC: 8.7 G/DL (ref 14–18)
HGB UR QL STRIP: NEGATIVE
IMM GRANULOCYTES # BLD AUTO: 0.02 K/UL (ref 0–0.04)
IMM GRANULOCYTES NFR BLD AUTO: 0.5 % (ref 0–0.5)
KETONES UR QL STRIP: NEGATIVE
LEUKOCYTE ESTERASE UR QL STRIP: NEGATIVE
LYMPHOCYTES # BLD AUTO: 0.6 K/UL (ref 1–4.8)
LYMPHOCYTES NFR BLD: 13.3 % (ref 18–48)
MAGNESIUM SERPL-MCNC: 1.3 MG/DL (ref 1.6–2.6)
MCH RBC QN AUTO: 31.9 PG (ref 27–31)
MCHC RBC AUTO-ENTMCNC: 32.5 G/DL (ref 32–36)
MCV RBC AUTO: 98 FL (ref 82–98)
MICROSCOPIC COMMENT: ABNORMAL
MONOCYTES # BLD AUTO: 0.2 K/UL (ref 0.3–1)
MONOCYTES NFR BLD: 5.6 % (ref 4–15)
NEUTROPHILS # BLD AUTO: 3.3 K/UL (ref 1.8–7.7)
NEUTROPHILS NFR BLD: 77.1 % (ref 38–73)
NITRITE UR QL STRIP: NEGATIVE
NRBC BLD-RTO: 0 /100 WBC
PH UR STRIP: 6 [PH] (ref 5–8)
PLATELET # BLD AUTO: 168 K/UL (ref 150–350)
PMV BLD AUTO: 10 FL (ref 9.2–12.9)
POTASSIUM SERPL-SCNC: 4.6 MMOL/L (ref 3.5–5.1)
PROT SERPL-MCNC: 6.4 G/DL (ref 6–8.4)
PROT UR QL STRIP: NEGATIVE
PROT UR-MCNC: 18 MG/DL (ref 0–15)
RBC # BLD AUTO: 2.73 M/UL (ref 4.6–6.2)
SODIUM SERPL-SCNC: 136 MMOL/L (ref 136–145)
SP GR UR STRIP: 1.01 (ref 1–1.03)
URN SPEC COLLECT METH UR: ABNORMAL
UROBILINOGEN UR STRIP-ACNC: NEGATIVE EU/DL
WBC # BLD AUTO: 4.27 K/UL (ref 3.9–12.7)
YEAST URNS QL MICRO: ABNORMAL

## 2020-08-20 PROCEDURE — 85025 COMPLETE CBC W/AUTO DIFF WBC: CPT

## 2020-08-20 PROCEDURE — 83735 ASSAY OF MAGNESIUM: CPT

## 2020-08-20 PROCEDURE — 84156 ASSAY OF PROTEIN URINE: CPT

## 2020-08-20 PROCEDURE — 80197 ASSAY OF TACROLIMUS: CPT

## 2020-08-20 PROCEDURE — 84100 ASSAY OF PHOSPHORUS: CPT

## 2020-08-20 PROCEDURE — 36415 COLL VENOUS BLD VENIPUNCTURE: CPT

## 2020-08-20 PROCEDURE — 82570 ASSAY OF URINE CREATININE: CPT

## 2020-08-20 PROCEDURE — 87799 DETECT AGENT NOS DNA QUANT: CPT

## 2020-08-20 PROCEDURE — 81000 URINALYSIS NONAUTO W/SCOPE: CPT

## 2020-08-20 PROCEDURE — 80053 COMPREHEN METABOLIC PANEL: CPT

## 2020-08-21 LAB
PHOSPHATE SERPL-MCNC: 2.6 MG/DL (ref 2.7–4.5)
TACROLIMUS BLD-MCNC: 6.4 NG/ML (ref 5–15)

## 2020-08-24 LAB
BKV DNA SERPL NAA+PROBE-ACNC: 320 COPIES/ML
BKV DNA SERPL NAA+PROBE-LOG#: 2.51 LOG (10) COPIES/ML
BKV DNA SERPL QL NAA+PROBE: DETECTED

## 2020-08-27 ENCOUNTER — LAB VISIT (OUTPATIENT)
Dept: LAB | Facility: HOSPITAL | Age: 70
End: 2020-08-27
Attending: TRANSPLANT SURGERY
Payer: MEDICARE

## 2020-08-27 DIAGNOSIS — Z94.0 KIDNEY REPLACED BY TRANSPLANT: ICD-10-CM

## 2020-08-27 LAB
ALBUMIN SERPL BCP-MCNC: 4 G/DL (ref 3.5–5.2)
ALP SERPL-CCNC: 155 U/L (ref 55–135)
ALT SERPL W/O P-5'-P-CCNC: 18 U/L (ref 10–44)
ANION GAP SERPL CALC-SCNC: 7 MMOL/L (ref 8–16)
AST SERPL-CCNC: 14 U/L (ref 10–40)
BASOPHILS # BLD AUTO: 0.03 K/UL (ref 0–0.2)
BASOPHILS NFR BLD: 0.8 % (ref 0–1.9)
BILIRUB SERPL-MCNC: 0.7 MG/DL (ref 0.1–1)
BILIRUB UR QL STRIP: NEGATIVE
BUN SERPL-MCNC: 38 MG/DL (ref 8–23)
CALCIUM SERPL-MCNC: 10.5 MG/DL (ref 8.7–10.5)
CHLORIDE SERPL-SCNC: 116 MMOL/L (ref 95–110)
CLARITY UR: CLEAR
CO2 SERPL-SCNC: 18 MMOL/L (ref 23–29)
COLOR UR: YELLOW
CREAT SERPL-MCNC: 2.3 MG/DL (ref 0.5–1.4)
CREAT UR-MCNC: 70.7 MG/DL (ref 23–375)
DIFFERENTIAL METHOD: ABNORMAL
EOSINOPHIL # BLD AUTO: 0.1 K/UL (ref 0–0.5)
EOSINOPHIL NFR BLD: 2.1 % (ref 0–8)
ERYTHROCYTE [DISTWIDTH] IN BLOOD BY AUTOMATED COUNT: 18.6 % (ref 11.5–14.5)
EST. GFR  (AFRICAN AMERICAN): 31.8 ML/MIN/1.73 M^2
EST. GFR  (NON AFRICAN AMERICAN): 27.5 ML/MIN/1.73 M^2
GLUCOSE SERPL-MCNC: 149 MG/DL (ref 70–110)
GLUCOSE UR QL STRIP: ABNORMAL
HCT VFR BLD AUTO: 29.5 % (ref 40–54)
HGB BLD-MCNC: 9.3 G/DL (ref 14–18)
HGB UR QL STRIP: NEGATIVE
IMM GRANULOCYTES # BLD AUTO: 0.03 K/UL (ref 0–0.04)
IMM GRANULOCYTES NFR BLD AUTO: 0.8 % (ref 0–0.5)
KETONES UR QL STRIP: NEGATIVE
LEUKOCYTE ESTERASE UR QL STRIP: NEGATIVE
LYMPHOCYTES # BLD AUTO: 0.5 K/UL (ref 1–4.8)
LYMPHOCYTES NFR BLD: 13.2 % (ref 18–48)
MAGNESIUM SERPL-MCNC: 1.4 MG/DL (ref 1.6–2.6)
MCH RBC QN AUTO: 31.7 PG (ref 27–31)
MCHC RBC AUTO-ENTMCNC: 31.5 G/DL (ref 32–36)
MCV RBC AUTO: 101 FL (ref 82–98)
MONOCYTES # BLD AUTO: 0.2 K/UL (ref 0.3–1)
MONOCYTES NFR BLD: 6 % (ref 4–15)
NEUTROPHILS # BLD AUTO: 3 K/UL (ref 1.8–7.7)
NEUTROPHILS NFR BLD: 77.1 % (ref 38–73)
NITRITE UR QL STRIP: NEGATIVE
NRBC BLD-RTO: 0 /100 WBC
PH UR STRIP: 6 [PH] (ref 5–8)
PHOSPHATE SERPL-MCNC: 3.1 MG/DL (ref 2.7–4.5)
PLATELET # BLD AUTO: 143 K/UL (ref 150–350)
PMV BLD AUTO: 10.5 FL (ref 9.2–12.9)
POTASSIUM SERPL-SCNC: 4.8 MMOL/L (ref 3.5–5.1)
PROT SERPL-MCNC: 6.3 G/DL (ref 6–8.4)
PROT UR QL STRIP: NEGATIVE
PROT UR-MCNC: 29 MG/DL (ref 0–15)
RBC # BLD AUTO: 2.93 M/UL (ref 4.6–6.2)
SODIUM SERPL-SCNC: 141 MMOL/L (ref 136–145)
SP GR UR STRIP: 1.02 (ref 1–1.03)
URN SPEC COLLECT METH UR: ABNORMAL
UROBILINOGEN UR STRIP-ACNC: NEGATIVE EU/DL
WBC # BLD AUTO: 3.86 K/UL (ref 3.9–12.7)

## 2020-08-27 PROCEDURE — 80053 COMPREHEN METABOLIC PANEL: CPT

## 2020-08-27 PROCEDURE — 80197 ASSAY OF TACROLIMUS: CPT

## 2020-08-27 PROCEDURE — 83735 ASSAY OF MAGNESIUM: CPT

## 2020-08-27 PROCEDURE — 85025 COMPLETE CBC W/AUTO DIFF WBC: CPT

## 2020-08-27 PROCEDURE — 81003 URINALYSIS AUTO W/O SCOPE: CPT

## 2020-08-27 PROCEDURE — 84100 ASSAY OF PHOSPHORUS: CPT

## 2020-08-27 PROCEDURE — 84156 ASSAY OF PROTEIN URINE: CPT

## 2020-08-27 PROCEDURE — 82570 ASSAY OF URINE CREATININE: CPT

## 2020-08-27 PROCEDURE — 87799 DETECT AGENT NOS DNA QUANT: CPT

## 2020-08-28 LAB — TACROLIMUS BLD-MCNC: 8.4 NG/ML (ref 5–15)

## 2020-08-31 LAB
BKV DNA SERPL NAA+PROBE-ACNC: <125 COPIES/ML
BKV DNA SERPL NAA+PROBE-LOG#: <2.1 LOG (10) COPIES/ML
BKV DNA SERPL QL NAA+PROBE: DETECTED

## 2020-09-03 ENCOUNTER — LAB VISIT (OUTPATIENT)
Dept: LAB | Facility: HOSPITAL | Age: 70
End: 2020-09-03
Attending: TRANSPLANT SURGERY
Payer: MEDICARE

## 2020-09-03 DIAGNOSIS — Z94.0 KIDNEY REPLACED BY TRANSPLANT: ICD-10-CM

## 2020-09-03 LAB
ALBUMIN SERPL BCP-MCNC: 4 G/DL (ref 3.5–5.2)
ALP SERPL-CCNC: 157 U/L (ref 55–135)
ALT SERPL W/O P-5'-P-CCNC: 22 U/L (ref 10–44)
ANION GAP SERPL CALC-SCNC: 6 MMOL/L (ref 8–16)
AST SERPL-CCNC: 17 U/L (ref 10–40)
BASOPHILS # BLD AUTO: 0.04 K/UL (ref 0–0.2)
BASOPHILS NFR BLD: 0.9 % (ref 0–1.9)
BILIRUB SERPL-MCNC: 0.2 MG/DL (ref 0.1–1)
BILIRUB UR QL STRIP: NEGATIVE
BUN SERPL-MCNC: 44 MG/DL (ref 8–23)
CALCIUM SERPL-MCNC: 10.4 MG/DL (ref 8.7–10.5)
CHLORIDE SERPL-SCNC: 113 MMOL/L (ref 95–110)
CLARITY UR: CLEAR
CO2 SERPL-SCNC: 18 MMOL/L (ref 23–29)
COLOR UR: YELLOW
CREAT SERPL-MCNC: 2.4 MG/DL (ref 0.5–1.4)
CREAT UR-MCNC: 76.7 MG/DL (ref 23–375)
DIFFERENTIAL METHOD: ABNORMAL
EOSINOPHIL # BLD AUTO: 0.1 K/UL (ref 0–0.5)
EOSINOPHIL NFR BLD: 1.3 % (ref 0–8)
ERYTHROCYTE [DISTWIDTH] IN BLOOD BY AUTOMATED COUNT: 17.7 % (ref 11.5–14.5)
EST. GFR  (AFRICAN AMERICAN): 30.2 ML/MIN/1.73 M^2
EST. GFR  (NON AFRICAN AMERICAN): 26.2 ML/MIN/1.73 M^2
GLUCOSE SERPL-MCNC: 133 MG/DL (ref 70–110)
GLUCOSE UR QL STRIP: ABNORMAL
HCT VFR BLD AUTO: 30.8 % (ref 40–54)
HGB BLD-MCNC: 9.9 G/DL (ref 14–18)
HGB UR QL STRIP: NEGATIVE
IMM GRANULOCYTES # BLD AUTO: 0.03 K/UL (ref 0–0.04)
IMM GRANULOCYTES NFR BLD AUTO: 0.7 % (ref 0–0.5)
KETONES UR QL STRIP: NEGATIVE
LEUKOCYTE ESTERASE UR QL STRIP: NEGATIVE
LYMPHOCYTES # BLD AUTO: 0.6 K/UL (ref 1–4.8)
LYMPHOCYTES NFR BLD: 12.6 % (ref 18–48)
MAGNESIUM SERPL-MCNC: 1.5 MG/DL (ref 1.6–2.6)
MCH RBC QN AUTO: 32 PG (ref 27–31)
MCHC RBC AUTO-ENTMCNC: 32.1 G/DL (ref 32–36)
MCV RBC AUTO: 100 FL (ref 82–98)
MONOCYTES # BLD AUTO: 0.3 K/UL (ref 0.3–1)
MONOCYTES NFR BLD: 6.4 % (ref 4–15)
NEUTROPHILS # BLD AUTO: 3.5 K/UL (ref 1.8–7.7)
NEUTROPHILS NFR BLD: 78.1 % (ref 38–73)
NITRITE UR QL STRIP: NEGATIVE
NRBC BLD-RTO: 0 /100 WBC
PH UR STRIP: 6 [PH] (ref 5–8)
PHOSPHATE SERPL-MCNC: 3.1 MG/DL (ref 2.7–4.5)
PLATELET # BLD AUTO: 158 K/UL (ref 150–350)
PMV BLD AUTO: 10.6 FL (ref 9.2–12.9)
POTASSIUM SERPL-SCNC: 4.6 MMOL/L (ref 3.5–5.1)
PROT SERPL-MCNC: 6.7 G/DL (ref 6–8.4)
PROT UR QL STRIP: NEGATIVE
PROT UR-MCNC: 32 MG/DL (ref 0–15)
RBC # BLD AUTO: 3.09 M/UL (ref 4.6–6.2)
SODIUM SERPL-SCNC: 137 MMOL/L (ref 136–145)
SP GR UR STRIP: 1.02 (ref 1–1.03)
URN SPEC COLLECT METH UR: ABNORMAL
UROBILINOGEN UR STRIP-ACNC: NEGATIVE EU/DL
WBC # BLD AUTO: 4.52 K/UL (ref 3.9–12.7)

## 2020-09-03 PROCEDURE — 85025 COMPLETE CBC W/AUTO DIFF WBC: CPT

## 2020-09-03 PROCEDURE — 84100 ASSAY OF PHOSPHORUS: CPT

## 2020-09-03 PROCEDURE — 36415 COLL VENOUS BLD VENIPUNCTURE: CPT

## 2020-09-03 PROCEDURE — 82570 ASSAY OF URINE CREATININE: CPT

## 2020-09-03 PROCEDURE — 80053 COMPREHEN METABOLIC PANEL: CPT

## 2020-09-03 PROCEDURE — 81003 URINALYSIS AUTO W/O SCOPE: CPT

## 2020-09-03 PROCEDURE — 87799 DETECT AGENT NOS DNA QUANT: CPT

## 2020-09-03 PROCEDURE — 83735 ASSAY OF MAGNESIUM: CPT

## 2020-09-03 PROCEDURE — 84156 ASSAY OF PROTEIN URINE: CPT

## 2020-09-03 PROCEDURE — 80197 ASSAY OF TACROLIMUS: CPT

## 2020-09-04 LAB — TACROLIMUS BLD-MCNC: 8.9 NG/ML (ref 5–15)

## 2020-09-10 ENCOUNTER — HOSPITAL ENCOUNTER (OUTPATIENT)
Dept: RADIOLOGY | Facility: HOSPITAL | Age: 70
Discharge: HOME | End: 2020-09-10
Attending: NURSE PRACTITIONER
Payer: MEDICARE

## 2020-09-10 DIAGNOSIS — G89.11 ACUTE PAIN OF RIGHT SHOULDER DUE TO TRAUMA: ICD-10-CM

## 2020-09-10 DIAGNOSIS — M25.511 ACUTE PAIN OF RIGHT SHOULDER DUE TO TRAUMA: ICD-10-CM

## 2020-09-10 DIAGNOSIS — Z04.3 ENCOUNTER FOR POST FALL EXAMINATION: ICD-10-CM

## 2020-09-24 ENCOUNTER — LAB VISIT (OUTPATIENT)
Dept: LAB | Facility: HOSPITAL | Age: 70
End: 2020-09-24
Attending: TRANSPLANT SURGERY
Payer: MEDICARE

## 2020-09-24 DIAGNOSIS — Z94.0 KIDNEY REPLACED BY TRANSPLANT: ICD-10-CM

## 2020-09-24 LAB
ALBUMIN SERPL BCP-MCNC: 4.4 G/DL (ref 3.5–5.2)
ALP SERPL-CCNC: 164 U/L (ref 55–135)
ALT SERPL W/O P-5'-P-CCNC: 17 U/L (ref 10–44)
ANION GAP SERPL CALC-SCNC: 10 MMOL/L (ref 8–16)
AST SERPL-CCNC: 13 U/L (ref 10–40)
BASOPHILS # BLD AUTO: 0.02 K/UL (ref 0–0.2)
BASOPHILS NFR BLD: 0.4 % (ref 0–1.9)
BILIRUB SERPL-MCNC: 0.7 MG/DL (ref 0.1–1)
BILIRUB UR QL STRIP: NEGATIVE
BUN SERPL-MCNC: 75 MG/DL (ref 8–23)
CALCIUM SERPL-MCNC: 11.1 MG/DL (ref 8.7–10.5)
CHLORIDE SERPL-SCNC: 107 MMOL/L (ref 95–110)
CLARITY UR: CLEAR
CO2 SERPL-SCNC: 18 MMOL/L (ref 23–29)
COLOR UR: YELLOW
CREAT SERPL-MCNC: 2.9 MG/DL (ref 0.5–1.4)
CREAT UR-MCNC: 78.4 MG/DL (ref 23–375)
DIFFERENTIAL METHOD: ABNORMAL
EOSINOPHIL # BLD AUTO: 0.1 K/UL (ref 0–0.5)
EOSINOPHIL NFR BLD: 1.4 % (ref 0–8)
ERYTHROCYTE [DISTWIDTH] IN BLOOD BY AUTOMATED COUNT: 15.3 % (ref 11.5–14.5)
EST. GFR  (AFRICAN AMERICAN): 24.1 ML/MIN/1.73 M^2
EST. GFR  (NON AFRICAN AMERICAN): 20.8 ML/MIN/1.73 M^2
GLUCOSE SERPL-MCNC: 200 MG/DL (ref 70–110)
GLUCOSE UR QL STRIP: ABNORMAL
HCT VFR BLD AUTO: 33.7 % (ref 40–54)
HGB BLD-MCNC: 11.2 G/DL (ref 14–18)
HGB UR QL STRIP: ABNORMAL
IMM GRANULOCYTES # BLD AUTO: 0.26 K/UL (ref 0–0.04)
IMM GRANULOCYTES NFR BLD AUTO: 5.1 % (ref 0–0.5)
KETONES UR QL STRIP: NEGATIVE
LEUKOCYTE ESTERASE UR QL STRIP: NEGATIVE
LYMPHOCYTES # BLD AUTO: 0.5 K/UL (ref 1–4.8)
LYMPHOCYTES NFR BLD: 9.8 % (ref 18–48)
MAGNESIUM SERPL-MCNC: 1.6 MG/DL (ref 1.6–2.6)
MCH RBC QN AUTO: 32.4 PG (ref 27–31)
MCHC RBC AUTO-ENTMCNC: 33.2 G/DL (ref 32–36)
MCV RBC AUTO: 97 FL (ref 82–98)
MONOCYTES # BLD AUTO: 0.4 K/UL (ref 0.3–1)
MONOCYTES NFR BLD: 8.2 % (ref 4–15)
NEUTROPHILS # BLD AUTO: 3.9 K/UL (ref 1.8–7.7)
NEUTROPHILS NFR BLD: 75.1 % (ref 38–73)
NITRITE UR QL STRIP: NEGATIVE
NRBC BLD-RTO: 0 /100 WBC
PH UR STRIP: 6 [PH] (ref 5–8)
PHOSPHATE SERPL-MCNC: 3.1 MG/DL (ref 2.7–4.5)
PLATELET # BLD AUTO: 225 K/UL (ref 150–350)
PMV BLD AUTO: 10.5 FL (ref 9.2–12.9)
POTASSIUM SERPL-SCNC: 5.6 MMOL/L (ref 3.5–5.1)
PROT SERPL-MCNC: 6.9 G/DL (ref 6–8.4)
PROT UR QL STRIP: NEGATIVE
PROT UR-MCNC: 27 MG/DL (ref 0–15)
RBC # BLD AUTO: 3.46 M/UL (ref 4.6–6.2)
SODIUM SERPL-SCNC: 135 MMOL/L (ref 136–145)
SP GR UR STRIP: 1.01 (ref 1–1.03)
URN SPEC COLLECT METH UR: ABNORMAL
UROBILINOGEN UR STRIP-ACNC: NEGATIVE EU/DL
WBC # BLD AUTO: 5.12 K/UL (ref 3.9–12.7)

## 2020-09-24 PROCEDURE — 80053 COMPREHEN METABOLIC PANEL: CPT

## 2020-09-24 PROCEDURE — 80197 ASSAY OF TACROLIMUS: CPT

## 2020-09-24 PROCEDURE — 84156 ASSAY OF PROTEIN URINE: CPT

## 2020-09-24 PROCEDURE — 83735 ASSAY OF MAGNESIUM: CPT

## 2020-09-24 PROCEDURE — 85007 BL SMEAR W/DIFF WBC COUNT: CPT

## 2020-09-24 PROCEDURE — 85027 COMPLETE CBC AUTOMATED: CPT

## 2020-09-24 PROCEDURE — 84100 ASSAY OF PHOSPHORUS: CPT

## 2020-09-24 PROCEDURE — 82570 ASSAY OF URINE CREATININE: CPT

## 2020-09-24 PROCEDURE — 81003 URINALYSIS AUTO W/O SCOPE: CPT

## 2020-09-24 PROCEDURE — 87799 DETECT AGENT NOS DNA QUANT: CPT

## 2020-09-25 LAB — TACROLIMUS BLD-MCNC: 8.2 NG/ML (ref 5–15)

## 2020-10-01 ENCOUNTER — LAB VISIT (OUTPATIENT)
Dept: LAB | Facility: HOSPITAL | Age: 70
End: 2020-10-01
Attending: TRANSPLANT SURGERY
Payer: MEDICARE

## 2020-10-01 DIAGNOSIS — Z94.0 KIDNEY REPLACED BY TRANSPLANT: ICD-10-CM

## 2020-10-01 LAB
ALBUMIN SERPL BCP-MCNC: 4.1 G/DL (ref 3.5–5.2)
ALP SERPL-CCNC: 135 U/L (ref 55–135)
ALT SERPL W/O P-5'-P-CCNC: 22 U/L (ref 10–44)
ANION GAP SERPL CALC-SCNC: 6 MMOL/L (ref 8–16)
AST SERPL-CCNC: 15 U/L (ref 10–40)
BACTERIA #/AREA URNS HPF: ABNORMAL /HPF
BASOPHILS # BLD AUTO: 0.02 K/UL (ref 0–0.2)
BASOPHILS NFR BLD: 0.4 % (ref 0–1.9)
BILIRUB SERPL-MCNC: 0.5 MG/DL (ref 0.1–1)
BILIRUB UR QL STRIP: NEGATIVE
BUN SERPL-MCNC: 57 MG/DL (ref 8–23)
CALCIUM SERPL-MCNC: 10.5 MG/DL (ref 8.7–10.5)
CHLORIDE SERPL-SCNC: 114 MMOL/L (ref 95–110)
CLARITY UR: CLEAR
CO2 SERPL-SCNC: 17 MMOL/L (ref 23–29)
COLOR UR: YELLOW
CREAT SERPL-MCNC: 2.4 MG/DL (ref 0.5–1.4)
CREAT UR-MCNC: 75.6 MG/DL (ref 23–375)
DIFFERENTIAL METHOD: ABNORMAL
EOSINOPHIL # BLD AUTO: 0 K/UL (ref 0–0.5)
EOSINOPHIL NFR BLD: 0.9 % (ref 0–8)
ERYTHROCYTE [DISTWIDTH] IN BLOOD BY AUTOMATED COUNT: 15.1 % (ref 11.5–14.5)
EST. GFR  (AFRICAN AMERICAN): 30.2 ML/MIN/1.73 M^2
EST. GFR  (NON AFRICAN AMERICAN): 26.2 ML/MIN/1.73 M^2
GLUCOSE SERPL-MCNC: 119 MG/DL (ref 70–110)
GLUCOSE UR QL STRIP: ABNORMAL
HCT VFR BLD AUTO: 34.2 % (ref 40–54)
HGB BLD-MCNC: 11.2 G/DL (ref 14–18)
HGB UR QL STRIP: NEGATIVE
HYALINE CASTS #/AREA URNS LPF: 0 /LPF
IMM GRANULOCYTES # BLD AUTO: 0.19 K/UL (ref 0–0.04)
IMM GRANULOCYTES NFR BLD AUTO: 4 % (ref 0–0.5)
KETONES UR QL STRIP: NEGATIVE
LEUKOCYTE ESTERASE UR QL STRIP: NEGATIVE
LYMPHOCYTES # BLD AUTO: 0.5 K/UL (ref 1–4.8)
LYMPHOCYTES NFR BLD: 10.9 % (ref 18–48)
MAGNESIUM SERPL-MCNC: 1.4 MG/DL (ref 1.6–2.6)
MCH RBC QN AUTO: 32.3 PG (ref 27–31)
MCHC RBC AUTO-ENTMCNC: 32.7 G/DL (ref 32–36)
MCV RBC AUTO: 99 FL (ref 82–98)
MICROSCOPIC COMMENT: ABNORMAL
MONOCYTES # BLD AUTO: 0.3 K/UL (ref 0.3–1)
MONOCYTES NFR BLD: 5.7 % (ref 4–15)
NEUTROPHILS # BLD AUTO: 3.7 K/UL (ref 1.8–7.7)
NEUTROPHILS NFR BLD: 78.1 % (ref 38–73)
NITRITE UR QL STRIP: NEGATIVE
NRBC BLD-RTO: 0 /100 WBC
PH UR STRIP: 6 [PH] (ref 5–8)
PHOSPHATE SERPL-MCNC: 2.9 MG/DL (ref 2.7–4.5)
PLATELET # BLD AUTO: 200 K/UL (ref 150–350)
PMV BLD AUTO: 10.2 FL (ref 9.2–12.9)
POTASSIUM SERPL-SCNC: 4.9 MMOL/L (ref 3.5–5.1)
PROT SERPL-MCNC: 6.7 G/DL (ref 6–8.4)
PROT UR QL STRIP: ABNORMAL
PROT UR-MCNC: 43 MG/DL (ref 0–15)
RBC # BLD AUTO: 3.47 M/UL (ref 4.6–6.2)
RBC #/AREA URNS HPF: 2 /HPF (ref 0–4)
SODIUM SERPL-SCNC: 137 MMOL/L (ref 136–145)
SP GR UR STRIP: 1.02 (ref 1–1.03)
URN SPEC COLLECT METH UR: ABNORMAL
UROBILINOGEN UR STRIP-ACNC: NEGATIVE EU/DL
WBC # BLD AUTO: 4.7 K/UL (ref 3.9–12.7)
WBC #/AREA URNS HPF: 3 /HPF (ref 0–5)

## 2020-10-01 PROCEDURE — 80197 ASSAY OF TACROLIMUS: CPT

## 2020-10-01 PROCEDURE — 82570 ASSAY OF URINE CREATININE: CPT

## 2020-10-01 PROCEDURE — 87799 DETECT AGENT NOS DNA QUANT: CPT

## 2020-10-01 PROCEDURE — 84156 ASSAY OF PROTEIN URINE: CPT

## 2020-10-01 PROCEDURE — 81000 URINALYSIS NONAUTO W/SCOPE: CPT

## 2020-10-01 PROCEDURE — 83735 ASSAY OF MAGNESIUM: CPT

## 2020-10-01 PROCEDURE — 80053 COMPREHEN METABOLIC PANEL: CPT

## 2020-10-01 PROCEDURE — 85025 COMPLETE CBC W/AUTO DIFF WBC: CPT

## 2020-10-01 PROCEDURE — 36415 COLL VENOUS BLD VENIPUNCTURE: CPT

## 2020-10-01 PROCEDURE — 84100 ASSAY OF PHOSPHORUS: CPT

## 2020-10-02 LAB — TACROLIMUS BLD-MCNC: 8.9 NG/ML (ref 5–15)

## 2020-10-08 ENCOUNTER — LAB VISIT (OUTPATIENT)
Dept: LAB | Facility: HOSPITAL | Age: 70
End: 2020-10-08
Attending: TRANSPLANT SURGERY
Payer: MEDICARE

## 2020-10-08 DIAGNOSIS — Z94.0 KIDNEY REPLACED BY TRANSPLANT: ICD-10-CM

## 2020-10-08 LAB
ALBUMIN SERPL BCP-MCNC: 4.1 G/DL (ref 3.5–5.2)
ALP SERPL-CCNC: 143 U/L (ref 55–135)
ALT SERPL W/O P-5'-P-CCNC: 23 U/L (ref 10–44)
ANION GAP SERPL CALC-SCNC: 8 MMOL/L (ref 8–16)
AST SERPL-CCNC: 17 U/L (ref 10–40)
BASOPHILS # BLD AUTO: 0.03 K/UL (ref 0–0.2)
BASOPHILS NFR BLD: 0.5 % (ref 0–1.9)
BILIRUB SERPL-MCNC: 0.5 MG/DL (ref 0.1–1)
BILIRUB UR QL STRIP: NEGATIVE
BUN SERPL-MCNC: 60 MG/DL (ref 8–23)
CALCIUM SERPL-MCNC: 10.3 MG/DL (ref 8.7–10.5)
CHLORIDE SERPL-SCNC: 109 MMOL/L (ref 95–110)
CLARITY UR: CLEAR
CO2 SERPL-SCNC: 16 MMOL/L (ref 23–29)
COLOR UR: YELLOW
CREAT SERPL-MCNC: 2.3 MG/DL (ref 0.5–1.4)
CREAT UR-MCNC: 73.2 MG/DL (ref 23–375)
DIFFERENTIAL METHOD: ABNORMAL
EOSINOPHIL # BLD AUTO: 0.1 K/UL (ref 0–0.5)
EOSINOPHIL NFR BLD: 1.2 % (ref 0–8)
ERYTHROCYTE [DISTWIDTH] IN BLOOD BY AUTOMATED COUNT: 15.2 % (ref 11.5–14.5)
EST. GFR  (AFRICAN AMERICAN): 31.8 ML/MIN/1.73 M^2
EST. GFR  (NON AFRICAN AMERICAN): 27.5 ML/MIN/1.73 M^2
GLUCOSE SERPL-MCNC: 177 MG/DL (ref 70–110)
GLUCOSE UR QL STRIP: ABNORMAL
HCT VFR BLD AUTO: 33.4 % (ref 40–54)
HGB BLD-MCNC: 11.3 G/DL (ref 14–18)
HGB UR QL STRIP: NEGATIVE
IMM GRANULOCYTES # BLD AUTO: 0.17 K/UL (ref 0–0.04)
IMM GRANULOCYTES NFR BLD AUTO: 2.9 % (ref 0–0.5)
KETONES UR QL STRIP: NEGATIVE
LEUKOCYTE ESTERASE UR QL STRIP: NEGATIVE
LYMPHOCYTES # BLD AUTO: 0.6 K/UL (ref 1–4.8)
LYMPHOCYTES NFR BLD: 10.6 % (ref 18–48)
MAGNESIUM SERPL-MCNC: 1.3 MG/DL (ref 1.6–2.6)
MCH RBC QN AUTO: 33.2 PG (ref 27–31)
MCHC RBC AUTO-ENTMCNC: 33.8 G/DL (ref 32–36)
MCV RBC AUTO: 98 FL (ref 82–98)
MONOCYTES # BLD AUTO: 0.3 K/UL (ref 0.3–1)
MONOCYTES NFR BLD: 5.7 % (ref 4–15)
NEUTROPHILS # BLD AUTO: 4.6 K/UL (ref 1.8–7.7)
NEUTROPHILS NFR BLD: 79.1 % (ref 38–73)
NITRITE UR QL STRIP: NEGATIVE
NRBC BLD-RTO: 0 /100 WBC
PH UR STRIP: 6 [PH] (ref 5–8)
PHOSPHATE SERPL-MCNC: 2.6 MG/DL (ref 2.7–4.5)
PLATELET # BLD AUTO: 188 K/UL (ref 150–350)
PMV BLD AUTO: 10.5 FL (ref 9.2–12.9)
POTASSIUM SERPL-SCNC: 5.3 MMOL/L (ref 3.5–5.1)
PROT SERPL-MCNC: 6.5 G/DL (ref 6–8.4)
PROT UR QL STRIP: ABNORMAL
PROT UR-MCNC: 33 MG/DL (ref 0–15)
RBC # BLD AUTO: 3.4 M/UL (ref 4.6–6.2)
SODIUM SERPL-SCNC: 133 MMOL/L (ref 136–145)
SP GR UR STRIP: 1.01 (ref 1–1.03)
URN SPEC COLLECT METH UR: ABNORMAL
UROBILINOGEN UR STRIP-ACNC: NEGATIVE EU/DL
WBC # BLD AUTO: 5.84 K/UL (ref 3.9–12.7)

## 2020-10-08 PROCEDURE — 36415 COLL VENOUS BLD VENIPUNCTURE: CPT

## 2020-10-08 PROCEDURE — 84156 ASSAY OF PROTEIN URINE: CPT

## 2020-10-08 PROCEDURE — 83735 ASSAY OF MAGNESIUM: CPT

## 2020-10-08 PROCEDURE — 82570 ASSAY OF URINE CREATININE: CPT

## 2020-10-08 PROCEDURE — 87799 DETECT AGENT NOS DNA QUANT: CPT

## 2020-10-08 PROCEDURE — 80053 COMPREHEN METABOLIC PANEL: CPT

## 2020-10-08 PROCEDURE — 85025 COMPLETE CBC W/AUTO DIFF WBC: CPT

## 2020-10-08 PROCEDURE — 80197 ASSAY OF TACROLIMUS: CPT

## 2020-10-08 PROCEDURE — 84100 ASSAY OF PHOSPHORUS: CPT

## 2020-10-08 PROCEDURE — 81003 URINALYSIS AUTO W/O SCOPE: CPT

## 2020-10-09 LAB — TACROLIMUS BLD-MCNC: 7.9 NG/ML (ref 5–15)

## 2020-10-16 ENCOUNTER — OFFICE VISIT (OUTPATIENT)
Dept: PODIATRY | Facility: CLINIC | Age: 70
End: 2020-10-16
Payer: MEDICARE

## 2020-10-16 VITALS
WEIGHT: 199 LBS | HEART RATE: 95 BPM | DIASTOLIC BLOOD PRESSURE: 85 MMHG | SYSTOLIC BLOOD PRESSURE: 146 MMHG | BODY MASS INDEX: 31.23 KG/M2 | HEIGHT: 67 IN | RESPIRATION RATE: 18 BRPM | TEMPERATURE: 98 F | OXYGEN SATURATION: 99 %

## 2020-10-16 DIAGNOSIS — B35.1 ONYCHOMYCOSIS OF TOENAIL: ICD-10-CM

## 2020-10-16 DIAGNOSIS — L85.3 DRY SKIN DERMATITIS: ICD-10-CM

## 2020-10-16 DIAGNOSIS — E11.49 TYPE II DIABETES MELLITUS WITH NEUROLOGICAL MANIFESTATIONS: Primary | ICD-10-CM

## 2020-10-16 PROCEDURE — 99214 PR OFFICE/OUTPT VISIT, EST, LEVL IV, 30-39 MIN: ICD-10-PCS | Mod: S$PBB,,, | Performed by: PODIATRIST

## 2020-10-16 PROCEDURE — 99999 PR PBB SHADOW E&M-EST. PATIENT-LVL V: ICD-10-PCS | Mod: PBBFAC,,, | Performed by: PODIATRIST

## 2020-10-16 PROCEDURE — 99999 PR PBB SHADOW E&M-EST. PATIENT-LVL V: CPT | Mod: PBBFAC,,, | Performed by: PODIATRIST

## 2020-10-16 PROCEDURE — 99215 OFFICE O/P EST HI 40 MIN: CPT | Mod: PBBFAC | Performed by: PODIATRIST

## 2020-10-16 PROCEDURE — 99214 OFFICE O/P EST MOD 30 MIN: CPT | Mod: S$PBB,,, | Performed by: PODIATRIST

## 2020-10-16 RX ORDER — MULTIVITAMIN
1 TABLET ORAL DAILY
COMMUNITY
Start: 2020-09-30

## 2020-10-16 RX ORDER — VELPATASVIR AND SOFOSBUVIR 100; 400 MG/1; MG/1
TABLET, FILM COATED ORAL
COMMUNITY
Start: 2020-09-23 | End: 2021-02-12 | Stop reason: ALTCHOICE

## 2020-10-16 NOTE — LETTER
October 19, 2020      Brianna Elaine III, MD  [address]  [facility] [place] [address]           [facility] - Podiatry/[facility]ound [facility]  [address] [facility] [place] [address]  Phone: [phone]  Fax: [phone]          Patient: Olu Gant Jr.   MR Number: [id number]   YOB: 1950   Date of Visit: 10/16/2020       Dear Dr. Brianna Elaine III:    Thank you for referring Olu Gant to me for evaluation. Attached you will find relevant portions of my assessment and plan of care.    If you have questions, please do not hesitate to call me. I look forward to following Olu Gant along with you.    Sincerely,    Anuradha Trujillo, ROSIE    Enclosure  CC:  No Recipients    If you would like to receive this communication electronically, please contact [email or web address] or [phone] to request more information on [employer] access.    For providers and/or their staff who would like to refer a patient to [facility], please contact us through our one-stop-shop provider referral line, [facility], at [phone].    If you feel you have received this communication in error or would no longer like to receive these types of communications, please e-mail [email or web address]

## 2020-10-19 NOTE — PROGRESS NOTES
"Subjective:       Patient ID: Olu Gant Jr. is a 70 y.o. male.    Chief Complaint: Follow-up and Diabetes Mellitus  Patient presents with his wife for follow-up ulcer dorsal right foot,   Type 2 diabetes with neuropathy.  Patient reports utilizing iodine and silver alginate, keeping the area clean and dry helped significantly, has healed without complication.  Reports kidney transplant has been "slowly waking up"  Since procedure 05/04/2020 which was performed at [facility].  Reports kidney function has improved, they have extended time frame between lab work, he has been feeling well.  He has noticed over the last few months neuropathy pain in his feet has slowly improved.  He has decreased dosage of Lyrica and has not taken pain medication in several months.  He feels diabetes has remained fairly well controlled.  Glucose checked at home this morning was 165      Past Medical History:   Diagnosis Date    Asbestos exposure - 1973 2/18/2014    Benign hypertension with ESRD (end-stage renal disease) 2/18/2014    Diabetes type 2 - since 1996 2/18/2014    DIALYSIS 2013    ESRD (end stage renal disease) - initiated dialysis 05/29/2013 2/18/2014    Gout, arthritis 2/18/2014    Hypothyroidism 2/18/2014    Irregular heart rhythm - unsure of Afib vs. A flutter 2/18/2014    Kidney transplanted 05/04/2020    Obesity 2/18/2014    Secondary hyperparathyroidism, renal 2/18/2014    Sleep apnea on Bipap 2/18/2014     Past Surgical History:   Procedure Laterality Date    AV FISTULA PLACEMENT      COLON SURGERY  02/2017    resection for "ischemic colon"    INGUINAL HERNIA REPAIR      bilaterally    KIDNEY TRANSPLANT      pace maker      [email or web address]    TONSILLECTOMY           Current Outpatient Medications   Medication Sig Dispense Refill    allopurinoL (ZYLOPRIM) 100 MG tablet Take 1 tablet (100 mg total) by mouth once daily. 90 tablet 3    amLODIPine (NORVASC) 5 MG tablet Take 5 mg by mouth 2 (two) times " daily.      aspirin (ECOTRIN) 81 MG EC tablet Take 81 mg by mouth once daily.      diclofenac sodium (VOLTAREN) 1 % Gel Apply very small amount to left foot once to twice daily as needed for pain 100 g 1    docusate sodium (COLACE) 100 MG capsule TAKE 1 CAPSULE BY MOUTH EVERY DAY FOR CONSTIPATION      EPCLUSA 400-100 mg Tab       ergocalciferol (ERGOCALCIFEROL) 50,000 unit Cap Take 50,000 Units by mouth every 30 days.       insulin detemir U-100 (LEVEMIR FLEXTOUCH U-100 INSULN) 100 unit/mL (3 mL) SubQ InPn pen Inject 30 Units into the skin every evening. 27 mL 3    levothyroxine (SYNTHROID) 100 MCG tablet Take 1 tablet (100 mcg total) by mouth once daily. 90 tablet 3    multivitamin (THERAGRAN) per tablet Take 1 tablet by mouth once daily.      multivitamin Tab TAKE 1 TABLET BY MOUTH EVERY DAY      mycophenolate (MYFORTIC) 360 MG TbEC Take 720 mg by mouth 2 (two) times daily.      nitroGLYCERIN (NITROSTAT) 0.4 MG SL tablet Place 0.4 mg under the tongue every 5 (five) minutes as needed for Chest pain.      NOVOLOG U-100 INSULIN ASPART 100 unit/mL injection Inject 14-20 Units into the skin as needed. 10 mL 11    nystatin (MYCOSTATIN) 100,000 unit/mL suspension TAKE 5 ML BY MOUTH THREE TIMES DAILY      omeprazole (PRILOSEC) 20 MG capsule Take 20 mg by mouth once daily.      ondansetron (ZOFRAN-ODT) 4 MG TbDL       ONETOUCH VERIO Strp USE 1 STRIP TO CHECK GLUCOSE 3 TO 4 TIMES DAILY AS DIRECTED  0    oxyCODONE-acetaminophen (PERCOCET) 5-325 mg per tablet       polyethylene glycol (GLYCOLAX) 17 gram/dose powder MIX 1 CAPFUL (17 GRAMS) IN LIQUID AND DRINK ONCE DAILY AS DIRECTED FOR CONSTIPATION      predniSONE (DELTASONE) 5 MG tablet Take 15 mg by mouth once daily.      pregabalin (LYRICA) 50 MG capsule Take 1 capsule (50 mg total) by mouth once daily. In the evening 30 capsule 5    rosuvastatin (CRESTOR) 20 MG tablet Take 20 mg by mouth once daily.      sodium bicarbonate 650 MG tablet Take 650 mg by  "mouth 2 (two) times daily.      SPS, WITH SORBITOL, 15-20 gram/60 mL Susp take 60 ML BY MOUTH as 1 DOSE now. REPEAT in 6 hours.      sulfamethoxazole-trimethoprim 800-160mg (BACTRIM DS) 800-160 mg Tab Take 1 tablet by mouth once daily. Monday , Wednesday, Friday      tacrolimus XR, ENVARSUS, (TACROLIMUS XR, ENVARSUS, 1 MG ORAL TB24) 1 mg Tb24 Take 3 mg by mouth.      valGANciclovir (VALCYTE) 450 mg Tab Take 450 mg by mouth once daily. One pill on Monday and Thursday      venlafaxine (EFFEXOR) 37.5 MG Tab Take 1/2 tablet by mouth once daily at bedtime 45 tablet 1    EUTHYROX 100 mcg tablet Take 1 tablet by mouth once daily (Patient not taking: Reported on 9/10/2020) 90 tablet 0     No current facility-administered medications for this visit.      Review of patient's allergies indicates:  No Known Allergies    Review of Systems   Constitutional: Negative for fever.   HENT: Negative for congestion.    Respiratory: Negative for cough and shortness of breath.    Cardiovascular: Negative for leg swelling.   Endocrine:        KIDNEY TRANSPLANT 5/4   Musculoskeletal: Positive for gait problem.        Walker   Skin: Negative for wound.   All other systems reviewed and are negative.      Objective:      Vitals:    10/16/20 0919   BP: (!) 146/85   Pulse: 95   Resp: 18   Temp: 97.9 °F (36.6 °C)   TempSrc: Temporal   SpO2: 99%   Weight: 90.3 kg (199 lb)   Height: 5' 7" (1.702 m)     Physical Exam  Vitals signs and nursing note reviewed.   Constitutional:       General: He is not in acute distress.     Appearance: Normal appearance. He is well-developed.   Cardiovascular:      Pulses:           Dorsalis pedis pulses are 1+ on the right side and 1+ on the left side.        Posterior tibial pulses are 1+ on the right side and 1+ on the left side.   Pulmonary:      Effort: Pulmonary effort is normal.   Musculoskeletal:         General: No swelling or tenderness.      Right foot: Decreased range of motion.      Left foot: " Decreased range of motion.   Feet:      Right foot:      Protective Sensation: 6 sites tested. 4 sites sensed.      Skin integrity: Dry skin present. No ulcer, erythema or warmth.      Toenail Condition: Right toenails are abnormally thick. Fungal disease present.     Left foot:      Protective Sensation: 6 sites tested. 4 sites sensed.      Skin integrity: Dry skin present. No erythema or warmth.      Toenail Condition: Left toenails are abnormally thick. Fungal disease present.  Skin:     General: Skin is dry.      Capillary Refill: Capillary refill takes 2 to 3 seconds.   Psychiatric:         Mood and Affect: Mood normal.         Behavior: Behavior normal.     Vascular   Normal CFT bilateral   No lower extremity edema bilateral  Pedal skin temperature is warm, discolored skin lower extremities bilateral    Integumentary   Ulcer dorsal lateral right foot  Completely healed with hardly any evidence of previous wound  Dry skin texture feet and lower legs.  No cracking, bleeding or erythema this time  Severely dystrophic onychomycosis.  Thick, raised, tented.  Thickness reduced with no evidence of subungual abscess or ingrown nail.  Patient is at risk with diminished sensation to digits       Neurological   Gross sensation diminished bilateral digits     Musculoskeletal   Muscle Strength and Tone: Improving     Joints, Bones, and Muscles: Pes planus   Difficulty walking  Walker   Limited mobility  Presents in appropriate tennis shoes    Hemoglobin A1c   Ref Range & Units 10mo ago  (11/25/19)      Hemoglobin A1C <5.7 % of total Hgb 6.9                   Assessment:       1. Type II diabetes mellitus with neurological manifestations    2. Dry skin dermatitis    3. Onychomycosis of toenail        Plan:             Reviewed diabetic education, changes in neuropathy and medications.   Discussed benefit of tight(er) control of glucose/diabetes regarding potential foot problems especially neuropathy.    Overdue A1c, order  in system/Dr Elaine  Reviewed appropriate shoes,  especially indoors to protect feet, no flat shoes, slippers or walking in sock or bare feet.    Discussed maintenance of dry skin and fungal nails and potential complications with lack of feeling to toes    Advised dry skin needs to be treated, skin can crack, bleed with potential for infection.  We discussed diabetic cream or lotion, apply just a few times a week, avoid getting between toes  Advised ulcer on the top of the foot healed very well.    We discussed importance of daily foot checks and instructed patient to contact the office with any area of redness or swelling which has not improved within 3 days.  Patient was in understanding and agreement with treatment plan  Counseled the patient on his conditions, their implications and medical management.  Instructed patient to contact the office with any changes, questions, concerns, worsening of symptoms. Patient verbalized understanding.   Total face to face time, exam, assessment, treatment, discussion, documentation 25 minutes, more than half this time spent on consultation and coordination of care.   Follow up 2 months      This note was created using M*Modal voice recognition software that occasionally misinterpreted phrases or words.

## 2020-10-29 ENCOUNTER — LAB VISIT (OUTPATIENT)
Dept: LAB | Facility: HOSPITAL | Age: 70
End: 2020-10-29
Attending: TRANSPLANT SURGERY
Payer: MEDICARE

## 2020-10-29 DIAGNOSIS — Z94.0 KIDNEY REPLACED BY TRANSPLANT: ICD-10-CM

## 2020-10-29 LAB
ALBUMIN SERPL BCP-MCNC: 4.1 G/DL (ref 3.5–5.2)
ALP SERPL-CCNC: 105 U/L (ref 55–135)
ALT SERPL W/O P-5'-P-CCNC: 31 U/L (ref 10–44)
ANION GAP SERPL CALC-SCNC: 5 MMOL/L (ref 8–16)
AST SERPL-CCNC: 17 U/L (ref 10–40)
BASOPHILS # BLD AUTO: 0.04 K/UL (ref 0–0.2)
BASOPHILS NFR BLD: 0.6 % (ref 0–1.9)
BILIRUB SERPL-MCNC: 0.8 MG/DL (ref 0.1–1)
BILIRUB UR QL STRIP: NEGATIVE
BILIRUB UR QL STRIP: NEGATIVE
BUN SERPL-MCNC: 47 MG/DL (ref 8–23)
CALCIUM SERPL-MCNC: 10.8 MG/DL (ref 8.7–10.5)
CHLORIDE SERPL-SCNC: 110 MMOL/L (ref 95–110)
CLARITY UR: CLEAR
CLARITY UR: CLEAR
CO2 SERPL-SCNC: 23 MMOL/L (ref 23–29)
COLOR UR: YELLOW
COLOR UR: YELLOW
CREAT SERPL-MCNC: 2.2 MG/DL (ref 0.5–1.4)
CREAT UR-MCNC: 77 MG/DL (ref 23–375)
DIFFERENTIAL METHOD: ABNORMAL
EOSINOPHIL # BLD AUTO: 0.1 K/UL (ref 0–0.5)
EOSINOPHIL NFR BLD: 0.9 % (ref 0–8)
ERYTHROCYTE [DISTWIDTH] IN BLOOD BY AUTOMATED COUNT: 14.6 % (ref 11.5–14.5)
EST. GFR  (AFRICAN AMERICAN): 33.6 ML/MIN/1.73 M^2
EST. GFR  (NON AFRICAN AMERICAN): 29.1 ML/MIN/1.73 M^2
GLUCOSE SERPL-MCNC: 132 MG/DL (ref 70–110)
GLUCOSE UR QL STRIP: ABNORMAL
GLUCOSE UR QL STRIP: ABNORMAL
HCT VFR BLD AUTO: 36.7 % (ref 40–54)
HGB BLD-MCNC: 11.6 G/DL (ref 14–18)
HGB UR QL STRIP: NEGATIVE
HGB UR QL STRIP: NEGATIVE
IMM GRANULOCYTES # BLD AUTO: 0.13 K/UL (ref 0–0.04)
IMM GRANULOCYTES NFR BLD AUTO: 2.1 % (ref 0–0.5)
KETONES UR QL STRIP: NEGATIVE
KETONES UR QL STRIP: NEGATIVE
LEUKOCYTE ESTERASE UR QL STRIP: NEGATIVE
LEUKOCYTE ESTERASE UR QL STRIP: NEGATIVE
LYMPHOCYTES # BLD AUTO: 0.7 K/UL (ref 1–4.8)
LYMPHOCYTES NFR BLD: 10.9 % (ref 18–48)
MAGNESIUM SERPL-MCNC: 1.4 MG/DL (ref 1.6–2.6)
MCH RBC QN AUTO: 32.1 PG (ref 27–31)
MCHC RBC AUTO-ENTMCNC: 31.6 G/DL (ref 32–36)
MCV RBC AUTO: 102 FL (ref 82–98)
MONOCYTES # BLD AUTO: 0.4 K/UL (ref 0.3–1)
MONOCYTES NFR BLD: 5.5 % (ref 4–15)
NEUTROPHILS # BLD AUTO: 5.1 K/UL (ref 1.8–7.7)
NEUTROPHILS NFR BLD: 80 % (ref 38–73)
NITRITE UR QL STRIP: NEGATIVE
NITRITE UR QL STRIP: NEGATIVE
NRBC BLD-RTO: 0 /100 WBC
PH UR STRIP: 7 [PH] (ref 5–8)
PH UR STRIP: 7 [PH] (ref 5–8)
PHOSPHATE SERPL-MCNC: 3 MG/DL (ref 2.7–4.5)
PLATELET # BLD AUTO: 154 K/UL (ref 150–350)
PMV BLD AUTO: 9.5 FL (ref 9.2–12.9)
POTASSIUM SERPL-SCNC: 5 MMOL/L (ref 3.5–5.1)
PROT SERPL-MCNC: 6.5 G/DL (ref 6–8.4)
PROT UR QL STRIP: ABNORMAL
PROT UR QL STRIP: ABNORMAL
PROT UR-MCNC: 30 MG/DL (ref 0–15)
RBC # BLD AUTO: 3.61 M/UL (ref 4.6–6.2)
SODIUM SERPL-SCNC: 138 MMOL/L (ref 136–145)
SP GR UR STRIP: 1.02 (ref 1–1.03)
SP GR UR STRIP: 1.02 (ref 1–1.03)
URN SPEC COLLECT METH UR: ABNORMAL
URN SPEC COLLECT METH UR: ABNORMAL
UROBILINOGEN UR STRIP-ACNC: NEGATIVE EU/DL
UROBILINOGEN UR STRIP-ACNC: NEGATIVE EU/DL
WBC # BLD AUTO: 6.34 K/UL (ref 3.9–12.7)

## 2020-10-29 PROCEDURE — 80053 COMPREHEN METABOLIC PANEL: CPT

## 2020-10-29 PROCEDURE — 87799 DETECT AGENT NOS DNA QUANT: CPT

## 2020-10-29 PROCEDURE — 82570 ASSAY OF URINE CREATININE: CPT

## 2020-10-29 PROCEDURE — 84100 ASSAY OF PHOSPHORUS: CPT

## 2020-10-29 PROCEDURE — 83735 ASSAY OF MAGNESIUM: CPT

## 2020-10-29 PROCEDURE — 84156 ASSAY OF PROTEIN URINE: CPT

## 2020-10-29 PROCEDURE — 36415 COLL VENOUS BLD VENIPUNCTURE: CPT

## 2020-10-29 PROCEDURE — 85025 COMPLETE CBC W/AUTO DIFF WBC: CPT

## 2020-10-29 PROCEDURE — 80197 ASSAY OF TACROLIMUS: CPT

## 2020-10-29 PROCEDURE — 81003 URINALYSIS AUTO W/O SCOPE: CPT

## 2020-10-30 LAB — TACROLIMUS BLD-MCNC: 7.7 NG/ML (ref 5–15)

## 2020-10-31 ENCOUNTER — LAB VISIT (OUTPATIENT)
Dept: LAB | Facility: HOSPITAL | Age: 70
End: 2020-10-31
Attending: TRANSPLANT SURGERY
Payer: MEDICARE

## 2020-10-31 DIAGNOSIS — B19.20 UNSPECIFIED VIRAL HEPATITIS C WITHOUT HEPATIC COMA: Primary | ICD-10-CM

## 2020-10-31 LAB
ALBUMIN SERPL BCP-MCNC: 3.9 G/DL (ref 3.5–5.2)
ALP SERPL-CCNC: 120 U/L (ref 55–135)
ALT SERPL W/O P-5'-P-CCNC: 33 U/L (ref 10–44)
ANION GAP SERPL CALC-SCNC: 6 MMOL/L (ref 8–16)
AST SERPL-CCNC: 21 U/L (ref 10–40)
BASOPHILS # BLD AUTO: 0.03 K/UL (ref 0–0.2)
BASOPHILS NFR BLD: 0.5 % (ref 0–1.9)
BILIRUB SERPL-MCNC: 0.6 MG/DL (ref 0.1–1)
BUN SERPL-MCNC: 48 MG/DL (ref 8–23)
CALCIUM SERPL-MCNC: 10.3 MG/DL (ref 8.7–10.5)
CHLORIDE SERPL-SCNC: 109 MMOL/L (ref 95–110)
CO2 SERPL-SCNC: 23 MMOL/L (ref 23–29)
CREAT SERPL-MCNC: 2.6 MG/DL (ref 0.5–1.4)
DIFFERENTIAL METHOD: ABNORMAL
EOSINOPHIL # BLD AUTO: 0 K/UL (ref 0–0.5)
EOSINOPHIL NFR BLD: 0.7 % (ref 0–8)
ERYTHROCYTE [DISTWIDTH] IN BLOOD BY AUTOMATED COUNT: 14.5 % (ref 11.5–14.5)
EST. GFR  (AFRICAN AMERICAN): 27.5 ML/MIN/1.73 M^2
EST. GFR  (NON AFRICAN AMERICAN): 23.7 ML/MIN/1.73 M^2
GLUCOSE SERPL-MCNC: 272 MG/DL (ref 70–110)
HCT VFR BLD AUTO: 35.5 % (ref 40–54)
HGB BLD-MCNC: 11.4 G/DL (ref 14–18)
IMM GRANULOCYTES # BLD AUTO: 0.06 K/UL (ref 0–0.04)
IMM GRANULOCYTES NFR BLD AUTO: 1.1 % (ref 0–0.5)
LYMPHOCYTES # BLD AUTO: 0.4 K/UL (ref 1–4.8)
LYMPHOCYTES NFR BLD: 7.8 % (ref 18–48)
MCH RBC QN AUTO: 32.7 PG (ref 27–31)
MCHC RBC AUTO-ENTMCNC: 32.1 G/DL (ref 32–36)
MCV RBC AUTO: 102 FL (ref 82–98)
MONOCYTES # BLD AUTO: 0.4 K/UL (ref 0.3–1)
MONOCYTES NFR BLD: 6.2 % (ref 4–15)
NEUTROPHILS # BLD AUTO: 4.7 K/UL (ref 1.8–7.7)
NEUTROPHILS NFR BLD: 83.7 % (ref 38–73)
NRBC BLD-RTO: 0 /100 WBC
PLATELET # BLD AUTO: 159 K/UL (ref 150–350)
PMV BLD AUTO: 9.8 FL (ref 9.2–12.9)
POTASSIUM SERPL-SCNC: 5.1 MMOL/L (ref 3.5–5.1)
PROT SERPL-MCNC: 6.2 G/DL (ref 6–8.4)
RBC # BLD AUTO: 3.49 M/UL (ref 4.6–6.2)
SODIUM SERPL-SCNC: 138 MMOL/L (ref 136–145)
WBC # BLD AUTO: 5.66 K/UL (ref 3.9–12.7)

## 2020-10-31 PROCEDURE — 36415 COLL VENOUS BLD VENIPUNCTURE: CPT

## 2020-10-31 PROCEDURE — 87902 NFCT AGT GNTYP ALYS HEP C: CPT

## 2020-10-31 PROCEDURE — 80053 COMPREHEN METABOLIC PANEL: CPT

## 2020-10-31 PROCEDURE — 85025 COMPLETE CBC W/AUTO DIFF WBC: CPT

## 2020-11-06 ENCOUNTER — TELEPHONE (OUTPATIENT)
Dept: PODIATRY | Facility: CLINIC | Age: 70
End: 2020-11-06

## 2020-11-06 NOTE — TELEPHONE ENCOUNTER
Spoke with pt will need to reschedule 12/22/2020 appointment, Dr. Anuradha Trujillo will not be office. Pt stated he is driving and will call to reschedule.

## 2020-11-08 LAB — HCV GENTYP SERPL NAA+PROBE: NOT DETECTED

## 2020-11-12 ENCOUNTER — LAB VISIT (OUTPATIENT)
Dept: LAB | Facility: HOSPITAL | Age: 70
End: 2020-11-12
Attending: TRANSPLANT SURGERY
Payer: MEDICARE

## 2020-11-12 DIAGNOSIS — Z94.0 KIDNEY REPLACED BY TRANSPLANT: ICD-10-CM

## 2020-11-12 LAB
ALBUMIN SERPL BCP-MCNC: 3.7 G/DL (ref 3.5–5.2)
ALP SERPL-CCNC: 123 U/L (ref 55–135)
ALT SERPL W/O P-5'-P-CCNC: 34 U/L (ref 10–44)
ANION GAP SERPL CALC-SCNC: 5 MMOL/L (ref 8–16)
AST SERPL-CCNC: 18 U/L (ref 10–40)
BASOPHILS # BLD AUTO: 0.02 K/UL (ref 0–0.2)
BASOPHILS NFR BLD: 0.3 % (ref 0–1.9)
BILIRUB SERPL-MCNC: 0.8 MG/DL (ref 0.1–1)
BILIRUB UR QL STRIP: NEGATIVE
BUN SERPL-MCNC: 56 MG/DL (ref 8–23)
CALCIUM SERPL-MCNC: 10.8 MG/DL (ref 8.7–10.5)
CHLORIDE SERPL-SCNC: 113 MMOL/L (ref 95–110)
CLARITY UR: CLEAR
CO2 SERPL-SCNC: 22 MMOL/L (ref 23–29)
COLOR UR: YELLOW
CREAT SERPL-MCNC: 2.1 MG/DL (ref 0.5–1.4)
CREAT UR-MCNC: 69.2 MG/DL (ref 23–375)
DIFFERENTIAL METHOD: ABNORMAL
EOSINOPHIL # BLD AUTO: 0 K/UL (ref 0–0.5)
EOSINOPHIL NFR BLD: 0.7 % (ref 0–8)
ERYTHROCYTE [DISTWIDTH] IN BLOOD BY AUTOMATED COUNT: 14.3 % (ref 11.5–14.5)
EST. GFR  (AFRICAN AMERICAN): 35.5 ML/MIN/1.73 M^2
EST. GFR  (NON AFRICAN AMERICAN): 30.7 ML/MIN/1.73 M^2
GLUCOSE SERPL-MCNC: 185 MG/DL (ref 70–110)
GLUCOSE UR QL STRIP: ABNORMAL
HCT VFR BLD AUTO: 34.6 % (ref 40–54)
HGB BLD-MCNC: 11.4 G/DL (ref 14–18)
HGB UR QL STRIP: NEGATIVE
IMM GRANULOCYTES # BLD AUTO: 0.11 K/UL (ref 0–0.04)
IMM GRANULOCYTES NFR BLD AUTO: 1.9 % (ref 0–0.5)
KETONES UR QL STRIP: NEGATIVE
LEUKOCYTE ESTERASE UR QL STRIP: NEGATIVE
LYMPHOCYTES # BLD AUTO: 0.6 K/UL (ref 1–4.8)
LYMPHOCYTES NFR BLD: 9.6 % (ref 18–48)
MAGNESIUM SERPL-MCNC: 1.4 MG/DL (ref 1.6–2.6)
MCH RBC QN AUTO: 33 PG (ref 27–31)
MCHC RBC AUTO-ENTMCNC: 32.9 G/DL (ref 32–36)
MCV RBC AUTO: 100 FL (ref 82–98)
MONOCYTES # BLD AUTO: 0.3 K/UL (ref 0.3–1)
MONOCYTES NFR BLD: 5.7 % (ref 4–15)
NEUTROPHILS # BLD AUTO: 4.8 K/UL (ref 1.8–7.7)
NEUTROPHILS NFR BLD: 81.8 % (ref 38–73)
NITRITE UR QL STRIP: NEGATIVE
NRBC BLD-RTO: 0 /100 WBC
PH UR STRIP: 7 [PH] (ref 5–8)
PHOSPHATE SERPL-MCNC: 2.8 MG/DL (ref 2.7–4.5)
PLATELET # BLD AUTO: 178 K/UL (ref 150–350)
PMV BLD AUTO: 10 FL (ref 9.2–12.9)
POTASSIUM SERPL-SCNC: 4.9 MMOL/L (ref 3.5–5.1)
PROT SERPL-MCNC: 6.3 G/DL (ref 6–8.4)
PROT UR QL STRIP: NEGATIVE
PROT UR-MCNC: 18 MG/DL (ref 0–15)
RBC # BLD AUTO: 3.45 M/UL (ref 4.6–6.2)
SODIUM SERPL-SCNC: 140 MMOL/L (ref 136–145)
SP GR UR STRIP: 1.02 (ref 1–1.03)
URN SPEC COLLECT METH UR: ABNORMAL
UROBILINOGEN UR STRIP-ACNC: NEGATIVE EU/DL
WBC # BLD AUTO: 5.83 K/UL (ref 3.9–12.7)

## 2020-11-12 PROCEDURE — 36415 COLL VENOUS BLD VENIPUNCTURE: CPT

## 2020-11-12 PROCEDURE — 81003 URINALYSIS AUTO W/O SCOPE: CPT

## 2020-11-12 PROCEDURE — 80053 COMPREHEN METABOLIC PANEL: CPT

## 2020-11-12 PROCEDURE — 85025 COMPLETE CBC W/AUTO DIFF WBC: CPT

## 2020-11-12 PROCEDURE — 84156 ASSAY OF PROTEIN URINE: CPT

## 2020-11-12 PROCEDURE — 83735 ASSAY OF MAGNESIUM: CPT

## 2020-11-12 PROCEDURE — 80197 ASSAY OF TACROLIMUS: CPT

## 2020-11-12 PROCEDURE — 87799 DETECT AGENT NOS DNA QUANT: CPT

## 2020-11-12 PROCEDURE — 84100 ASSAY OF PHOSPHORUS: CPT

## 2020-11-12 PROCEDURE — 82570 ASSAY OF URINE CREATININE: CPT

## 2020-11-13 LAB — TACROLIMUS BLD-MCNC: 7.1 NG/ML (ref 5–15)

## 2020-11-25 ENCOUNTER — LAB VISIT (OUTPATIENT)
Dept: LAB | Facility: HOSPITAL | Age: 70
End: 2020-11-25
Attending: TRANSPLANT SURGERY
Payer: MEDICARE

## 2020-11-25 DIAGNOSIS — Z94.0 KIDNEY REPLACED BY TRANSPLANT: Primary | ICD-10-CM

## 2020-11-25 LAB
ALBUMIN SERPL BCP-MCNC: 4 G/DL (ref 3.5–5.2)
ALP SERPL-CCNC: 107 U/L (ref 55–135)
ALT SERPL W/O P-5'-P-CCNC: 27 U/L (ref 10–44)
ANION GAP SERPL CALC-SCNC: 6 MMOL/L (ref 8–16)
AST SERPL-CCNC: 17 U/L (ref 10–40)
BASOPHILS # BLD AUTO: 0.03 K/UL (ref 0–0.2)
BASOPHILS NFR BLD: 0.5 % (ref 0–1.9)
BILIRUB SERPL-MCNC: 0.9 MG/DL (ref 0.1–1)
BILIRUB UR QL STRIP: NEGATIVE
BUN SERPL-MCNC: 40 MG/DL (ref 8–23)
CALCIUM SERPL-MCNC: 10.6 MG/DL (ref 8.7–10.5)
CHLORIDE SERPL-SCNC: 109 MMOL/L (ref 95–110)
CLARITY UR: CLEAR
CO2 SERPL-SCNC: 24 MMOL/L (ref 23–29)
COLOR UR: YELLOW
CREAT SERPL-MCNC: 2 MG/DL (ref 0.5–1.4)
CREAT UR-MCNC: 92.1 MG/DL (ref 23–375)
DIFFERENTIAL METHOD: ABNORMAL
EOSINOPHIL # BLD AUTO: 0.1 K/UL (ref 0–0.5)
EOSINOPHIL NFR BLD: 1.2 % (ref 0–8)
ERYTHROCYTE [DISTWIDTH] IN BLOOD BY AUTOMATED COUNT: 13.7 % (ref 11.5–14.5)
EST. GFR  (AFRICAN AMERICAN): 37.7 ML/MIN/1.73 M^2
EST. GFR  (NON AFRICAN AMERICAN): 32.6 ML/MIN/1.73 M^2
GLUCOSE SERPL-MCNC: 50 MG/DL (ref 70–110)
GLUCOSE UR QL STRIP: ABNORMAL
HCT VFR BLD AUTO: 36.3 % (ref 40–54)
HGB BLD-MCNC: 11.9 G/DL (ref 14–18)
HGB UR QL STRIP: NEGATIVE
IMM GRANULOCYTES # BLD AUTO: 0.1 K/UL (ref 0–0.04)
IMM GRANULOCYTES NFR BLD AUTO: 1.7 % (ref 0–0.5)
KETONES UR QL STRIP: NEGATIVE
LEUKOCYTE ESTERASE UR QL STRIP: NEGATIVE
LYMPHOCYTES # BLD AUTO: 0.8 K/UL (ref 1–4.8)
LYMPHOCYTES NFR BLD: 14.2 % (ref 18–48)
MAGNESIUM SERPL-MCNC: 1.5 MG/DL (ref 1.6–2.6)
MCH RBC QN AUTO: 33.6 PG (ref 27–31)
MCHC RBC AUTO-ENTMCNC: 32.8 G/DL (ref 32–36)
MCV RBC AUTO: 103 FL (ref 82–98)
MONOCYTES # BLD AUTO: 0.4 K/UL (ref 0.3–1)
MONOCYTES NFR BLD: 5.9 % (ref 4–15)
NEUTROPHILS # BLD AUTO: 4.5 K/UL (ref 1.8–7.7)
NEUTROPHILS NFR BLD: 76.5 % (ref 38–73)
NITRITE UR QL STRIP: NEGATIVE
NRBC BLD-RTO: 0 /100 WBC
PH UR STRIP: 7 [PH] (ref 5–8)
PHOSPHATE SERPL-MCNC: 2.3 MG/DL (ref 2.7–4.5)
PLATELET # BLD AUTO: 181 K/UL (ref 150–350)
PMV BLD AUTO: 9.9 FL (ref 9.2–12.9)
POTASSIUM SERPL-SCNC: 4.2 MMOL/L (ref 3.5–5.1)
PROT SERPL-MCNC: 6.2 G/DL (ref 6–8.4)
PROT UR QL STRIP: NEGATIVE
PROT UR-MCNC: 16 MG/DL (ref 0–15)
RBC # BLD AUTO: 3.54 M/UL (ref 4.6–6.2)
SODIUM SERPL-SCNC: 139 MMOL/L (ref 136–145)
SP GR UR STRIP: 1.02 (ref 1–1.03)
URN SPEC COLLECT METH UR: ABNORMAL
UROBILINOGEN UR STRIP-ACNC: NEGATIVE EU/DL
WBC # BLD AUTO: 5.9 K/UL (ref 3.9–12.7)

## 2020-11-25 PROCEDURE — 84100 ASSAY OF PHOSPHORUS: CPT

## 2020-11-25 PROCEDURE — 83735 ASSAY OF MAGNESIUM: CPT

## 2020-11-25 PROCEDURE — 84156 ASSAY OF PROTEIN URINE: CPT

## 2020-11-25 PROCEDURE — 80197 ASSAY OF TACROLIMUS: CPT

## 2020-11-25 PROCEDURE — 36415 COLL VENOUS BLD VENIPUNCTURE: CPT

## 2020-11-25 PROCEDURE — 81003 URINALYSIS AUTO W/O SCOPE: CPT

## 2020-11-25 PROCEDURE — 82570 ASSAY OF URINE CREATININE: CPT

## 2020-11-25 PROCEDURE — 80053 COMPREHEN METABOLIC PANEL: CPT

## 2020-11-25 PROCEDURE — 85025 COMPLETE CBC W/AUTO DIFF WBC: CPT

## 2020-11-25 PROCEDURE — 87799 DETECT AGENT NOS DNA QUANT: CPT

## 2020-11-26 LAB — TACROLIMUS BLD-MCNC: 7 NG/ML (ref 5–15)

## 2020-11-30 LAB
BKV DNA SERPL NAA+PROBE-ACNC: 164 COPIES/ML
BKV DNA SERPL NAA+PROBE-LOG#: 2.21 LOG (10) COPIES/ML
BKV DNA SERPL QL NAA+PROBE: DETECTED

## 2020-12-10 ENCOUNTER — LAB VISIT (OUTPATIENT)
Dept: LAB | Facility: HOSPITAL | Age: 70
End: 2020-12-10
Attending: TRANSPLANT SURGERY
Payer: MEDICARE

## 2020-12-10 DIAGNOSIS — E03.9 HYPOTHYROIDISM: ICD-10-CM

## 2020-12-10 DIAGNOSIS — E66.9 OBESITY: ICD-10-CM

## 2020-12-10 DIAGNOSIS — Z94.0 KIDNEY REPLACED BY TRANSPLANT: Primary | ICD-10-CM

## 2020-12-10 DIAGNOSIS — E11.21 TYPE II DIABETES MELLITUS WITH NEPHROPATHY: ICD-10-CM

## 2020-12-10 DIAGNOSIS — Z29.89 NEED FOR ISOLATION: ICD-10-CM

## 2020-12-10 LAB
ALBUMIN SERPL BCP-MCNC: 3.7 G/DL (ref 3.5–5.2)
ALP SERPL-CCNC: 110 U/L (ref 55–135)
ALT SERPL W/O P-5'-P-CCNC: 30 U/L (ref 10–44)
ANION GAP SERPL CALC-SCNC: 3 MMOL/L (ref 8–16)
AST SERPL-CCNC: 22 U/L (ref 10–40)
BASOPHILS # BLD AUTO: 0.04 K/UL (ref 0–0.2)
BASOPHILS NFR BLD: 0.7 % (ref 0–1.9)
BILIRUB SERPL-MCNC: 1 MG/DL (ref 0.1–1)
BUN SERPL-MCNC: 45 MG/DL (ref 8–23)
CALCIUM SERPL-MCNC: 10.1 MG/DL (ref 8.7–10.5)
CHLORIDE SERPL-SCNC: 113 MMOL/L (ref 95–110)
CO2 SERPL-SCNC: 22 MMOL/L (ref 23–29)
CREAT SERPL-MCNC: 1.9 MG/DL (ref 0.5–1.4)
DIFFERENTIAL METHOD: ABNORMAL
EOSINOPHIL # BLD AUTO: 0.1 K/UL (ref 0–0.5)
EOSINOPHIL NFR BLD: 1 % (ref 0–8)
ERYTHROCYTE [DISTWIDTH] IN BLOOD BY AUTOMATED COUNT: 13.4 % (ref 11.5–14.5)
EST. GFR  (AFRICAN AMERICAN): 40.1 ML/MIN/1.73 M^2
EST. GFR  (NON AFRICAN AMERICAN): 34.7 ML/MIN/1.73 M^2
GLUCOSE SERPL-MCNC: 137 MG/DL (ref 70–110)
HCT VFR BLD AUTO: 34.5 % (ref 40–54)
HGB BLD-MCNC: 11.1 G/DL (ref 14–18)
IMM GRANULOCYTES # BLD AUTO: 0.12 K/UL (ref 0–0.04)
IMM GRANULOCYTES NFR BLD AUTO: 2.1 % (ref 0–0.5)
LYMPHOCYTES # BLD AUTO: 0.7 K/UL (ref 1–4.8)
LYMPHOCYTES NFR BLD: 11.7 % (ref 18–48)
MAGNESIUM SERPL-MCNC: 1.6 MG/DL (ref 1.6–2.6)
MCH RBC QN AUTO: 32.9 PG (ref 27–31)
MCHC RBC AUTO-ENTMCNC: 32.2 G/DL (ref 32–36)
MCV RBC AUTO: 102 FL (ref 82–98)
MONOCYTES # BLD AUTO: 0.3 K/UL (ref 0.3–1)
MONOCYTES NFR BLD: 5.7 % (ref 4–15)
NEUTROPHILS # BLD AUTO: 4.6 K/UL (ref 1.8–7.7)
NEUTROPHILS NFR BLD: 78.8 % (ref 38–73)
NRBC BLD-RTO: 0 /100 WBC
PHOSPHATE SERPL-MCNC: 2.5 MG/DL (ref 2.7–4.5)
PLATELET # BLD AUTO: 163 K/UL (ref 150–350)
PMV BLD AUTO: 10.2 FL (ref 9.2–12.9)
POTASSIUM SERPL-SCNC: 4.6 MMOL/L (ref 3.5–5.1)
PROT SERPL-MCNC: 5.8 G/DL (ref 6–8.4)
RBC # BLD AUTO: 3.37 M/UL (ref 4.6–6.2)
SODIUM SERPL-SCNC: 138 MMOL/L (ref 136–145)
WBC # BLD AUTO: 5.81 K/UL (ref 3.9–12.7)

## 2020-12-10 PROCEDURE — 82306 VITAMIN D 25 HYDROXY: CPT

## 2020-12-10 PROCEDURE — 80197 ASSAY OF TACROLIMUS: CPT

## 2020-12-10 PROCEDURE — 83735 ASSAY OF MAGNESIUM: CPT

## 2020-12-10 PROCEDURE — 80053 COMPREHEN METABOLIC PANEL: CPT

## 2020-12-10 PROCEDURE — 36415 COLL VENOUS BLD VENIPUNCTURE: CPT

## 2020-12-10 PROCEDURE — 84100 ASSAY OF PHOSPHORUS: CPT

## 2020-12-10 PROCEDURE — 87799 DETECT AGENT NOS DNA QUANT: CPT

## 2020-12-10 PROCEDURE — 85025 COMPLETE CBC W/AUTO DIFF WBC: CPT

## 2020-12-11 LAB
25(OH)D3+25(OH)D2 SERPL-MCNC: 14 NG/ML (ref 30–96)
TACROLIMUS BLD-MCNC: 6.7 NG/ML (ref 5–15)

## 2020-12-14 LAB
BKV DNA SERPL NAA+PROBE-ACNC: 230 COPIES/ML
BKV DNA SERPL NAA+PROBE-LOG#: 2.36 LOG (10) COPIES/ML
BKV DNA SERPL QL NAA+PROBE: DETECTED

## 2020-12-24 ENCOUNTER — LAB VISIT (OUTPATIENT)
Dept: LAB | Facility: HOSPITAL | Age: 70
End: 2020-12-24
Attending: TRANSPLANT SURGERY
Payer: MEDICARE

## 2020-12-24 DIAGNOSIS — Z94.0 KIDNEY REPLACED BY TRANSPLANT: ICD-10-CM

## 2020-12-24 LAB
ALBUMIN SERPL BCP-MCNC: 3.9 G/DL (ref 3.5–5.2)
ALP SERPL-CCNC: 126 U/L (ref 55–135)
ALT SERPL W/O P-5'-P-CCNC: 25 U/L (ref 10–44)
ANION GAP SERPL CALC-SCNC: 6 MMOL/L (ref 8–16)
AST SERPL-CCNC: 16 U/L (ref 10–40)
BASOPHILS # BLD AUTO: 0.03 K/UL (ref 0–0.2)
BASOPHILS NFR BLD: 0.5 % (ref 0–1.9)
BILIRUB SERPL-MCNC: 0.8 MG/DL (ref 0.1–1)
BILIRUB UR QL STRIP: NEGATIVE
BUN SERPL-MCNC: 44 MG/DL (ref 8–23)
CALCIUM SERPL-MCNC: 9.6 MG/DL (ref 8.7–10.5)
CHLORIDE SERPL-SCNC: 107 MMOL/L (ref 95–110)
CLARITY UR: CLEAR
CO2 SERPL-SCNC: 24 MMOL/L (ref 23–29)
COLOR UR: YELLOW
CREAT SERPL-MCNC: 1.8 MG/DL (ref 0.5–1.4)
CREAT UR-MCNC: 119.1 MG/DL (ref 23–375)
DIFFERENTIAL METHOD: ABNORMAL
EOSINOPHIL # BLD AUTO: 0.1 K/UL (ref 0–0.5)
EOSINOPHIL NFR BLD: 1 % (ref 0–8)
ERYTHROCYTE [DISTWIDTH] IN BLOOD BY AUTOMATED COUNT: 13 % (ref 11.5–14.5)
EST. GFR  (AFRICAN AMERICAN): 42.8 ML/MIN/1.73 M^2
EST. GFR  (NON AFRICAN AMERICAN): 37 ML/MIN/1.73 M^2
GLUCOSE SERPL-MCNC: 144 MG/DL (ref 70–110)
GLUCOSE UR QL STRIP: ABNORMAL
HCT VFR BLD AUTO: 33.8 % (ref 40–54)
HGB BLD-MCNC: 11.1 G/DL (ref 14–18)
HGB UR QL STRIP: NEGATIVE
IMM GRANULOCYTES # BLD AUTO: 0.18 K/UL (ref 0–0.04)
IMM GRANULOCYTES NFR BLD AUTO: 3.1 % (ref 0–0.5)
KETONES UR QL STRIP: NEGATIVE
LEUKOCYTE ESTERASE UR QL STRIP: NEGATIVE
LYMPHOCYTES # BLD AUTO: 0.6 K/UL (ref 1–4.8)
LYMPHOCYTES NFR BLD: 9.6 % (ref 18–48)
MAGNESIUM SERPL-MCNC: 1.3 MG/DL (ref 1.6–2.6)
MCH RBC QN AUTO: 33.1 PG (ref 27–31)
MCHC RBC AUTO-ENTMCNC: 32.8 G/DL (ref 32–36)
MCV RBC AUTO: 101 FL (ref 82–98)
MONOCYTES # BLD AUTO: 0.3 K/UL (ref 0.3–1)
MONOCYTES NFR BLD: 5.8 % (ref 4–15)
NEUTROPHILS # BLD AUTO: 4.7 K/UL (ref 1.8–7.7)
NEUTROPHILS NFR BLD: 80 % (ref 38–73)
NITRITE UR QL STRIP: NEGATIVE
NRBC BLD-RTO: 0 /100 WBC
PH UR STRIP: 6 [PH] (ref 5–8)
PHOSPHATE SERPL-MCNC: 2.4 MG/DL (ref 2.7–4.5)
PLATELET # BLD AUTO: 172 K/UL (ref 150–350)
PMV BLD AUTO: 9.9 FL (ref 9.2–12.9)
POTASSIUM SERPL-SCNC: 4.5 MMOL/L (ref 3.5–5.1)
PROT SERPL-MCNC: 6.2 G/DL (ref 6–8.4)
PROT UR QL STRIP: NEGATIVE
PROT UR-MCNC: 26 MG/DL (ref 0–15)
RBC # BLD AUTO: 3.35 M/UL (ref 4.6–6.2)
SODIUM SERPL-SCNC: 137 MMOL/L (ref 136–145)
SP GR UR STRIP: 1.02 (ref 1–1.03)
URN SPEC COLLECT METH UR: ABNORMAL
UROBILINOGEN UR STRIP-ACNC: NEGATIVE EU/DL
WBC # BLD AUTO: 5.84 K/UL (ref 3.9–12.7)

## 2020-12-24 PROCEDURE — 85025 COMPLETE CBC W/AUTO DIFF WBC: CPT

## 2020-12-24 PROCEDURE — 80197 ASSAY OF TACROLIMUS: CPT

## 2020-12-24 PROCEDURE — 83735 ASSAY OF MAGNESIUM: CPT

## 2020-12-24 PROCEDURE — 81003 URINALYSIS AUTO W/O SCOPE: CPT

## 2020-12-24 PROCEDURE — 82570 ASSAY OF URINE CREATININE: CPT

## 2020-12-24 PROCEDURE — 84100 ASSAY OF PHOSPHORUS: CPT

## 2020-12-24 PROCEDURE — 84156 ASSAY OF PROTEIN URINE: CPT

## 2020-12-24 PROCEDURE — 80053 COMPREHEN METABOLIC PANEL: CPT

## 2020-12-24 PROCEDURE — 87799 DETECT AGENT NOS DNA QUANT: CPT

## 2020-12-25 LAB — TACROLIMUS BLD-MCNC: 8.2 NG/ML (ref 5–15)

## 2020-12-28 LAB
BKV DNA SERPL NAA+PROBE-ACNC: 151 COPIES/ML
BKV DNA SERPL NAA+PROBE-LOG#: 2.18 LOG (10) COPIES/ML
BKV DNA SERPL QL NAA+PROBE: DETECTED

## 2021-01-08 ENCOUNTER — OFFICE VISIT (OUTPATIENT)
Dept: PODIATRY | Facility: CLINIC | Age: 71
End: 2021-01-08
Payer: MEDICARE

## 2021-01-08 VITALS
OXYGEN SATURATION: 99 % | TEMPERATURE: 98 F | SYSTOLIC BLOOD PRESSURE: 146 MMHG | HEART RATE: 96 BPM | WEIGHT: 199 LBS | RESPIRATION RATE: 18 BRPM | HEIGHT: 67 IN | DIASTOLIC BLOOD PRESSURE: 74 MMHG | BODY MASS INDEX: 31.23 KG/M2

## 2021-01-08 DIAGNOSIS — G62.9 NEUROPATHY: ICD-10-CM

## 2021-01-08 DIAGNOSIS — L85.3 DRY SKIN DERMATITIS: ICD-10-CM

## 2021-01-08 DIAGNOSIS — E11.49 TYPE II DIABETES MELLITUS WITH NEUROLOGICAL MANIFESTATIONS: Primary | ICD-10-CM

## 2021-01-08 DIAGNOSIS — B35.1 ONYCHOMYCOSIS OF TOENAIL: ICD-10-CM

## 2021-01-08 PROCEDURE — 99999 PR PBB SHADOW E&M-EST. PATIENT-LVL V: ICD-10-PCS | Mod: PBBFAC,,, | Performed by: PODIATRIST

## 2021-01-08 PROCEDURE — 99213 PR OFFICE/OUTPT VISIT, EST, LEVL III, 20-29 MIN: ICD-10-PCS | Mod: S$PBB,,, | Performed by: PODIATRIST

## 2021-01-08 PROCEDURE — 99999 PR PBB SHADOW E&M-EST. PATIENT-LVL V: CPT | Mod: PBBFAC,,, | Performed by: PODIATRIST

## 2021-01-08 PROCEDURE — 99215 OFFICE O/P EST HI 40 MIN: CPT | Mod: PBBFAC | Performed by: PODIATRIST

## 2021-01-08 PROCEDURE — 99213 OFFICE O/P EST LOW 20 MIN: CPT | Mod: S$PBB,,, | Performed by: PODIATRIST

## 2021-01-08 RX ORDER — CINACALCET 30 MG/1
TABLET, FILM COATED ORAL
COMMUNITY
Start: 2020-12-30

## 2021-01-08 RX ORDER — OMEPRAZOLE 20 MG/1
20 TABLET, DELAYED RELEASE ORAL
COMMUNITY
Start: 2020-12-08 | End: 2021-05-03 | Stop reason: SDUPTHER

## 2021-01-25 ENCOUNTER — LAB VISIT (OUTPATIENT)
Dept: LAB | Facility: HOSPITAL | Age: 71
End: 2021-01-25
Attending: NURSE PRACTITIONER
Payer: MEDICARE

## 2021-01-25 DIAGNOSIS — B19.20 UNSPECIFIED VIRAL HEPATITIS C WITHOUT HEPATIC COMA: Primary | ICD-10-CM

## 2021-01-25 PROCEDURE — 87522 HEPATITIS C REVRS TRNSCRPJ: CPT

## 2021-01-27 LAB
HCV RNA SERPL NAA+PROBE-LOG IU: <1.08 LOG (10) IU/ML
HCV RNA SERPL QL NAA+PROBE: NOT DETECTED IU/ML
HCV RNA SPEC NAA+PROBE-ACNC: <12 IU/ML

## 2021-02-04 ENCOUNTER — LAB VISIT (OUTPATIENT)
Dept: LAB | Facility: HOSPITAL | Age: 71
End: 2021-02-04
Attending: TRANSPLANT SURGERY
Payer: MEDICARE

## 2021-02-04 DIAGNOSIS — Z94.0 KIDNEY REPLACED BY TRANSPLANT: ICD-10-CM

## 2021-02-04 LAB
ALBUMIN SERPL BCP-MCNC: 4.3 G/DL (ref 3.5–5.2)
ALP SERPL-CCNC: 147 U/L (ref 55–135)
ALT SERPL W/O P-5'-P-CCNC: 32 U/L (ref 10–44)
ANION GAP SERPL CALC-SCNC: 7 MMOL/L (ref 8–16)
AST SERPL-CCNC: 18 U/L (ref 10–40)
BASOPHILS # BLD AUTO: 0.05 K/UL (ref 0–0.2)
BASOPHILS NFR BLD: 0.6 % (ref 0–1.9)
BILIRUB SERPL-MCNC: 0.7 MG/DL (ref 0.1–1)
BILIRUB UR QL STRIP: NEGATIVE
BUN SERPL-MCNC: 60 MG/DL (ref 8–23)
CALCIUM SERPL-MCNC: 9.3 MG/DL (ref 8.7–10.5)
CHLORIDE SERPL-SCNC: 108 MMOL/L (ref 95–110)
CLARITY UR: CLEAR
CO2 SERPL-SCNC: 23 MMOL/L (ref 23–29)
COLOR UR: YELLOW
CREAT SERPL-MCNC: 1.8 MG/DL (ref 0.5–1.4)
CREAT UR-MCNC: 51.2 MG/DL (ref 23–375)
DIFFERENTIAL METHOD: ABNORMAL
EOSINOPHIL # BLD AUTO: 0.1 K/UL (ref 0–0.5)
EOSINOPHIL NFR BLD: 1.2 % (ref 0–8)
ERYTHROCYTE [DISTWIDTH] IN BLOOD BY AUTOMATED COUNT: 12.6 % (ref 11.5–14.5)
EST. GFR  (AFRICAN AMERICAN): 42.8 ML/MIN/1.73 M^2
EST. GFR  (NON AFRICAN AMERICAN): 37 ML/MIN/1.73 M^2
GLUCOSE SERPL-MCNC: 63 MG/DL (ref 70–110)
GLUCOSE UR QL STRIP: ABNORMAL
HCT VFR BLD AUTO: 36.6 % (ref 40–54)
HGB BLD-MCNC: 12.1 G/DL (ref 14–18)
HGB UR QL STRIP: ABNORMAL
IMM GRANULOCYTES # BLD AUTO: 0.28 K/UL (ref 0–0.04)
IMM GRANULOCYTES NFR BLD AUTO: 3.6 % (ref 0–0.5)
KETONES UR QL STRIP: NEGATIVE
LEUKOCYTE ESTERASE UR QL STRIP: NEGATIVE
LYMPHOCYTES # BLD AUTO: 0.9 K/UL (ref 1–4.8)
LYMPHOCYTES NFR BLD: 11.1 % (ref 18–48)
MAGNESIUM SERPL-MCNC: 1.6 MG/DL (ref 1.6–2.6)
MCH RBC QN AUTO: 34 PG (ref 27–31)
MCHC RBC AUTO-ENTMCNC: 33.1 G/DL (ref 32–36)
MCV RBC AUTO: 103 FL (ref 82–98)
MONOCYTES # BLD AUTO: 0.4 K/UL (ref 0.3–1)
MONOCYTES NFR BLD: 5.7 % (ref 4–15)
NEUTROPHILS # BLD AUTO: 6.1 K/UL (ref 1.8–7.7)
NEUTROPHILS NFR BLD: 77.8 % (ref 38–73)
NITRITE UR QL STRIP: NEGATIVE
NRBC BLD-RTO: 0 /100 WBC
PH UR STRIP: 6 [PH] (ref 5–8)
PHOSPHATE SERPL-MCNC: 3.5 MG/DL (ref 2.7–4.5)
PLATELET # BLD AUTO: 174 K/UL (ref 150–350)
PMV BLD AUTO: 10.1 FL (ref 9.2–12.9)
POTASSIUM SERPL-SCNC: 4.6 MMOL/L (ref 3.5–5.1)
PROT SERPL-MCNC: 6.9 G/DL (ref 6–8.4)
PROT UR QL STRIP: NEGATIVE
PROT UR-MCNC: 12 MG/DL (ref 0–15)
RBC # BLD AUTO: 3.56 M/UL (ref 4.6–6.2)
SODIUM SERPL-SCNC: 138 MMOL/L (ref 136–145)
SP GR UR STRIP: 1.02 (ref 1–1.03)
URN SPEC COLLECT METH UR: ABNORMAL
UROBILINOGEN UR STRIP-ACNC: NEGATIVE EU/DL
WBC # BLD AUTO: 7.77 K/UL (ref 3.9–12.7)

## 2021-02-04 PROCEDURE — 87799 DETECT AGENT NOS DNA QUANT: CPT

## 2021-02-04 PROCEDURE — 84156 ASSAY OF PROTEIN URINE: CPT

## 2021-02-04 PROCEDURE — 83735 ASSAY OF MAGNESIUM: CPT

## 2021-02-04 PROCEDURE — 84100 ASSAY OF PHOSPHORUS: CPT

## 2021-02-04 PROCEDURE — 82570 ASSAY OF URINE CREATININE: CPT

## 2021-02-04 PROCEDURE — 80053 COMPREHEN METABOLIC PANEL: CPT

## 2021-02-04 PROCEDURE — 36415 COLL VENOUS BLD VENIPUNCTURE: CPT

## 2021-02-04 PROCEDURE — 85025 COMPLETE CBC W/AUTO DIFF WBC: CPT

## 2021-02-04 PROCEDURE — 81003 URINALYSIS AUTO W/O SCOPE: CPT

## 2021-02-04 PROCEDURE — 80197 ASSAY OF TACROLIMUS: CPT

## 2021-02-05 LAB — TACROLIMUS BLD-MCNC: 7.1 NG/ML (ref 5–15)

## 2021-02-08 LAB
BKV DNA SERPL NAA+PROBE-ACNC: 209 COPIES/ML
BKV DNA SERPL NAA+PROBE-LOG#: 2.32 LOG (10) COPIES/ML
BKV DNA SERPL QL NAA+PROBE: DETECTED

## 2021-03-11 ENCOUNTER — LAB VISIT (OUTPATIENT)
Dept: LAB | Facility: HOSPITAL | Age: 71
End: 2021-03-11
Attending: TRANSPLANT SURGERY
Payer: MEDICARE

## 2021-03-11 DIAGNOSIS — Z94.0 KIDNEY REPLACED BY TRANSPLANT: ICD-10-CM

## 2021-03-11 LAB
ALBUMIN SERPL BCP-MCNC: 3.9 G/DL (ref 3.5–5.2)
ALP SERPL-CCNC: 189 U/L (ref 55–135)
ALT SERPL W/O P-5'-P-CCNC: 53 U/L (ref 10–44)
ANION GAP SERPL CALC-SCNC: 9 MMOL/L (ref 8–16)
AST SERPL-CCNC: 22 U/L (ref 10–40)
BACTERIA #/AREA URNS HPF: ABNORMAL /HPF
BASOPHILS # BLD AUTO: 0.04 K/UL (ref 0–0.2)
BASOPHILS NFR BLD: 0.6 % (ref 0–1.9)
BILIRUB SERPL-MCNC: 0.7 MG/DL (ref 0.1–1)
BILIRUB UR QL STRIP: NEGATIVE
BUN SERPL-MCNC: 47 MG/DL (ref 8–23)
CALCIUM SERPL-MCNC: 9.5 MG/DL (ref 8.7–10.5)
CHLORIDE SERPL-SCNC: 107 MMOL/L (ref 95–110)
CLARITY UR: CLEAR
CO2 SERPL-SCNC: 22 MMOL/L (ref 23–29)
COLOR UR: YELLOW
CREAT SERPL-MCNC: 1.9 MG/DL (ref 0.5–1.4)
CREAT UR-MCNC: 32.9 MG/DL (ref 23–375)
DIFFERENTIAL METHOD: ABNORMAL
EOSINOPHIL # BLD AUTO: 0.1 K/UL (ref 0–0.5)
EOSINOPHIL NFR BLD: 1.2 % (ref 0–8)
ERYTHROCYTE [DISTWIDTH] IN BLOOD BY AUTOMATED COUNT: 12.7 % (ref 11.5–14.5)
EST. GFR  (AFRICAN AMERICAN): 40.1 ML/MIN/1.73 M^2
EST. GFR  (NON AFRICAN AMERICAN): 34.7 ML/MIN/1.73 M^2
GLUCOSE SERPL-MCNC: 166 MG/DL (ref 70–110)
GLUCOSE UR QL STRIP: ABNORMAL
HCT VFR BLD AUTO: 36.4 % (ref 40–54)
HGB BLD-MCNC: 12.1 G/DL (ref 14–18)
HGB UR QL STRIP: NEGATIVE
IMM GRANULOCYTES # BLD AUTO: 0.29 K/UL (ref 0–0.04)
IMM GRANULOCYTES NFR BLD AUTO: 4.4 % (ref 0–0.5)
KETONES UR QL STRIP: NEGATIVE
LEUKOCYTE ESTERASE UR QL STRIP: NEGATIVE
LYMPHOCYTES # BLD AUTO: 0.8 K/UL (ref 1–4.8)
LYMPHOCYTES NFR BLD: 11.3 % (ref 18–48)
MAGNESIUM SERPL-MCNC: 1.8 MG/DL (ref 1.6–2.6)
MCH RBC QN AUTO: 33.3 PG (ref 27–31)
MCHC RBC AUTO-ENTMCNC: 33.2 G/DL (ref 32–36)
MCV RBC AUTO: 100 FL (ref 82–98)
MICROSCOPIC COMMENT: ABNORMAL
MONOCYTES # BLD AUTO: 0.4 K/UL (ref 0.3–1)
MONOCYTES NFR BLD: 6.1 % (ref 4–15)
NEUTROPHILS # BLD AUTO: 5.1 K/UL (ref 1.8–7.7)
NEUTROPHILS NFR BLD: 76.4 % (ref 38–73)
NITRITE UR QL STRIP: NEGATIVE
NRBC BLD-RTO: 0 /100 WBC
PH UR STRIP: 7 [PH] (ref 5–8)
PHOSPHATE SERPL-MCNC: 3 MG/DL (ref 2.7–4.5)
PLATELET # BLD AUTO: 183 K/UL (ref 150–350)
PMV BLD AUTO: 10.2 FL (ref 9.2–12.9)
POTASSIUM SERPL-SCNC: 4.8 MMOL/L (ref 3.5–5.1)
PROT SERPL-MCNC: 6.8 G/DL (ref 6–8.4)
PROT UR QL STRIP: NEGATIVE
PROT UR-MCNC: 7 MG/DL (ref 0–15)
RBC # BLD AUTO: 3.63 M/UL (ref 4.6–6.2)
SODIUM SERPL-SCNC: 138 MMOL/L (ref 136–145)
SP GR UR STRIP: 1.01 (ref 1–1.03)
URN SPEC COLLECT METH UR: ABNORMAL
UROBILINOGEN UR STRIP-ACNC: NEGATIVE EU/DL
WBC # BLD AUTO: 6.61 K/UL (ref 3.9–12.7)
YEAST URNS QL MICRO: ABNORMAL

## 2021-03-11 PROCEDURE — 82570 ASSAY OF URINE CREATININE: CPT | Performed by: TRANSPLANT SURGERY

## 2021-03-11 PROCEDURE — 80053 COMPREHEN METABOLIC PANEL: CPT | Performed by: TRANSPLANT SURGERY

## 2021-03-11 PROCEDURE — 84156 ASSAY OF PROTEIN URINE: CPT | Performed by: TRANSPLANT SURGERY

## 2021-03-11 PROCEDURE — 87799 DETECT AGENT NOS DNA QUANT: CPT | Performed by: TRANSPLANT SURGERY

## 2021-03-11 PROCEDURE — 36415 COLL VENOUS BLD VENIPUNCTURE: CPT | Performed by: TRANSPLANT SURGERY

## 2021-03-11 PROCEDURE — 83735 ASSAY OF MAGNESIUM: CPT | Performed by: TRANSPLANT SURGERY

## 2021-03-11 PROCEDURE — 85025 COMPLETE CBC W/AUTO DIFF WBC: CPT | Performed by: TRANSPLANT SURGERY

## 2021-03-11 PROCEDURE — 81000 URINALYSIS NONAUTO W/SCOPE: CPT | Performed by: TRANSPLANT SURGERY

## 2021-03-11 PROCEDURE — 80197 ASSAY OF TACROLIMUS: CPT | Performed by: TRANSPLANT SURGERY

## 2021-03-11 PROCEDURE — 84100 ASSAY OF PHOSPHORUS: CPT | Performed by: TRANSPLANT SURGERY

## 2021-03-12 ENCOUNTER — OFFICE VISIT (OUTPATIENT)
Dept: PODIATRY | Facility: CLINIC | Age: 71
End: 2021-03-12
Payer: MEDICARE

## 2021-03-12 VITALS
DIASTOLIC BLOOD PRESSURE: 74 MMHG | SYSTOLIC BLOOD PRESSURE: 126 MMHG | BODY MASS INDEX: 27.43 KG/M2 | HEART RATE: 85 BPM | WEIGHT: 181 LBS | TEMPERATURE: 98 F | HEIGHT: 68 IN

## 2021-03-12 DIAGNOSIS — E11.9 COMPREHENSIVE DIABETIC FOOT EXAMINATION, TYPE 2 DM, ENCOUNTER FOR: ICD-10-CM

## 2021-03-12 DIAGNOSIS — L85.3 DRY SKIN DERMATITIS: ICD-10-CM

## 2021-03-12 DIAGNOSIS — B35.1 ONYCHOMYCOSIS OF TOENAIL: ICD-10-CM

## 2021-03-12 DIAGNOSIS — E11.49 TYPE II DIABETES MELLITUS WITH NEUROLOGICAL MANIFESTATIONS: Primary | ICD-10-CM

## 2021-03-12 LAB — TACROLIMUS BLD-MCNC: 8.5 NG/ML (ref 5–15)

## 2021-03-12 PROCEDURE — 99999 PR PBB SHADOW E&M-EST. PATIENT-LVL IV: ICD-10-PCS | Mod: PBBFAC,,, | Performed by: PODIATRIST

## 2021-03-12 PROCEDURE — 99999 PR PBB SHADOW E&M-EST. PATIENT-LVL IV: CPT | Mod: PBBFAC,,, | Performed by: PODIATRIST

## 2021-03-12 PROCEDURE — 99214 PR OFFICE/OUTPT VISIT, EST, LEVL IV, 30-39 MIN: ICD-10-PCS | Mod: S$PBB,,, | Performed by: PODIATRIST

## 2021-03-12 PROCEDURE — 99214 OFFICE O/P EST MOD 30 MIN: CPT | Mod: PBBFAC | Performed by: PODIATRIST

## 2021-03-12 PROCEDURE — 99214 OFFICE O/P EST MOD 30 MIN: CPT | Mod: S$PBB,,, | Performed by: PODIATRIST

## 2021-03-12 RX ORDER — OMEPRAZOLE 20 MG/1
CAPSULE, DELAYED RELEASE ORAL
COMMUNITY
Start: 2021-02-22 | End: 2021-09-14 | Stop reason: SDUPTHER

## 2021-03-12 RX ORDER — CEFDINIR 300 MG/1
300 CAPSULE ORAL 2 TIMES DAILY
COMMUNITY
Start: 2021-03-08 | End: 2021-05-03 | Stop reason: ALTCHOICE

## 2021-03-15 LAB
BKV DNA SERPL NAA+PROBE-ACNC: 152 COPIES/ML
BKV DNA SERPL NAA+PROBE-LOG#: 2.18 LOG (10) COPIES/ML
BKV DNA SERPL QL NAA+PROBE: DETECTED

## 2021-03-25 ENCOUNTER — LAB VISIT (OUTPATIENT)
Dept: LAB | Facility: HOSPITAL | Age: 71
End: 2021-03-25
Attending: TRANSPLANT SURGERY
Payer: MEDICARE

## 2021-03-25 DIAGNOSIS — Z94.0 KIDNEY REPLACED BY TRANSPLANT: ICD-10-CM

## 2021-03-25 LAB
ALBUMIN SERPL BCP-MCNC: 4.1 G/DL (ref 3.5–5.2)
ALP SERPL-CCNC: 157 U/L (ref 55–135)
ALT SERPL W/O P-5'-P-CCNC: 30 U/L (ref 10–44)
ANION GAP SERPL CALC-SCNC: 8 MMOL/L (ref 8–16)
AST SERPL-CCNC: 20 U/L (ref 10–40)
BACTERIA #/AREA URNS HPF: NORMAL /HPF
BASOPHILS # BLD AUTO: 0.03 K/UL (ref 0–0.2)
BASOPHILS NFR BLD: 0.5 % (ref 0–1.9)
BILIRUB SERPL-MCNC: 0.4 MG/DL (ref 0.1–1)
BILIRUB UR QL STRIP: NEGATIVE
BUN SERPL-MCNC: 56 MG/DL (ref 8–23)
CALCIUM SERPL-MCNC: 9.1 MG/DL (ref 8.7–10.5)
CHLORIDE SERPL-SCNC: 109 MMOL/L (ref 95–110)
CLARITY UR: CLEAR
CO2 SERPL-SCNC: 21 MMOL/L (ref 23–29)
COLOR UR: YELLOW
CREAT SERPL-MCNC: 2.1 MG/DL (ref 0.5–1.4)
CREAT UR-MCNC: 73 MG/DL (ref 23–375)
DIFFERENTIAL METHOD: ABNORMAL
EOSINOPHIL # BLD AUTO: 0.1 K/UL (ref 0–0.5)
EOSINOPHIL NFR BLD: 1.2 % (ref 0–8)
ERYTHROCYTE [DISTWIDTH] IN BLOOD BY AUTOMATED COUNT: 12.9 % (ref 11.5–14.5)
EST. GFR  (AFRICAN AMERICAN): 35.5 ML/MIN/1.73 M^2
EST. GFR  (NON AFRICAN AMERICAN): 30.7 ML/MIN/1.73 M^2
GLUCOSE SERPL-MCNC: 92 MG/DL (ref 70–110)
GLUCOSE UR QL STRIP: ABNORMAL
HCT VFR BLD AUTO: 36.3 % (ref 40–54)
HGB BLD-MCNC: 12 G/DL (ref 14–18)
HGB UR QL STRIP: NEGATIVE
IMM GRANULOCYTES # BLD AUTO: 0.16 K/UL (ref 0–0.04)
IMM GRANULOCYTES NFR BLD AUTO: 2.8 % (ref 0–0.5)
KETONES UR QL STRIP: NEGATIVE
LEUKOCYTE ESTERASE UR QL STRIP: NEGATIVE
LYMPHOCYTES # BLD AUTO: 1 K/UL (ref 1–4.8)
LYMPHOCYTES NFR BLD: 17.8 % (ref 18–48)
MAGNESIUM SERPL-MCNC: 1.8 MG/DL (ref 1.6–2.6)
MCH RBC QN AUTO: 32.6 PG (ref 27–31)
MCHC RBC AUTO-ENTMCNC: 33.1 G/DL (ref 32–36)
MCV RBC AUTO: 99 FL (ref 82–98)
MICROSCOPIC COMMENT: NORMAL
MONOCYTES # BLD AUTO: 0.4 K/UL (ref 0.3–1)
MONOCYTES NFR BLD: 7.2 % (ref 4–15)
NEUTROPHILS # BLD AUTO: 4 K/UL (ref 1.8–7.7)
NEUTROPHILS NFR BLD: 70.5 % (ref 38–73)
NITRITE UR QL STRIP: NEGATIVE
NRBC BLD-RTO: 0 /100 WBC
PH UR STRIP: 6 [PH] (ref 5–8)
PHOSPHATE SERPL-MCNC: 3.5 MG/DL (ref 2.7–4.5)
PLATELET # BLD AUTO: 158 K/UL (ref 150–350)
PMV BLD AUTO: 9.9 FL (ref 9.2–12.9)
POTASSIUM SERPL-SCNC: 4.7 MMOL/L (ref 3.5–5.1)
PROT SERPL-MCNC: 6.9 G/DL (ref 6–8.4)
PROT UR QL STRIP: NEGATIVE
PROT UR-MCNC: 10 MG/DL (ref 0–15)
RBC # BLD AUTO: 3.68 M/UL (ref 4.6–6.2)
SODIUM SERPL-SCNC: 138 MMOL/L (ref 136–145)
SP GR UR STRIP: 1.01 (ref 1–1.03)
URN SPEC COLLECT METH UR: ABNORMAL
UROBILINOGEN UR STRIP-ACNC: NEGATIVE EU/DL
WBC # BLD AUTO: 5.67 K/UL (ref 3.9–12.7)
YEAST URNS QL MICRO: NORMAL

## 2021-03-25 PROCEDURE — 83735 ASSAY OF MAGNESIUM: CPT | Performed by: TRANSPLANT SURGERY

## 2021-03-25 PROCEDURE — 80053 COMPREHEN METABOLIC PANEL: CPT | Performed by: TRANSPLANT SURGERY

## 2021-03-25 PROCEDURE — 87799 DETECT AGENT NOS DNA QUANT: CPT | Performed by: TRANSPLANT SURGERY

## 2021-03-25 PROCEDURE — 84100 ASSAY OF PHOSPHORUS: CPT | Performed by: TRANSPLANT SURGERY

## 2021-03-25 PROCEDURE — 82570 ASSAY OF URINE CREATININE: CPT | Performed by: TRANSPLANT SURGERY

## 2021-03-25 PROCEDURE — 84156 ASSAY OF PROTEIN URINE: CPT | Performed by: TRANSPLANT SURGERY

## 2021-03-25 PROCEDURE — 80197 ASSAY OF TACROLIMUS: CPT | Performed by: TRANSPLANT SURGERY

## 2021-03-25 PROCEDURE — 81000 URINALYSIS NONAUTO W/SCOPE: CPT | Performed by: TRANSPLANT SURGERY

## 2021-03-25 PROCEDURE — 85025 COMPLETE CBC W/AUTO DIFF WBC: CPT | Performed by: TRANSPLANT SURGERY

## 2021-03-26 LAB — TACROLIMUS BLD-MCNC: 4.3 NG/ML (ref 5–15)

## 2021-03-29 LAB
BKV DNA SERPL NAA+PROBE-ACNC: 307 COPIES/ML
BKV DNA SERPL NAA+PROBE-LOG#: 2.49 LOG (10) COPIES/ML
BKV DNA SERPL QL NAA+PROBE: DETECTED

## 2021-03-30 ENCOUNTER — IMMUNIZATION (OUTPATIENT)
Dept: FAMILY MEDICINE | Facility: CLINIC | Age: 71
End: 2021-03-30
Payer: MEDICARE

## 2021-03-30 DIAGNOSIS — Z23 NEED FOR VACCINATION: Primary | ICD-10-CM

## 2021-03-30 PROCEDURE — 0011A COVID-19, MRNA, LNP-S, PF, 100 MCG/0.5 ML DOSE VACCINE: CPT | Mod: CV19,S$GLB,, | Performed by: FAMILY MEDICINE

## 2021-03-30 PROCEDURE — 91301 COVID-19, MRNA, LNP-S, PF, 100 MCG/0.5 ML DOSE VACCINE: ICD-10-PCS | Mod: S$GLB,,, | Performed by: FAMILY MEDICINE

## 2021-03-30 PROCEDURE — 91301 COVID-19, MRNA, LNP-S, PF, 100 MCG/0.5 ML DOSE VACCINE: CPT | Mod: S$GLB,,, | Performed by: FAMILY MEDICINE

## 2021-03-30 PROCEDURE — 0011A COVID-19, MRNA, LNP-S, PF, 100 MCG/0.5 ML DOSE VACCINE: ICD-10-PCS | Mod: CV19,S$GLB,, | Performed by: FAMILY MEDICINE

## 2021-04-08 ENCOUNTER — LAB VISIT (OUTPATIENT)
Dept: LAB | Facility: HOSPITAL | Age: 71
End: 2021-04-08
Attending: TRANSPLANT SURGERY
Payer: MEDICARE

## 2021-04-08 DIAGNOSIS — Z94.0 KIDNEY REPLACED BY TRANSPLANT: ICD-10-CM

## 2021-04-08 LAB
ALBUMIN SERPL BCP-MCNC: 3.6 G/DL (ref 3.5–5.2)
ALP SERPL-CCNC: 207 U/L (ref 55–135)
ALT SERPL W/O P-5'-P-CCNC: 80 U/L (ref 10–44)
ANION GAP SERPL CALC-SCNC: 6 MMOL/L (ref 8–16)
AST SERPL-CCNC: 27 U/L (ref 10–40)
BASOPHILS # BLD AUTO: 0.02 K/UL (ref 0–0.2)
BASOPHILS NFR BLD: 0.4 % (ref 0–1.9)
BILIRUB SERPL-MCNC: 0.4 MG/DL (ref 0.1–1)
BUN SERPL-MCNC: 46 MG/DL (ref 8–23)
CALCIUM SERPL-MCNC: 9.2 MG/DL (ref 8.7–10.5)
CHLORIDE SERPL-SCNC: 109 MMOL/L (ref 95–110)
CO2 SERPL-SCNC: 22 MMOL/L (ref 23–29)
CREAT SERPL-MCNC: 1.5 MG/DL (ref 0.5–1.4)
DIFFERENTIAL METHOD: ABNORMAL
EOSINOPHIL # BLD AUTO: 0.1 K/UL (ref 0–0.5)
EOSINOPHIL NFR BLD: 1.4 % (ref 0–8)
ERYTHROCYTE [DISTWIDTH] IN BLOOD BY AUTOMATED COUNT: 13.3 % (ref 11.5–14.5)
EST. GFR  (AFRICAN AMERICAN): 53.4 ML/MIN/1.73 M^2
EST. GFR  (NON AFRICAN AMERICAN): 46.2 ML/MIN/1.73 M^2
GLUCOSE SERPL-MCNC: 96 MG/DL (ref 70–110)
HCT VFR BLD AUTO: 35.5 % (ref 40–54)
HGB BLD-MCNC: 11.8 G/DL (ref 14–18)
IMM GRANULOCYTES # BLD AUTO: 0.22 K/UL (ref 0–0.04)
IMM GRANULOCYTES NFR BLD AUTO: 4.3 % (ref 0–0.5)
LYMPHOCYTES # BLD AUTO: 1.2 K/UL (ref 1–4.8)
LYMPHOCYTES NFR BLD: 22.9 % (ref 18–48)
MAGNESIUM SERPL-MCNC: 1.5 MG/DL (ref 1.6–2.6)
MCH RBC QN AUTO: 32.8 PG (ref 27–31)
MCHC RBC AUTO-ENTMCNC: 33.2 G/DL (ref 32–36)
MCV RBC AUTO: 99 FL (ref 82–98)
MONOCYTES # BLD AUTO: 0.3 K/UL (ref 0.3–1)
MONOCYTES NFR BLD: 6.4 % (ref 4–15)
NEUTROPHILS # BLD AUTO: 3.3 K/UL (ref 1.8–7.7)
NEUTROPHILS NFR BLD: 64.6 % (ref 38–73)
NRBC BLD-RTO: 0 /100 WBC
PHOSPHATE SERPL-MCNC: 2.8 MG/DL (ref 2.7–4.5)
PLATELET # BLD AUTO: 145 K/UL (ref 150–450)
PMV BLD AUTO: 10.2 FL (ref 9.2–12.9)
POTASSIUM SERPL-SCNC: 4.5 MMOL/L (ref 3.5–5.1)
PROT SERPL-MCNC: 6.6 G/DL (ref 6–8.4)
RBC # BLD AUTO: 3.6 M/UL (ref 4.6–6.2)
SODIUM SERPL-SCNC: 137 MMOL/L (ref 136–145)
WBC # BLD AUTO: 5.16 K/UL (ref 3.9–12.7)

## 2021-04-08 PROCEDURE — 36415 COLL VENOUS BLD VENIPUNCTURE: CPT | Performed by: TRANSPLANT SURGERY

## 2021-04-08 PROCEDURE — 83735 ASSAY OF MAGNESIUM: CPT | Performed by: TRANSPLANT SURGERY

## 2021-04-08 PROCEDURE — 80197 ASSAY OF TACROLIMUS: CPT | Performed by: TRANSPLANT SURGERY

## 2021-04-08 PROCEDURE — 87799 DETECT AGENT NOS DNA QUANT: CPT | Performed by: TRANSPLANT SURGERY

## 2021-04-08 PROCEDURE — 85025 COMPLETE CBC W/AUTO DIFF WBC: CPT | Performed by: TRANSPLANT SURGERY

## 2021-04-08 PROCEDURE — 84100 ASSAY OF PHOSPHORUS: CPT | Performed by: TRANSPLANT SURGERY

## 2021-04-08 PROCEDURE — 80053 COMPREHEN METABOLIC PANEL: CPT | Performed by: TRANSPLANT SURGERY

## 2021-04-09 LAB — TACROLIMUS BLD-MCNC: 3.2 NG/ML (ref 5–15)

## 2021-04-12 LAB
BKV DNA SERPL NAA+PROBE-ACNC: 156 COPIES/ML
BKV DNA SERPL NAA+PROBE-LOG#: 2.19 LOG (10) COPIES/ML
BKV DNA SERPL QL NAA+PROBE: DETECTED

## 2021-05-04 ENCOUNTER — IMMUNIZATION (OUTPATIENT)
Dept: FAMILY MEDICINE | Facility: CLINIC | Age: 71
End: 2021-05-04
Payer: MEDICARE

## 2021-05-04 DIAGNOSIS — Z23 NEED FOR VACCINATION: Primary | ICD-10-CM

## 2021-05-04 PROCEDURE — 0012A COVID-19, MRNA, LNP-S, PF, 100 MCG/0.5 ML DOSE VACCINE: CPT | Mod: CV19,S$GLB,, | Performed by: FAMILY MEDICINE

## 2021-05-04 PROCEDURE — 91301 COVID-19, MRNA, LNP-S, PF, 100 MCG/0.5 ML DOSE VACCINE: ICD-10-PCS | Mod: S$GLB,,, | Performed by: FAMILY MEDICINE

## 2021-05-04 PROCEDURE — 0012A COVID-19, MRNA, LNP-S, PF, 100 MCG/0.5 ML DOSE VACCINE: ICD-10-PCS | Mod: CV19,S$GLB,, | Performed by: FAMILY MEDICINE

## 2021-05-04 PROCEDURE — 91301 COVID-19, MRNA, LNP-S, PF, 100 MCG/0.5 ML DOSE VACCINE: CPT | Mod: S$GLB,,, | Performed by: FAMILY MEDICINE

## 2021-05-13 ENCOUNTER — OFFICE VISIT (OUTPATIENT)
Dept: PODIATRY | Facility: CLINIC | Age: 71
End: 2021-05-13
Payer: MEDICARE

## 2021-05-13 VITALS
SYSTOLIC BLOOD PRESSURE: 137 MMHG | TEMPERATURE: 97 F | RESPIRATION RATE: 18 BRPM | HEIGHT: 68 IN | WEIGHT: 183 LBS | DIASTOLIC BLOOD PRESSURE: 85 MMHG | BODY MASS INDEX: 27.74 KG/M2 | HEART RATE: 82 BPM

## 2021-05-13 DIAGNOSIS — G62.9 NEUROPATHY: ICD-10-CM

## 2021-05-13 DIAGNOSIS — M25.511 ACUTE PAIN OF RIGHT SHOULDER DUE TO TRAUMA: ICD-10-CM

## 2021-05-13 DIAGNOSIS — B35.1 ONYCHOMYCOSIS OF TOENAIL: ICD-10-CM

## 2021-05-13 DIAGNOSIS — L85.3 DRY SKIN DERMATITIS: ICD-10-CM

## 2021-05-13 DIAGNOSIS — G89.11 ACUTE PAIN OF RIGHT SHOULDER DUE TO TRAUMA: ICD-10-CM

## 2021-05-13 DIAGNOSIS — E11.49 TYPE II DIABETES MELLITUS WITH NEUROLOGICAL MANIFESTATIONS: Primary | ICD-10-CM

## 2021-05-13 PROCEDURE — 99999 PR PBB SHADOW E&M-EST. PATIENT-LVL V: ICD-10-PCS | Mod: PBBFAC,,, | Performed by: PODIATRIST

## 2021-05-13 PROCEDURE — 99213 OFFICE O/P EST LOW 20 MIN: CPT | Mod: S$PBB,,, | Performed by: PODIATRIST

## 2021-05-13 PROCEDURE — 99215 OFFICE O/P EST HI 40 MIN: CPT | Mod: PBBFAC | Performed by: PODIATRIST

## 2021-05-13 PROCEDURE — 99999 PR PBB SHADOW E&M-EST. PATIENT-LVL V: CPT | Mod: PBBFAC,,, | Performed by: PODIATRIST

## 2021-05-13 PROCEDURE — 99213 PR OFFICE/OUTPT VISIT, EST, LEVL III, 20-29 MIN: ICD-10-PCS | Mod: S$PBB,,, | Performed by: PODIATRIST

## 2021-05-13 RX ORDER — SULFAMETHOXAZOLE AND TRIMETHOPRIM 800; 160 MG/1; MG/1
1 TABLET ORAL
COMMUNITY
Start: 2021-03-25 | End: 2021-05-15

## 2021-05-13 RX ORDER — INSULIN ASPART 100 [IU]/ML
INJECTION, SOLUTION INTRAVENOUS; SUBCUTANEOUS
COMMUNITY
Start: 2021-04-04 | End: 2022-04-25 | Stop reason: SDUPTHER

## 2021-05-13 RX ORDER — DOXYCYCLINE 100 MG/1
100 CAPSULE ORAL 2 TIMES DAILY
COMMUNITY
Start: 2021-04-20 | End: 2022-03-27

## 2021-05-13 RX ORDER — ROSUVASTATIN CALCIUM 10 MG/1
10 TABLET, COATED ORAL DAILY
COMMUNITY
Start: 2021-04-22 | End: 2022-02-24 | Stop reason: SDUPTHER

## 2021-05-13 RX ORDER — LEVOFLOXACIN 250 MG/1
TABLET ORAL
COMMUNITY
Start: 2021-04-23 | End: 2022-03-27

## 2021-06-07 ENCOUNTER — LAB VISIT (OUTPATIENT)
Dept: LAB | Facility: HOSPITAL | Age: 71
End: 2021-06-07
Attending: TRANSPLANT SURGERY
Payer: MEDICARE

## 2021-06-07 DIAGNOSIS — Z94.0 KIDNEY REPLACED BY TRANSPLANT: ICD-10-CM

## 2021-06-07 LAB
BILIRUB UR QL STRIP: NEGATIVE
CLARITY UR: CLEAR
COLOR UR: YELLOW
CREAT UR-MCNC: 75.3 MG/DL (ref 23–375)
GLUCOSE UR QL STRIP: NEGATIVE
HGB UR QL STRIP: NEGATIVE
KETONES UR QL STRIP: NEGATIVE
LEUKOCYTE ESTERASE UR QL STRIP: NEGATIVE
NITRITE UR QL STRIP: NEGATIVE
PH UR STRIP: 6 [PH] (ref 5–8)
PROT UR QL STRIP: NEGATIVE
PROT UR-MCNC: 11 MG/DL (ref 0–15)
SP GR UR STRIP: 1.02 (ref 1–1.03)
URN SPEC COLLECT METH UR: NORMAL
UROBILINOGEN UR STRIP-ACNC: NEGATIVE EU/DL

## 2021-06-07 PROCEDURE — 84156 ASSAY OF PROTEIN URINE: CPT | Performed by: TRANSPLANT SURGERY

## 2021-06-07 PROCEDURE — 81003 URINALYSIS AUTO W/O SCOPE: CPT | Performed by: TRANSPLANT SURGERY

## 2021-06-07 PROCEDURE — 82570 ASSAY OF URINE CREATININE: CPT | Performed by: TRANSPLANT SURGERY

## 2021-06-09 ENCOUNTER — HOSPITAL ENCOUNTER (OUTPATIENT)
Dept: RADIOLOGY | Facility: HOSPITAL | Age: 71
Discharge: HOME | End: 2021-06-09
Attending: NURSE PRACTITIONER
Payer: MEDICARE

## 2021-06-09 DIAGNOSIS — R07.89 RIGHT-SIDED CHEST WALL PAIN: Primary | ICD-10-CM

## 2021-06-09 DIAGNOSIS — R07.89 RIGHT-SIDED CHEST WALL PAIN: ICD-10-CM

## 2021-06-09 PROCEDURE — 71046 X-RAY EXAM CHEST 2 VIEWS: CPT | Mod: TC,FY

## 2021-06-09 PROCEDURE — 71046 XR CHEST PA AND LATERAL: ICD-10-PCS | Mod: 26,,, | Performed by: RADIOLOGY

## 2021-06-09 PROCEDURE — 71046 X-RAY EXAM CHEST 2 VIEWS: CPT | Mod: 26,,, | Performed by: RADIOLOGY

## 2021-07-07 ENCOUNTER — LAB VISIT (OUTPATIENT)
Dept: LAB | Facility: HOSPITAL | Age: 71
End: 2021-07-07
Attending: TRANSPLANT SURGERY
Payer: MEDICARE

## 2021-07-07 DIAGNOSIS — Z94.0 KIDNEY REPLACED BY TRANSPLANT: ICD-10-CM

## 2021-07-07 LAB
ALBUMIN SERPL BCP-MCNC: 3.6 G/DL (ref 3.5–5.2)
ALP SERPL-CCNC: 245 U/L (ref 55–135)
ALT SERPL W/O P-5'-P-CCNC: 46 U/L (ref 10–44)
ANION GAP SERPL CALC-SCNC: 8 MMOL/L (ref 8–16)
AST SERPL-CCNC: 21 U/L (ref 10–40)
BASOPHILS # BLD AUTO: 0.04 K/UL (ref 0–0.2)
BASOPHILS NFR BLD: 0.7 % (ref 0–1.9)
BILIRUB SERPL-MCNC: 0.7 MG/DL (ref 0.1–1)
BUN SERPL-MCNC: 50 MG/DL (ref 8–23)
CALCIUM SERPL-MCNC: 9.7 MG/DL (ref 8.7–10.5)
CHLORIDE SERPL-SCNC: 107 MMOL/L (ref 95–110)
CO2 SERPL-SCNC: 25 MMOL/L (ref 23–29)
CREAT SERPL-MCNC: 1.7 MG/DL (ref 0.5–1.4)
DIFFERENTIAL METHOD: ABNORMAL
EOSINOPHIL # BLD AUTO: 0.1 K/UL (ref 0–0.5)
EOSINOPHIL NFR BLD: 1.6 % (ref 0–8)
ERYTHROCYTE [DISTWIDTH] IN BLOOD BY AUTOMATED COUNT: 12.9 % (ref 11.5–14.5)
EST. GFR  (AFRICAN AMERICAN): 45.6 ML/MIN/1.73 M^2
EST. GFR  (NON AFRICAN AMERICAN): 39.4 ML/MIN/1.73 M^2
GLUCOSE SERPL-MCNC: 149 MG/DL (ref 70–110)
HCT VFR BLD AUTO: 40.7 % (ref 40–54)
HGB BLD-MCNC: 13.2 G/DL (ref 14–18)
IMM GRANULOCYTES # BLD AUTO: 0.1 K/UL (ref 0–0.04)
IMM GRANULOCYTES NFR BLD AUTO: 1.8 % (ref 0–0.5)
LYMPHOCYTES # BLD AUTO: 1.4 K/UL (ref 1–4.8)
LYMPHOCYTES NFR BLD: 25.7 % (ref 18–48)
MAGNESIUM SERPL-MCNC: 1.6 MG/DL (ref 1.6–2.6)
MCH RBC QN AUTO: 32 PG (ref 27–31)
MCHC RBC AUTO-ENTMCNC: 32.4 G/DL (ref 32–36)
MCV RBC AUTO: 99 FL (ref 82–98)
MONOCYTES # BLD AUTO: 0.5 K/UL (ref 0.3–1)
MONOCYTES NFR BLD: 8.9 % (ref 4–15)
NEUTROPHILS # BLD AUTO: 3.4 K/UL (ref 1.8–7.7)
NEUTROPHILS NFR BLD: 61.3 % (ref 38–73)
NRBC BLD-RTO: 0 /100 WBC
PHOSPHATE SERPL-MCNC: 3.5 MG/DL (ref 2.7–4.5)
PLATELET # BLD AUTO: 127 K/UL (ref 150–450)
PMV BLD AUTO: 9.8 FL (ref 9.2–12.9)
POTASSIUM SERPL-SCNC: 4.4 MMOL/L (ref 3.5–5.1)
PROT SERPL-MCNC: 6.7 G/DL (ref 6–8.4)
RBC # BLD AUTO: 4.13 M/UL (ref 4.6–6.2)
SODIUM SERPL-SCNC: 140 MMOL/L (ref 136–145)
WBC # BLD AUTO: 5.52 K/UL (ref 3.9–12.7)

## 2021-07-07 PROCEDURE — 87799 DETECT AGENT NOS DNA QUANT: CPT | Performed by: TRANSPLANT SURGERY

## 2021-07-07 PROCEDURE — 84100 ASSAY OF PHOSPHORUS: CPT | Performed by: TRANSPLANT SURGERY

## 2021-07-07 PROCEDURE — 83735 ASSAY OF MAGNESIUM: CPT | Performed by: TRANSPLANT SURGERY

## 2021-07-07 PROCEDURE — 36415 COLL VENOUS BLD VENIPUNCTURE: CPT | Performed by: TRANSPLANT SURGERY

## 2021-07-07 PROCEDURE — 85025 COMPLETE CBC W/AUTO DIFF WBC: CPT | Performed by: TRANSPLANT SURGERY

## 2021-07-07 PROCEDURE — 80053 COMPREHEN METABOLIC PANEL: CPT | Performed by: TRANSPLANT SURGERY

## 2021-07-07 PROCEDURE — 80197 ASSAY OF TACROLIMUS: CPT | Performed by: TRANSPLANT SURGERY

## 2021-07-08 LAB
TACROLIMUS BLD-MCNC: 4.6 NG/ML (ref 5–15)
TACROLIMUS, NORMALIZED: 4.3 NG/ML (ref 5–15)

## 2021-07-13 ENCOUNTER — OFFICE VISIT (OUTPATIENT)
Dept: PODIATRY | Facility: CLINIC | Age: 71
End: 2021-07-13
Payer: MEDICARE

## 2021-07-13 VITALS
WEIGHT: 196.13 LBS | DIASTOLIC BLOOD PRESSURE: 80 MMHG | SYSTOLIC BLOOD PRESSURE: 129 MMHG | HEIGHT: 68 IN | RESPIRATION RATE: 14 BRPM | BODY MASS INDEX: 29.72 KG/M2 | OXYGEN SATURATION: 98 % | HEART RATE: 72 BPM

## 2021-07-13 DIAGNOSIS — L85.3 DRY SKIN DERMATITIS: ICD-10-CM

## 2021-07-13 DIAGNOSIS — E11.49 TYPE II DIABETES MELLITUS WITH NEUROLOGICAL MANIFESTATIONS: Primary | ICD-10-CM

## 2021-07-13 DIAGNOSIS — B35.1 ONYCHOMYCOSIS OF TOENAIL: ICD-10-CM

## 2021-07-13 DIAGNOSIS — L60.8 ACQUIRED DYSMORPHIC TOENAIL: ICD-10-CM

## 2021-07-13 PROCEDURE — 99999 PR PBB SHADOW E&M-EST. PATIENT-LVL V: ICD-10-PCS | Mod: PBBFAC,,, | Performed by: PODIATRIST

## 2021-07-13 PROCEDURE — 99213 OFFICE O/P EST LOW 20 MIN: CPT | Mod: S$PBB,,, | Performed by: PODIATRIST

## 2021-07-13 PROCEDURE — 99215 OFFICE O/P EST HI 40 MIN: CPT | Mod: PBBFAC | Performed by: PODIATRIST

## 2021-07-13 PROCEDURE — 99999 PR PBB SHADOW E&M-EST. PATIENT-LVL V: CPT | Mod: PBBFAC,,, | Performed by: PODIATRIST

## 2021-07-13 PROCEDURE — 99213 PR OFFICE/OUTPT VISIT, EST, LEVL III, 20-29 MIN: ICD-10-PCS | Mod: S$PBB,,, | Performed by: PODIATRIST

## 2021-08-17 ENCOUNTER — IMMUNIZATION (OUTPATIENT)
Dept: FAMILY MEDICINE | Facility: CLINIC | Age: 71
End: 2021-08-17
Payer: MEDICARE

## 2021-08-17 DIAGNOSIS — Z23 NEED FOR VACCINATION: Primary | ICD-10-CM

## 2021-08-17 PROCEDURE — 0013A COVID-19, MRNA, LNP-S, PF, 100 MCG/0.5 ML DOSE VACCINE: ICD-10-PCS | Mod: CV19,S$GLB,, | Performed by: FAMILY MEDICINE

## 2021-08-17 PROCEDURE — 0013A COVID-19, MRNA, LNP-S, PF, 100 MCG/0.5 ML DOSE VACCINE: CPT | Mod: CV19,S$GLB,, | Performed by: FAMILY MEDICINE

## 2021-08-17 PROCEDURE — 91301 COVID-19, MRNA, LNP-S, PF, 100 MCG/0.5 ML DOSE VACCINE: CPT | Mod: S$GLB,,, | Performed by: FAMILY MEDICINE

## 2021-08-17 PROCEDURE — 91301 COVID-19, MRNA, LNP-S, PF, 100 MCG/0.5 ML DOSE VACCINE: ICD-10-PCS | Mod: S$GLB,,, | Performed by: FAMILY MEDICINE

## 2021-09-02 ENCOUNTER — LAB VISIT (OUTPATIENT)
Dept: LAB | Facility: HOSPITAL | Age: 71
End: 2021-09-02
Attending: TRANSPLANT SURGERY
Payer: MEDICARE

## 2021-09-02 DIAGNOSIS — Z94.0 KIDNEY REPLACED BY TRANSPLANT: ICD-10-CM

## 2021-09-14 ENCOUNTER — OFFICE VISIT (OUTPATIENT)
Dept: PODIATRY | Facility: CLINIC | Age: 71
End: 2021-09-14
Payer: MEDICARE

## 2021-09-14 VITALS
SYSTOLIC BLOOD PRESSURE: 121 MMHG | WEIGHT: 196 LBS | HEART RATE: 67 BPM | DIASTOLIC BLOOD PRESSURE: 76 MMHG | RESPIRATION RATE: 18 BRPM | BODY MASS INDEX: 29.7 KG/M2 | HEIGHT: 68 IN

## 2021-09-14 DIAGNOSIS — L85.3 DRY SKIN DERMATITIS: ICD-10-CM

## 2021-09-14 DIAGNOSIS — E11.49 TYPE II DIABETES MELLITUS WITH NEUROLOGICAL MANIFESTATIONS: Primary | ICD-10-CM

## 2021-09-14 DIAGNOSIS — B35.1 ONYCHOMYCOSIS OF TOENAIL: ICD-10-CM

## 2021-09-14 PROCEDURE — 99213 PR OFFICE/OUTPT VISIT, EST, LEVL III, 20-29 MIN: ICD-10-PCS | Mod: S$PBB,,, | Performed by: PODIATRIST

## 2021-09-14 PROCEDURE — 99213 OFFICE O/P EST LOW 20 MIN: CPT | Mod: S$PBB,,, | Performed by: PODIATRIST

## 2021-09-14 PROCEDURE — 99213 OFFICE O/P EST LOW 20 MIN: CPT | Mod: PBBFAC | Performed by: PODIATRIST

## 2021-09-14 PROCEDURE — 99999 PR PBB SHADOW E&M-EST. PATIENT-LVL III: CPT | Mod: PBBFAC,,, | Performed by: PODIATRIST

## 2021-09-14 PROCEDURE — 99999 PR PBB SHADOW E&M-EST. PATIENT-LVL III: ICD-10-PCS | Mod: PBBFAC,,, | Performed by: PODIATRIST

## 2021-09-14 RX ORDER — AMOXICILLIN AND CLAVULANATE POTASSIUM 875; 125 MG/1; MG/1
TABLET, FILM COATED ORAL
COMMUNITY
Start: 2021-08-24 | End: 2022-01-19

## 2021-09-14 RX ORDER — SODIUM BICARBONATE 650 MG/1
1950 TABLET ORAL
COMMUNITY
Start: 2021-08-21 | End: 2021-09-14 | Stop reason: SDUPTHER

## 2021-09-14 RX ORDER — OMEPRAZOLE 20 MG/1
20 TABLET, DELAYED RELEASE ORAL
COMMUNITY
Start: 2021-08-21 | End: 2021-11-21 | Stop reason: SDUPTHER

## 2021-09-14 RX ORDER — PREDNISOLONE 5 MG/1
5 TABLET ORAL
COMMUNITY
Start: 2021-08-21 | End: 2021-11-21 | Stop reason: SDUPTHER

## 2021-09-14 RX ORDER — HYDROCODONE BITARTRATE AND ACETAMINOPHEN 5; 325 MG/1; MG/1
TABLET ORAL
COMMUNITY
Start: 2021-08-24 | End: 2022-04-05

## 2021-09-14 RX ORDER — TACROLIMUS 0.75 MG/1
1.5 TABLET, EXTENDED RELEASE ORAL DAILY
COMMUNITY
Start: 2021-09-08 | End: 2021-09-14 | Stop reason: SDUPTHER

## 2021-10-04 ENCOUNTER — LAB VISIT (OUTPATIENT)
Dept: LAB | Facility: HOSPITAL | Age: 71
End: 2021-10-04
Attending: TRANSPLANT SURGERY
Payer: MEDICARE

## 2021-10-04 DIAGNOSIS — E03.9 HYPOTHYROIDISM, UNSPECIFIED TYPE: ICD-10-CM

## 2021-10-04 DIAGNOSIS — R73.09 HEMOGLOBIN A1C GREATER THAN 9.0%: ICD-10-CM

## 2021-10-04 DIAGNOSIS — R73.9 HYPERGLYCEMIA: ICD-10-CM

## 2021-10-04 DIAGNOSIS — N18.6 ESRD (END STAGE RENAL DISEASE): ICD-10-CM

## 2021-10-04 DIAGNOSIS — I63.9 ISCHEMIC CEREBROVASCULAR ACCIDENT (CVA): ICD-10-CM

## 2021-10-04 DIAGNOSIS — T86.19 RECEIVED KIDNEY FROM DONOR WITH HEPATITIS C: ICD-10-CM

## 2021-10-04 DIAGNOSIS — E78.00 HYPERCHOLESTEREMIA: ICD-10-CM

## 2021-10-04 DIAGNOSIS — E03.4 HYPOTHYROIDISM DUE TO ACQUIRED ATROPHY OF THYROID: ICD-10-CM

## 2021-10-04 DIAGNOSIS — Z79.52 LONG TERM SYSTEMIC STEROID USER: ICD-10-CM

## 2021-10-04 DIAGNOSIS — E11.49 TYPE II DIABETES MELLITUS WITH NEUROLOGICAL MANIFESTATIONS: ICD-10-CM

## 2021-10-04 DIAGNOSIS — Z94.0 HISTORY OF KIDNEY TRANSPLANT: ICD-10-CM

## 2021-10-04 DIAGNOSIS — Z94.0 KIDNEY REPLACED BY TRANSPLANT: ICD-10-CM

## 2021-10-04 LAB
ALBUMIN SERPL BCP-MCNC: 3.2 G/DL (ref 3.5–5.2)
ALP SERPL-CCNC: 163 U/L (ref 55–135)
ALT SERPL W/O P-5'-P-CCNC: 17 U/L (ref 10–44)
ANION GAP SERPL CALC-SCNC: 9 MMOL/L (ref 8–16)
AST SERPL-CCNC: 13 U/L (ref 10–40)
BASOPHILS # BLD AUTO: 0.02 K/UL (ref 0–0.2)
BASOPHILS # BLD AUTO: 0.02 K/UL (ref 0–0.2)
BASOPHILS NFR BLD: 0.3 % (ref 0–1.9)
BASOPHILS NFR BLD: 0.3 % (ref 0–1.9)
BILIRUB SERPL-MCNC: 0.8 MG/DL (ref 0.1–1)
BUN SERPL-MCNC: 27 MG/DL (ref 8–23)
CALCIUM SERPL-MCNC: 9.8 MG/DL (ref 8.7–10.5)
CHLORIDE SERPL-SCNC: 112 MMOL/L (ref 95–110)
CO2 SERPL-SCNC: 21 MMOL/L (ref 23–29)
CREAT SERPL-MCNC: 1.5 MG/DL (ref 0.5–1.4)
DIFFERENTIAL METHOD: ABNORMAL
DIFFERENTIAL METHOD: ABNORMAL
EOSINOPHIL # BLD AUTO: 0.1 K/UL (ref 0–0.5)
EOSINOPHIL # BLD AUTO: 0.1 K/UL (ref 0–0.5)
EOSINOPHIL NFR BLD: 1.7 % (ref 0–8)
EOSINOPHIL NFR BLD: 1.7 % (ref 0–8)
ERYTHROCYTE [DISTWIDTH] IN BLOOD BY AUTOMATED COUNT: 13.6 % (ref 11.5–14.5)
ERYTHROCYTE [DISTWIDTH] IN BLOOD BY AUTOMATED COUNT: 13.6 % (ref 11.5–14.5)
EST. GFR  (AFRICAN AMERICAN): 53 ML/MIN/1.73 M^2
EST. GFR  (NON AFRICAN AMERICAN): 45.9 ML/MIN/1.73 M^2
GLUCOSE SERPL-MCNC: 70 MG/DL (ref 70–110)
HCT VFR BLD AUTO: 36.5 % (ref 40–54)
HCT VFR BLD AUTO: 36.5 % (ref 40–54)
HGB BLD-MCNC: 11.8 G/DL (ref 14–18)
HGB BLD-MCNC: 11.8 G/DL (ref 14–18)
IMM GRANULOCYTES # BLD AUTO: 0.1 K/UL (ref 0–0.04)
IMM GRANULOCYTES # BLD AUTO: 0.1 K/UL (ref 0–0.04)
IMM GRANULOCYTES NFR BLD AUTO: 1.7 % (ref 0–0.5)
IMM GRANULOCYTES NFR BLD AUTO: 1.7 % (ref 0–0.5)
LYMPHOCYTES # BLD AUTO: 1.4 K/UL (ref 1–4.8)
LYMPHOCYTES # BLD AUTO: 1.4 K/UL (ref 1–4.8)
LYMPHOCYTES NFR BLD: 23.2 % (ref 18–48)
LYMPHOCYTES NFR BLD: 23.2 % (ref 18–48)
MAGNESIUM SERPL-MCNC: 1.7 MG/DL (ref 1.6–2.6)
MCH RBC QN AUTO: 31.2 PG (ref 27–31)
MCH RBC QN AUTO: 31.2 PG (ref 27–31)
MCHC RBC AUTO-ENTMCNC: 32.3 G/DL (ref 32–36)
MCHC RBC AUTO-ENTMCNC: 32.3 G/DL (ref 32–36)
MCV RBC AUTO: 97 FL (ref 82–98)
MCV RBC AUTO: 97 FL (ref 82–98)
MONOCYTES # BLD AUTO: 0.5 K/UL (ref 0.3–1)
MONOCYTES # BLD AUTO: 0.5 K/UL (ref 0.3–1)
MONOCYTES NFR BLD: 8.1 % (ref 4–15)
MONOCYTES NFR BLD: 8.1 % (ref 4–15)
NEUTROPHILS # BLD AUTO: 3.8 K/UL (ref 1.8–7.7)
NEUTROPHILS # BLD AUTO: 3.8 K/UL (ref 1.8–7.7)
NEUTROPHILS NFR BLD: 65 % (ref 38–73)
NEUTROPHILS NFR BLD: 65 % (ref 38–73)
NRBC BLD-RTO: 0 /100 WBC
NRBC BLD-RTO: 0 /100 WBC
PHOSPHATE SERPL-MCNC: 3 MG/DL (ref 2.7–4.5)
PLATELET # BLD AUTO: 134 K/UL (ref 150–450)
PLATELET # BLD AUTO: 134 K/UL (ref 150–450)
PMV BLD AUTO: 9.3 FL (ref 9.2–12.9)
PMV BLD AUTO: 9.3 FL (ref 9.2–12.9)
POTASSIUM SERPL-SCNC: 3.8 MMOL/L (ref 3.5–5.1)
PROT SERPL-MCNC: 6.5 G/DL (ref 6–8.4)
RBC # BLD AUTO: 3.78 M/UL (ref 4.6–6.2)
RBC # BLD AUTO: 3.78 M/UL (ref 4.6–6.2)
SODIUM SERPL-SCNC: 142 MMOL/L (ref 136–145)
WBC # BLD AUTO: 5.91 K/UL (ref 3.9–12.7)
WBC # BLD AUTO: 5.91 K/UL (ref 3.9–12.7)

## 2021-10-04 PROCEDURE — 36415 COLL VENOUS BLD VENIPUNCTURE: CPT | Performed by: TRANSPLANT SURGERY

## 2021-10-04 PROCEDURE — 87799 DETECT AGENT NOS DNA QUANT: CPT | Performed by: TRANSPLANT SURGERY

## 2021-10-04 PROCEDURE — 80197 ASSAY OF TACROLIMUS: CPT | Performed by: TRANSPLANT SURGERY

## 2021-10-04 PROCEDURE — 83735 ASSAY OF MAGNESIUM: CPT | Performed by: TRANSPLANT SURGERY

## 2021-10-04 PROCEDURE — 80053 COMPREHEN METABOLIC PANEL: CPT | Performed by: TRANSPLANT SURGERY

## 2021-10-04 PROCEDURE — 84100 ASSAY OF PHOSPHORUS: CPT | Performed by: TRANSPLANT SURGERY

## 2021-10-04 PROCEDURE — 85025 COMPLETE CBC W/AUTO DIFF WBC: CPT | Performed by: TRANSPLANT SURGERY

## 2021-10-05 LAB
TACROLIMUS BLD-MCNC: 5 NG/ML (ref 5–15)
TACROLIMUS, NORMALIZED: 4.6 NG/ML (ref 5–15)

## 2021-11-04 ENCOUNTER — LAB VISIT (OUTPATIENT)
Dept: LAB | Facility: HOSPITAL | Age: 71
End: 2021-11-04
Attending: TRANSPLANT SURGERY
Payer: MEDICARE

## 2021-11-04 DIAGNOSIS — Z94.0 KIDNEY REPLACED BY TRANSPLANT: ICD-10-CM

## 2021-11-04 LAB
ALBUMIN SERPL BCP-MCNC: 3.7 G/DL (ref 3.5–5.2)
ALP SERPL-CCNC: 176 U/L (ref 55–135)
ALT SERPL W/O P-5'-P-CCNC: 17 U/L (ref 10–44)
ANION GAP SERPL CALC-SCNC: 9 MMOL/L (ref 8–16)
AST SERPL-CCNC: 14 U/L (ref 10–40)
BASOPHILS # BLD AUTO: 0.02 K/UL (ref 0–0.2)
BASOPHILS NFR BLD: 0.4 % (ref 0–1.9)
BILIRUB SERPL-MCNC: 0.8 MG/DL (ref 0.1–1)
BUN SERPL-MCNC: 45 MG/DL (ref 8–23)
CALCIUM SERPL-MCNC: 9.4 MG/DL (ref 8.7–10.5)
CHLORIDE SERPL-SCNC: 109 MMOL/L (ref 95–110)
CO2 SERPL-SCNC: 22 MMOL/L (ref 23–29)
CREAT SERPL-MCNC: 1.6 MG/DL (ref 0.5–1.4)
CREAT UR-MCNC: 58.1 MG/DL (ref 23–375)
DIFFERENTIAL METHOD: ABNORMAL
EOSINOPHIL # BLD AUTO: 0.1 K/UL (ref 0–0.5)
EOSINOPHIL NFR BLD: 1.8 % (ref 0–8)
ERYTHROCYTE [DISTWIDTH] IN BLOOD BY AUTOMATED COUNT: 13.3 % (ref 11.5–14.5)
EST. GFR  (AFRICAN AMERICAN): 49 ML/MIN/1.73 M^2
EST. GFR  (NON AFRICAN AMERICAN): 42.4 ML/MIN/1.73 M^2
GLUCOSE SERPL-MCNC: 96 MG/DL (ref 70–110)
HCT VFR BLD AUTO: 37.9 % (ref 40–54)
HGB BLD-MCNC: 12.4 G/DL (ref 14–18)
IMM GRANULOCYTES # BLD AUTO: 0.11 K/UL (ref 0–0.04)
IMM GRANULOCYTES NFR BLD AUTO: 2.1 % (ref 0–0.5)
LYMPHOCYTES # BLD AUTO: 1.4 K/UL (ref 1–4.8)
LYMPHOCYTES NFR BLD: 27.4 % (ref 18–48)
MAGNESIUM SERPL-MCNC: 1.6 MG/DL (ref 1.6–2.6)
MCH RBC QN AUTO: 31.5 PG (ref 27–31)
MCHC RBC AUTO-ENTMCNC: 32.7 G/DL (ref 32–36)
MCV RBC AUTO: 96 FL (ref 82–98)
MONOCYTES # BLD AUTO: 0.5 K/UL (ref 0.3–1)
MONOCYTES NFR BLD: 9.5 % (ref 4–15)
NEUTROPHILS # BLD AUTO: 3 K/UL (ref 1.8–7.7)
NEUTROPHILS NFR BLD: 58.8 % (ref 38–73)
NRBC BLD-RTO: 0 /100 WBC
PHOSPHATE SERPL-MCNC: 3.9 MG/DL (ref 2.7–4.5)
PLATELET # BLD AUTO: 135 K/UL (ref 150–450)
PMV BLD AUTO: 10 FL (ref 9.2–12.9)
POTASSIUM SERPL-SCNC: 4.3 MMOL/L (ref 3.5–5.1)
PROT SERPL-MCNC: 6.7 G/DL (ref 6–8.4)
PROT UR-MCNC: 11 MG/DL (ref 0–15)
RBC # BLD AUTO: 3.94 M/UL (ref 4.6–6.2)
SODIUM SERPL-SCNC: 140 MMOL/L (ref 136–145)
WBC # BLD AUTO: 5.14 K/UL (ref 3.9–12.7)

## 2021-11-04 PROCEDURE — 36415 COLL VENOUS BLD VENIPUNCTURE: CPT | Performed by: TRANSPLANT SURGERY

## 2021-11-04 PROCEDURE — 84100 ASSAY OF PHOSPHORUS: CPT | Performed by: TRANSPLANT SURGERY

## 2021-11-04 PROCEDURE — 80197 ASSAY OF TACROLIMUS: CPT | Performed by: TRANSPLANT SURGERY

## 2021-11-04 PROCEDURE — 83735 ASSAY OF MAGNESIUM: CPT | Performed by: TRANSPLANT SURGERY

## 2021-11-04 PROCEDURE — 84156 ASSAY OF PROTEIN URINE: CPT | Performed by: TRANSPLANT SURGERY

## 2021-11-04 PROCEDURE — 80053 COMPREHEN METABOLIC PANEL: CPT | Performed by: TRANSPLANT SURGERY

## 2021-11-04 PROCEDURE — 82570 ASSAY OF URINE CREATININE: CPT | Performed by: TRANSPLANT SURGERY

## 2021-11-04 PROCEDURE — 87799 DETECT AGENT NOS DNA QUANT: CPT | Performed by: TRANSPLANT SURGERY

## 2021-11-04 PROCEDURE — 85025 COMPLETE CBC W/AUTO DIFF WBC: CPT | Performed by: TRANSPLANT SURGERY

## 2021-11-05 LAB — TACROLIMUS BLD-MCNC: 6 NG/ML (ref 5–15)

## 2021-11-08 LAB
BKV DNA SERPL NAA+PROBE-ACNC: 132 COPIES/ML
BKV DNA SERPL NAA+PROBE-LOG#: 2.12 LOG (10) COPIES/ML
BKV DNA SERPL QL NAA+PROBE: DETECTED

## 2021-11-16 ENCOUNTER — OFFICE VISIT (OUTPATIENT)
Dept: PODIATRY | Facility: CLINIC | Age: 71
End: 2021-11-16
Payer: MEDICARE

## 2021-11-16 VITALS
DIASTOLIC BLOOD PRESSURE: 66 MMHG | HEIGHT: 68 IN | RESPIRATION RATE: 18 BRPM | SYSTOLIC BLOOD PRESSURE: 115 MMHG | BODY MASS INDEX: 28.79 KG/M2 | WEIGHT: 190 LBS | HEART RATE: 61 BPM

## 2021-11-16 DIAGNOSIS — E11.49 TYPE II DIABETES MELLITUS WITH NEUROLOGICAL MANIFESTATIONS: Primary | ICD-10-CM

## 2021-11-16 DIAGNOSIS — B35.1 ONYCHOMYCOSIS OF TOENAIL: ICD-10-CM

## 2021-11-16 DIAGNOSIS — L60.8 ACQUIRED DYSMORPHIC TOENAIL: ICD-10-CM

## 2021-11-16 DIAGNOSIS — L85.3 DRY SKIN: ICD-10-CM

## 2021-11-16 PROCEDURE — 99213 PR OFFICE/OUTPT VISIT, EST, LEVL III, 20-29 MIN: ICD-10-PCS | Mod: S$PBB,,, | Performed by: PODIATRIST

## 2021-11-16 PROCEDURE — 99215 OFFICE O/P EST HI 40 MIN: CPT | Mod: PBBFAC | Performed by: PODIATRIST

## 2021-11-16 PROCEDURE — 99213 OFFICE O/P EST LOW 20 MIN: CPT | Mod: S$PBB,,, | Performed by: PODIATRIST

## 2021-11-16 PROCEDURE — 99999 PR PBB SHADOW E&M-EST. PATIENT-LVL V: ICD-10-PCS | Mod: PBBFAC,,, | Performed by: PODIATRIST

## 2021-11-16 PROCEDURE — 99999 PR PBB SHADOW E&M-EST. PATIENT-LVL V: CPT | Mod: PBBFAC,,, | Performed by: PODIATRIST

## 2021-11-16 RX ORDER — OMEPRAZOLE 20 MG/1
20 CAPSULE, DELAYED RELEASE ORAL EVERY MORNING
COMMUNITY
Start: 2021-11-03 | End: 2022-02-21

## 2021-11-16 RX ORDER — METRONIDAZOLE 500 MG/1
TABLET ORAL
COMMUNITY
Start: 2021-10-04 | End: 2022-11-04

## 2021-11-16 RX ORDER — PREDNISONE 5 MG/1
5 TABLET ORAL DAILY
COMMUNITY
Start: 2021-11-03

## 2021-11-16 RX ORDER — CIPROFLOXACIN 500 MG/1
TABLET ORAL
COMMUNITY
Start: 2021-10-04 | End: 2022-01-19

## 2021-12-07 ENCOUNTER — LAB VISIT (OUTPATIENT)
Dept: LAB | Facility: HOSPITAL | Age: 71
End: 2021-12-07
Attending: TRANSPLANT SURGERY
Payer: MEDICARE

## 2021-12-07 DIAGNOSIS — Z94.0 KIDNEY REPLACED BY TRANSPLANT: ICD-10-CM

## 2021-12-07 LAB
ALBUMIN SERPL BCP-MCNC: 3.6 G/DL (ref 3.5–5.2)
ALP SERPL-CCNC: 166 U/L (ref 55–135)
ALT SERPL W/O P-5'-P-CCNC: 32 U/L (ref 10–44)
ANION GAP SERPL CALC-SCNC: 9 MMOL/L (ref 8–16)
AST SERPL-CCNC: 20 U/L (ref 10–40)
BASOPHILS # BLD AUTO: 0.03 K/UL (ref 0–0.2)
BASOPHILS NFR BLD: 0.6 % (ref 0–1.9)
BILIRUB SERPL-MCNC: 0.7 MG/DL (ref 0.1–1)
BUN SERPL-MCNC: 35 MG/DL (ref 8–23)
CALCIUM SERPL-MCNC: 8.9 MG/DL (ref 8.7–10.5)
CHLORIDE SERPL-SCNC: 108 MMOL/L (ref 95–110)
CO2 SERPL-SCNC: 24 MMOL/L (ref 23–29)
CREAT SERPL-MCNC: 1.6 MG/DL (ref 0.5–1.4)
DIFFERENTIAL METHOD: ABNORMAL
EOSINOPHIL # BLD AUTO: 0.1 K/UL (ref 0–0.5)
EOSINOPHIL NFR BLD: 1.7 % (ref 0–8)
ERYTHROCYTE [DISTWIDTH] IN BLOOD BY AUTOMATED COUNT: 13 % (ref 11.5–14.5)
EST. GFR  (AFRICAN AMERICAN): 49 ML/MIN/1.73 M^2
EST. GFR  (NON AFRICAN AMERICAN): 42.4 ML/MIN/1.73 M^2
GLUCOSE SERPL-MCNC: 107 MG/DL (ref 70–110)
HCT VFR BLD AUTO: 38.1 % (ref 40–54)
HGB BLD-MCNC: 12.6 G/DL (ref 14–18)
IMM GRANULOCYTES # BLD AUTO: 0.07 K/UL (ref 0–0.04)
IMM GRANULOCYTES NFR BLD AUTO: 1.5 % (ref 0–0.5)
LYMPHOCYTES # BLD AUTO: 1.2 K/UL (ref 1–4.8)
LYMPHOCYTES NFR BLD: 25.6 % (ref 18–48)
MAGNESIUM SERPL-MCNC: 1.5 MG/DL (ref 1.6–2.6)
MCH RBC QN AUTO: 31.2 PG (ref 27–31)
MCHC RBC AUTO-ENTMCNC: 33.1 G/DL (ref 32–36)
MCV RBC AUTO: 94 FL (ref 82–98)
MONOCYTES # BLD AUTO: 0.4 K/UL (ref 0.3–1)
MONOCYTES NFR BLD: 9.3 % (ref 4–15)
NEUTROPHILS # BLD AUTO: 2.9 K/UL (ref 1.8–7.7)
NEUTROPHILS NFR BLD: 61.3 % (ref 38–73)
NRBC BLD-RTO: 0 /100 WBC
PHOSPHATE SERPL-MCNC: 3.1 MG/DL (ref 2.7–4.5)
PLATELET # BLD AUTO: 140 K/UL (ref 150–450)
PMV BLD AUTO: 9.7 FL (ref 9.2–12.9)
POTASSIUM SERPL-SCNC: 4.7 MMOL/L (ref 3.5–5.1)
PROT SERPL-MCNC: 6.4 G/DL (ref 6–8.4)
RBC # BLD AUTO: 4.04 M/UL (ref 4.6–6.2)
SODIUM SERPL-SCNC: 141 MMOL/L (ref 136–145)
WBC # BLD AUTO: 4.72 K/UL (ref 3.9–12.7)

## 2021-12-07 PROCEDURE — 80197 ASSAY OF TACROLIMUS: CPT | Performed by: TRANSPLANT SURGERY

## 2021-12-07 PROCEDURE — 80053 COMPREHEN METABOLIC PANEL: CPT | Performed by: TRANSPLANT SURGERY

## 2021-12-07 PROCEDURE — 84100 ASSAY OF PHOSPHORUS: CPT | Performed by: TRANSPLANT SURGERY

## 2021-12-07 PROCEDURE — 85025 COMPLETE CBC W/AUTO DIFF WBC: CPT | Performed by: TRANSPLANT SURGERY

## 2021-12-07 PROCEDURE — 83735 ASSAY OF MAGNESIUM: CPT | Performed by: TRANSPLANT SURGERY

## 2021-12-07 PROCEDURE — 87799 DETECT AGENT NOS DNA QUANT: CPT | Performed by: TRANSPLANT SURGERY

## 2021-12-08 LAB — TACROLIMUS BLD-MCNC: 5.5 NG/ML (ref 5–15)

## 2021-12-10 LAB
BKV DNA SERPL NAA+PROBE-ACNC: 252 COPIES/ML
BKV DNA SERPL NAA+PROBE-LOG#: 2.4 LOG (10) COPIES/ML
BKV DNA SERPL QL NAA+PROBE: DETECTED

## 2022-01-03 ENCOUNTER — LAB VISIT (OUTPATIENT)
Dept: LAB | Facility: HOSPITAL | Age: 72
End: 2022-01-03
Attending: TRANSPLANT SURGERY
Payer: MEDICARE

## 2022-01-03 DIAGNOSIS — Z94.0 KIDNEY REPLACED BY TRANSPLANT: ICD-10-CM

## 2022-01-03 LAB
ALBUMIN SERPL BCP-MCNC: 3.5 G/DL (ref 3.5–5.2)
ALP SERPL-CCNC: 212 U/L (ref 55–135)
ALT SERPL W/O P-5'-P-CCNC: 43 U/L (ref 10–44)
ANION GAP SERPL CALC-SCNC: 13 MMOL/L (ref 8–16)
AST SERPL-CCNC: 17 U/L (ref 10–40)
BASOPHILS # BLD AUTO: 0.04 K/UL (ref 0–0.2)
BASOPHILS NFR BLD: 0.7 % (ref 0–1.9)
BILIRUB SERPL-MCNC: 0.6 MG/DL (ref 0.1–1)
BUN SERPL-MCNC: 38 MG/DL (ref 8–23)
CALCIUM SERPL-MCNC: 9.5 MG/DL (ref 8.7–10.5)
CHLORIDE SERPL-SCNC: 110 MMOL/L (ref 95–110)
CO2 SERPL-SCNC: 21 MMOL/L (ref 23–29)
CREAT SERPL-MCNC: 1.7 MG/DL (ref 0.5–1.4)
DIFFERENTIAL METHOD: ABNORMAL
EOSINOPHIL # BLD AUTO: 0.1 K/UL (ref 0–0.5)
EOSINOPHIL NFR BLD: 2.3 % (ref 0–8)
ERYTHROCYTE [DISTWIDTH] IN BLOOD BY AUTOMATED COUNT: 13.4 % (ref 11.5–14.5)
EST. GFR  (AFRICAN AMERICAN): 45.6 ML/MIN/1.73 M^2
EST. GFR  (NON AFRICAN AMERICAN): 39.4 ML/MIN/1.73 M^2
GLUCOSE SERPL-MCNC: 91 MG/DL (ref 70–110)
HCT VFR BLD AUTO: 40.9 % (ref 40–54)
HGB BLD-MCNC: 13.4 G/DL (ref 14–18)
IMM GRANULOCYTES # BLD AUTO: 0.14 K/UL (ref 0–0.04)
IMM GRANULOCYTES NFR BLD AUTO: 2.5 % (ref 0–0.5)
LYMPHOCYTES # BLD AUTO: 1.4 K/UL (ref 1–4.8)
LYMPHOCYTES NFR BLD: 24.7 % (ref 18–48)
MAGNESIUM SERPL-MCNC: 1.6 MG/DL (ref 1.6–2.6)
MCH RBC QN AUTO: 31.2 PG (ref 27–31)
MCHC RBC AUTO-ENTMCNC: 32.8 G/DL (ref 32–36)
MCV RBC AUTO: 95 FL (ref 82–98)
MONOCYTES # BLD AUTO: 0.5 K/UL (ref 0.3–1)
MONOCYTES NFR BLD: 8.6 % (ref 4–15)
NEUTROPHILS # BLD AUTO: 3.5 K/UL (ref 1.8–7.7)
NEUTROPHILS NFR BLD: 61.2 % (ref 38–73)
NRBC BLD-RTO: 0 /100 WBC
PHOSPHATE SERPL-MCNC: 3.7 MG/DL (ref 2.7–4.5)
PLATELET # BLD AUTO: 141 K/UL (ref 150–450)
PMV BLD AUTO: 9.9 FL (ref 9.2–12.9)
POTASSIUM SERPL-SCNC: 4.3 MMOL/L (ref 3.5–5.1)
PROT SERPL-MCNC: 6.7 G/DL (ref 6–8.4)
RBC # BLD AUTO: 4.3 M/UL (ref 4.6–6.2)
SODIUM SERPL-SCNC: 144 MMOL/L (ref 136–145)
WBC # BLD AUTO: 5.67 K/UL (ref 3.9–12.7)

## 2022-01-03 PROCEDURE — 36415 COLL VENOUS BLD VENIPUNCTURE: CPT | Performed by: TRANSPLANT SURGERY

## 2022-01-03 PROCEDURE — 80197 ASSAY OF TACROLIMUS: CPT | Performed by: TRANSPLANT SURGERY

## 2022-01-03 PROCEDURE — 87799 DETECT AGENT NOS DNA QUANT: CPT | Performed by: TRANSPLANT SURGERY

## 2022-01-03 PROCEDURE — 85025 COMPLETE CBC W/AUTO DIFF WBC: CPT | Performed by: TRANSPLANT SURGERY

## 2022-01-03 PROCEDURE — 80053 COMPREHEN METABOLIC PANEL: CPT | Performed by: TRANSPLANT SURGERY

## 2022-01-03 PROCEDURE — 83735 ASSAY OF MAGNESIUM: CPT | Performed by: TRANSPLANT SURGERY

## 2022-01-03 PROCEDURE — 84100 ASSAY OF PHOSPHORUS: CPT | Performed by: TRANSPLANT SURGERY

## 2022-01-04 LAB — TACROLIMUS BLD-MCNC: 4.6 NG/ML (ref 5–15)

## 2022-01-18 ENCOUNTER — OFFICE VISIT (OUTPATIENT)
Dept: PODIATRY | Facility: CLINIC | Age: 72
End: 2022-01-18
Payer: MEDICARE

## 2022-01-18 VITALS
HEIGHT: 68 IN | DIASTOLIC BLOOD PRESSURE: 72 MMHG | WEIGHT: 192 LBS | HEART RATE: 82 BPM | BODY MASS INDEX: 29.1 KG/M2 | SYSTOLIC BLOOD PRESSURE: 131 MMHG

## 2022-01-18 DIAGNOSIS — L85.3 DRY SKIN DERMATITIS: ICD-10-CM

## 2022-01-18 DIAGNOSIS — E11.65 UNCONTROLLED TYPE 2 DIABETES MELLITUS WITH HYPERGLYCEMIA: ICD-10-CM

## 2022-01-18 DIAGNOSIS — E11.49 TYPE II DIABETES MELLITUS WITH NEUROLOGICAL MANIFESTATIONS: ICD-10-CM

## 2022-01-18 DIAGNOSIS — B35.1 ONYCHOMYCOSIS OF TOENAIL: ICD-10-CM

## 2022-01-18 DIAGNOSIS — E11.9 COMPREHENSIVE DIABETIC FOOT EXAMINATION, TYPE 2 DM, ENCOUNTER FOR: Primary | ICD-10-CM

## 2022-01-18 DIAGNOSIS — L60.8 ACQUIRED DYSMORPHIC TOENAIL: ICD-10-CM

## 2022-01-18 PROCEDURE — 99214 PR OFFICE/OUTPT VISIT, EST, LEVL IV, 30-39 MIN: ICD-10-PCS | Mod: S$PBB,,, | Performed by: PODIATRIST

## 2022-01-18 PROCEDURE — 99213 OFFICE O/P EST LOW 20 MIN: CPT | Mod: PBBFAC | Performed by: PODIATRIST

## 2022-01-18 PROCEDURE — 99999 PR PBB SHADOW E&M-EST. PATIENT-LVL III: ICD-10-PCS | Mod: PBBFAC,,, | Performed by: PODIATRIST

## 2022-01-18 PROCEDURE — 99999 PR PBB SHADOW E&M-EST. PATIENT-LVL III: CPT | Mod: PBBFAC,,, | Performed by: PODIATRIST

## 2022-01-18 PROCEDURE — 99214 OFFICE O/P EST MOD 30 MIN: CPT | Mod: S$PBB,,, | Performed by: PODIATRIST

## 2022-01-19 NOTE — PROGRESS NOTES
"Subjective:       Patient ID: Olu Gant Jr. is a 71 y.o. male.    Chief Complaint: Diabetic Foot Exam  Patient presents for annual diabetic foot exam.  Patient relates he has been diabetic "for a long time".  He started dialysis 2013, received kidney transplant  05/04/2020.  Reports he is doing very well, under the care of Dr Jennings to monitor kidney, PCP Dr. Elaine.  Relates diabetes has been slightly high, glucose 141. Relates burning and tingling in feet during the day is tolerable, worse at night.  He is taking Lyrica at night. When asked if it controls his pain he states "not great", will wake up after 2 or 3 hours of sleep almost every night.         Past Medical History:   Diagnosis Date    Asbestos exposure - 1973 2/18/2014    Benign hypertension with ESRD (end-stage renal disease) 2/18/2014    Diabetes type 2 - since 1996 2/18/2014    DIALYSIS 2013    ESRD (end stage renal disease) - initiated dialysis 05/29/2013 2/18/2014    Gout, arthritis 2/18/2014    Hypothyroidism 2/18/2014    Irregular heart rhythm - unsure of Afib vs. A flutter 2/18/2014    Kidney transplanted 05/04/2020    Obesity 2/18/2014    Secondary hyperparathyroidism, renal 2/18/2014    Sleep apnea on Bipap 2/18/2014     Past Surgical History:   Procedure Laterality Date    AV FISTULA PLACEMENT      CHOLECYSTECTOMY      COLON SURGERY  02/2017    resection for "ischemic colon"    INGUINAL HERNIA REPAIR      bilaterally    KIDNEY TRANSPLANT      pace maker      [email or web address]    TONSILLECTOMY           Current Outpatient Medications   Medication Sig Dispense Refill    allopurinoL (ZYLOPRIM) 100 MG tablet Take 1 tablet by mouth once daily 90 tablet 0    amLODIPine (NORVASC) 10 MG tablet Take 10 mg by mouth once daily.      aspirin (ECOTRIN) 81 MG EC tablet Take 81 mg by mouth once daily.      cinacalcet (SENSIPAR) 30 MG Tab TAKE 1 TABLET BY MOUTH ONCE DAILY with a meal      doxycycline (VIBRAMYCIN) 100 MG Cap Take " "100 mg by mouth 2 (two) times daily.      ergocalciferol (ERGOCALCIFEROL) 50,000 unit Cap Take 50,000 Units by mouth every 30 days.       HYDROcodone-acetaminophen (NORCO) 5-325 mg per tablet TAKE 1 TABLET BY MOUTH EVERY 4 HOURS AS NEEDED FOR PAIN FOR 5 DAYS      insulin detemir U-100 (LEVEMIR FLEXTOUCH U-100 INSULN) 100 unit/mL (3 mL) InPn pen Inject 40 Units into the skin every evening. 36 mL 3    lancets 30 gauge Misc 1 lancet by Misc.(Non-Drug; Combo Route) route 3 (three) times daily. 300 each 4    lancets 33 gauge Misc 1 lancet by Misc.(Non-Drug; Combo Route) route 3 (three) times daily. 300 each 3    levoFLOXacin (LEVAQUIN) 250 MG tablet       levothyroxine (SYNTHROID) 100 MCG tablet Take 1 tablet by mouth once daily 90 tablet 0    metoprolol succinate (TOPROL-XL) 50 MG 24 hr tablet Take 50 mg by mouth once daily.       metroNIDAZOLE (FLAGYL) 500 MG tablet       multivitamin (THERAGRAN) per tablet Take 1 tablet by mouth once daily.      nitroGLYCERIN (NITROSTAT) 0.4 MG SL tablet Place 0.4 mg under the tongue every 5 (five) minutes as needed for Chest pain.      NOVOLOG FLEXPEN U-100 INSULIN 100 unit/mL (3 mL) InPn pen INJECT 14 TO 20 UNITS SUBCUTANEOUSLY AS NEEDED      omeprazole (PRILOSEC) 20 MG capsule Take 20 mg by mouth every morning.      ONETOUCH VERIO TEST STRIPS Strp USE 1 STRIP TO CHECK GLUCOSE  3 TIMES DAILY AS DIRECTED 100 each 11    pen needle, diabetic (PEN NEEDLE) 32 gauge x 5/32" Ndle 1 each by Misc.(Non-Drug; Combo Route) route 4 (four) times daily. 360 each 3    polyethylene glycol (GLYCOLAX) 17 gram/dose powder MIX 1 CAPFUL (17 GRAMS) IN LIQUID AND DRINK ONCE DAILY AS DIRECTED FOR CONSTIPATION      predniSONE (DELTASONE) 5 MG tablet Take 5 mg by mouth once daily.      pregabalin (LYRICA) 50 MG capsule Take 1 capsule (50 mg total) by mouth every evening. 30 capsule 2    rosuvastatin (CRESTOR) 10 MG tablet Take 10 mg by mouth once daily.      rosuvastatin (CRESTOR) 20 MG " "tablet Take 10 mg by mouth once daily.       sodium bicarbonate 650 MG tablet Take 650 mg by mouth 2 (two) times daily.      SPS, WITH SORBITOL, 15-20 gram/60 mL Susp take 60 ML BY MOUTH as 1 DOSE now. REPEAT in 6 hours.      tacrolimus XR, ENVARSUS, (ENVARSUS) 1 mg Tb24 Take 4 mg by mouth.       valGANciclovir (VALCYTE) 450 mg Tab Take 450 mg by mouth once daily. One pill on Monday and Thursday       No current facility-administered medications for this visit.     Review of patient's allergies indicates:   Allergen Reactions    Iodine      Other reaction(s): Blisters       Review of Systems   HENT: Negative for congestion.    Respiratory: Negative for cough and shortness of breath.    Cardiovascular: Negative for leg swelling.   Endocrine:        KIDNEY TRANSPLANT 5/4/2020   Musculoskeletal: Positive for gait problem.        Cane   All other systems reviewed and are negative.      Objective:      Vitals:    01/18/22 0824   BP: 131/72   Pulse: 82   Weight: 87.1 kg (192 lb)   Height: 5' 8" (1.727 m)     Physical Exam  Vitals and nursing note reviewed.   Constitutional:       General: He is not in acute distress.     Appearance: He is well-developed.   Cardiovascular:      Pulses:           Dorsalis pedis pulses are 2+ on the right side and 2+ on the left side.        Posterior tibial pulses are 1+ on the right side and 1+ on the left side.   Pulmonary:      Effort: Pulmonary effort is normal.   Musculoskeletal:         General: No swelling or tenderness.      Right foot: Decreased range of motion.      Left foot: Decreased range of motion.   Feet:      Right foot:      Protective Sensation: 8 sites tested. 5 sites sensed.      Skin integrity: Dry skin present. No skin breakdown.      Toenail Condition: Right toenails are abnormally thick and long. Fungal disease present.     Left foot:      Protective Sensation: 8 sites tested. 5 sites sensed.      Skin integrity: Dry skin present. No skin breakdown.      " Toenail Condition: Left toenails are abnormally thick and long. Fungal disease present.  Skin:     General: Skin is dry.      Capillary Refill: Capillary refill takes 2 to 3 seconds.   Neurological:      Sensory: Sensory deficit present.      Comments: Sensation diminished distal digits fall lateral, intact elsewhere bilateral feet with monofilament testing.  Positive paresthesias feet, worse at night   Psychiatric:         Mood and Affect: Mood normal.         Behavior: Behavior normal.         Integumentary   Chronic dry skin texture feet and lower legs        Dystrophic raised elongated onychomycosis         No skin breaks, bruises, abrasions bilateral feet    Musculoskeletal   Muscle Strength: Normal for age     Joints, Bones, and Muscles: Pes planus  Cane, antalgic gait  Presents in appropriate tennis shoes          Hgb A1c w/ eAG Estimation    Component Ref Range & Units 8 mo ago   (5/4/21) 2 yr ago   (11/25/19)   Hemoglobin A1C <5.7 % of total Hgb 9.8 High   6.9 High  CM    EAG (MG/DL) (calc) 235               Assessment:       1. Comprehensive diabetic foot examination, type 2 DM, encounter for    2. Type II diabetes mellitus with neurological manifestations    3. Uncontrolled type 2 diabetes mellitus with hyperglycemia    4. Dry skin dermatitis    5. Acquired dysmorphic toenail    6. Onychomycosis of toenail        Plan:         Diabetic foot exam performed  Reviewed results of monofilament testing, lack of foot feeling toes and explained the patient this puts him at risk for complications  We reviewed neuropathy, lack of feeling in toes and numbness, difficulty walking, coordination, stability, foot pain due to neuropathy and medications.  Advised patient to discuss with PCP/nephrologist 1 dose of pain medication at night since he has increased neuropathic pain, poor sleeping habits due to foot pain and patient confirms when this medication was taking prior to his kidney transplant worked very well.   Advised patient he may be able to discontinue Lyrica and just take 1 pain pill at night    Reviewed diabetic education and potential foot complications   Reviewed A1c labs, a months ago 9.8, due for blood work  Had a lengthy discussion regarding benefit of tighter control of glucose/diabetes regarding feet    Reviewed shoes indoors to protect feet   Discussed maintenance of chronic dry skin and potential complications  Reviewed maintenance of fungal nails, nails debrided, no complications today   Instructed patient to contact the office with any area of redness or swelling which has not improved within 3 days.  Patient was in understanding and agreement with treatment plan  Counseled the patient on his conditions, their implications and medical management.  Instructed patient to contact the office with any changes, questions, concerns, worsening of symptoms. Patient verbalized understanding.   Total face to face time, exam, assessment, treatment, discussion, documentation 30 minutes, more than half this time spent on consultation and coordination of care.   Follow up 2 months      This note was created using M*Ask The Doctor voice recognition software that occasionally misinterpreted phrases or words.

## 2022-03-18 ENCOUNTER — OFFICE VISIT (OUTPATIENT)
Dept: PODIATRY | Facility: CLINIC | Age: 72
End: 2022-03-18
Payer: MEDICARE

## 2022-03-18 VITALS
HEIGHT: 68 IN | HEART RATE: 70 BPM | RESPIRATION RATE: 18 BRPM | WEIGHT: 190 LBS | SYSTOLIC BLOOD PRESSURE: 129 MMHG | DIASTOLIC BLOOD PRESSURE: 76 MMHG | BODY MASS INDEX: 28.79 KG/M2

## 2022-03-18 DIAGNOSIS — L85.3 DRY SKIN: ICD-10-CM

## 2022-03-18 DIAGNOSIS — E11.49 TYPE II DIABETES MELLITUS WITH NEUROLOGICAL MANIFESTATIONS: Primary | ICD-10-CM

## 2022-03-18 DIAGNOSIS — L60.8 ACQUIRED DYSMORPHIC TOENAIL: ICD-10-CM

## 2022-03-18 DIAGNOSIS — G57.93 NEUROPATHIC PAIN OF BOTH FEET: ICD-10-CM

## 2022-03-18 DIAGNOSIS — B35.1 ONYCHOMYCOSIS OF TOENAIL: ICD-10-CM

## 2022-03-18 PROCEDURE — 99215 OFFICE O/P EST HI 40 MIN: CPT | Mod: PBBFAC | Performed by: PODIATRIST

## 2022-03-18 PROCEDURE — 99999 PR PBB SHADOW E&M-EST. PATIENT-LVL V: ICD-10-PCS | Mod: PBBFAC,,, | Performed by: PODIATRIST

## 2022-03-18 PROCEDURE — 99999 PR PBB SHADOW E&M-EST. PATIENT-LVL V: CPT | Mod: PBBFAC,,, | Performed by: PODIATRIST

## 2022-03-18 PROCEDURE — 99214 OFFICE O/P EST MOD 30 MIN: CPT | Mod: S$PBB,,, | Performed by: PODIATRIST

## 2022-03-18 PROCEDURE — 99214 PR OFFICE/OUTPT VISIT, EST, LEVL IV, 30-39 MIN: ICD-10-PCS | Mod: S$PBB,,, | Performed by: PODIATRIST

## 2022-03-18 RX ORDER — ERGOCALCIFEROL 1.25 MG/1
50000 CAPSULE ORAL
COMMUNITY
Start: 2021-11-30 | End: 2023-04-13 | Stop reason: SDUPTHER

## 2022-03-18 RX ORDER — TACROLIMUS 0.75 MG/1
1.5 TABLET, EXTENDED RELEASE ORAL DAILY
COMMUNITY
Start: 2022-02-22

## 2022-03-18 RX ORDER — SODIUM BICARBONATE 650 MG/1
1950 TABLET ORAL
COMMUNITY
Start: 2021-08-21

## 2022-03-18 RX ORDER — OMEPRAZOLE 20 MG/1
20 TABLET, DELAYED RELEASE ORAL
COMMUNITY
Start: 2021-08-21 | End: 2022-03-27 | Stop reason: SDUPTHER

## 2022-03-18 RX ORDER — INSULIN DETEMIR 100 [IU]/ML
INJECTION, SOLUTION SUBCUTANEOUS
COMMUNITY
Start: 2021-11-30 | End: 2022-03-27 | Stop reason: SDUPTHER

## 2022-03-18 RX ORDER — ASPIRIN 81 MG/1
81 TABLET ORAL
COMMUNITY
Start: 2021-08-21

## 2022-03-27 NOTE — PROGRESS NOTES
"Subjective:       Patient ID: Olu Gant Jr. is a 71 y.o. male.    Chief Complaint: Follow-up and Diabetes Mellitus  Patient presents for follow-up due to type 2 diabetes, neuropathic pain in both feet.  Patient states this pain in his feet is gotten gradually worse.  For a short period of time after his kidney transplant seem to be doing better.  He relates pain at night is severe at times.  Taking 50 mg Lyrica at night.     Relates diabetes has been better, glucose was in the 80's yesterday, 137 this morning.   Under the care of Dr Jennings to monitor kidney.        Past Medical History:   Diagnosis Date    Asbestos exposure - 1973 2/18/2014    Benign hypertension with ESRD (end-stage renal disease) 2/18/2014    Diabetes type 2 - since 1996 2/18/2014    DIALYSIS 2013    ESRD (end stage renal disease) - initiated dialysis 05/29/2013 2/18/2014    Gout, arthritis 2/18/2014    Hypothyroidism 2/18/2014    Irregular heart rhythm - unsure of Afib vs. A flutter 2/18/2014    Kidney transplanted 05/04/2020    Obesity 2/18/2014    Secondary hyperparathyroidism, renal 2/18/2014    Sleep apnea on Bipap 2/18/2014     Past Surgical History:   Procedure Laterality Date    AV FISTULA PLACEMENT      CHOLECYSTECTOMY      COLON SURGERY  02/2017    resection for "ischemic colon"    INGUINAL HERNIA REPAIR      bilaterally    KIDNEY TRANSPLANT      pace maker      [email or web address]    TONSILLECTOMY           Current Outpatient Medications   Medication Sig Dispense Refill    allopurinoL (ZYLOPRIM) 100 MG tablet Take 1 tablet (100 mg total) by mouth once daily. 90 tablet 0    amLODIPine (NORVASC) 10 MG tablet Take 10 mg by mouth once daily.      aspirin (ECOTRIN) 81 MG EC tablet 81 mg.      cinacalcet (SENSIPAR) 30 MG Tab TAKE 1 TABLET BY MOUTH ONCE DAILY with a meal      ergocalciferol (ERGOCALCIFEROL) 50,000 unit Cap 50,000 Int'l Units.      HYDROcodone-acetaminophen (NORCO) 5-325 mg per tablet TAKE 1 TABLET BY " "MOUTH EVERY 4 HOURS AS NEEDED FOR PAIN FOR 5 DAYS      insulin detemir U-100 (LEVEMIR FLEXTOUCH U-100 INSULN) 100 unit/mL (3 mL) InPn pen Inject 40 Units into the skin every evening. 36 mL 3    lancets 30 gauge Misc 1 lancet by Misc.(Non-Drug; Combo Route) route 4 (four) times daily. 200 each 11    levothyroxine (SYNTHROID) 100 MCG tablet Take 1 tablet (100 mcg total) by mouth once daily. 90 tablet 0    metoprolol succinate 50 mg CSpX   cap, Oral, Daily, 0 Refill(s), Maintenance      multivitamin (THERAGRAN) per tablet Take 1 tablet by mouth once daily.      NOVOLOG FLEXPEN U-100 INSULIN 100 unit/mL (3 mL) InPn pen INJECT 14 TO 20 UNITS SUBCUTANEOUSLY AS NEEDED      omeprazole (PRILOSEC) 20 MG capsule TAKE 1 CAPSULE BY MOUTH EVERY MORNING 30 MINUTES BEFORE BREAKFAST 30 capsule 0    ONETOUCH VERIO TEST STRIPS Strp USE 1 STRIP TO CHECK GLUCOSE  3 TIMES DAILY AS DIRECTED 100 each 11    polyethylene glycol (GLYCOLAX) 17 gram/dose powder MIX 1 CAPFUL (17 GRAMS) IN LIQUID AND DRINK ONCE DAILY AS DIRECTED FOR CONSTIPATION      predniSONE (DELTASONE) 5 MG tablet Take 5 mg by mouth once daily.      pregabalin (LYRICA) 50 MG capsule Take 1 capsule (50 mg total) by mouth every evening. 30 capsule 2    rosuvastatin (CRESTOR) 10 MG tablet TAKE 1 TABLET BY MOUTH EVERY DAY 30 tablet 1    sodium bicarbonate 650 MG tablet 1,950 mg.      SPS, WITH SORBITOL, 15-20 gram/60 mL Susp take 60 ML BY MOUTH as 1 DOSE now. REPEAT in 6 hours.      TACROLIMUS XR, ENVARSUS, 0.75 MG ORAL TB24 0.75 mg Tb24 Take 1.5 mg by mouth once daily.      valGANciclovir (VALCYTE) 450 mg Tab Take 450 mg by mouth once daily. One pill on Monday and Thursday      aspirin (ECOTRIN) 81 MG EC tablet Take 81 mg by mouth once daily.      BD ZEB 2ND GEN PEN NEEDLE 32 gauge x 5/32" Ndle USE AS DIRECTED FOUR TIMES A  each 3    doxycycline (VIBRAMYCIN) 100 MG Cap Take 100 mg by mouth 2 (two) times daily.      ergocalciferol (ERGOCALCIFEROL) " "50,000 unit Cap Take 50,000 Units by mouth every 30 days.       insulin detemir U-100 (LEVEMIR U-100 INSULIN) 100 unit/mL injection   40 units, SubQ, HS, # 20 mL, 3 Refill(s), Maintenance, Pharmacy: [id number], 172, cm, 11/30/21 14:51:00 CST, Height/Length Measured, 88.3, kg, 11/09/21 8:34:00 CST, Weight Dosing      lancets 33 gauge Misc 1 lancet by Misc.(Non-Drug; Combo Route) route 3 (three) times daily. (Patient not taking: Reported on 3/18/2022) 300 each 3    levoFLOXacin (LEVAQUIN) 250 MG tablet       metoprolol succinate (TOPROL-XL) 50 MG 24 hr tablet Take 50 mg by mouth once daily.       metroNIDAZOLE (FLAGYL) 500 MG tablet       nitroGLYCERIN (NITROSTAT) 0.4 MG SL tablet Place 0.4 mg under the tongue every 5 (five) minutes as needed for Chest pain.      omeprazole (PRILOSEC OTC) 20 MG tablet 20 mg.      rosuvastatin (CRESTOR) 10 MG tablet Take 1 tablet (10 mg total) by mouth once daily. (Patient not taking: Reported on 3/18/2022) 90 tablet 3    rosuvastatin (CRESTOR) 20 MG tablet Take 10 mg by mouth once daily.       sodium bicarbonate 650 MG tablet Take 650 mg by mouth 2 (two) times daily.      tacrolimus XR, ENVARSUS, (ENVARSUS) 0.75 mg Tb24   tab, Oral, qAM, 0 Refill(s), Maintenance      tacrolimus XR, ENVARSUS, (ENVARSUS) 1 mg Tb24 Take 4 mg by mouth.        No current facility-administered medications for this visit.     Review of patient's allergies indicates:   Allergen Reactions    Iodine      Other reaction(s): Blisters       Review of Systems   HENT: Negative for congestion.    Respiratory: Negative for cough and shortness of breath.    Cardiovascular: Negative for leg swelling.   Endocrine:        KIDNEY TRANSPLANT 5/4/2020   Musculoskeletal: Positive for gait problem.        Cane   All other systems reviewed and are negative.      Objective:      Vitals:    03/18/22 0819   BP: 129/76   Pulse: 70   Resp: 18   Weight: 86.2 kg (190 lb)   Height: 5' 8" (1.727 m)     Physical " Exam  Vitals and nursing note reviewed.   Constitutional:       General: He is not in acute distress.     Appearance: He is well-developed.   Cardiovascular:      Pulses:           Dorsalis pedis pulses are 2+ on the right side and 2+ on the left side.        Posterior tibial pulses are 1+ on the right side and 1+ on the left side.   Pulmonary:      Effort: Pulmonary effort is normal.   Musculoskeletal:         General: No swelling or tenderness.      Right foot: Decreased range of motion.      Left foot: Decreased range of motion.   Feet:      Right foot:      Skin integrity: Dry skin present. No skin breakdown.      Toenail Condition: Right toenails are abnormally thick and long. Fungal disease present.     Left foot:      Skin integrity: Dry skin present. No skin breakdown.      Toenail Condition: Left toenails are abnormally thick and long. Fungal disease present.  Skin:     General: Skin is dry.      Capillary Refill: Capillary refill takes 2 to 3 seconds.   Neurological:      Sensory: Sensory deficit present.      Comments: Sensation diminished distal digits    Psychiatric:         Mood and Affect: Mood normal.         Behavior: Behavior normal.         Int                Integumentary   Chronic dry skin texture feet and lower legs        Dystrophic raised elongated onychomycosis         No skin breaks, bruises, abrasions bilateral feet    Musculoskeletal   Muscle Strength: Normal for age     Joints, Bones, and Muscles: Pes planus  Cane, antalgic gait  Presents in appropriate tennis shoes        Contains abnormal data Hgb A1c w/ eAG Estimation  Component Ref Range & Units 10 mo ago   (5/4/21) 2 yr ago   (11/25/19)   Hemoglobin A1C <5.7 % of total Hgb 9.8 High   6.9 High  CM    EAG (MG/DL) (calc) 235                   Assessment:       1. Type II diabetes mellitus with neurological manifestations    2. Neuropathic pain of both feet    3. Dry skin    4. Acquired dysmorphic toenail    5. Onychomycosis of toenail         Plan:         Reviewed diabetic education and potential complications regarding lack of feeling toes   Reviewed neuropathy, pain, difficulty walking, coordination, stability  Advised patient Lyrica 50 mg at bedtime is very low-dose and he needs to absolutely discussed with his nephrologist either increasing the Lyrica or taking 1 pain pill at night as this was previously very effective before kidney transplant.  Patient does understand controlling pain with least amount of medication is important to prevent additional stress on the kidneys but we also discussed how important it is to manage his pain so he can get a good night's sleep and remain active.  Reviewed benefit of tight control of glucose/diabetes regarding feet    Reviewed shoes indoors to protect feet   Discussed maintenance of chronic dry skin and potential complications  Reviewed maintenance of fungal nails, nails debrided, no complications today   Instructed patient to contact the office with any area of redness or swelling which has not improved within 3 days.  Patient was in understanding and agreement with treatment plan  Counseled the patient on his conditions, their implications and medical management.  Instructed patient to contact the office with any changes, questions, concerns, worsening of symptoms. Patient verbalized understanding.   Total face to face time, exam, assessment, treatment, discussion, documentation 30 minutes, more than half this time spent on consultation and coordination of care.   Follow up 2 months      This note was created using [employer] voice recognition software that occasionally misinterpreted phrases or words.

## 2022-04-05 ENCOUNTER — HOSPITAL ENCOUNTER (OUTPATIENT)
Dept: RADIOLOGY | Facility: HOSPITAL | Age: 72
Discharge: HOME | End: 2022-04-05
Attending: PODIATRIST
Payer: MEDICARE

## 2022-04-05 ENCOUNTER — TELEPHONE (OUTPATIENT)
Dept: PODIATRY | Facility: CLINIC | Age: 72
End: 2022-04-05
Payer: MEDICARE

## 2022-04-05 ENCOUNTER — OFFICE VISIT (OUTPATIENT)
Dept: PODIATRY | Facility: CLINIC | Age: 72
End: 2022-04-05
Payer: MEDICARE

## 2022-04-05 VITALS
WEIGHT: 190 LBS | SYSTOLIC BLOOD PRESSURE: 117 MMHG | BODY MASS INDEX: 28.79 KG/M2 | RESPIRATION RATE: 16 BRPM | HEIGHT: 68 IN | HEART RATE: 62 BPM | DIASTOLIC BLOOD PRESSURE: 65 MMHG

## 2022-04-05 DIAGNOSIS — S99.921A INJURY OF TOE ON RIGHT FOOT, INITIAL ENCOUNTER: Primary | ICD-10-CM

## 2022-04-05 DIAGNOSIS — E11.49 TYPE II DIABETES MELLITUS WITH NEUROLOGICAL MANIFESTATIONS: ICD-10-CM

## 2022-04-05 DIAGNOSIS — S92.911A CLOSED NONDISPLACED FRACTURE OF PROXIMAL PHALANX OF TOE OF RIGHT FOOT: ICD-10-CM

## 2022-04-05 DIAGNOSIS — S99.921A INJURY OF TOE ON RIGHT FOOT, INITIAL ENCOUNTER: ICD-10-CM

## 2022-04-05 PROCEDURE — 99999 PR PBB SHADOW E&M-EST. PATIENT-LVL V: ICD-10-PCS | Mod: PBBFAC,,, | Performed by: PODIATRIST

## 2022-04-05 PROCEDURE — 99215 OFFICE O/P EST HI 40 MIN: CPT | Mod: PBBFAC | Performed by: PODIATRIST

## 2022-04-05 PROCEDURE — 73630 X-RAY EXAM OF FOOT: CPT | Mod: TC,FY,RT

## 2022-04-05 PROCEDURE — 99999 PR PBB SHADOW E&M-EST. PATIENT-LVL V: CPT | Mod: PBBFAC,,, | Performed by: PODIATRIST

## 2022-04-05 PROCEDURE — 99214 PR OFFICE/OUTPT VISIT, EST, LEVL IV, 30-39 MIN: ICD-10-PCS | Mod: S$PBB,,, | Performed by: PODIATRIST

## 2022-04-05 PROCEDURE — 73630 X-RAY EXAM OF FOOT: CPT | Mod: 26,RT,, | Performed by: RADIOLOGY

## 2022-04-05 PROCEDURE — 73630 XR FOOT COMPLETE 3 VIEW RIGHT: ICD-10-PCS | Mod: 26,RT,, | Performed by: RADIOLOGY

## 2022-04-05 PROCEDURE — 99214 OFFICE O/P EST MOD 30 MIN: CPT | Mod: S$PBB,,, | Performed by: PODIATRIST

## 2022-04-05 RX ORDER — HYDROCODONE BITARTRATE AND ACETAMINOPHEN 7.5; 325 MG/1; MG/1
1 TABLET ORAL EVERY 6 HOURS PRN
Qty: 28 TABLET | Refills: 0 | Status: SHIPPED | OUTPATIENT
Start: 2022-04-05 | End: 2022-04-05 | Stop reason: SDUPTHER

## 2022-04-05 RX ORDER — LANCETS 33 GAUGE
EACH MISCELLANEOUS
COMMUNITY
Start: 2022-03-31 | End: 2022-11-04 | Stop reason: SDUPTHER

## 2022-04-05 RX ORDER — HYDROCODONE BITARTRATE AND ACETAMINOPHEN 7.5; 325 MG/1; MG/1
1 TABLET ORAL EVERY 6 HOURS PRN
Qty: 28 TABLET | Refills: 0 | Status: SHIPPED | OUTPATIENT
Start: 2022-04-05 | End: 2022-04-12

## 2022-04-05 RX ORDER — METOPROLOL SUCCINATE 50 MG/1
50 TABLET, EXTENDED RELEASE ORAL NIGHTLY
COMMUNITY
Start: 2022-04-02

## 2022-04-05 NOTE — TELEPHONE ENCOUNTER
Called in pain medication, take only as needed. Advise patient radiologist did not note any other fractures. Thanks

## 2022-04-07 NOTE — PROGRESS NOTES
"Subjective:       Patient ID: Olu Gant Jr. is a 71 y.o. male.    Chief Complaint: Foot Pain and Diabetes Mellitus  Patient presents with complaint of pain on the bottom of the right foot stemming from a injury about 5 days ago.  He is not quite sure when this occurred but noticed bruising swelling and discomfort in the front of the foot.  Pain is escalated over the last 5 days, bruising has worsened, no bleeding.  Pain level 6/10      Past Medical History:   Diagnosis Date    Asbestos exposure - 1973 2/18/2014    Benign hypertension with ESRD (end-stage renal disease) 2/18/2014    Diabetes type 2 - since 1996 2/18/2014    DIALYSIS 2013    ESRD (end stage renal disease) - initiated dialysis 05/29/2013 2/18/2014    Gout, arthritis 2/18/2014    Hypothyroidism 2/18/2014    Irregular heart rhythm - unsure of Afib vs. A flutter 2/18/2014    Kidney transplanted 05/04/2020    Obesity 2/18/2014    Secondary hyperparathyroidism, renal 2/18/2014    Sleep apnea on Bipap 2/18/2014     Past Surgical History:   Procedure Laterality Date    AV FISTULA PLACEMENT      CHOLECYSTECTOMY      COLON SURGERY  02/2017    resection for "ischemic colon"    INGUINAL HERNIA REPAIR      bilaterally    KIDNEY TRANSPLANT      pace maker      [email or web address]    TONSILLECTOMY           Current Outpatient Medications   Medication Sig Dispense Refill    allopurinoL (ZYLOPRIM) 100 MG tablet Take 1 tablet (100 mg total) by mouth once daily. 90 tablet 0    amLODIPine (NORVASC) 10 MG tablet Take 10 mg by mouth once daily.      aspirin (ECOTRIN) 81 MG EC tablet 81 mg.      BD ZEB 2ND GEN PEN NEEDLE 32 gauge x 5/32" Ndle USE AS DIRECTED FOUR TIMES A  each 3    cinacalcet (SENSIPAR) 30 MG Tab TAKE 1 TABLET BY MOUTH ONCE DAILY with a meal      ergocalciferol (ERGOCALCIFEROL) 50,000 unit Cap 50,000 Int'l Units.      insulin detemir U-100 (LEVEMIR FLEXTOUCH U-100 INSULN) 100 unit/mL (3 mL) InPn pen Inject 40 Units into the " skin every evening. 36 mL 3    lancets 30 gauge Misc 1 lancet by Misc.(Non-Drug; Combo Route) route 4 (four) times daily. 200 each 11    levothyroxine (SYNTHROID) 100 MCG tablet Take 1 tablet (100 mcg total) by mouth once daily. 90 tablet 0    metoprolol succinate (TOPROL-XL) 50 MG 24 hr tablet Take 50 mg by mouth nightly.      metroNIDAZOLE (FLAGYL) 500 MG tablet       multivitamin (THERAGRAN) per tablet Take 1 tablet by mouth once daily.      NOVOLOG FLEXPEN U-100 INSULIN 100 unit/mL (3 mL) InPn pen INJECT 14 TO 20 UNITS SUBCUTANEOUSLY AS NEEDED      omeprazole (PRILOSEC) 20 MG capsule TAKE 1 CAPSULE BY MOUTH EVERY MORNING 30 MINUTES BEFORE BREAKFAST 30 capsule 0    ONETOUCH DELICA PLUS LANCET 33 gauge Misc Apply topically.      ONETOUCH VERIO TEST STRIPS Strp USE 1 STRIP TO CHECK GLUCOSE  3 TIMES DAILY AS DIRECTED 100 each 11    polyethylene glycol (GLYCOLAX) 17 gram/dose powder MIX 1 CAPFUL (17 GRAMS) IN LIQUID AND DRINK ONCE DAILY AS DIRECTED FOR CONSTIPATION      predniSONE (DELTASONE) 5 MG tablet Take 5 mg by mouth once daily.      pregabalin (LYRICA) 50 MG capsule Take 1 capsule (50 mg total) by mouth every evening. 30 capsule 2    rosuvastatin (CRESTOR) 10 MG tablet TAKE 1 TABLET BY MOUTH EVERY DAY 30 tablet 1    sodium bicarbonate 650 MG tablet 1,950 mg.      SPS, WITH SORBITOL, 15-20 gram/60 mL Susp take 60 ML BY MOUTH as 1 DOSE now. REPEAT in 6 hours.      TACROLIMUS XR, ENVARSUS, 0.75 MG ORAL TB24 0.75 mg Tb24 Take 1.5 mg by mouth once daily.      valGANciclovir (VALCYTE) 450 mg Tab Take 450 mg by mouth once daily. One pill on Monday and Thursday      HYDROcodone-acetaminophen (NORCO) 7.5-325 mg per tablet Take 1 tablet by mouth every 6 (six) hours as needed for Pain. 28 tablet 0    nitroGLYCERIN (NITROSTAT) 0.4 MG SL tablet Place 0.4 mg under the tongue every 5 (five) minutes as needed for Chest pain.       No current facility-administered medications for this visit.     Review of  "patient's allergies indicates:   Allergen Reactions    Iodine      Other reaction(s): Blisters       Review of Systems   HENT: Negative for congestion.    Respiratory: Negative for cough and shortness of breath.    Cardiovascular: Negative for leg swelling.   Endocrine:        KIDNEY TRANSPLANT 5/4/2020   Musculoskeletal: Positive for gait problem.        Cane   All other systems reviewed and are negative.      Objective:      Vitals:    04/05/22 1310   BP: 117/65   Pulse: 62   Resp: 16   Weight: 86.2 kg (190 lb)   Height: 5' 8" (1.727 m)     Physical Exam  Vitals and nursing note reviewed.   Constitutional:       General: He is not in acute distress.     Appearance: He is well-developed.   Cardiovascular:      Pulses:           Dorsalis pedis pulses are 2+ on the right side and 2+ on the left side.        Posterior tibial pulses are 1+ on the right side and 1+ on the left side.   Pulmonary:      Effort: Pulmonary effort is normal.   Musculoskeletal:         General: Tenderness present.      Right foot: Decreased range of motion.      Left foot: Decreased range of motion.      Comments: Pain, bruising, right forefoot, lesser digits   Feet:      Right foot:      Skin integrity: Dry skin present. No skin breakdown.      Left foot:      Skin integrity: Dry skin present. No skin breakdown.   Skin:     Capillary Refill: Capillary refill takes 2 to 3 seconds.      Findings: Bruising present.      Comments: Right forefoot, digits   Psychiatric:         Mood and Affect: Mood normal.         Behavior: Behavior normal.         Int                            XR FOOT COMPLETE 3 VIEW RIGHT     CLINICAL HISTORY:  . Unspecified injury of right foot, initial encounter     TECHNIQUE:  AP, lateral, and oblique views of the right foot were performed.     COMPARISON:  None     FINDINGS:  No acute fracture.  Dorsal and plantar calcaneal spurs.  No malalignment.  Flexion deformity of the 2nd through 4th toes.  Scattered mild forefoot " degenerative changes.  Atherosclerosis.        Electronically signed by: Mikel Cortes  Date:                                            04/05/2022       Assessment:       1. Injury of toe on right foot, initial encounter    2. Closed nondisplaced fracture of proximal phalanx of toe of right foot    3. Type II diabetes mellitus with neurological manifestations        Plan:         X-RAY COMPLETE RIGHT  HYDROCODONE 8 H P.R.N. RIGHT FOOT PAIN      On the oblique view there is a fracture along the base the proximal phalanx 4th digit right foot on the lateral aspect.  Advised patient with the bruising at the base of the first 2nd digits likely suffered a contusion/turf toe type injury jamming these toes with no other evidence of acute fracture noted at this time.  This patient is limited mobility and difficulty walking with a cane we discussed good alignment the fracture him to continue his tennis shoes to avoid all.  Did fit patient for surgical shoe and encouraged him to wear this at if well tolerated comfortable and safe.  Advised patient surgical shoe with a rigid prevents him pushing off to prevent additional stress on fractured 4th.  It also with pain forefoot as this is very inflamed, swollen and bending foot when walking prevents healing.  Patient relates he will stay off his foot as much as possible over the next 2 weeks and try surgical shoe at home.  If he is not able to tolerate the surgical shoe he is to remain in tennis shoes at all times even indoors.  Patient verbalized understanding.  Instructed patient to elevate foot to reduce swelling, have his wife check between his toes to make sure there is no additional bruising, cracking of the skin  Contact the office immediately with any changes  Hydrocodone 7.5 mg prescribed Q 8 H p.r.n. right foot pain  Patient was in understanding and agreement with treatment plan  Counseled the patient on his conditions, their implications and medical  management.  Instructed patient to contact the office with any changes, questions, concerns, worsening of symptoms. Patient verbalized understanding.   Total face to face time, exam, assessment, treatment, discussion, documentation 30 minutes, more than half this time spent on consultation and coordination of care.   Follow up 2 weeks      This note was created using [employer] voice recognition software that occasionally misinterpreted phrases or words.

## 2022-04-19 ENCOUNTER — OFFICE VISIT (OUTPATIENT)
Dept: PODIATRY | Facility: CLINIC | Age: 72
End: 2022-04-19
Payer: MEDICARE

## 2022-04-19 ENCOUNTER — HOSPITAL ENCOUNTER (OUTPATIENT)
Dept: RADIOLOGY | Facility: HOSPITAL | Age: 72
Discharge: HOME | End: 2022-04-19
Attending: PODIATRIST
Payer: MEDICARE

## 2022-04-19 VITALS
WEIGHT: 190 LBS | BODY MASS INDEX: 28.79 KG/M2 | DIASTOLIC BLOOD PRESSURE: 69 MMHG | HEIGHT: 68 IN | HEART RATE: 91 BPM | SYSTOLIC BLOOD PRESSURE: 134 MMHG | RESPIRATION RATE: 18 BRPM

## 2022-04-19 DIAGNOSIS — S92.911A CLOSED NONDISPLACED FRACTURE OF PROXIMAL PHALANX OF TOE OF RIGHT FOOT: Primary | ICD-10-CM

## 2022-04-19 DIAGNOSIS — S92.911A CLOSED NONDISPLACED FRACTURE OF PROXIMAL PHALANX OF TOE OF RIGHT FOOT: ICD-10-CM

## 2022-04-19 PROCEDURE — 73630 X-RAY EXAM OF FOOT: CPT | Mod: TC,FY,RT

## 2022-04-19 PROCEDURE — 73630 XR FOOT COMPLETE 3 VIEW RIGHT: ICD-10-PCS | Mod: 26,RT,, | Performed by: RADIOLOGY

## 2022-04-19 PROCEDURE — 99999 PR PBB SHADOW E&M-EST. PATIENT-LVL V: CPT | Mod: PBBFAC,,, | Performed by: PODIATRIST

## 2022-04-19 PROCEDURE — 73630 X-RAY EXAM OF FOOT: CPT | Mod: 26,RT,, | Performed by: RADIOLOGY

## 2022-04-19 PROCEDURE — 99213 OFFICE O/P EST LOW 20 MIN: CPT | Mod: S$PBB,,, | Performed by: PODIATRIST

## 2022-04-19 PROCEDURE — 99213 PR OFFICE/OUTPT VISIT, EST, LEVL III, 20-29 MIN: ICD-10-PCS | Mod: S$PBB,,, | Performed by: PODIATRIST

## 2022-04-19 PROCEDURE — 99215 OFFICE O/P EST HI 40 MIN: CPT | Mod: PBBFAC | Performed by: PODIATRIST

## 2022-04-19 PROCEDURE — 99999 PR PBB SHADOW E&M-EST. PATIENT-LVL V: ICD-10-PCS | Mod: PBBFAC,,, | Performed by: PODIATRIST

## 2022-04-23 NOTE — PROGRESS NOTES
"Subjective:       Patient ID: Olu Gant Jr. is a 71 y.o. male.    Chief Complaint: Follow-up, Toe Injury, and Diabetes Mellitus  Patient presents with his wife for follow-up on displaced fracture 4th digit right foot.  Patient relates he has been doing really well, bruising and swelling resolved, wearing tennis shoes at all times even indoors.  Pain has decreased considerably, taking pain medication only as needed.  Pain level 2/10      Past Medical History:   Diagnosis Date    Asbestos exposure - 1973 2/18/2014    Benign hypertension with ESRD (end-stage renal disease) 2/18/2014    Diabetes type 2 - since 1996 2/18/2014    DIALYSIS 2013    ESRD (end stage renal disease) - initiated dialysis 05/29/2013 2/18/2014    Gout, arthritis 2/18/2014    Hypothyroidism 2/18/2014    Irregular heart rhythm - unsure of Afib vs. A flutter 2/18/2014    Kidney transplanted 05/04/2020    Obesity 2/18/2014    Secondary hyperparathyroidism, renal 2/18/2014    Sleep apnea on Bipap 2/18/2014     Past Surgical History:   Procedure Laterality Date    AV FISTULA PLACEMENT      CHOLECYSTECTOMY      COLON SURGERY  02/2017    resection for "ischemic colon"    INGUINAL HERNIA REPAIR      bilaterally    KIDNEY TRANSPLANT      pace maker      [email or web address]    TONSILLECTOMY           Current Outpatient Medications   Medication Sig Dispense Refill    allopurinoL (ZYLOPRIM) 100 MG tablet Take 1 tablet (100 mg total) by mouth once daily. 90 tablet 0    amLODIPine (NORVASC) 10 MG tablet Take 10 mg by mouth once daily.      aspirin (ECOTRIN) 81 MG EC tablet 81 mg.      BD ZEB 2ND GEN PEN NEEDLE 32 gauge x 5/32" Ndle USE AS DIRECTED FOUR TIMES A  each 3    cinacalcet (SENSIPAR) 30 MG Tab TAKE 1 TABLET BY MOUTH ONCE DAILY with a meal      ergocalciferol (ERGOCALCIFEROL) 50,000 unit Cap 50,000 Int'l Units.      insulin detemir U-100 (LEVEMIR FLEXTOUCH U-100 INSULN) 100 unit/mL (3 mL) InPn pen Inject 40 Units into the " skin every evening. 36 mL 3    lancets 30 gauge Misc 1 lancet by Misc.(Non-Drug; Combo Route) route 4 (four) times daily. 200 each 11    levothyroxine (SYNTHROID) 100 MCG tablet Take 1 tablet (100 mcg total) by mouth once daily. 90 tablet 0    metoprolol succinate (TOPROL-XL) 50 MG 24 hr tablet Take 50 mg by mouth nightly.      metroNIDAZOLE (FLAGYL) 500 MG tablet       multivitamin (THERAGRAN) per tablet Take 1 tablet by mouth once daily.      NOVOLOG FLEXPEN U-100 INSULIN 100 unit/mL (3 mL) InPn pen INJECT 14 TO 20 UNITS SUBCUTANEOUSLY AS NEEDED      omeprazole (PRILOSEC) 20 MG capsule TAKE 1 CAPSULE BY MOUTH EVERY MORNING 30 MINUTES BEFORE BREAKFAST 30 capsule 0    ONETOUCH DELICA PLUS LANCET 33 gauge Misc Apply topically.      ONETOUCH VERIO TEST STRIPS Strp USE 1 STRIP TO CHECK GLUCOSE  3 TIMES DAILY AS DIRECTED 100 each 11    polyethylene glycol (GLYCOLAX) 17 gram/dose powder MIX 1 CAPFUL (17 GRAMS) IN LIQUID AND DRINK ONCE DAILY AS DIRECTED FOR CONSTIPATION      predniSONE (DELTASONE) 5 MG tablet Take 5 mg by mouth once daily.      pregabalin (LYRICA) 50 MG capsule Take 1 capsule (50 mg total) by mouth every evening. 30 capsule 2    rosuvastatin (CRESTOR) 10 MG tablet TAKE 1 TABLET BY MOUTH EVERY DAY 30 tablet 1    sodium bicarbonate 650 MG tablet 1,950 mg.      SPS, WITH SORBITOL, 15-20 gram/60 mL Susp take 60 ML BY MOUTH as 1 DOSE now. REPEAT in 6 hours.      TACROLIMUS XR, ENVARSUS, 0.75 MG ORAL TB24 0.75 mg Tb24 Take 1.5 mg by mouth once daily.      valGANciclovir (VALCYTE) 450 mg Tab Take 450 mg by mouth once daily. One pill on Monday and Thursday       No current facility-administered medications for this visit.     Review of patient's allergies indicates:   Allergen Reactions    Iodine      Other reaction(s): Blisters       Review of Systems   HENT: Negative for congestion.    Respiratory: Negative for cough and shortness of breath.    Cardiovascular: Negative for leg swelling.  "  Endocrine:        KIDNEY TRANSPLANT 5/4/2020   Musculoskeletal: Positive for gait problem.        Cane   All other systems reviewed and are negative.      Objective:      Vitals:    04/19/22 1305   BP: 134/69   Pulse: 91   Resp: 18   Weight: 86.2 kg (190 lb)   Height: 5' 8" (1.727 m)     Physical Exam  Vitals and nursing note reviewed.   Constitutional:       General: He is not in acute distress.     Appearance: He is well-developed.   Cardiovascular:      Pulses:           Dorsalis pedis pulses are 2+ on the right side and 2+ on the left side.        Posterior tibial pulses are 1+ on the right side and 1+ on the left side.   Pulmonary:      Effort: Pulmonary effort is normal.   Musculoskeletal:         General: Tenderness present.      Right foot: Decreased range of motion.      Left foot: Decreased range of motion.      Comments: Discomfort 4th digit right foot, resolved ecchymosis and   Feet:      Right foot:      Skin integrity: Dry skin present.      Left foot:      Skin integrity: Dry skin present.   Skin:     Capillary Refill: Capillary refill takes 2 to 3 seconds.   Psychiatric:         Mood and Affect: Mood normal.         Behavior: Behavior normal.                         XR FOOT COMPLETE 3 VIEW RIGHT     CLINICAL HISTORY:  . Unspecified fracture of right toe(s), initial encounter for closed fracture     TECHNIQUE:  AP, lateral, and oblique views of the right foot were performed.     COMPARISON:  None     FINDINGS:  There is hammertoe and degenerative changes at the interphalangeal joint of the big toe.  There appear to be additional hammertoes of the 2nd through 5th digits with clinical correlation recommended.  An acute fracture is not seen.  Juxta-articular bone erosion or Osteoporosis is not noted.  A small blunt heel spur is noted.     Impression:     Degenerative changes at the interphalangeal joint of the right big toe with hammertoes of the 5 digits.        Electronically signed by: Silviano " MD Angelica  Date:                                            04/19/2022                 Assessment:       1. Closed nondisplaced fracture of proximal phalanx of toe of right foot        Plan:         X-RAY COMPLETE RIGHT    Reviewed x-rays, small fracture at the lateral base the proximal phalanx 4th digit right foot was not documented by radiologist  Pointed out cortical disruption indicating fracture in this location, advised fracture is healing well, in good alignment, mild and can continue tennis shoes at all times for a few weeks.  Contact the office with any changes especially any swelling  Patient was in understanding and agreement with treatment plan  Counseled the patient on his conditions, their implications and medical management.  Instructed patient to contact the office with any changes, questions, concerns, worsening of symptoms. Patient verbalized understanding.   Total face to face time, exam, assessment, treatment, discussion, documentation 20 minutes, more than half this time spent on consultation and coordination of care.   Follow up 4 weeks      This note was created using M*[employer] voice recognition software that occasionally misinterpreted phrases or words.

## 2022-05-19 ENCOUNTER — OFFICE VISIT (OUTPATIENT)
Dept: PODIATRY | Facility: CLINIC | Age: 72
End: 2022-05-19
Payer: MEDICARE

## 2022-05-19 VITALS
RESPIRATION RATE: 18 BRPM | WEIGHT: 197.63 LBS | SYSTOLIC BLOOD PRESSURE: 125 MMHG | HEIGHT: 68 IN | BODY MASS INDEX: 29.95 KG/M2 | HEART RATE: 60 BPM | DIASTOLIC BLOOD PRESSURE: 69 MMHG

## 2022-05-19 DIAGNOSIS — L85.3 DRY SKIN DERMATITIS: ICD-10-CM

## 2022-05-19 DIAGNOSIS — B35.1 ONYCHOMYCOSIS OF TOENAIL: ICD-10-CM

## 2022-05-19 DIAGNOSIS — G57.93 NEUROPATHIC PAIN OF BOTH FEET: ICD-10-CM

## 2022-05-19 DIAGNOSIS — S92.911A CLOSED NONDISPLACED FRACTURE OF PROXIMAL PHALANX OF TOE OF RIGHT FOOT: Primary | ICD-10-CM

## 2022-05-19 DIAGNOSIS — E11.49 TYPE II DIABETES MELLITUS WITH NEUROLOGICAL MANIFESTATIONS: ICD-10-CM

## 2022-05-19 PROCEDURE — 99999 PR PBB SHADOW E&M-EST. PATIENT-LVL V: ICD-10-PCS | Mod: PBBFAC,,, | Performed by: PODIATRIST

## 2022-05-19 PROCEDURE — 99214 OFFICE O/P EST MOD 30 MIN: CPT | Mod: S$PBB,,, | Performed by: PODIATRIST

## 2022-05-19 PROCEDURE — 99215 OFFICE O/P EST HI 40 MIN: CPT | Mod: PBBFAC | Performed by: PODIATRIST

## 2022-05-19 PROCEDURE — 99999 PR PBB SHADOW E&M-EST. PATIENT-LVL V: CPT | Mod: PBBFAC,,, | Performed by: PODIATRIST

## 2022-05-19 PROCEDURE — 99214 PR OFFICE/OUTPT VISIT, EST, LEVL IV, 30-39 MIN: ICD-10-PCS | Mod: S$PBB,,, | Performed by: PODIATRIST

## 2022-05-22 NOTE — PROGRESS NOTES
"Subjective:       Patient ID: Olu Gant Jr. is a 71 y.o. male.    Chief Complaint: Diabetes Mellitus and Follow-up  Patient presents for follow-up non displaced fracture 4th digit right foot.  Patient relates toe heal well without complication, some residual swelling but no pain. States he has been doing very well recently, went to [employer] with family, did use a wheelchair.  No additional swelling in feet or legs.  Relates diabetes has been high, but improving      Past Medical History:   Diagnosis Date    Asbestos exposure - 1973 2/18/2014    Benign hypertension with ESRD (end-stage renal disease) 2/18/2014    Diabetes type 2 - since 1996 2/18/2014    DIALYSIS 2013    ESRD (end stage renal disease) - initiated dialysis 05/29/2013 2/18/2014    Gout, arthritis 2/18/2014    Hypothyroidism 2/18/2014    Irregular heart rhythm - unsure of Afib vs. A flutter 2/18/2014    Kidney transplanted 05/04/2020    Obesity 2/18/2014    Secondary hyperparathyroidism, renal 2/18/2014    Sleep apnea on Bipap 2/18/2014     Past Surgical History:   Procedure Laterality Date    AV FISTULA PLACEMENT      CHOLECYSTECTOMY      COLON SURGERY  02/2017    resection for "ischemic colon"    INGUINAL HERNIA REPAIR      bilaterally    KIDNEY TRANSPLANT      pace maker      [email or web address]    TONSILLECTOMY           Current Outpatient Medications   Medication Sig Dispense Refill    allopurinoL (ZYLOPRIM) 100 MG tablet Take 1 tablet (100 mg total) by mouth once daily. 90 tablet 0    amLODIPine (NORVASC) 10 MG tablet Take 10 mg by mouth once daily.      aspirin (ECOTRIN) 81 MG EC tablet 81 mg.      BD ZEB 2ND GEN PEN NEEDLE 32 gauge x 5/32" Ndle USE AS DIRECTED FOUR TIMES A  each 3    cinacalcet (SENSIPAR) 30 MG Tab TAKE 1 TABLET BY MOUTH ONCE DAILY with a meal      ergocalciferol (ERGOCALCIFEROL) 50,000 unit Cap 50,000 Int'l Units.      insulin detemir U-100 (LEVEMIR FLEXTOUCH U-100 INSULN) 100 " unit/mL (3 mL) InPn pen Inject 40 Units into the skin every evening. 36 mL 3    lancets 30 gauge Misc 1 lancet by Misc.(Non-Drug; Combo Route) route 4 (four) times daily. 200 each 11    levothyroxine (SYNTHROID) 100 MCG tablet Take 1 tablet (100 mcg total) by mouth once daily. 90 tablet 0    metoprolol succinate (TOPROL-XL) 50 MG 24 hr tablet Take 50 mg by mouth nightly.      metroNIDAZOLE (FLAGYL) 500 MG tablet       multivitamin (THERAGRAN) per tablet Take 1 tablet by mouth once daily.      NOVOLOG FLEXPEN U-100 INSULIN 100 unit/mL (3 mL) InPn pen INJECT 14 TO 20 UNITS SUBCUTANEOUSLY AS NEEDED 15 mL 5    omeprazole (PRILOSEC) 20 MG capsule TAKE 1 CAPSULE BY MOUTH EVERY MORNING 30 MINUTES BEFORE BREAKFAST 30 capsule 9    ONETOUCH DELICA PLUS LANCET 33 gauge Misc Apply topically.      ONETOUCH VERIO TEST STRIPS Strp USE 1 STRIP TO CHECK GLUCOSE  3 TIMES DAILY AS DIRECTED 100 each 11    polyethylene glycol (GLYCOLAX) 17 gram/dose powder MIX 1 CAPFUL (17 GRAMS) IN LIQUID AND DRINK ONCE DAILY AS DIRECTED FOR CONSTIPATION      predniSONE (DELTASONE) 5 MG tablet Take 5 mg by mouth once daily.      pregabalin (LYRICA) 50 MG capsule TAKE ONE CAPSULE BY MOUTH EVERY EVENING 90 capsule 1    rosuvastatin (CRESTOR) 10 MG tablet TAKE 1 TABLET BY MOUTH EVERY DAY 30 tablet 1    sodium bicarbonate 650 MG tablet 1,950 mg.      SPS, WITH SORBITOL, 15-20 gram/60 mL Susp take 60 ML BY MOUTH as 1 DOSE now. REPEAT in 6 hours.      TACROLIMUS XR, ENVARSUS, 0.75 MG ORAL TB24 0.75 mg Tb24 Take 1.5 mg by mouth once daily.      valGANciclovir (VALCYTE) 450 mg Tab Take 450 mg by mouth once daily. One pill on Monday and Thursday       No current facility-administered medications for this visit.     Review of patient's allergies indicates:   Allergen Reactions    Iodine      Other reaction(s): Blisters       Review of Systems   HENT: Negative for congestion.    Respiratory: Negative for cough and shortness of breath.   "  Cardiovascular: Negative for leg swelling.   Endocrine:        KIDNEY TRANSPLANT 5/4/2020   Musculoskeletal: Positive for gait problem.        Cane   All other systems reviewed and are negative.      Objective:      Vitals:    05/19/22 0813   BP: 125/69   Pulse: 60   Resp: 18   Weight: 89.6 kg (197 lb 9.6 oz)   Height: 5' 8" (1.727 m)     Physical Exam  Vitals and nursing note reviewed.   Constitutional:       General: He is not in acute distress.     Appearance: He is well-developed.   Cardiovascular:      Pulses:           Dorsalis pedis pulses are 2+ on the right side and 2+ on the left side.        Posterior tibial pulses are 1+ on the right side and 1+ on the left side.   Pulmonary:      Effort: Pulmonary effort is normal.   Musculoskeletal:         General: No tenderness.      Right foot: Decreased range of motion.      Left foot: Decreased range of motion.      Comments: No pain 4th digit right foot   Feet:      Right foot:      Skin integrity: Dry skin present. No skin breakdown or erythema.      Toenail Condition: Right toenails are abnormally thick and long. Fungal disease present.     Left foot:      Skin integrity: Dry skin present. No skin breakdown or erythema.      Toenail Condition: Left toenails are abnormally thick and long. Fungal disease present.  Skin:     Capillary Refill: Capillary refill takes 2 to 3 seconds.   Psychiatric:         Mood and Affect: Mood normal.         Behavior: Behavior normal.           XR FOOT COMPLETE 3 VIEW RIGHT     CLINICAL HISTORY:  . Unspecified fracture of right toe(s), initial encounter for closed fracture     TECHNIQUE:  AP, lateral, and oblique views of the right foot were performed.     COMPARISON:  None     FINDINGS:  There is hammertoe and degenerative changes at the interphalangeal joint of the big toe.  There appear to be additional hammertoes of the 2nd through 5th digits with clinical correlation recommended.  An acute fracture is not seen.  " Juxta-articular bone erosion or Osteoporosis is not noted.  A small blunt heel spur is noted.     Impression:     Degenerative changes at the interphalangeal joint of the right big toe with hammertoes of the 5 digits.        Electronically signed by: Silviano Dominguez MD  Date:                                            04/19/2022        Hgb A1c w/ eAG Estimation  Component Ref Range & Units 6 d ago   (5/16/22) 1 yr ago   (5/4/21) 2 yr ago   (11/25/19)   Hemoglobin A1C <5.7 % of total Hgb 8.1 High   9.8 High  CM  6.9 High  CM    EAG (MG/DL) mg/dL 186  235 R                                         Assessment:       1. Closed nondisplaced fracture of proximal phalanx of toe of right foot    2. Type II diabetes mellitus with neurological manifestations    3. Neuropathic pain of both feet    4. Dry skin dermatitis    5. Onychomycosis of toenail        Plan:           Reviewed well healed small fracture at the lateral base the proximal phalanx 4th digit right foot, advised some residual swelling may still be present for a few months  Reviewed wide appropriate tennis shoes  Reviewed diabetic education  Reviewed neuropathy.   Discussed benefit of tight(er) control of glucose/diabetes regarding potential foot problems especially neuropathy.    Reviewed A1c  Reviewed appropriate shoes,  especially indoors to protect feet, no flat shoes, slippers or walking in sock or bare feet.    Discussed maintenance of dry skin which is worse, have wife apply diabetic lotion a few times a week  Reviewed potential complications  Reviewed care and maintenance nails and potential complications.    Reviewed need for daily foot checks and instructed patient to contact the office with any area of redness or swelling which has not improved within 3 days.  Patient was in understanding and agreement with treatment plan  Counseled the patient on his conditions, their implications and medical management.  Instructed patient to contact the office with  any changes, questions, concerns, worsening of symptoms. Patient verbalized understanding.   Total face to face time, exam, assessment, treatment, discussion, documentation 30 minutes, more than half this time spent on consultation and coordination of care.   Follow up 8 weeks      This note was created using M*[employer] voice recognition software that occasionally misinterpreted phrases or words.

## 2022-09-01 ENCOUNTER — OFFICE VISIT (OUTPATIENT)
Dept: PODIATRY | Facility: CLINIC | Age: 72
End: 2022-09-01
Payer: MEDICARE

## 2022-09-01 VITALS
HEIGHT: 68 IN | SYSTOLIC BLOOD PRESSURE: 126 MMHG | HEART RATE: 60 BPM | DIASTOLIC BLOOD PRESSURE: 72 MMHG | BODY MASS INDEX: 26.98 KG/M2 | WEIGHT: 178 LBS

## 2022-09-01 DIAGNOSIS — E11.49 TYPE II DIABETES MELLITUS WITH NEUROLOGICAL MANIFESTATIONS: Primary | ICD-10-CM

## 2022-09-01 DIAGNOSIS — L85.3 DRY SKIN: ICD-10-CM

## 2022-09-01 DIAGNOSIS — B35.1 ONYCHOMYCOSIS OF TOENAIL: ICD-10-CM

## 2022-09-01 PROCEDURE — 99999 PR PBB SHADOW E&M-EST. PATIENT-LVL V: CPT | Mod: PBBFAC,,, | Performed by: PODIATRIST

## 2022-09-01 PROCEDURE — 99215 OFFICE O/P EST HI 40 MIN: CPT | Mod: PBBFAC | Performed by: PODIATRIST

## 2022-09-01 PROCEDURE — 99213 PR OFFICE/OUTPT VISIT, EST, LEVL III, 20-29 MIN: ICD-10-PCS | Mod: S$PBB,,, | Performed by: PODIATRIST

## 2022-09-01 PROCEDURE — 99213 OFFICE O/P EST LOW 20 MIN: CPT | Mod: S$PBB,,, | Performed by: PODIATRIST

## 2022-09-01 PROCEDURE — 99999 PR PBB SHADOW E&M-EST. PATIENT-LVL V: ICD-10-PCS | Mod: PBBFAC,,, | Performed by: PODIATRIST

## 2022-09-09 NOTE — PROGRESS NOTES
"Subjective:       Patient ID: Olu Gant Jr. is a 72 y.o. male.    Chief Complaint: Follow-up, Diabetes Mellitus, and Foot Injury  Patient presents with his wife for follow-up due to type 2 diabetes.  Presents today with all walker.  Relates his wife was diagnosed with COVID 7/5.  He was diagnosed 7/7 and he fell and fractured his hip on 07/08.  Had hip surgery 7/nursing.  States he has been doing very well walking with walker.  Relates fractured 4th digit right foot resolved without complication, no pain.  Relates no change in diabetes          Past Medical History:   Diagnosis Date    Asbestos exposure - 1973 2/18/2014    Benign hypertension with ESRD (end-stage renal disease) 2/18/2014    Diabetes type 2 - since 1996 2/18/2014    DIALYSIS 2013    ESRD (end stage renal disease) - initiated dialysis 05/29/2013 2/18/2014    Gout, arthritis 2/18/2014    Hypothyroidism 2/18/2014    Irregular heart rhythm - unsure of Afib vs. A flutter 2/18/2014    Kidney transplanted 05/04/2020    Obesity 2/18/2014    Secondary hyperparathyroidism, renal 2/18/2014    Sleep apnea on Bipap 2/18/2014     Past Surgical History:   Procedure Laterality Date    AV FISTULA PLACEMENT      CHOLECYSTECTOMY      COLON SURGERY  02/2017    resection for "ischemic colon"    FRACTURE SURGERY      INGUINAL HERNIA REPAIR      bilaterally    KIDNEY TRANSPLANT      pace maker      [email or web address]    TONSILLECTOMY           Current Outpatient Medications   Medication Sig Dispense Refill    allopurinoL (ZYLOPRIM) 100 MG tablet Take 1 tablet (100 mg total) by mouth once daily. 90 tablet 3    aspirin (ECOTRIN) 81 MG EC tablet 81 mg.      cinacalcet (SENSIPAR) 30 MG Tab TAKE 1 TABLET BY MOUTH ONCE DAILY with a meal      ergocalciferol (ERGOCALCIFEROL) 50,000 unit Cap 50,000 Int'l Units.      flash glucose sensor (FREESTYLE CLAIRE 2 SENSOR) Kit 1 each by Misc.(Non-Drug; Combo Route) route every 14 (fourteen) days. 1 kit 0    insulin detemir U-100 (LEVEMIR " "FLEXTOUCH U-100 INSULN) 100 unit/mL (3 mL) InPn pen Inject 40 Units into the skin every evening. 36 mL 3    lancets 30 gauge Misc 1 lancet by Misc.(Non-Drug; Combo Route) route 4 (four) times daily. 200 each 11    levothyroxine (SYNTHROID) 100 MCG tablet Take 1 tablet (100 mcg total) by mouth once daily. 90 tablet 3    metoprolol succinate (TOPROL-XL) 50 MG 24 hr tablet Take 50 mg by mouth nightly.      multivitamin (THERAGRAN) per tablet Take 1 tablet by mouth once daily.      NOVOLOG FLEXPEN U-100 INSULIN 100 unit/mL (3 mL) InPn pen INJECT 14 TO 20 UNITS UNDER THE SKIN AS NEEDED. MAX DAILY DOSE OF 60 UNITS PER DAY 15 mL 5    omeprazole (PRILOSEC) 20 MG capsule TAKE 1 CAPSULE BY MOUTH EVERY MORNING 30 MINUTES BEFORE BREAKFAST 30 capsule 9    ONETOUCH DELICA PLUS LANCET 33 gauge Misc Apply topically.      ONETOUCH VERIO TEST STRIPS Strp USE 1 STRIP TO CHECK GLUCOSE  3 TIMES DAILY AS DIRECTED 100 each 11    pen needle, diabetic (BD ZEB 2ND GEN PEN NEEDLE) 32 gauge x 5/32" Ndle USE AS DIRECTED FOUR TIMES A  each 3    predniSONE (DELTASONE) 5 MG tablet Take 5 mg by mouth once daily.      pregabalin (LYRICA) 75 MG capsule Take 1 capsule (75 mg total) by mouth every evening. 90 capsule 1    rosuvastatin (CRESTOR) 10 MG tablet TAKE 1 TABLET BY MOUTH EVERY DAY 30 tablet 1    sodium bicarbonate 650 MG tablet 1,950 mg.      SPS, WITH SORBITOL, 15-20 gram/60 mL Susp take 60 ML BY MOUTH as 1 DOSE now. REPEAT in 6 hours.      TACROLIMUS XR, ENVARSUS, 0.75 MG ORAL TB24 0.75 mg Tb24 Take 1.5 mg by mouth once daily.      amLODIPine (NORVASC) 10 MG tablet Take 10 mg by mouth once daily.      metroNIDAZOLE (FLAGYL) 500 MG tablet       polyethylene glycol (GLYCOLAX) 17 gram/dose powder MIX 1 CAPFUL (17 GRAMS) IN LIQUID AND DRINK ONCE DAILY AS DIRECTED FOR CONSTIPATION      valGANciclovir (VALCYTE) 450 mg Tab Take 450 mg by mouth once daily. One pill on Monday and Thursday       No current facility-administered medications for " "this visit.     Review of patient's allergies indicates:   Allergen Reactions    Iodine      Other reaction(s): Blisters       Review of Systems   HENT:  Negative for congestion.    Respiratory:  Negative for cough and shortness of breath.    Cardiovascular:  Negative for leg swelling.   Endocrine:        KIDNEY TRANSPLANT 5/4/2020   Musculoskeletal:  Positive for gait problem.        Walker   All other systems reviewed and are negative.    Objective:      Vitals:    09/01/22 0857   BP: 126/72   Pulse: 60   Weight: 80.7 kg (178 lb)   Height: 5' 8" (1.727 m)     Physical Exam  Vitals and nursing note reviewed. Exam conducted with a chaperone present.   Constitutional:       General: He is not in acute distress.     Appearance: He is well-developed.   Cardiovascular:      Pulses:           Dorsalis pedis pulses are 2+ on the right side and 2+ on the left side.        Posterior tibial pulses are 1+ on the right side and 1+ on the left side.   Pulmonary:      Effort: Pulmonary effort is normal.   Musculoskeletal:         General: No tenderness.      Right foot: Decreased range of motion.      Left foot: Decreased range of motion.   Feet:      Right foot:      Skin integrity: Dry skin present. No skin breakdown or erythema.      Toenail Condition: Right toenails are abnormally thick and long. Fungal disease present.     Left foot:      Skin integrity: Dry skin present. No skin breakdown or erythema.      Toenail Condition: Left toenails are abnormally thick and long. Fungal disease present.  Skin:     Capillary Refill: Capillary refill takes 2 to 3 seconds.   Psychiatric:         Mood and Affect: Mood normal.         Behavior: Behavior normal.                                           Contains abnormal data Hgb A1c w/ eAG Estimation    Component Ref Range & Units 3 mo ago   (5/16/22) 1 yr ago   (5/4/21)   Hemoglobin A1C <5.7 % of total Hgb 8.1 High   9.8 High  CM       EAG (MG/DL) mg/dL 186  235 R            "   Assessment:       1. Type II diabetes mellitus with neurological manifestations    2. Dry skin    3. Onychomycosis of toenail          Plan:             Reviewed diabetic education  Reviewed neuropathy  Discussed benefit of tight(er) control of glucose/diabetes regarding potential foot problems especially neuropathy.    Reviewed A1c  Reviewed wide light appropriate shoes,  especially indoors to protect feet, no flat shoes, slippers or walking in sock or bare feet.    Discussed maintenance of dry skin  Reviewed potential complications  Reviewed care and maintenance nails and potential complications. Nails debrided, thickness reduced   Reviewed need for daily foot checks and instructed patient to contact the office with any area of redness or swelling which has not improved within 3 days.  Patient was in understanding and agreement with treatment plan  Counseled the patient on his conditions, their implications and medical management.  Instructed patient to contact the office with any changes, questions, concerns, worsening of symptoms. Patient verbalized understanding.   Total face to face time, exam, assessment, treatment, discussion, documentation 20 minutes, more than half this time spent on consultation and coordination of care.   Follow up 3 months      This note was created using M*[employer] voice recognition software that occasionally misinterpreted phrases or words.

## 2022-11-01 ENCOUNTER — OFFICE VISIT (OUTPATIENT)
Dept: PODIATRY | Facility: CLINIC | Age: 72
End: 2022-11-01
Payer: MEDICARE

## 2022-11-01 VITALS
HEART RATE: 70 BPM | BODY MASS INDEX: 28.04 KG/M2 | RESPIRATION RATE: 16 BRPM | HEIGHT: 68 IN | WEIGHT: 185 LBS | DIASTOLIC BLOOD PRESSURE: 76 MMHG | SYSTOLIC BLOOD PRESSURE: 148 MMHG

## 2022-11-01 DIAGNOSIS — G57.93 NEUROPATHIC PAIN OF BOTH FEET: ICD-10-CM

## 2022-11-01 DIAGNOSIS — L85.3 DRY SKIN: ICD-10-CM

## 2022-11-01 DIAGNOSIS — E11.49 TYPE II DIABETES MELLITUS WITH NEUROLOGICAL MANIFESTATIONS: Primary | ICD-10-CM

## 2022-11-01 DIAGNOSIS — B35.1 ONYCHOMYCOSIS OF TOENAIL: ICD-10-CM

## 2022-11-01 PROCEDURE — 99213 PR OFFICE/OUTPT VISIT, EST, LEVL III, 20-29 MIN: ICD-10-PCS | Mod: S$PBB,,, | Performed by: PODIATRIST

## 2022-11-01 PROCEDURE — 99213 OFFICE O/P EST LOW 20 MIN: CPT | Mod: S$PBB,,, | Performed by: PODIATRIST

## 2022-11-01 PROCEDURE — 99999 PR PBB SHADOW E&M-EST. PATIENT-LVL V: ICD-10-PCS | Mod: PBBFAC,,, | Performed by: PODIATRIST

## 2022-11-01 PROCEDURE — 99999 PR PBB SHADOW E&M-EST. PATIENT-LVL V: CPT | Mod: PBBFAC,,, | Performed by: PODIATRIST

## 2022-11-01 PROCEDURE — 99215 OFFICE O/P EST HI 40 MIN: CPT | Mod: PBBFAC | Performed by: PODIATRIST

## 2022-11-01 RX ORDER — NIRMATRELVIR AND RITONAVIR 150-100 MG
KIT ORAL
COMMUNITY
Start: 2022-07-08 | End: 2022-11-04

## 2022-11-01 RX ORDER — PANTOPRAZOLE SODIUM 40 MG/1
40 TABLET, DELAYED RELEASE ORAL
COMMUNITY
Start: 2022-07-26 | End: 2022-11-04

## 2022-11-01 RX ORDER — CINACALCET 30 MG/1
30 TABLET, FILM COATED ORAL
COMMUNITY
Start: 2022-07-26 | End: 2022-11-04 | Stop reason: SDUPTHER

## 2022-11-01 RX ORDER — APIXABAN 5 MG/1
5 TABLET, FILM COATED ORAL 2 TIMES DAILY
COMMUNITY
Start: 2022-08-08 | End: 2023-08-12 | Stop reason: SDUPTHER

## 2022-11-01 RX ORDER — INSULIN DETEMIR 100 [IU]/ML
INJECTION, SOLUTION SUBCUTANEOUS
COMMUNITY
Start: 2022-09-12 | End: 2023-04-13 | Stop reason: SDUPTHER

## 2022-11-01 RX ORDER — COVID-19 MOLECULAR TEST ASSAY
KIT MISCELLANEOUS
COMMUNITY
Start: 2022-08-15 | End: 2022-11-04

## 2022-11-01 RX ORDER — ONDANSETRON 4 MG/1
4 TABLET, ORALLY DISINTEGRATING ORAL EVERY 6 HOURS PRN
COMMUNITY
Start: 2022-07-21 | End: 2023-11-17

## 2022-11-01 RX ORDER — DOCUSATE SODIUM 100 MG/1
100 CAPSULE, LIQUID FILLED ORAL
COMMUNITY
Start: 2022-07-29 | End: 2023-11-17

## 2022-11-01 RX ORDER — AMLODIPINE BESYLATE 5 MG/1
5 TABLET ORAL DAILY
COMMUNITY
Start: 2022-10-19 | End: 2023-12-02

## 2022-11-01 RX ORDER — PANTOPRAZOLE SODIUM 40 MG/1
40 TABLET, DELAYED RELEASE ORAL 2 TIMES DAILY
COMMUNITY
Start: 2022-07-21 | End: 2022-11-04

## 2022-11-01 RX ORDER — HYDROCODONE BITARTRATE AND ACETAMINOPHEN 5; 325 MG/1; MG/1
1 TABLET ORAL EVERY 4 HOURS PRN
COMMUNITY
Start: 2022-07-21 | End: 2022-11-04

## 2022-11-01 RX ORDER — NITROGLYCERIN 0.4 MG/1
TABLET SUBLINGUAL
COMMUNITY
Start: 2022-07-21

## 2022-11-01 RX ORDER — BISACODYL 5 MG
10 TABLET, DELAYED RELEASE (ENTERIC COATED) ORAL
COMMUNITY
Start: 2022-07-29 | End: 2022-11-04

## 2022-11-01 RX ORDER — HYDROCODONE BITARTRATE AND ACETAMINOPHEN 5; 325 MG/1; MG/1
TABLET ORAL
COMMUNITY
Start: 2022-07-29 | End: 2022-11-04

## 2022-11-01 RX ORDER — ACETAMINOPHEN 325 MG/1
325 TABLET ORAL
COMMUNITY
Start: 2022-07-29 | End: 2023-11-17

## 2022-11-01 RX ORDER — INSULIN DETEMIR 100 [IU]/ML
INJECTION, SOLUTION SUBCUTANEOUS
COMMUNITY
Start: 2022-07-24 | End: 2022-11-04

## 2022-11-05 NOTE — PROGRESS NOTES
"Subjective:       Patient ID: Olu Gant Jr. is a 72 y.o. male.    Chief Complaint: Follow-up, Nail Problem, Skin Problem, and Diabetes Mellitus  Patient presents with his wife for follow-up due to type 2 diabetes, neuropathy, skin and nail problem.  Patient relates diabetes has been slightly high recently, 177 this morning.  Overall it has improved over last 2 years.  Takes 75 mg Lyrica at night for neuropathy pain.  States he has done very well with recovery hip fracture proximally 4 months ago, has graduated from a walker to a cane        Past Medical History:   Diagnosis Date    Asbestos exposure - 1973 2/18/2014    Benign hypertension with ESRD (end-stage renal disease) 2/18/2014    Diabetes type 2 - since 1996 2/18/2014    DIALYSIS 2013    ESRD (end stage renal disease) - initiated dialysis 05/29/2013 2/18/2014    Gout, arthritis 2/18/2014    Hypothyroidism 2/18/2014    Irregular heart rhythm - unsure of Afib vs. A flutter 2/18/2014    Kidney transplanted 05/04/2020    Obesity 2/18/2014    Secondary hyperparathyroidism, renal 2/18/2014    Sleep apnea on Bipap 2/18/2014     Past Surgical History:   Procedure Laterality Date    AV FISTULA PLACEMENT      CHOLECYSTECTOMY      COLON SURGERY  02/2017    resection for "ischemic colon"    FRACTURE SURGERY      HIP FRACTURE SURGERY Left     INGUINAL HERNIA REPAIR      bilaterally    KIDNEY TRANSPLANT      pace maker      [email or web address]    TONSILLECTOMY           Current Outpatient Medications   Medication Sig Dispense Refill    acetaminophen (TYLENOL) 325 MG tablet 325 mg.      allopurinoL (ZYLOPRIM) 100 MG tablet Take 1 tablet (100 mg total) by mouth once daily. 90 tablet 3    amLODIPine (NORVASC) 5 MG tablet Take 5 mg by mouth once daily.      apixaban (ELIQUIS) 5 mg Tab   TAKE 1 TABLET BY MOUTH TWICE DAILY      aspirin (ECOTRIN) 81 MG EC tablet 81 mg.      cinacalcet (SENSIPAR) 30 MG Tab TAKE 1 TABLET BY MOUTH ONCE DAILY with a meal      docusate sodium (COLACE) " 100 MG capsule 100 mg.      ELIQUIS 5 mg Tab Take 5 mg by mouth 2 (two) times daily.      enoxaparin sodium (LOVENOX SUBQ) 80 mg.      ergocalciferol (ERGOCALCIFEROL) 50,000 unit Cap 50,000 Int'l Units.      flash glucose sensor (FREESTYLE CLAIRE 2 SENSOR) Kit 1 each by Misc.(Non-Drug; Combo Route) route every 14 (fourteen) days. 1 kit 0    insulin detemir U-100 (LEVEMIR FLEXTOUCH U-100 INSULN) 100 unit/mL (3 mL) InPn pen   11 unit, SUBQ, HS, max TDD 50, # 30 mL, 3 Refill(s), Maintenance, Pharmacy: [id number], DM II (diabetes mellitus, type II), controlled, 166, cm, 22 13:45:00 CDT, Height/Length Measured, 84.2, kg, 22 13:45:00 CDT, Weight Do...      levothyroxine (SYNTHROID) 100 MCG tablet Take 1 tablet (100 mcg total) by mouth once daily. 90 tablet 3    metoprolol succinate (TOPROL-XL) 50 MG 24 hr tablet Take 50 mg by mouth nightly.      multivitamin (THERAGRAN) per tablet Take 1 tablet by mouth once daily.      NOVOLOG FLEXPEN U-100 INSULIN 100 unit/mL (3 mL) InPn pen INJECT 14 TO 20 UNITS UNDER THE SKIN AS NEEDED. MAX DAILY DOSE OF 60 UNITS PER DAY 15 mL 5    omeprazole (PRILOSEC) 20 MG capsule TAKE 1 CAPSULE BY MOUTH EVERY MORNING 30 MINUTES BEFORE BREAKFAST 30 capsule 9    predniSONE (DELTASONE) 5 MG tablet Take 5 mg by mouth once daily.      pregabalin (LYRICA) 75 MG capsule Take 1 capsule (75 mg total) by mouth every evening. 90 capsule 1    rosuvastatin (CRESTOR) 10 MG tablet TAKE 1 TABLET BY MOUTH EVERY DAY 30 tablet 1    sodium bicarbonate 650 MG tablet 1,950 mg.      TACROLIMUS XR, ENVARSUS, 0.75 MG ORAL TB24 0.75 mg Tb24 Take 1.5 mg by mouth once daily.      nitroGLYCERIN (NITROSTAT) 0.4 MG SL tablet SMARTSI Tablet(s) Sublingual      ondansetron (ZOFRAN-ODT) 4 MG TbDL Take 4 mg by mouth every 6 (six) hours as needed.      SPS, WITH SORBITOL, 15-20 gram/60 mL Susp take 60 ML BY MOUTH as 1 DOSE now. REPEAT in 6 hours.       No current facility-administered medications for  "this visit.     Review of patient's allergies indicates:   Allergen Reactions    Iodine      Other reaction(s): Blisters       Review of Systems   HENT:  Negative for congestion.    Respiratory:  Negative for cough.    Cardiovascular:  Negative for leg swelling.   Endocrine:        KIDNEY TRANSPLANT 5/4/2020   Musculoskeletal:  Positive for gait problem.        Cane   All other systems reviewed and are negative.    Objective:      Vitals:    11/01/22 0835   BP: (!) 148/76   Pulse: 70   Resp: 16   Weight: 83.9 kg (185 lb)   Height: 5' 8" (1.727 m)     Physical Exam  Vitals and nursing note reviewed. Exam conducted with a chaperone present.   Constitutional:       General: He is not in acute distress.     Appearance: He is well-developed.   Cardiovascular:      Pulses:           Dorsalis pedis pulses are 2+ on the right side and 2+ on the left side.        Posterior tibial pulses are 1+ on the right side and 1+ on the left side.   Pulmonary:      Effort: Pulmonary effort is normal.   Musculoskeletal:         General: No tenderness.      Right foot: Decreased range of motion.      Left foot: Decreased range of motion.   Feet:      Right foot:      Skin integrity: Dry skin present. No skin breakdown or erythema.      Toenail Condition: Right toenails are abnormally thick and long. Fungal disease present.     Left foot:      Skin integrity: Dry skin present. No skin breakdown or erythema.      Toenail Condition: Left toenails are abnormally thick and long. Fungal disease present.  Skin:     Capillary Refill: Capillary refill takes 2 to 3 seconds.   Psychiatric:         Mood and Affect: Mood normal.         Behavior: Behavior normal.         Thought Content: Thought content normal.         Judgment: Judgment normal.                                            Assessment:       1. Type II diabetes mellitus with neurological manifestations    2. Neuropathic pain of both feet    3. Dry skin    4. Onychomycosis of toenail  "         Plan:             Reviewed diabetic education and had a lengthy discussion regarding neuropathy, conservative treatments  Discussed benefit of tight(er) control of glucose/diabetes regarding potential foot problems especially neuropathy.    Reviewed A1c  Reviewed wide light appropriate tennis shoes,  especially indoors to protect feet  Discussed maintenance of dry skin, diabetic foot lotion a few times a week  Reviewed potential complications  Reviewed care and maintenance nails and potential complications. Nails debrided, thickness reduced   Reviewed need for daily foot checks and instructed patient to contact the office with any area of redness or swelling which has not improved within 3 days.  Patient was in understanding and agreement with treatment plan  Counseled the patient on his conditions, their implications and medical management.  Instructed patient to contact the office with any changes, questions, concerns, worsening of symptoms. Patient verbalized understanding.   Total face to face time, exam, assessment, treatment, discussion, documentation 20 minutes, more than half this time spent on consultation and coordination of care.   Follow up 3 months      This note was created using M*Modal voice recognition software that occasionally misinterpreted phrases or words.

## 2022-12-12 ENCOUNTER — IMMUNIZATION (OUTPATIENT)
Dept: FAMILY MEDICINE | Facility: CLINIC | Age: 72
End: 2022-12-12
Payer: MEDICARE

## 2022-12-12 PROCEDURE — 0134A COVID-19, MRNA, LNP-S, BIVALENT BOOSTER, PF, 50 MCG/0.5 ML: CPT | Mod: PBBFAC

## 2022-12-12 PROCEDURE — 91313 COVID-19, MRNA, LNP-S, BIVALENT BOOSTER, PF, 50 MCG/0.5 ML: CPT | Mod: PBBFAC

## 2023-01-03 ENCOUNTER — OFFICE VISIT (OUTPATIENT)
Dept: PODIATRY | Facility: CLINIC | Age: 73
End: 2023-01-03
Payer: MEDICARE

## 2023-01-03 VITALS
BODY MASS INDEX: 29.55 KG/M2 | SYSTOLIC BLOOD PRESSURE: 151 MMHG | WEIGHT: 195 LBS | DIASTOLIC BLOOD PRESSURE: 79 MMHG | HEART RATE: 71 BPM | HEIGHT: 68 IN | RESPIRATION RATE: 16 BRPM

## 2023-01-03 DIAGNOSIS — B35.1 ONYCHOMYCOSIS OF TOENAIL: ICD-10-CM

## 2023-01-03 DIAGNOSIS — L85.3 DRY SKIN: ICD-10-CM

## 2023-01-03 DIAGNOSIS — E11.9 COMPREHENSIVE DIABETIC FOOT EXAMINATION, TYPE 2 DM, ENCOUNTER FOR: Primary | ICD-10-CM

## 2023-01-03 DIAGNOSIS — L84 FOOT CALLUS: ICD-10-CM

## 2023-01-03 DIAGNOSIS — E11.49 TYPE II DIABETES MELLITUS WITH NEUROLOGICAL MANIFESTATIONS: ICD-10-CM

## 2023-01-03 PROCEDURE — 99999 PR PBB SHADOW E&M-EST. PATIENT-LVL III: ICD-10-PCS | Mod: PBBFAC,,, | Performed by: PODIATRIST

## 2023-01-03 PROCEDURE — 99213 OFFICE O/P EST LOW 20 MIN: CPT | Mod: PBBFAC | Performed by: PODIATRIST

## 2023-01-03 PROCEDURE — 99999 PR PBB SHADOW E&M-EST. PATIENT-LVL III: CPT | Mod: PBBFAC,,, | Performed by: PODIATRIST

## 2023-01-03 PROCEDURE — 99214 PR OFFICE/OUTPT VISIT, EST, LEVL IV, 30-39 MIN: ICD-10-PCS | Mod: S$PBB,,, | Performed by: PODIATRIST

## 2023-01-03 PROCEDURE — 99214 OFFICE O/P EST MOD 30 MIN: CPT | Mod: S$PBB,,, | Performed by: PODIATRIST

## 2023-01-03 RX ORDER — DENOSUMAB 60 MG/ML
60 INJECTION SUBCUTANEOUS
COMMUNITY
Start: 2022-11-15 | End: 2023-01-06 | Stop reason: SDUPTHER

## 2023-01-03 RX ORDER — ERGOCALCIFEROL 1.25 MG/1
50000 CAPSULE ORAL
COMMUNITY
Start: 2022-11-15 | End: 2023-04-13 | Stop reason: SDUPTHER

## 2023-01-03 RX ORDER — PEN NEEDLE, DIABETIC 32GX 5/32"
NEEDLE, DISPOSABLE MISCELLANEOUS
COMMUNITY
Start: 2022-12-09 | End: 2023-05-08 | Stop reason: SDUPTHER

## 2023-01-07 NOTE — PROGRESS NOTES
"Subjective:       Patient ID: Olu Gant Jr. is a 72 y.o. male.    Chief Complaint: Follow-up and Diabetic Foot Exam  Patient presents with his wife for annual diabetic foot exam. Patient relates diabetes has been doing well, 144 this morning.  Continues to take 75 mg Lyrica at night for neuropathy pain.  Long history of diabetes, h/o foot ulcer x 1 right foot.      Past Medical History:   Diagnosis Date    Asbestos exposure - 1973 2/18/2014    Benign hypertension with ESRD (end-stage renal disease) 2/18/2014    Diabetes type 2 - since 1996 2/18/2014    DIALYSIS 2013    ESRD (end stage renal disease) - initiated dialysis 05/29/2013 2/18/2014    Gout, arthritis 2/18/2014    Hypothyroidism 2/18/2014    Irregular heart rhythm - unsure of Afib vs. A flutter 2/18/2014    Kidney transplanted 05/04/2020    Obesity 2/18/2014    Secondary hyperparathyroidism, renal 2/18/2014    Sleep apnea on Bipap 2/18/2014     Past Surgical History:   Procedure Laterality Date    AV FISTULA PLACEMENT      CHOLECYSTECTOMY      COLON SURGERY  02/2017    resection for "ischemic colon"    FRACTURE SURGERY      HIP FRACTURE SURGERY Left     INGUINAL HERNIA REPAIR      bilaterally    KIDNEY TRANSPLANT      pace maker      [email or web address]    TONSILLECTOMY           Current Outpatient Medications   Medication Sig Dispense Refill    acetaminophen (TYLENOL) 325 MG tablet 325 mg.      allopurinoL (ZYLOPRIM) 100 MG tablet Take 1 tablet (100 mg total) by mouth once daily. 90 tablet 3    amLODIPine (NORVASC) 5 MG tablet Take 5 mg by mouth once daily.      apixaban (ELIQUIS) 5 mg Tab   TAKE 1 TABLET BY MOUTH TWICE DAILY      aspirin (ECOTRIN) 81 MG EC tablet 81 mg.      BD ZEB 2ND GEN PEN NEEDLE 32 gauge x 5/32" Ndle USE AS DIRECTED FOUR TIMES DAILY      cinacalcet (SENSIPAR) 30 MG Tab TAKE 1 TABLET BY MOUTH ONCE DAILY with a meal      denosumab (PROLIA) 60 mg/mL Syrg Inject 60 mg into the skin every 6 (six) months.      docusate sodium (COLACE) 100 " MG capsule 100 mg.      ELIQUIS 5 mg Tab Take 5 mg by mouth 2 (two) times daily.      ergocalciferol (ERGOCALCIFEROL) 50,000 unit Cap 50,000 Int'l Units.      ergocalciferol (ERGOCALCIFEROL) 50,000 unit Cap 50,000 Int'l Units.      flash glucose sensor (FREESTYLE CLAIRE 2 SENSOR) Kit 1 each by Misc.(Non-Drug; Combo Route) route every 14 (fourteen) days. 1 kit 0    insulin detemir U-100 (LEVEMIR FLEXTOUCH U-100 INSULN) 100 unit/mL (3 mL) InPn pen   11 unit, SUBQ, HS, max TDD 50, # 30 mL, 3 Refill(s), Maintenance, Pharmacy: [facility] #[id number], DM II (diabetes mellitus, type II), controlled, 166, cm, 09/06/22 13:45:00 CDT, Height/Length Measured, 84.2, kg, 09/06/22 13:45:00 CDT, Weight Do...      levothyroxine (SYNTHROID) 100 MCG tablet TAKE 1 TABLET(100 MCG) BY MOUTH EVERY DAY 90 tablet 3    metoprolol succinate (TOPROL-XL) 50 MG 24 hr tablet Take 50 mg by mouth nightly.      multivitamin (THERAGRAN) per tablet Take 1 tablet by mouth once daily.      mupirocin (BACTROBAN) 2 % ointment Apply topically 3 (three) times daily. 22 g 0    NOVOLOG FLEXPEN U-100 INSULIN 100 unit/mL (3 mL) InPn pen INJECT 14 TO 20 UNITS UNDER THE SKIN AS NEEDED. MAX DAILY DOSE OF 60 UNITS PER DAY 15 mL 5    omeprazole (PRILOSEC) 20 MG capsule TAKE 1 CAPSULE BY MOUTH EVERY MORNING 30 MINUTES BEFORE BREAKFAST 30 capsule 9    ondansetron (ZOFRAN-ODT) 4 MG TbDL Take 4 mg by mouth every 6 (six) hours as needed.      predniSONE (DELTASONE) 5 MG tablet Take 5 mg by mouth once daily.      pregabalin (LYRICA) 75 MG capsule Take 1 capsule (75 mg total) by mouth every evening. 90 capsule 1    rosuvastatin (CRESTOR) 10 MG tablet TAKE 1 TABLET BY MOUTH EVERY DAY 30 tablet 1    sodium bicarbonate 650 MG tablet 1,950 mg.      SPS, WITH SORBITOL, 15-20 gram/60 mL Susp take 60 ML BY MOUTH as 1 DOSE now. REPEAT in 6 hours.      TACROLIMUS XR, ENVARSUS, 0.75 MG ORAL TB24 0.75 mg Tb24 Take 1.5 mg by mouth once daily.      denosumab (PROLIA) 60 mg/mL Syrg  "60 mg.      nitroGLYCERIN (NITROSTAT) 0.4 MG SL tablet SMARTSI Tablet(s) Sublingual       No current facility-administered medications for this visit.     Review of patient's allergies indicates:   Allergen Reactions    Iodine      Other reaction(s): Blisters       Review of Systems   HENT:  Negative for congestion.    Respiratory:  Negative for cough.    Cardiovascular:  Negative for leg swelling.   Endocrine:        KIDNEY TRANSPLANT 2020   Musculoskeletal:  Positive for gait problem.        Cane   All other systems reviewed and are negative.    Objective:      Vitals:    23 0823   BP: (!) 151/79   Pulse: 71   Resp: 16   Weight: 88.5 kg (195 lb)   Height: 5' 8" (1.727 m)     Physical Exam  Vitals and nursing note reviewed. Exam conducted with a chaperone present.   Constitutional:       General: He is not in acute distress.     Appearance: Normal appearance. He is well-developed.   Cardiovascular:      Pulses:           Dorsalis pedis pulses are 2+ on the right side and 2+ on the left side.        Posterior tibial pulses are 1+ on the right side and 1+ on the left side.   Pulmonary:      Effort: Pulmonary effort is normal.   Musculoskeletal:         General: No swelling.      Right foot: Decreased range of motion.      Left foot: Decreased range of motion.   Feet:      Right foot:      Protective Sensation: 6 sites tested.  3 sites sensed.      Skin integrity: Dry skin present. No skin breakdown or erythema.      Toenail Condition: Right toenails are abnormally thick and long. Fungal disease present.     Left foot:      Protective Sensation: 6 sites tested.  3 sites sensed.      Skin integrity: Callus (non painful callus sub 4th met head region left) and dry skin present. No skin breakdown or erythema.      Toenail Condition: Left toenails are abnormally thick and long. Fungal disease present.  Skin:     Capillary Refill: Capillary refill takes 2 to 3 seconds.   Neurological:      Mental Status: He is " alert.      Comments: Lack of sensation to distal digits with monofilament testing bilateral    Psychiatric:         Mood and Affect: Mood normal.         Behavior: Behavior normal.         Thought Content: Thought content normal.         Judgment: Judgment normal.                                 Hgb A1c w/ eAG Estimation  Component Ref Range & Units 1 mo ago   (11/23/22) 7 mo ago   (5/16/22) 1 yr ago   (5/4/21)   Hemoglobin A1C <5.7 % of total Hgb 6.9 High   8.1 High  CM  9.8 High  CM       EAG (MG/DL) mg/dL 151  186  235 R           Contains abnormal data Comprehensive Metabolic Panel  Component Ref Range & Units 1 mo ago   (11/23/22) 7 mo ago   (5/16/22) 1 yr ago   (1/3/22) 1 yr ago   (12/7/21)   Glucose 65 - 99 mg/dL 109 High   186 High  R, CM  91 R  107 R         BUN 7 - 25 mg/dL 28 High   33 High   38 High  R  35 High  R    Creatinine 0.70 - 1.28 mg/dL 1.24  1.44 High  R, CM  1.7 High  R  1.6 High  R    eGFR > OR = 60 mL/min/1.73m2 61          BUN/Creatinine Ratio 6 - 22 (calc) 23 High   23 High       Sodium 135 - 146 mmol/L 142  140  144 R  141 R    Potassium 3.5 - 5.3 mmol/L 4.1  4.3  4.3 R  4.7 R    Chloride 98 - 110 mmol/L 111 High   106  110 R  108 R    CO2 20 - 32 mmol/L 26  25  21 Low  R  24 R    Calcium 8.6 - 10.3 mg/dL 8.8  8.9  9.5 R  8.9 R    Total Protein 6.1 - 8.1 g/dL 6.3  6.0 Low   6.7 R  6.4 R    Albumin 3.6 - 5.1 g/dL 3.9  3.8  3.5 R  3.6 R    Globulin, Total 1.9 - 3.7 g/dL (calc) 2.4  2.2      Albumin/Globulin Ratio 1.0 - 2.5 (calc) 1.6  1.7      Total Bilirubin 0.2 - 1.2 mg/dL 0.7  0.9  0.6 R, CM  0.7 R, CM    Alkaline Phosphatase 35 - 144 U/L 143  138  212 High  R  166 High  R    AST 10 - 35 U/L 23  13  17 R  20 R    ALT 9 - 46 U/L 30  14  43 R             Assessment:       1. Comprehensive diabetic foot examination, type 2 DM, encounter for    2. Type II diabetes mellitus with neurological manifestations    3. Dry skin    4. Foot callus - Left Foot    5. Onychomycosis of toenail             Plan:           Diabetic pedal exam performed.      Reviewed diabetic neuropathy and Lyrica at night  Reviewed results monofilament testing lack of sensation to distal digits which puts him at risk for potential complications  Reviewed diabetic education  Discussed benefit of tight(er) control of glucose/diabetes regarding potential foot problems especially neuropathy.    Reviewed A1c  Reviewed wide light appropriate tennis shoes,  especially indoors to protect feet  Discussed maintenance of dry skin, especially on heels,   Instructed to start OTC diabetic foot lotion a few times a week  Reviewed potential skin complications  We discussed area of callus on the ball of the left foot, this is the 1st time this has been present in this location.  Again stressed importance of appropriate shoes indoors.  Callus sub 4th met head was debrided left foot, no complications at this time  Reviewed care and maintenance nails and potential complications. Nails debrided, thickness reduced   Reviewed need for daily foot checks and instructed patient to contact the office with any area of redness or swelling which has not improved within 3 days.  Patient was in understanding and agreement with treatment plan  Counseled the patient on his conditions, their implications and medical management.  Instructed patient to contact the office with any changes, questions, concerns, worsening of symptoms. Patient verbalized understanding.   Total face to face time, exam, assessment, treatment, discussion, documentation 30 minutes, more than half this time spent on consultation and coordination of care.   Follow up 3 months      This note was created using M*Modal voice recognition software that occasionally misinterpreted phrases or words.

## 2023-03-02 ENCOUNTER — OFFICE VISIT (OUTPATIENT)
Dept: PODIATRY | Facility: CLINIC | Age: 73
End: 2023-03-02
Payer: MEDICARE

## 2023-03-02 ENCOUNTER — TELEPHONE (OUTPATIENT)
Dept: PODIATRY | Facility: CLINIC | Age: 73
End: 2023-03-02
Payer: MEDICARE

## 2023-03-02 VITALS
WEIGHT: 183 LBS | RESPIRATION RATE: 16 BRPM | DIASTOLIC BLOOD PRESSURE: 72 MMHG | HEART RATE: 69 BPM | HEIGHT: 68 IN | BODY MASS INDEX: 27.74 KG/M2 | SYSTOLIC BLOOD PRESSURE: 123 MMHG

## 2023-03-02 DIAGNOSIS — L85.3 DRY SKIN: ICD-10-CM

## 2023-03-02 DIAGNOSIS — L97.521 ULCER OF LEFT FOOT, LIMITED TO BREAKDOWN OF SKIN: Primary | ICD-10-CM

## 2023-03-02 DIAGNOSIS — E11.49 TYPE II DIABETES MELLITUS WITH NEUROLOGICAL MANIFESTATIONS: ICD-10-CM

## 2023-03-02 DIAGNOSIS — B35.1 ONYCHOMYCOSIS OF TOENAIL: ICD-10-CM

## 2023-03-02 DIAGNOSIS — G57.93 NEUROPATHIC PAIN OF BOTH FEET: ICD-10-CM

## 2023-03-02 PROCEDURE — 99214 OFFICE O/P EST MOD 30 MIN: CPT | Mod: S$PBB,,, | Performed by: PODIATRIST

## 2023-03-02 PROCEDURE — 99215 OFFICE O/P EST HI 40 MIN: CPT | Mod: PBBFAC | Performed by: PODIATRIST

## 2023-03-02 PROCEDURE — 99214 PR OFFICE/OUTPT VISIT, EST, LEVL IV, 30-39 MIN: ICD-10-PCS | Mod: S$PBB,,, | Performed by: PODIATRIST

## 2023-03-02 PROCEDURE — 99999 PR PBB SHADOW E&M-EST. PATIENT-LVL V: CPT | Mod: PBBFAC,,, | Performed by: PODIATRIST

## 2023-03-02 PROCEDURE — 99999 PR PBB SHADOW E&M-EST. PATIENT-LVL V: ICD-10-PCS | Mod: PBBFAC,,, | Performed by: PODIATRIST

## 2023-03-02 RX ORDER — ERGOCALCIFEROL 1.25 MG/1
1 CAPSULE ORAL
COMMUNITY
Start: 2023-02-01

## 2023-03-02 RX ORDER — ERGOCALCIFEROL 1.25 MG/1
50000 CAPSULE ORAL
COMMUNITY
Start: 2022-11-15 | End: 2023-04-13 | Stop reason: SDUPTHER

## 2023-03-02 RX ORDER — SODIUM BICARBONATE 650 MG/1
650 TABLET ORAL
COMMUNITY
Start: 2022-09-12 | End: 2023-04-13 | Stop reason: SDUPTHER

## 2023-03-02 RX ORDER — PREGABALIN 75 MG/1
75 CAPSULE ORAL
COMMUNITY
Start: 2022-12-19 | End: 2023-04-13 | Stop reason: SDUPTHER

## 2023-03-02 RX ORDER — ROSUVASTATIN CALCIUM 10 MG/1
10 TABLET, COATED ORAL
COMMUNITY
Start: 2023-02-20 | End: 2023-04-13 | Stop reason: SDUPTHER

## 2023-03-02 RX ORDER — FUROSEMIDE 40 MG/1
40 TABLET ORAL
COMMUNITY
Start: 2023-02-17 | End: 2023-04-13 | Stop reason: DRUGHIGH

## 2023-03-02 RX ORDER — FUROSEMIDE 40 MG/1
20 TABLET ORAL
COMMUNITY
Start: 2023-02-17 | End: 2023-04-13 | Stop reason: DRUGHIGH

## 2023-03-02 RX ORDER — OMEPRAZOLE 20 MG/1
20 CAPSULE, DELAYED RELEASE ORAL
COMMUNITY
Start: 2022-09-12 | End: 2023-04-13 | Stop reason: SDUPTHER

## 2023-03-02 RX ORDER — DOXYCYCLINE 100 MG/1
CAPSULE ORAL
COMMUNITY
Start: 2023-02-17 | End: 2023-04-13

## 2023-03-02 RX ORDER — INSULIN DETEMIR 100 [IU]/ML
INJECTION, SOLUTION SUBCUTANEOUS
COMMUNITY
Start: 2022-09-12 | End: 2023-09-07

## 2023-03-02 RX ORDER — MUPIROCIN 20 MG/G
1 OINTMENT TOPICAL 3 TIMES DAILY
COMMUNITY
Start: 2022-11-30 | End: 2023-04-13

## 2023-03-02 NOTE — TELEPHONE ENCOUNTER
Spoke to patient and wife. Advised wife to wash the area with soap and water to removed the betadine and re-dress with the appropriate dressing. Wife stated that she would give benadry to patient to prevent any reaction. Wife verbalized understanding.    ----- Message from Jovan Hernandez sent at 3/2/2023 10:05 AM CST -----  Contact: pt's wife Anabel at [phone]  Type: Needs Medical Advice  Who Called:  pt's wife Anabel   Best Call Back Number: [phone]  Additional Information: pt's wife is calling the office requesting a call back from the nurse regarding her  they put iodine one his foot she says he's allergic. Please call back and advise.    PER THE PT'S WIFE ANABEL PLEASE CALL BACK TODAY ASAP

## 2023-03-04 NOTE — PROGRESS NOTES
"Subjective:       Patient ID: Olu Gant Jr. is a 72 y.o. male.    Chief Complaint: Follow-up, Callouses, Skin Problem, Diabetes Mellitus, and Foot Pain  Patient presents for follow-up type 2 diabetes.  Patient relates diabetes has remained well controlled, last A1c just under 7, reports glucose was 150 at time of visit.  Takes Lyrica at night for neuropathy pain which he states does rather well.  He has been feeling good and has increased his activity, just a lot of work last few days fertilizer thing garden.  He does relate some discomfort on the ball of the left foot, not really painful.      Past Medical History:   Diagnosis Date    Asbestos exposure - 1973 2/18/2014    Benign hypertension with ESRD (end-stage renal disease) 2/18/2014    Diabetes type 2 - since 1996 2/18/2014    DIALYSIS 2013    ESRD (end stage renal disease) - initiated dialysis 05/29/2013 2/18/2014    Gout, arthritis 2/18/2014    Hypothyroidism 2/18/2014    Irregular heart rhythm - unsure of Afib vs. A flutter 2/18/2014    Kidney transplanted 05/04/2020    Obesity 2/18/2014    Secondary hyperparathyroidism, renal 2/18/2014    Sleep apnea on Bipap 2/18/2014     Past Surgical History:   Procedure Laterality Date    AV FISTULA PLACEMENT      CHOLECYSTECTOMY      COLON SURGERY  02/2017    resection for "ischemic colon"    FRACTURE SURGERY      HIP FRACTURE SURGERY Left     INGUINAL HERNIA REPAIR      bilaterally    KIDNEY TRANSPLANT      pace maker      [email or web address]    TONSILLECTOMY           Current Outpatient Medications   Medication Sig Dispense Refill    acetaminophen (TYLENOL) 325 MG tablet 325 mg.      allopurinoL (ZYLOPRIM) 100 MG tablet Take 1 tablet (100 mg total) by mouth once daily. 90 tablet 3    amLODIPine (NORVASC) 5 MG tablet Take 5 mg by mouth once daily.      apixaban (ELIQUIS) 5 mg Tab   TAKE 1 TABLET BY MOUTH TWICE DAILY      aspirin (ECOTRIN) 81 MG EC tablet 81 mg.      BD ZEB 2ND GEN PEN NEEDLE 32 gauge x 5/32" Ndle USE " AS DIRECTED FOUR TIMES DAILY      cinacalcet (SENSIPAR) 30 MG Tab TAKE 1 TABLET BY MOUTH ONCE DAILY with a meal      denosumab (PROLIA) 60 mg/mL Syrg Inject 60 mg into the skin every 6 (six) months.      docusate sodium (COLACE) 100 MG capsule 100 mg.      ELIQUIS 5 mg Tab Take 5 mg by mouth 2 (two) times daily.      ergocalciferol (ERGOCALCIFEROL) 50,000 unit Cap 50,000 Int'l Units.      ergocalciferol (ERGOCALCIFEROL) 50,000 unit Cap 50,000 Int'l Units.      flash glucose sensor (FREESTYLE CLAIRE 2 SENSOR) Kit 1 each by Misc.(Non-Drug; Combo Route) route every 14 (fourteen) days. 1 kit 0    furosemide (LASIX) 40 MG tablet Take 40 mg by mouth.      insulin detemir U-100 (LEVEMIR FLEXPEN) 100 unit/mL (3 mL) InPn pen   11 unit, SUBQ, HS, max TDD 50, # 30 mL, 3 Refill(s), Maintenance, Pharmacy: [facility] #[id number], DM II (diabetes mellitus, type II), controlled, 166, cm, 09/06/22 13:45:00 CDT, Height/Length Measured, 84.2, kg, 09/06/22 13:45:00 CDT, Weight Do...      levothyroxine (SYNTHROID) 100 MCG tablet TAKE 1 TABLET(100 MCG) BY MOUTH EVERY DAY 90 tablet 3    metoprolol succinate (TOPROL-XL) 50 MG 24 hr tablet Take 50 mg by mouth nightly.      multivitamin (THERAGRAN) per tablet Take 1 tablet by mouth once daily.      mupirocin (BACTROBAN) 2 % ointment Apply topically 3 (three) times daily. 22 g 0    mupirocin (BACTROBAN) 2 % ointment 1 application 3 (three) times daily.      NOVOLOG FLEXPEN U-100 INSULIN 100 unit/mL (3 mL) InPn pen INJECT 14 TO 20 UNITS UNDER THE SKIN AS NEEDED. MAX DAILY DOSE OF 60 UNITS PER DAY 15 mL 5    omeprazole (PRILOSEC) 20 MG capsule TAKE 1 CAPSULE BY MOUTH EVERY MORNING 30 MINUTES BEFORE BREAKFAST 90 capsule 3    omeprazole (PRILOSEC) 20 MG capsule 20 mg.      ondansetron (ZOFRAN-ODT) 4 MG TbDL Take 4 mg by mouth every 6 (six) hours as needed.      predniSONE (DELTASONE) 5 MG tablet Take 5 mg by mouth once daily.      pregabalin (LYRICA) 75 MG capsule Take 75 mg by mouth.       rosuvastatin (CRESTOR) 10 MG tablet Take 10 mg by mouth.      sodium bicarbonate 650 MG tablet 1,950 mg.      TACROLIMUS XR, ENVARSUS, 0.75 MG ORAL TB24 0.75 mg Tb24 Take 1.5 mg by mouth once daily.      doxycycline (MONODOX) 100 MG capsule TAKE 1 CAPSULE BY MOUTH EVERY 12 HOURS FOR 6 DAYS      ergocalciferol (ERGOCALCIFEROL) 50,000 unit Cap 50,000 Int'l Units.      ergocalciferol (ERGOCALCIFEROL) 50,000 unit Cap Take 1 capsule by mouth twice a week.      furosemide (LASIX) 40 MG tablet Take 20 mg by mouth.      insulin detemir U-100 (LEVEMIR FLEXTOUCH U-100 INSULN) 100 unit/mL (3 mL) InPn pen   11 unit, SUBQ, HS, max TDD 50, # 30 mL, 3 Refill(s), Maintenance, Pharmacy: [facility] #[id number], DM II (diabetes mellitus, type II), controlled, 166, cm, 22 13:45:00 CDT, Height/Length Measured, 84.2, kg, 22 13:45:00 CDT, Weight Do...      nitroGLYCERIN (NITROSTAT) 0.4 MG SL tablet SMARTSI Tablet(s) Sublingual      pregabalin (LYRICA) 75 MG capsule Take 1 capsule (75 mg total) by mouth every evening. (Patient not taking: Reported on 3/2/2023) 90 capsule 1    rosuvastatin (CRESTOR) 10 MG tablet TAKE 1 TABLET BY MOUTH EVERY DAY (Patient not taking: Reported on 3/2/2023) 30 tablet 1    sodium bicarbonate 650 MG tablet Take 650 mg by mouth.      SPS, WITH SORBITOL, 15-20 gram/60 mL Susp take 60 ML BY MOUTH as 1 DOSE now. REPEAT in 6 hours.       No current facility-administered medications for this visit.     Review of patient's allergies indicates:   Allergen Reactions    Iodine      Other reaction(s): Blisters       Review of Systems   HENT:  Negative for congestion.    Respiratory:  Negative for cough.    Cardiovascular:  Negative for leg swelling.   Endocrine:        KIDNEY TRANSPLANT 2020   Musculoskeletal:  Positive for gait problem.        Cane   All other systems reviewed and are negative.    Objective:      Vitals:    23 0819   BP: 123/72   Pulse: 69   Resp: 16   Weight: 83 kg (183 lb)  "  Height: 5' 8" (1.727 m)     Physical Exam  Vitals and nursing note reviewed.   Constitutional:       General: He is not in acute distress.     Appearance: Normal appearance. He is well-developed.   Cardiovascular:      Pulses:           Dorsalis pedis pulses are 2+ on the right side and 2+ on the left side.        Posterior tibial pulses are 1+ on the right side and 1+ on the left side.   Pulmonary:      Effort: Pulmonary effort is normal.   Musculoskeletal:         General: Tenderness present.      Right foot: Decreased range of motion. Bunion (mild HAV and hammertoe 2nd right) present.      Left foot: Decreased range of motion.      Comments: Lesion/callus with pain plantar left foot   Feet:      Right foot:      Skin integrity: Dry skin present. No skin breakdown or erythema.      Toenail Condition: Right toenails are abnormally thick and long. Fungal disease present.     Left foot:      Skin integrity: Ulcer, callus (Painful discolored callus sub 4th met head region with superficial ulcer and bloody blister type drainage, no tracking or undermining, no erythema or calor surrounding this area) and dry skin present. No erythema.      Toenail Condition: Left toenails are abnormally thick and long. Fungal disease present.  Skin:     Capillary Refill: Capillary refill takes 2 to 3 seconds.   Neurological:      Mental Status: He is alert.      Comments: Lack of sensation to distal digits, intact elsewhere b/l feet   Psychiatric:         Mood and Affect: Mood normal.         Behavior: Behavior normal.         Thought Content: Thought content normal.         Judgment: Judgment normal.                                  Assessment:       1. Ulcer of left foot, limited to breakdown of skin    2. Type II diabetes mellitus with neurological manifestations    3. Neuropathic pain of both feet    4. Dry skin    5. Onychomycosis of toenail              Plan:         Following debridement callus left foot advised patient a " watery type bloody drainage was noted, superficially, blister was starting to form under the callus, he had a callus in this location last visit.  We checked patient shoes, he relates no change, wears them indoors  Following debridement superficial ulcers present in patient was shown a picture  Patient confirms his wife can apply dressing to this area daily.  Discussed with patient use of iodine rather than Neosporin to keep the wound clean and dry.  Can use silver alginate, he had use this on a wound on the top of the foot a while ago, light dressing but explained to patient the best treatment is to stay off his foot the next week until follow-up.  Do not get wet  Patient was in understanding and agreement with treatment plan  Long care today ulcer debrided left foot, cleanse with wound , iodine, silver alginate, light bandage applied  Reviewed diabetic education  Reviewed neuropathy  Reviewed tennis shoes indoors to protect feet as all times, no flat shoes, slippers or walking in sock or bare feet.      Discussed maintenance of dry skin skin and fungal nails and potential complications.    Nails debrided. Reviewed need for daily foot checks and instructed patient to contact the office with any area of redness or swelling which has not improved within 3 days.  Patient was in understanding and agreement with treatment plan.  I counseled the patient on their conditions, implications and medical management.  Instructed patient to contact the office with any changes, questions, concerns, worsening of symptoms.   Total face to face time 30 minutes, exam, assessment, treatment, discussion, additional time for review of chart prior to and following appointment and visit documentation, consultation and coordination of care.    Follow up 1 week    Although patient confirmed okay using topical iodine his wife called when he returned home relating allergy to topical as well. Was instructed to immediately wash off, take  Benadryl as directed, contact the office if ay blistering/further skin condition occurs    This note was created using [employer] voice recognition software that occasionally misinterpreted phrases or words.

## 2023-03-09 ENCOUNTER — OFFICE VISIT (OUTPATIENT)
Dept: PODIATRY | Facility: CLINIC | Age: 73
End: 2023-03-09
Payer: MEDICARE

## 2023-03-09 VITALS
WEIGHT: 186 LBS | RESPIRATION RATE: 18 BRPM | DIASTOLIC BLOOD PRESSURE: 73 MMHG | BODY MASS INDEX: 28.19 KG/M2 | SYSTOLIC BLOOD PRESSURE: 139 MMHG | HEIGHT: 68 IN | HEART RATE: 63 BPM

## 2023-03-09 DIAGNOSIS — L97.521 ULCER OF LEFT FOOT, LIMITED TO BREAKDOWN OF SKIN: Primary | ICD-10-CM

## 2023-03-09 DIAGNOSIS — E11.49 TYPE II DIABETES MELLITUS WITH NEUROLOGICAL MANIFESTATIONS: ICD-10-CM

## 2023-03-09 PROCEDURE — 99999 PR PBB SHADOW E&M-EST. PATIENT-LVL III: ICD-10-PCS | Mod: PBBFAC,,, | Performed by: PODIATRIST

## 2023-03-09 PROCEDURE — 99999 PR PBB SHADOW E&M-EST. PATIENT-LVL III: CPT | Mod: PBBFAC,,, | Performed by: PODIATRIST

## 2023-03-09 PROCEDURE — 99213 OFFICE O/P EST LOW 20 MIN: CPT | Mod: S$PBB,,, | Performed by: PODIATRIST

## 2023-03-09 PROCEDURE — 99213 PR OFFICE/OUTPT VISIT, EST, LEVL III, 20-29 MIN: ICD-10-PCS | Mod: S$PBB,,, | Performed by: PODIATRIST

## 2023-03-09 PROCEDURE — 99213 OFFICE O/P EST LOW 20 MIN: CPT | Mod: PBBFAC | Performed by: PODIATRIST

## 2023-03-09 RX ORDER — GLUCAGON INJECTION, SOLUTION 1 MG/.2ML
1 INJECTION, SOLUTION SUBCUTANEOUS DAILY
COMMUNITY
Start: 2023-03-08

## 2023-03-09 NOTE — PROGRESS NOTES
"Subjective:       Patient ID: Olu Gant Jr. is a 72 y.o. male.    Chief Complaint: Callouses, Follow-up, Diabetes Mellitus, and Foot Ulcer  Patient presents with his wife for follow up ulcer left foot. They have been applying silver alginate, light Bandage and patient has been off his feet most of the week. Wife relates area is much improved, no drainage        Past Medical History:   Diagnosis Date    Asbestos exposure - 1973 2/18/2014    Benign hypertension with ESRD (end-stage renal disease) 2/18/2014    Diabetes type 2 - since 1996 2/18/2014    DIALYSIS 2013    ESRD (end stage renal disease) - initiated dialysis 05/29/2013 2/18/2014    Gout, arthritis 2/18/2014    Hypothyroidism 2/18/2014    Irregular heart rhythm - unsure of Afib vs. A flutter 2/18/2014    Kidney transplanted 05/04/2020    Obesity 2/18/2014    Secondary hyperparathyroidism, renal 2/18/2014    Sleep apnea on Bipap 2/18/2014     Past Surgical History:   Procedure Laterality Date    AV FISTULA PLACEMENT      CHOLECYSTECTOMY      COLON SURGERY  02/2017    resection for "ischemic colon"    FRACTURE SURGERY      HIP FRACTURE SURGERY Left     INGUINAL HERNIA REPAIR      bilaterally    KIDNEY TRANSPLANT      pace maker      [email or web address]    TONSILLECTOMY           Current Outpatient Medications   Medication Sig Dispense Refill    acetaminophen (TYLENOL) 325 MG tablet 325 mg.      allopurinoL (ZYLOPRIM) 100 MG tablet Take 1 tablet (100 mg total) by mouth once daily. 90 tablet 3    amLODIPine (NORVASC) 5 MG tablet Take 5 mg by mouth once daily.      apixaban (ELIQUIS) 5 mg Tab   TAKE 1 TABLET BY MOUTH TWICE DAILY      aspirin (ECOTRIN) 81 MG EC tablet 81 mg.      BD ZEB 2ND GEN PEN NEEDLE 32 gauge x 5/32" Ndle USE AS DIRECTED FOUR TIMES DAILY      cinacalcet (SENSIPAR) 30 MG Tab TAKE 1 TABLET BY MOUTH ONCE DAILY with a meal      denosumab (PROLIA) 60 mg/mL Syrg Inject 60 mg into the skin every 6 (six) months.      docusate sodium (COLACE) 100 MG " capsule 100 mg.      doxycycline (MONODOX) 100 MG capsule TAKE 1 CAPSULE BY MOUTH EVERY 12 HOURS FOR 6 DAYS      ELIQUIS 5 mg Tab Take 5 mg by mouth 2 (two) times daily.      ergocalciferol (ERGOCALCIFEROL) 50,000 unit Cap 50,000 Int'l Units.      flash glucose sensor (FREESTYLE CLAIRE 2 SENSOR) Kit 1 each by Misc.(Non-Drug; Combo Route) route every 14 (fourteen) days. 1 kit 0    furosemide (LASIX) 40 MG tablet Take 40 mg by mouth.      GVOKE HYPOPEN 2-PACK 1 mg/0.2 mL AtIn Inject 1 Units into the skin Daily.      insulin detemir U-100 (LEVEMIR FLEXPEN) 100 unit/mL (3 mL) InPn pen   11 unit, SUBQ, HS, max TDD 50, # 30 mL, 3 Refill(s), Maintenance, Pharmacy: [facility] #[id number], DM II (diabetes mellitus, type II), controlled, 166, cm, 22 13:45:00 CDT, Height/Length Measured, 84.2, kg, 22 13:45:00 CDT, Weight Do...      levothyroxine (SYNTHROID) 100 MCG tablet TAKE 1 TABLET(100 MCG) BY MOUTH EVERY DAY 90 tablet 3    metoprolol succinate (TOPROL-XL) 50 MG 24 hr tablet Take 50 mg by mouth nightly.      multivitamin (THERAGRAN) per tablet Take 1 tablet by mouth once daily.      mupirocin (BACTROBAN) 2 % ointment Apply topically 3 (three) times daily. 22 g 0    nitroGLYCERIN (NITROSTAT) 0.4 MG SL tablet SMARTSI Tablet(s) Sublingual      NOVOLOG FLEXPEN U-100 INSULIN 100 unit/mL (3 mL) InPn pen INJECT 14 TO 20 UNITS UNDER THE SKIN AS NEEDED. MAX DAILY DOSE OF 60 UNITS PER DAY 15 mL 5    omeprazole (PRILOSEC) 20 MG capsule TAKE 1 CAPSULE BY MOUTH EVERY MORNING 30 MINUTES BEFORE BREAKFAST 90 capsule 3    ondansetron (ZOFRAN-ODT) 4 MG TbDL Take 4 mg by mouth every 6 (six) hours as needed.      predniSONE (DELTASONE) 5 MG tablet Take 5 mg by mouth once daily.      pregabalin (LYRICA) 75 MG capsule Take 1 capsule (75 mg total) by mouth every evening. 90 capsule 1    rosuvastatin (CRESTOR) 10 MG tablet TAKE 1 TABLET BY MOUTH EVERY DAY 30 tablet 1    sodium bicarbonate 650 MG tablet 1,950 mg.      SPS, WITH  "SORBITOL, 15-20 gram/60 mL Susp take 60 ML BY MOUTH as 1 DOSE now. REPEAT in 6 hours.      TACROLIMUS XR, ENVARSUS, 0.75 MG ORAL TB24 0.75 mg Tb24 Take 1.5 mg by mouth once daily.      ergocalciferol (ERGOCALCIFEROL) 50,000 unit Cap 50,000 Int'l Units.      ergocalciferol (ERGOCALCIFEROL) 50,000 unit Cap Take 1 capsule by mouth twice a week.      ergocalciferol (ERGOCALCIFEROL) 50,000 unit Cap 50,000 Int'l Units.      furosemide (LASIX) 40 MG tablet Take 20 mg by mouth.      insulin detemir U-100 (LEVEMIR FLEXTOUCH U-100 INSULN) 100 unit/mL (3 mL) InPn pen   11 unit, SUBQ, HS, max TDD 50, # 30 mL, 3 Refill(s), Maintenance, [id number], DM II (diabetes mellitus, type II), controlled, 166, cm, 09/06/22 13:45:00 CDT, Height/Length Measured, 84.2, kg, 09/06/22 13:45:00 CDT, Weight Do...      mupirocin (BACTROBAN) 2 % ointment 1 application 3 (three) times daily.      omeprazole (PRILOSEC) 20 MG capsule 20 mg.      pregabalin (LYRICA) 75 MG capsule Take 75 mg by mouth.      rosuvastatin (CRESTOR) 10 MG tablet Take 10 mg by mouth.      sodium bicarbonate 650 MG tablet Take 650 mg by mouth.       No current facility-administered medications for this visit.     Review of patient's allergies indicates:   Allergen Reactions    Iodine      Other reaction(s): Blisters       Review of Systems   HENT:  Negative for congestion.    Endocrine:        KIDNEY TRANSPLANT 5/4/2020   Musculoskeletal:  Positive for gait problem.        Cane   All other systems reviewed and are negative.    Objective:      Vitals:    03/09/23 0827   BP: 139/73   Pulse: 63   Resp: 18   Weight: 84.4 kg (186 lb)   Height: 5' 8" (1.727 m)     Physical Exam  Vitals and nursing note reviewed. Exam conducted with a chaperone present.   Constitutional:       General: He is not in acute distress.     Appearance: Normal appearance. He is well-developed.   Cardiovascular:      Pulses:           Dorsalis pedis pulses are 2+ on the right side " and 2+ on the left side.        Posterior tibial pulses are 1+ on the right side and 1+ on the left side.   Pulmonary:      Effort: Pulmonary effort is normal.   Musculoskeletal:      Right foot: Decreased range of motion. Bunion (mild HAV and hammertoe 2nd right) present.      Left foot: Decreased range of motion.   Feet:      Right foot:      Skin integrity: No skin breakdown.      Toenail Condition: Right toenails are abnormally thick and long. Fungal disease present.     Left foot:      Skin integrity: No ulcer (ulcer sub 4th met head left healed, dry, underlying discoloration) or erythema.      Toenail Condition: Left toenails are abnormally thick and long. Fungal disease present.  Skin:     Capillary Refill: Capillary refill takes 2 to 3 seconds.   Neurological:      Mental Status: He is alert.      Comments: Lack of sensation to distal digits, intact elsewhere b/l feet   Psychiatric:         Mood and Affect: Mood normal.         Behavior: Behavior normal.         Thought Content: Thought content normal.         Judgment: Judgment normal.                                    Assessment:       1. Ulcer of left foot, limited to breakdown of skin    2. Type II diabetes mellitus with neurological manifestations                Plan:         Following debridement reassured small ulcer is healed, closed, dry  However this was an area of previous callus 2 visits back  They need to check area daily, wife to gently file if needed, apply lotion every night  Wear tennis shoes indoor to try to prevent recurrence of callus, puts him at higher risk for recurrence  Reviewed potential complication and diabetic foot care and education  Call immediately with any changes   Patient was in understanding and agreement with treatment plan.  I counseled the patient on their conditions, implications and medical management.  Instructed patient to contact the office with any changes, questions, concerns, worsening of symptoms.   Total face  to face time 20 minutes, exam, assessment, treatment, discussion, additional time for review of chart prior to and following appointment and visit documentation, consultation and coordination of care.    Follow up 2 months        This note was created using [employer] voice recognition software that occasionally misinterpreted phrases or words.

## 2023-04-13 ENCOUNTER — OFFICE VISIT (OUTPATIENT)
Dept: PODIATRY | Facility: CLINIC | Age: 73
End: 2023-04-13
Payer: MEDICARE

## 2023-04-13 VITALS
HEIGHT: 68 IN | BODY MASS INDEX: 29.7 KG/M2 | SYSTOLIC BLOOD PRESSURE: 126 MMHG | HEART RATE: 73 BPM | RESPIRATION RATE: 18 BRPM | DIASTOLIC BLOOD PRESSURE: 66 MMHG | WEIGHT: 196 LBS

## 2023-04-13 DIAGNOSIS — L97.521 ULCER OF LEFT FOOT, LIMITED TO BREAKDOWN OF SKIN: Primary | ICD-10-CM

## 2023-04-13 DIAGNOSIS — E11.49 TYPE II DIABETES MELLITUS WITH NEUROLOGICAL MANIFESTATIONS: ICD-10-CM

## 2023-04-13 PROCEDURE — 99999 PR PBB SHADOW E&M-EST. PATIENT-LVL V: CPT | Mod: PBBFAC,,, | Performed by: PODIATRIST

## 2023-04-13 PROCEDURE — 99999 PR PBB SHADOW E&M-EST. PATIENT-LVL V: ICD-10-PCS | Mod: PBBFAC,,, | Performed by: PODIATRIST

## 2023-04-13 PROCEDURE — 99213 OFFICE O/P EST LOW 20 MIN: CPT | Mod: S$PBB,,, | Performed by: PODIATRIST

## 2023-04-13 PROCEDURE — 99215 OFFICE O/P EST HI 40 MIN: CPT | Mod: PBBFAC | Performed by: PODIATRIST

## 2023-04-13 PROCEDURE — 99213 PR OFFICE/OUTPT VISIT, EST, LEVL III, 20-29 MIN: ICD-10-PCS | Mod: S$PBB,,, | Performed by: PODIATRIST

## 2023-04-13 RX ORDER — FUROSEMIDE 20 MG/1
20 TABLET ORAL EVERY OTHER DAY
COMMUNITY

## 2023-04-15 NOTE — PROGRESS NOTES
"Subjective:       Patient ID: Olu Gant Jr. is a 72 y.o. male.    Chief Complaint: Follow-up, Diabetes Mellitus, Foot Pain, and Wound Check  Patient presents with his wife for follow up ulcer left foot, concern regarding increased pain.  They went to [place] would with family and patient states he really did not do much walking wound body's had a lot of pain under the area of wound on the ball of the left foot the last few days.  Pain level 6/10.  Wife relates area has been healed, dry with no redness or swelling.        Past Medical History:   Diagnosis Date    Asbestos exposure - 1973 2/18/2014    Benign hypertension with ESRD (end-stage renal disease) 2/18/2014    Diabetes type 2 - since 1996 2/18/2014    DIALYSIS 2013    ESRD (end stage renal disease) - initiated dialysis 05/29/2013 2/18/2014    Gout, arthritis 2/18/2014    Hypothyroidism 2/18/2014    Irregular heart rhythm - unsure of Afib vs. A flutter 2/18/2014    Kidney transplanted 05/04/2020    Obesity 2/18/2014    Secondary hyperparathyroidism, renal 2/18/2014    Sleep apnea on Bipap 2/18/2014     Past Surgical History:   Procedure Laterality Date    AV FISTULA PLACEMENT      CHOLECYSTECTOMY      COLON SURGERY  02/2017    resection for "ischemic colon"    FRACTURE SURGERY      HIP FRACTURE SURGERY Left     INGUINAL HERNIA REPAIR      bilaterally    KIDNEY TRANSPLANT      pace maker      [email or web address]    TONSILLECTOMY           Current Outpatient Medications   Medication Sig Dispense Refill    allopurinoL (ZYLOPRIM) 100 MG tablet Take 1 tablet (100 mg total) by mouth once daily. 90 tablet 3    amLODIPine (NORVASC) 5 MG tablet Take 5 mg by mouth once daily.      apixaban (ELIQUIS) 5 mg Tab   TAKE 1 TABLET BY MOUTH TWICE DAILY      aspirin (ECOTRIN) 81 MG EC tablet 81 mg.      BD ZEB 2ND GEN PEN NEEDLE 32 gauge x 5/32" Ndle USE AS DIRECTED FOUR TIMES DAILY      cinacalcet (SENSIPAR) 30 MG Tab TAKE 1 TABLET BY MOUTH ONCE DAILY with a meal      denosumab " (PROLIA) 60 mg/mL Syrg Inject 60 mg into the skin every 6 (six) months.      ELIQUIS 5 mg Tab Take 5 mg by mouth 2 (two) times daily.      ergocalciferol (ERGOCALCIFEROL) 50,000 unit Cap Take 1 capsule by mouth twice a week.      flash glucose sensor (FREESTYLE CLAIRE 2 SENSOR) Kit 1 each by Misc.(Non-Drug; Combo Route) route every 14 (fourteen) days. 1 kit 0    furosemide (LASIX) 20 MG tablet Take 20 mg by mouth every other day.      GVOKE HYPOPEN 2-PACK 1 mg/0.2 mL AtIn Inject 1 Units into the skin Daily.      insulin detemir U-100 (LEVEMIR FLEXPEN) 100 unit/mL (3 mL) InPn pen   11 unit, SUBQ, HS, max TDD 50, # 30 mL, 3 Refill(s), Maintenance, Pharmacy: [id number], DM II (diabetes mellitus, type II), controlled, 166, cm, 22 13:45:00 CDT, Height/Length Measured, 84.2, kg, 22 13:45:00 CDT, Weight Do...      levothyroxine (SYNTHROID) 100 MCG tablet TAKE 1 TABLET(100 MCG) BY MOUTH EVERY DAY 90 tablet 3    metoprolol succinate (TOPROL-XL) 50 MG 24 hr tablet Take 50 mg by mouth nightly.      multivitamin (THERAGRAN) per tablet Take 1 tablet by mouth once daily.      nitroGLYCERIN (NITROSTAT) 0.4 MG SL tablet SMARTSI Tablet(s) Sublingual      NOVOLOG FLEXPEN U-100 INSULIN 100 unit/mL (3 mL) InPn pen INJECT 14 TO 20 UNITS UNDER THE SKIN AS NEEDED. MAX DAILY DOSE OF 60 UNITS PER DAY 15 mL 5    omeprazole (PRILOSEC) 20 MG capsule TAKE 1 CAPSULE BY MOUTH EVERY MORNING 30 MINUTES BEFORE BREAKFAST 90 capsule 3    ondansetron (ZOFRAN-ODT) 4 MG TbDL Take 4 mg by mouth every 6 (six) hours as needed.      predniSONE (DELTASONE) 5 MG tablet Take 5 mg by mouth once daily.      pregabalin (LYRICA) 75 MG capsule Take 1 capsule (75 mg total) by mouth every evening. 90 capsule 1    rosuvastatin (CRESTOR) 10 MG tablet TAKE 1 TABLET BY MOUTH EVERY DAY 30 tablet 1    sodium bicarbonate 650 MG tablet 1,950 mg.      TACROLIMUS XR, ENVARSUS, 0.75 MG ORAL TB24 0.75 mg Tb24 Take 1.5 mg by mouth once daily.       "acetaminophen (TYLENOL) 325 MG tablet 325 mg.      docusate sodium (COLACE) 100 MG capsule 100 mg.       No current facility-administered medications for this visit.     Review of patient's allergies indicates:   Allergen Reactions    Iodine      Other reaction(s): Blisters       Review of Systems   HENT:  Negative for congestion.    Endocrine:        KIDNEY TRANSPLANT 5/4/2020   Musculoskeletal:  Positive for gait problem.        Cane   All other systems reviewed and are negative.    Objective:      Vitals:    04/13/23 1116   BP: 126/66   Pulse: 73   Resp: 18   Weight: 88.9 kg (196 lb)   Height: 5' 8" (1.727 m)     Physical Exam  Vitals and nursing note reviewed. Exam conducted with a chaperone present.   Constitutional:       General: He is not in acute distress.     Appearance: Normal appearance. He is well-developed.   Cardiovascular:      Pulses:           Dorsalis pedis pulses are 2+ on the right side and 2+ on the left side.        Posterior tibial pulses are 1+ on the right side and 1+ on the left side.   Pulmonary:      Effort: Pulmonary effort is normal.   Musculoskeletal:         General: Tenderness present.      Right foot: Decreased range of motion. Bunion (mild HAV and hammertoe 2nd right) present.      Left foot: Decreased range of motion. Prominent metatarsal heads present.      Comments: Pain mild capsulitis due to lesion/wound sub 4th met head left foot   Feet:      Right foot:      Skin integrity: No skin breakdown.      Left foot:      Skin integrity: Ulcer (pain dry discolored ulcer with deep callus sub 4th met head left healed, dry), callus and dry skin present. No erythema.   Skin:     Capillary Refill: Capillary refill takes 2 to 3 seconds.   Neurological:      Mental Status: He is alert.      Comments: Lack of sensation to distal digits, intact elsewhere b/l feet   Psychiatric:         Mood and Affect: Mood normal.         Behavior: Behavior normal.         Thought Content: Thought content " normal.         Judgment: Judgment normal.          Assessment:       1. Ulcer of left foot, limited to breakdown of skin    2. Type II diabetes mellitus with neurological manifestations              Plan:         Following debridement advised small ulcer remains closed but not completely healed.  There is a deep callus/scab in this area pushing on the 4th metatarsal head causing pain.  We reviewed treatments to try to prevent recurrence of callus and offload pressure  Debridement of ulcer performed today with no skin opening.  Pointed out to wife there is circular pink area of discoloration due to repetitive pressure  Dispensed samples of Xeroform and showed patient's wife how to apply a small amount to the area to keep it soft preferably used overnight.  Remove bandage in the morning and keep the area clean and dry  Applied you have a felt pad to the area to offload pressure and dispensed samples.  We discussed using this pad in this location daily, using this pad or a similar Dr. Cagle's circular corn cushion on the insole of his shoe to help remove pressure as long as comfortable, beneficial and well tolerated  Reviewed appropriate shoes for indoors specifically anything sturdy with an insole in which a offloading pad could be applied comfortably  Explained preventing callus from developed and removing pressure are the best to ways to avoid recurrence  We reviewed diabetic education, potential complications, history of 2 mild diabetic sores on his feet and the need to check feet daily, contact the office immediately with any change especially if any discoloration occurs.  Patient was in understanding and agreement with treatment plan.  I counseled the patient on their conditions, implications and medical management.  Instructed patient to contact the office with any changes, questions, concerns, worsening of symptoms.   Total face to face time 20 minutes, exam, assessment, treatment, discussion, additional time  for review of chart prior to and following appointment and visit documentation, consultation and coordination of care.    Follow up 1 month        This note was created using [employer] voice recognition software that occasionally misinterpreted phrases or words.

## 2023-05-02 ENCOUNTER — OFFICE VISIT (OUTPATIENT)
Dept: PODIATRY | Facility: CLINIC | Age: 73
End: 2023-05-02
Payer: MEDICARE

## 2023-05-02 ENCOUNTER — HOSPITAL ENCOUNTER (OUTPATIENT)
Dept: RADIOLOGY | Facility: HOSPITAL | Age: 73
Discharge: HOME | End: 2023-05-02
Attending: PODIATRIST
Payer: MEDICARE

## 2023-05-02 VITALS
SYSTOLIC BLOOD PRESSURE: 127 MMHG | WEIGHT: 192.63 LBS | BODY MASS INDEX: 29.2 KG/M2 | HEIGHT: 68 IN | RESPIRATION RATE: 16 BRPM | HEART RATE: 73 BPM | DIASTOLIC BLOOD PRESSURE: 76 MMHG

## 2023-05-02 DIAGNOSIS — L84 PRE-ULCERATIVE CALLUSES: ICD-10-CM

## 2023-05-02 DIAGNOSIS — M89.8X7 PAIN IN METATARSUS OF LEFT FOOT: ICD-10-CM

## 2023-05-02 DIAGNOSIS — L85.3 DRY SKIN: ICD-10-CM

## 2023-05-02 DIAGNOSIS — E11.49 TYPE II DIABETES MELLITUS WITH NEUROLOGICAL MANIFESTATIONS: ICD-10-CM

## 2023-05-02 DIAGNOSIS — B35.1 ONYCHOMYCOSIS OF TOENAIL: ICD-10-CM

## 2023-05-02 DIAGNOSIS — M20.62 ACQUIRED DEFORMITY OF TOE, LEFT: ICD-10-CM

## 2023-05-02 DIAGNOSIS — M89.8X7 PAIN IN METATARSUS OF LEFT FOOT: Primary | ICD-10-CM

## 2023-05-02 PROCEDURE — 73630 XR FOOT COMPLETE 3 VIEW LEFT: ICD-10-PCS | Mod: 26,LT,, | Performed by: RADIOLOGY

## 2023-05-02 PROCEDURE — 99999 PR PBB SHADOW E&M-EST. PATIENT-LVL III: ICD-10-PCS | Mod: PBBFAC,,, | Performed by: PODIATRIST

## 2023-05-02 PROCEDURE — 99214 PR OFFICE/OUTPT VISIT, EST, LEVL IV, 30-39 MIN: ICD-10-PCS | Mod: S$PBB,,, | Performed by: PODIATRIST

## 2023-05-02 PROCEDURE — 73630 X-RAY EXAM OF FOOT: CPT | Mod: TC,LT

## 2023-05-02 PROCEDURE — 99999 PR PBB SHADOW E&M-EST. PATIENT-LVL III: CPT | Mod: PBBFAC,,, | Performed by: PODIATRIST

## 2023-05-02 PROCEDURE — 99214 OFFICE O/P EST MOD 30 MIN: CPT | Mod: S$PBB,,, | Performed by: PODIATRIST

## 2023-05-02 PROCEDURE — 99213 OFFICE O/P EST LOW 20 MIN: CPT | Mod: PBBFAC | Performed by: PODIATRIST

## 2023-05-02 PROCEDURE — 73630 X-RAY EXAM OF FOOT: CPT | Mod: 26,LT,, | Performed by: RADIOLOGY

## 2023-05-02 RX ORDER — CALCITRIOL 0.25 UG/1
0.25 CAPSULE ORAL
COMMUNITY
Start: 2023-04-25 | End: 2023-09-18

## 2023-05-06 NOTE — PROGRESS NOTES
"Subjective:       Patient ID: Olu Gant Jr. is a 72 y.o. male.    Chief Complaint: Follow-up, Foot Pain, Foot Ulcer, and Diabetes Mellitus  Patient presents with his wife for follow up pain left foot, area of previous ulcer left foot, concern regarding continued pain. Patient relates prior to going to [employer] he was walking more at home without his cane, now unfortunately since he developed this small sore he is had continued sharp shooting pain up in this area upon weight-bearing.  Relates it helped significantly to have the area trimmed but even then it lessens the pain it does not resolve.  Today he is still having the same pain when walking.  Wife relates the area remains well healed with some mild callus.  Has been using a callus cushion. Presents in [facility]. Pain level 4/10.  Patient reports glucose was 156 this morning      Past Medical History:   Diagnosis Date    Asbestos exposure - 1973 2/18/2014    Benign hypertension with ESRD (end-stage renal disease) 2/18/2014    Diabetes type 2 - since 1996 2/18/2014    DIALYSIS 2013    ESRD (end stage renal disease) - initiated dialysis 05/29/2013 2/18/2014    Gout, arthritis 2/18/2014    Hypothyroidism 2/18/2014    Irregular heart rhythm - unsure of Afib vs. A flutter 2/18/2014    Kidney transplanted 05/04/2020    Obesity 2/18/2014    Secondary hyperparathyroidism, renal 2/18/2014    Sleep apnea on Bipap 2/18/2014     Past Surgical History:   Procedure Laterality Date    AV FISTULA PLACEMENT      CHOLECYSTECTOMY      COLON SURGERY  02/2017    resection for "ischemic colon"    FRACTURE SURGERY      HIP FRACTURE SURGERY Left     INGUINAL HERNIA REPAIR      bilaterally    KIDNEY TRANSPLANT      pace maker      [email or web address]    TONSILLECTOMY           Current Outpatient Medications   Medication Sig Dispense Refill    acetaminophen (TYLENOL) 325 MG tablet 325 mg.      allopurinoL (ZYLOPRIM) 100 MG tablet Take 1 tablet (100 mg total) by mouth once daily. 90 tablet 3    " "amLODIPine (NORVASC) 5 MG tablet Take 5 mg by mouth once daily.      apixaban (ELIQUIS) 5 mg Tab   TAKE 1 TABLET BY MOUTH TWICE DAILY      aspirin (ECOTRIN) 81 MG EC tablet 81 mg.      BD ZEB 2ND GEN PEN NEEDLE 32 gauge x " Ndle USE AS DIRECTED FOUR TIMES DAILY      calcitRIOL (ROCALTROL) 0.25 MCG Cap Take 0.25 mcg by mouth.      cinacalcet (SENSIPAR) 30 MG Tab TAKE 1 TABLET BY MOUTH ONCE DAILY with a meal      denosumab (PROLIA) 60 mg/mL Syrg Inject 60 mg into the skin every 6 (six) months.      docusate sodium (COLACE) 100 MG capsule 100 mg.      ELIQUIS 5 mg Tab Take 5 mg by mouth 2 (two) times daily.      ergocalciferol (ERGOCALCIFEROL) 50,000 unit Cap Take 1 capsule by mouth twice a week.      flash glucose sensor (FREESTYLE CLAIRE 2 SENSOR) Kit 1 each by Misc.(Non-Drug; Combo Route) route every 14 (fourteen) days. 1 kit 0    furosemide (LASIX) 20 MG tablet Take 20 mg by mouth every other day.      GVOKE HYPOPEN 2-PACK 1 mg/0.2 mL AtIn Inject 1 Units into the skin Daily.      insulin detemir U-100 (LEVEMIR FLEXPEN) 100 unit/mL (3 mL) InPn pen   11 unit, SUBQ, HS, max TDD 50, # 30 mL, 3 Refill(s), Maintenance, Pharmacy: [facility] #[id number], DM II (diabetes mellitus, type II), controlled, 166, cm, 22 13:45:00 CDT, Height/Length Measured, 84.2, kg, 22 13:45:00 CDT, Weight Do...      levothyroxine (SYNTHROID) 100 MCG tablet TAKE 1 TABLET(100 MCG) BY MOUTH EVERY DAY 90 tablet 3    metoprolol succinate (TOPROL-XL) 50 MG 24 hr tablet Take 50 mg by mouth nightly.      multivitamin (THERAGRAN) per tablet Take 1 tablet by mouth once daily.      nitroGLYCERIN (NITROSTAT) 0.4 MG SL tablet SMARTSI Tablet(s) Sublingual      NOVOLOG FLEXPEN U-100 INSULIN 100 unit/mL (3 mL) InPn pen INJECT 14 TO 20 UNITS UNDER THE SKIN AS NEEDED. MAX DAILY DOSE OF 60 UNITS PER DAY 15 mL 5    omeprazole (PRILOSEC) 20 MG capsule TAKE 1 CAPSULE BY MOUTH EVERY MORNING 30 MINUTES BEFORE BREAKFAST 90 capsule 3    " "ondansetron (ZOFRAN-ODT) 4 MG TbDL Take 4 mg by mouth every 6 (six) hours as needed.      predniSONE (DELTASONE) 5 MG tablet Take 5 mg by mouth once daily.      pregabalin (LYRICA) 75 MG capsule Take 1 capsule (75 mg total) by mouth every evening. 90 capsule 1    rosuvastatin (CRESTOR) 10 MG tablet TAKE 1 TABLET BY MOUTH EVERY DAY 30 tablet 1    sodium bicarbonate 650 MG tablet 1,950 mg.      TACROLIMUS XR, ENVARSUS, 0.75 MG ORAL TB24 0.75 mg Tb24 Take 1.5 mg by mouth once daily.       No current facility-administered medications for this visit.     Review of patient's allergies indicates:   Allergen Reactions    Iodine      Other reaction(s): Blisters       Review of Systems   HENT:  Negative for congestion.    Endocrine:        KIDNEY TRANSPLANT 5/4/2020   Musculoskeletal:  Positive for gait problem.        Cane   All other systems reviewed and are negative.    Objective:      Vitals:    05/02/23 1018   BP: 127/76   Pulse: 73   Resp: 16   Weight: 87.4 kg (192 lb 9.6 oz)   Height: 5' 8" (1.727 m)     Physical Exam  Vitals and nursing note reviewed. Exam conducted with a chaperone present.   Constitutional:       General: He is not in acute distress.     Appearance: Normal appearance. He is well-developed.   Cardiovascular:      Pulses:           Dorsalis pedis pulses are 2+ on the right side and 2+ on the left side.        Posterior tibial pulses are 1+ on the right side and 1+ on the left side.   Pulmonary:      Effort: Pulmonary effort is normal.   Musculoskeletal:         General: Tenderness present.      Right foot: Decreased range of motion. Bunion (mild HAV and hammertoe 2nd right) present.      Left foot: Decreased range of motion. Prominent metatarsal heads present.      Comments: Pain mild capsulitis sub 4th met head left foot with callus   Feet:      Right foot:      Skin integrity: Dry skin present. No skin breakdown.      Left foot:      Skin integrity: Ulcer (pain dry discolored pre ulcerative callus " closed, healed nuch improved sub 4th met head left healed, no skin opening), callus and dry skin present. No erythema.   Skin:     Capillary Refill: Capillary refill takes 2 to 3 seconds.   Neurological:      Mental Status: He is alert.      Comments: Lack of sensation to distal digits, intact elsewhere b/l feet   Psychiatric:         Mood and Affect: Mood normal.         Behavior: Behavior normal.         Thought Content: Thought content normal.         Judgment: Judgment normal.                    Assessment:       1. Pain in metatarsus of left foot    2. Acquired deformity of toe, left    3. Pre-ulcerative calluses - Left Foot    4. Type II diabetes mellitus with neurological manifestations    5. Dry skin    6. Onychomycosis of toenail              Plan:         Following debridement advised small ulcer remains   Completely closed, all previous discoloration was removed through debridement.  Reassured areas completely flat, smooth, normal color, however the bone underneath is prominent in this is the reason this area developed begin with.  X-ray taken today and advised there is no bony abnormality regarding this joint, there his however arthritis of the toe which can affect the joint and the way he walks  Dispensed ample power step full arch supports.  Applied and patient's Crocs today but states he should not be wearing the shoes, he should have his tennis shoes on at all times indoors and out.  Explained how to remove insoles from the new balance tennis shoes and apply arch supports, wear all day if comfortable and we discussed how to gradually adjust to wearing them if they are uncomfortable.  Advised patient arch supports can be a long term solution to taking pressure off this area to try to prevent recurrence.  Patient verbalized understanding on use of arch supports in his wife was with him today as well for instructions  We discussed use of Voltaren gel in this area to help with underlying inflammation as  patient has discomfort upon weight-bearing even once the calluses debrided.  Applied daily as directed, small amount 3 times daily to the area.  Make sure it is completely absorbed into the skin and dry before applying socks and shoes  Advised wife she can gently file any callus from the area couple times a week, any discoloration which develops contact the office for follow-up  We reviewed diabetic education, potential complications, care and maintenance of dry skin and nails and potential complications.    Reviewed need for daily foot checks and instructed patient to contact the office with any area of redness or swelling which has not improved within 3 days.  Patient was in understanding and agreement with treatment plan.  I counseled the patient on their conditions, implications and medical management.  Instructed patient to contact the office with any changes, questions, concerns, worsening of symptoms.   Total face to face time 30 minutes, exam, assessment, treatment, discussion, additional time for review of chart prior to and following appointment and visit documentation, consultation and coordination of care.    Follow up 2 months        This note was created using [employer] voice recognition software that occasionally misinterpreted phrases or words.

## 2023-07-11 ENCOUNTER — OFFICE VISIT (OUTPATIENT)
Dept: PODIATRY | Facility: CLINIC | Age: 73
End: 2023-07-11
Payer: MEDICARE

## 2023-07-11 VITALS
BODY MASS INDEX: 29.4 KG/M2 | WEIGHT: 194 LBS | HEIGHT: 68 IN | RESPIRATION RATE: 16 BRPM | SYSTOLIC BLOOD PRESSURE: 115 MMHG | HEART RATE: 69 BPM | DIASTOLIC BLOOD PRESSURE: 75 MMHG

## 2023-07-11 DIAGNOSIS — E11.49 TYPE II DIABETES MELLITUS WITH NEUROLOGICAL MANIFESTATIONS: ICD-10-CM

## 2023-07-11 DIAGNOSIS — M89.8X7 PAIN IN METATARSUS OF LEFT FOOT: ICD-10-CM

## 2023-07-11 DIAGNOSIS — M20.62 ACQUIRED DEFORMITY OF TOE, LEFT: Primary | ICD-10-CM

## 2023-07-11 DIAGNOSIS — B35.1 ONYCHOMYCOSIS OF TOENAIL: ICD-10-CM

## 2023-07-11 DIAGNOSIS — L84 PRE-ULCERATIVE CALLUSES: ICD-10-CM

## 2023-07-11 PROCEDURE — 99213 PR OFFICE/OUTPT VISIT, EST, LEVL III, 20-29 MIN: ICD-10-PCS | Mod: S$PBB,,, | Performed by: PODIATRIST

## 2023-07-11 PROCEDURE — 99999 PR PBB SHADOW E&M-EST. PATIENT-LVL III: CPT | Mod: PBBFAC,,, | Performed by: PODIATRIST

## 2023-07-11 PROCEDURE — 99999 PR PBB SHADOW E&M-EST. PATIENT-LVL III: ICD-10-PCS | Mod: PBBFAC,,, | Performed by: PODIATRIST

## 2023-07-11 PROCEDURE — 99213 OFFICE O/P EST LOW 20 MIN: CPT | Mod: PBBFAC | Performed by: PODIATRIST

## 2023-07-11 PROCEDURE — 99213 OFFICE O/P EST LOW 20 MIN: CPT | Mod: S$PBB,,, | Performed by: PODIATRIST

## 2023-07-11 RX ORDER — FUROSEMIDE 40 MG/1
40 TABLET ORAL
COMMUNITY
Start: 2023-05-09 | End: 2023-09-18 | Stop reason: DRUGHIGH

## 2023-07-12 NOTE — PROGRESS NOTES
"Subjective:       Patient ID: Olu Gant Jr. is a 73 y.o. male.    Chief Complaint: Follow-up, Callouses, Skin Problem, Nail Problem, and Diabetes Mellitus  Patient presents for follow up due to diabetes, pain left foot in area of previous ulcer left foot. Reports decreased pain. Wears crocs at home, wears good tennis shoes to go out with less pain, but crocs feel the best. Has not been using off loading felt pad/corn cushion. Relates no changes in diabetes, usually around 150 when checked at home. Pain level left foot 2/10      Past Medical History:   Diagnosis Date    Asbestos exposure - 1973 2/18/2014    Benign hypertension with ESRD (end-stage renal disease) 2/18/2014    Diabetes type 2 - since 1996 2/18/2014    DIALYSIS 2013    ESRD (end stage renal disease) - initiated dialysis 05/29/2013 2/18/2014    Gout, arthritis 2/18/2014    Hypothyroidism 2/18/2014    Irregular heart rhythm - unsure of Afib vs. A flutter 2/18/2014    Kidney transplanted 05/04/2020    Obesity 2/18/2014    Secondary hyperparathyroidism, renal 2/18/2014    Sleep apnea on Bipap 2/18/2014     Past Surgical History:   Procedure Laterality Date    AV FISTULA PLACEMENT      CHOLECYSTECTOMY      COLON SURGERY  02/2017    resection for "ischemic colon"    FRACTURE SURGERY      HIP FRACTURE SURGERY Left     INGUINAL HERNIA REPAIR      bilaterally    KIDNEY TRANSPLANT      pace maker      [email or web address]    TONSILLECTOMY           Current Outpatient Medications   Medication Sig Dispense Refill    acetaminophen (TYLENOL) 325 MG tablet 325 mg.      allopurinoL (ZYLOPRIM) 100 MG tablet TAKE 1 TABLET(100 MG) BY MOUTH EVERY DAY 90 tablet 3    amLODIPine (NORVASC) 5 MG tablet Take 5 mg by mouth once daily.      apixaban (ELIQUIS) 5 mg Tab   TAKE 1 TABLET BY MOUTH TWICE DAILY      aspirin (ECOTRIN) 81 MG EC tablet 81 mg.      BD ZEB 2ND GEN PEN NEEDLE 32 gauge x 5/32" Ndle USE AS DIRECTED FOUR TIMES DAILY 84 each 3    calcitRIOL (ROCALTROL) 0.25 MCG Cap " Take 0.25 mcg by mouth.      cinacalcet (SENSIPAR) 30 MG Tab TAKE 1 TABLET BY MOUTH ONCE DAILY with a meal      denosumab (PROLIA) 60 mg/mL Syrg Inject 60 mg into the skin every 6 (six) months.      docusate sodium (COLACE) 100 MG capsule 100 mg.      ELIQUIS 5 mg Tab Take 5 mg by mouth 2 (two) times daily.      ergocalciferol (ERGOCALCIFEROL) 50,000 unit Cap Take 1 capsule by mouth twice a week.      flash glucose sensor (FREESTYLE CLAIRE 2 SENSOR) Kit 1 each by Misc.(Non-Drug; Combo Route) route every 14 (fourteen) days. 1 kit 0    furosemide (LASIX) 20 MG tablet Take 20 mg by mouth every other day.      GVOKE HYPOPEN 2-PACK 1 mg/0.2 mL AtIn Inject 1 Units into the skin Daily.      insulin detemir U-100 (LEVEMIR FLEXPEN) 100 unit/mL (3 mL) InPn pen   11 unit, SUBQ, HS, max TDD 50, # 30 mL, 3 Refill(s), Maintenance, Pharmacy: [id number], DM II (diabetes mellitus, type II), controlled, 166, cm, 09/06/22 13:45:00 CDT, Height/Length Measured, 84.2, kg, 09/06/22 13:45:00 CDT, Weight Do...      levothyroxine (SYNTHROID) 100 MCG tablet TAKE 1 TABLET(100 MCG) BY MOUTH EVERY DAY 90 tablet 3    metoprolol succinate (TOPROL-XL) 50 MG 24 hr tablet Take 50 mg by mouth nightly.      multivitamin (THERAGRAN) per tablet Take 1 tablet by mouth once daily.      NOVOLOG FLEXPEN U-100 INSULIN 100 unit/mL (3 mL) InPn pen INJECT 14 TO 20 UNITS UNDER THE SKIN AS NEEDED. MAX DAILY DOSE OF 60 UNITS PER DAY 15 mL 5    omeprazole (PRILOSEC) 20 MG capsule TAKE 1 CAPSULE BY MOUTH EVERY MORNING 30 MINUTES BEFORE BREAKFAST 90 capsule 3    ondansetron (ZOFRAN-ODT) 4 MG TbDL Take 4 mg by mouth every 6 (six) hours as needed.      predniSONE (DELTASONE) 5 MG tablet Take 5 mg by mouth once daily.      pregabalin (LYRICA) 75 MG capsule Take 1 capsule (75 mg total) by mouth every evening. 90 capsule 1    rosuvastatin (CRESTOR) 10 MG tablet Take 1 tablet (10 mg total) by mouth once daily. 90 tablet 3    sodium bicarbonate 650 MG tablet  "1,950 mg.      TACROLIMUS XR, ENVARSUS, 0.75 MG ORAL TB24 0.75 mg Tb24 Take 1.5 mg by mouth once daily.      furosemide (LASIX) 40 MG tablet Take 40 mg by mouth.      nitroGLYCERIN (NITROSTAT) 0.4 MG SL tablet SMARTSI Tablet(s) Sublingual       No current facility-administered medications for this visit.     Review of patient's allergies indicates:   Allergen Reactions    Iodine      Other reaction(s): Blisters       Review of Systems   Endocrine:        KIDNEY TRANSPLANT 2020   Musculoskeletal:  Positive for gait problem.        Cane   All other systems reviewed and are negative.    Objective:      Vitals:    23 0840   BP: 115/75   Pulse: 69   Resp: 16   Weight: 88 kg (194 lb)   Height: 5' 8" (1.727 m)     Physical Exam  Vitals and nursing note reviewed. Exam conducted with a chaperone present.   Constitutional:       General: He is not in acute distress.     Appearance: Normal appearance. He is well-developed.   Cardiovascular:      Pulses:           Dorsalis pedis pulses are 2+ on the right side and 2+ on the left side.        Posterior tibial pulses are 1+ on the right side and 1+ on the left side.   Pulmonary:      Effort: Pulmonary effort is normal.   Musculoskeletal:         General: Tenderness present.      Right foot: Decreased range of motion. Bunion (mild HAV and hammertoe 2nd right) present.      Left foot: Decreased range of motion. Prominent metatarsal heads present.      Comments: Pain mild capsulitis sub 4th met head left foot with callus   Feet:      Right foot:      Skin integrity: Dry skin present. No skin breakdown.      Toenail Condition: Right toenails are abnormally thick. Fungal disease present.     Left foot:      Skin integrity: Ulcer (tender dry discolored pre ulcerative callus improved sub 4th met head left with discoloration/bruising, no opening), callus and dry skin present. No erythema.      Toenail Condition: Left toenails are abnormally thick. Fungal disease " present.  Skin:     Capillary Refill: Capillary refill takes 2 to 3 seconds.   Neurological:      Mental Status: He is alert.      Comments: Lack of sensation to distal digits, intact elsewhere b/l feet   Psychiatric:         Behavior: Behavior normal.         Thought Content: Thought content normal.                                  Assessment:       1. Type II diabetes mellitus with neurological manifestations    2. Acquired deformity of toe, left    3. Pre-ulcerative calluses - Left Foot    4. Onychomycosis of toenail            Plan:         Following debridement advised pre ulcerative callus remains with discoloration, showed patient photo afterwards explaining underlying bruising puts him at risk for recurrence of open ulcer  Advised patient decreased pain especially with certain shoes is a good sign, however continuous pressure is still causing damage to the underlying skin  Explained pressure point in this area with a prominent 4th metatarsal head is an area in which he places pressure with walking each time he flexes his foot  We reviewed padding to help offload this area, how to apply it to insole and or to his foot.  This was demonstrated for patient on a previous visit and I strongly encouraged he start to use padding daily as long  We discussed multiple topical treatments which can be helpful to keep this area soft, always apply before bed so it thoroughly dry and absorb before applying socks and shoes  Instructed patient to have wife check daily and contact the office immediately with any changes  Advised his wife can gently file any callus from the area couple times a week, any discoloration which develops contact the office for follow-up  We reviewed diabetic education, potential complications, care and maintenance of chronic dry skin and nails and potential complications. Nails debrided    Reviewed need for daily foot checks and instructed patient to contact the office with any area of redness or  swelling which has not improved within 3 days.  Patient was in understanding and agreement with treatment plan.  I counseled the patient on their conditions, implications and medical management.  Instructed patient to contact the office with any changes, questions, concerns, worsening of symptoms.   Total face to face time 20 minutes, exam, assessment, treatment, discussion, additional time for review of chart prior to and following appointment and visit documentation, consultation and coordination of care.    Follow up 2 months        This note was created using M*Modal voice recognition software that occasionally misinterpreted phrases or words.

## 2023-08-10 ENCOUNTER — OFFICE VISIT (OUTPATIENT)
Dept: PODIATRY | Facility: CLINIC | Age: 73
End: 2023-08-10
Payer: MEDICARE

## 2023-08-10 ENCOUNTER — TELEPHONE (OUTPATIENT)
Dept: PODIATRY | Facility: CLINIC | Age: 73
End: 2023-08-10
Payer: MEDICARE

## 2023-08-10 VITALS
BODY MASS INDEX: 29.4 KG/M2 | HEART RATE: 79 BPM | SYSTOLIC BLOOD PRESSURE: 115 MMHG | HEIGHT: 68 IN | WEIGHT: 194 LBS | RESPIRATION RATE: 16 BRPM | DIASTOLIC BLOOD PRESSURE: 68 MMHG

## 2023-08-10 DIAGNOSIS — M20.62 ACQUIRED DEFORMITY OF TOE, LEFT: Primary | ICD-10-CM

## 2023-08-10 DIAGNOSIS — L84 PRE-ULCERATIVE CALLUSES: ICD-10-CM

## 2023-08-10 DIAGNOSIS — E11.49 TYPE II DIABETES MELLITUS WITH NEUROLOGICAL MANIFESTATIONS: ICD-10-CM

## 2023-08-10 DIAGNOSIS — M89.8X7 PAIN IN METATARSUS OF LEFT FOOT: ICD-10-CM

## 2023-08-10 PROCEDURE — 99213 PR OFFICE/OUTPT VISIT, EST, LEVL III, 20-29 MIN: ICD-10-PCS | Mod: S$PBB,,, | Performed by: PODIATRIST

## 2023-08-10 PROCEDURE — 99213 OFFICE O/P EST LOW 20 MIN: CPT | Mod: PBBFAC | Performed by: PODIATRIST

## 2023-08-10 PROCEDURE — 99999 PR PBB SHADOW E&M-EST. PATIENT-LVL III: CPT | Mod: PBBFAC,,, | Performed by: PODIATRIST

## 2023-08-10 PROCEDURE — 99999 PR PBB SHADOW E&M-EST. PATIENT-LVL III: ICD-10-PCS | Mod: PBBFAC,,, | Performed by: PODIATRIST

## 2023-08-10 PROCEDURE — 99213 OFFICE O/P EST LOW 20 MIN: CPT | Mod: S$PBB,,, | Performed by: PODIATRIST

## 2023-08-10 NOTE — TELEPHONE ENCOUNTER
----- Message from Misti Stewart sent at 8/10/2023 11:32 AM CDT -----  Type:  Sooner Appointment Request    Caller is requesting a sooner appointment.  Caller declined first available appointment listed below.  Caller will not accept being placed on the waitlist and is requesting a message be sent to doctor.    Name of Caller:  pts wife  When is the first available appointment?  8/24  Symptoms:  pain at bottom of foot/having trouble walking  Best Call Back Number:  [phone]  Additional Information:  pl call bk to advise thanks

## 2023-08-10 NOTE — TELEPHONE ENCOUNTER
Patient wanted message sent to provider.   Would like to be worked in as soon as possible.   Complaining of pain (level 4) to left foot, making it difficult to walk stated. hx: diabetic.

## 2023-08-13 NOTE — PROGRESS NOTES
"Subjective:       Patient ID: Olu Gant Jr. is a 73 y.o. male.    Chief Complaint: Foot Pain, Callouses, and Diabetes Mellitus  Patient presents with his wife with concern regarding significant increase in pain in area of previous ulcer on the ball of the left foot.  Patient states it feels like it is infected.  They did apply a salicylic acid callus patch to the area.  Typically only hurts when walking, this area has been hurting even when his foot is elevated.  Denies drainage.  Pain level 5/10  Reports glucose was 152 this morning.      Past Medical History:   Diagnosis Date    Asbestos exposure - 1973 2/18/2014    Benign hypertension with ESRD (end-stage renal disease) 2/18/2014    Diabetes type 2 - since 1996 2/18/2014    DIALYSIS 2013    ESRD (end stage renal disease) - initiated dialysis 05/29/2013 2/18/2014    Gout, arthritis 2/18/2014    Hypothyroidism 2/18/2014    Irregular heart rhythm - unsure of Afib vs. A flutter 2/18/2014    Kidney transplanted 05/04/2020    Obesity 2/18/2014    Secondary hyperparathyroidism, renal 2/18/2014    Sleep apnea on Bipap 2/18/2014     Past Surgical History:   Procedure Laterality Date    AV FISTULA PLACEMENT      CHOLECYSTECTOMY      COLON SURGERY  02/2017    resection for "ischemic colon"    FRACTURE SURGERY      HIP FRACTURE SURGERY Left     INGUINAL HERNIA REPAIR      bilaterally    KIDNEY TRANSPLANT      pace maker      [email or web address]    TONSILLECTOMY           Current Outpatient Medications   Medication Sig Dispense Refill    allopurinoL (ZYLOPRIM) 100 MG tablet TAKE 1 TABLET(100 MG) BY MOUTH EVERY DAY 90 tablet 3    aspirin (ECOTRIN) 81 MG EC tablet 81 mg.      calcitRIOL (ROCALTROL) 0.25 MCG Cap Take 0.25 mcg by mouth.      cinacalcet (SENSIPAR) 30 MG Tab TAKE 1 TABLET BY MOUTH ONCE DAILY with a meal      denosumab (PROLIA) 60 mg/mL Syrg Inject 60 mg into the skin every 6 (six) months.      ergocalciferol (ERGOCALCIFEROL) 50,000 unit Cap Take 1 capsule by mouth " "twice a week.      furosemide (LASIX) 20 MG tablet Take 20 mg by mouth every other day.      insulin detemir U-100 (LEVEMIR FLEXPEN) 100 unit/mL (3 mL) InPn pen   11 unit, SUBQ, HS, max TDD 50, # 30 mL, 3 Refill(s), Maintenance, Pharmacy: [facility] #[id number], DM II (diabetes mellitus, type II), controlled, 166, cm, 09/06/22 13:45:00 CDT, Height/Length Measured, 84.2, kg, 09/06/22 13:45:00 CDT, Weight Do...      levothyroxine (SYNTHROID) 100 MCG tablet TAKE 1 TABLET(100 MCG) BY MOUTH EVERY DAY 90 tablet 3    metoprolol succinate (TOPROL-XL) 50 MG 24 hr tablet Take 50 mg by mouth nightly.      multivitamin (THERAGRAN) per tablet Take 1 tablet by mouth once daily.      NOVOLOG FLEXPEN U-100 INSULIN 100 unit/mL (3 mL) InPn pen INJECT 14 TO 20 UNITS UNDER THE SKIN AS NEEDED. MAX DAILY DOSE OF 60 UNITS PER DAY 15 mL 5    omeprazole (PRILOSEC) 20 MG capsule TAKE 1 CAPSULE BY MOUTH EVERY MORNING 30 MINUTES BEFORE BREAKFAST 90 capsule 3    predniSONE (DELTASONE) 5 MG tablet Take 5 mg by mouth once daily.      pregabalin (LYRICA) 75 MG capsule Take 1 capsule (75 mg total) by mouth every evening. 90 capsule 1    rosuvastatin (CRESTOR) 10 MG tablet Take 1 tablet (10 mg total) by mouth once daily. 90 tablet 3    sodium bicarbonate 650 MG tablet 1,950 mg.      acetaminophen (TYLENOL) 325 MG tablet 325 mg.      amLODIPine (NORVASC) 5 MG tablet Take 5 mg by mouth once daily.      apixaban (ELIQUIS) 5 mg Tab   TAKE 1 TABLET BY MOUTH TWICE DAILY      BD ZEB 2ND GEN PEN NEEDLE 32 gauge x 5/32" Ndle USE AS DIRECTED FOUR TIMES DAILY 84 each 3    docusate sodium (COLACE) 100 MG capsule 100 mg.      ELIQUIS 5 mg Tab Take 5 mg by mouth 2 (two) times daily.      flash glucose sensor (FREESTYLE CLAIRE 2 SENSOR) Kit 1 each by Misc.(Non-Drug; Combo Route) route every 14 (fourteen) days. 1 kit 0    furosemide (LASIX) 40 MG tablet Take 40 mg by mouth.      GVOKE HYPOPEN 2-PACK 1 mg/0.2 mL AtIn Inject 1 Units into the skin Daily.      " "nitroGLYCERIN (NITROSTAT) 0.4 MG SL tablet SMARTSI Tablet(s) Sublingual      ondansetron (ZOFRAN-ODT) 4 MG TbDL Take 4 mg by mouth every 6 (six) hours as needed.      TACROLIMUS XR, ENVARSUS, 0.75 MG ORAL TB24 0.75 mg Tb24 Take 1.5 mg by mouth once daily.       No current facility-administered medications for this visit.     Review of patient's allergies indicates:   Allergen Reactions    Iodine      Other reaction(s): Blisters       Review of Systems   Endocrine:        KIDNEY TRANSPLANT 2020   Musculoskeletal:  Positive for gait problem.        Cane   All other systems reviewed and are negative.      Objective:      Vitals:    08/10/23 1255   BP: 115/68   Pulse: 79   Resp: 16   Weight: 88 kg (194 lb)   Height: 5' 8" (1.727 m)     Physical Exam  Vitals and nursing note reviewed. Exam conducted with a chaperone present.   Constitutional:       General: He is not in acute distress.     Appearance: Normal appearance. He is well-developed.   Cardiovascular:      Pulses:           Dorsalis pedis pulses are 2+ on the right side and 2+ on the left side.        Posterior tibial pulses are 1+ on the right side and 1+ on the left side.   Pulmonary:      Effort: Pulmonary effort is normal.   Musculoskeletal:         General: Tenderness present.      Right foot: Decreased range of motion. Bunion (mild HAV and hammertoe 2nd right) present.      Left foot: Decreased range of motion. Prominent metatarsal heads present.      Comments: Very painful sub 4th met head left foot with pre ulcerative callus   Feet:      Right foot:      Skin integrity: Dry skin present. No skin breakdown.      Left foot:      Skin integrity: Ulcer (painful macerated pre ulcerative callus sub 4th met head left no discoloration,  no skin opening, no infection), callus and dry skin present. No erythema.   Skin:     Capillary Refill: Capillary refill takes 2 to 3 seconds.   Neurological:      Mental Status: He is alert.      Comments: Lack of " sensation to distal digits, intact elsewhere b/l feet   Psychiatric:         Behavior: Behavior normal.         Thought Content: Thought content normal.                  Assessment:       1. Acquired deformity of toe, left    2. Pain in metatarsus of left foot    3. Pre-ulcerative calluses - Left Foot    4. Type II diabetes mellitus with neurological manifestations            Plan:         Reviewed pressure due to prominent 4th metatarsal head  Reassured patient following debridement no skin opening is present.  Small opening was due to breakdown in moist callus skin.  After removal of this tissue there is mild maceration with healthy pink skin, smooth with no skin opening or sign of infection  Debriding this pre ulcerative callus did not offer patient complete relief.  It was much better but the bone is very easily palpable under this area.  Explained to patient there is no cushion or fat pad in this location and with his foot type and structure he will continue to develop pressure/callus in this location unless he changes shoes/arch supports/padding  Do not use any other medicated patches/salicylic acid to the area  Reviewed appropriate shoes with thick sole for shock absorption which should be worn indoors as well  We discussed arch supports and callus cushion either on his foot or insole in his tennis shoes. Showed how to apply  Reviewed care and maintenance of callus with patient's wife today  Explained keep area clean and dry, daily needs to include removing pressure from this area continuously both in shoes indoors and out  Have wife check daily and contact the office immediately with any changes  They do understand patient is at risk for complications as he has had a ulcer present in this location  Reviewed diabetic education  Reviewed need for daily foot checks and instructed patient to contact the office with any area of redness or swelling which has not improved within 3 days.  Patient was in  understanding and agreement with treatment plan.  I counseled the patient on their conditions, implications and medical management.  Instructed patient to contact the office with any changes, questions, concerns, worsening of symptoms.   Total face to face time 30 minutes, exam, assessment, treatment, discussion, additional time for review of chart prior to and following appointment and visit documentation, consultation and coordination of care.    Follow up 2 months        This note was created using M*Modal voice recognition software that occasionally misinterpreted phrases or words.

## 2023-09-21 ENCOUNTER — OFFICE VISIT (OUTPATIENT)
Dept: PODIATRY | Facility: CLINIC | Age: 73
End: 2023-09-21
Payer: MEDICARE

## 2023-09-21 VITALS
HEART RATE: 62 BPM | SYSTOLIC BLOOD PRESSURE: 120 MMHG | BODY MASS INDEX: 30.04 KG/M2 | HEIGHT: 68 IN | WEIGHT: 198.19 LBS | RESPIRATION RATE: 16 BRPM | DIASTOLIC BLOOD PRESSURE: 74 MMHG

## 2023-09-21 DIAGNOSIS — E11.49 TYPE II DIABETES MELLITUS WITH NEUROLOGICAL MANIFESTATIONS: ICD-10-CM

## 2023-09-21 DIAGNOSIS — B35.1 ONYCHOMYCOSIS OF TOENAIL: ICD-10-CM

## 2023-09-21 DIAGNOSIS — L84 PRE-ULCERATIVE CALLUSES: ICD-10-CM

## 2023-09-21 DIAGNOSIS — L85.3 DRY SKIN: ICD-10-CM

## 2023-09-21 DIAGNOSIS — M21.962 ACQUIRED DEFORMITY OF JOINT OF LEFT FOOT: Primary | ICD-10-CM

## 2023-09-21 PROCEDURE — 99999 PR PBB SHADOW E&M-EST. PATIENT-LVL III: CPT | Mod: PBBFAC,,, | Performed by: PODIATRIST

## 2023-09-21 PROCEDURE — 99213 OFFICE O/P EST LOW 20 MIN: CPT | Mod: PBBFAC | Performed by: PODIATRIST

## 2023-09-21 PROCEDURE — 99213 PR OFFICE/OUTPT VISIT, EST, LEVL III, 20-29 MIN: ICD-10-PCS | Mod: S$PBB,,, | Performed by: PODIATRIST

## 2023-09-21 PROCEDURE — 99213 OFFICE O/P EST LOW 20 MIN: CPT | Mod: S$PBB,,, | Performed by: PODIATRIST

## 2023-09-21 PROCEDURE — 99999 PR PBB SHADOW E&M-EST. PATIENT-LVL III: ICD-10-PCS | Mod: PBBFAC,,, | Performed by: PODIATRIST

## 2023-09-21 RX ORDER — MECLIZINE HYDROCHLORIDE 25 MG/1
TABLET ORAL
COMMUNITY
Start: 2023-09-07 | End: 2023-11-17

## 2023-09-21 RX ORDER — FUROSEMIDE 40 MG/1
20 TABLET ORAL
COMMUNITY
Start: 2023-02-17 | End: 2023-09-23 | Stop reason: ALTCHOICE

## 2023-09-21 RX ORDER — INSULIN DETEMIR 100 [IU]/ML
INJECTION, SOLUTION SUBCUTANEOUS
COMMUNITY
Start: 2023-09-01

## 2023-09-23 NOTE — PROGRESS NOTES
"Subjective:       Patient ID: Olu Gant Jr. is a 73 y.o. male.    Chief Complaint: Follow-up, Diabetes Mellitus, and Foot Pain  Patient presents with his wife for follow-up preulcerative callus ball of the left foot.  Patient relates this area continues to do well as long as he wears Dr. Cagle's callus cushion daily.  His wife has been applying daily with paper tape and he has been doing well so far.  He was hospitalized for a few days since last visit, states he feels well today, no complications.  Reports diabetes is doing well, glucose 157 this morning.  No pain in his feet today      Past Medical History:   Diagnosis Date    Asbestos exposure - 1973 2/18/2014    Benign hypertension with ESRD (end-stage renal disease) 2/18/2014    Diabetes type 2 - since 1996 2/18/2014    DIALYSIS 2013    ESRD (end stage renal disease) - initiated dialysis 05/29/2013 2/18/2014    Gout, arthritis 2/18/2014    Hypothyroidism 2/18/2014    Irregular heart rhythm - unsure of Afib vs. A flutter 2/18/2014    Kidney transplanted 05/04/2020    Obesity 2/18/2014    Secondary hyperparathyroidism, renal 2/18/2014    Sleep apnea on Bipap 2/18/2014     Past Surgical History:   Procedure Laterality Date    AV FISTULA PLACEMENT      CHOLECYSTECTOMY      COLON SURGERY  02/2017    resection for "ischemic colon"    FRACTURE SURGERY      HIP FRACTURE SURGERY Left     INGUINAL HERNIA REPAIR      bilaterally    KIDNEY TRANSPLANT      pace maker      [email or web address]    TONSILLECTOMY           Current Outpatient Medications   Medication Sig Dispense Refill    allopurinoL (ZYLOPRIM) 100 MG tablet TAKE 1 TABLET(100 MG) BY MOUTH EVERY DAY 90 tablet 3    amLODIPine (NORVASC) 5 MG tablet Take 5 mg by mouth once daily.      apixaban (ELIQUIS) 5 mg Tab   TAKE 1 TABLET BY MOUTH TWICE DAILY      aspirin (ECOTRIN) 81 MG EC tablet 81 mg.      BD ZEB 2ND GEN PEN NEEDLE 32 gauge x 5/32" Ndle USE AS DIRECTED FOUR TIMES DAILY 84 each 3    cinacalcet (SENSIPAR) 30 " MG Tab TAKE 1 TABLET BY MOUTH ONCE DAILY with a meal      ergocalciferol (ERGOCALCIFEROL) 50,000 unit Cap Take 1 capsule by mouth twice a week.      flash glucose sensor (FREESTYLE CLAIRE 2 SENSOR) Kit 1 each by Misc.(Non-Drug; Combo Route) route every 14 (fourteen) days. 1 kit 0    furosemide (LASIX) 20 MG tablet Take 20 mg by mouth every other day.      insulin detemir U-100, Levemir, (LEVEMIR FLEXPEN) 100 unit/mL (3 mL) InPn pen   16 unit, SUBQ, Daily, # 10 mL, 3 Refill(s), Maintenance, Pharmacy: [id number], 166, cm, 08/28/23 10:30:00 CDT, Height/Length Measured, 91.5, kg, 08/28/23 10:30:00 CDT, Weight Dosing      levothyroxine (SYNTHROID) 100 MCG tablet TAKE 1 TABLET(100 MCG) BY MOUTH EVERY DAY 90 tablet 3    losartan (COZAAR) 25 MG tablet Take 1 tablet (25 mg total) by mouth once daily. 90 tablet 3    metoprolol succinate (TOPROL-XL) 50 MG 24 hr tablet Take 50 mg by mouth nightly.      multivitamin (THERAGRAN) per tablet Take 1 tablet by mouth once daily.      NOVOLOG FLEXPEN U-100 INSULIN 100 unit/mL (3 mL) InPn pen INJECT 14 TO 20 UNITS UNDER THE SKIN AS NEEDED. MAX DAILY DOSE OF 60 UNITS PER DAY 15 mL 5    omeprazole (PRILOSEC) 20 MG capsule TAKE 1 CAPSULE BY MOUTH EVERY MORNING 30 MINUTES BEFORE BREAKFAST 90 capsule 3    predniSONE (DELTASONE) 5 MG tablet Take 5 mg by mouth once daily.      pregabalin (LYRICA) 75 MG capsule Take 1 capsule (75 mg total) by mouth every evening. 90 capsule 1    rosuvastatin (CRESTOR) 10 MG tablet Take 1 tablet (10 mg total) by mouth once daily. 90 tablet 3    sodium bicarbonate 650 MG tablet 1,950 mg.      TACROLIMUS XR, ENVARSUS, 0.75 MG ORAL TB24 0.75 mg Tb24 Take 1.5 mg by mouth once daily.      acetaminophen (TYLENOL) 325 MG tablet 325 mg.      denosumab (PROLIA) 60 mg/mL Syrg Inject 60 mg into the skin every 6 (six) months.      docusate sodium (COLACE) 100 MG capsule 100 mg.      furosemide (LASIX) 40 MG tablet Take 20 mg by mouth.      GVOKE HYPOPEN  "2-PACK 1 mg/0.2 mL AtIn Inject 1 Units into the skin Daily.      meclizine (ANTIVERT) 25 mg tablet TAKE 1 TABLET BY MOUTH TWICE DAILY AS NEEDED FOR VERTIGO      nitroGLYCERIN (NITROSTAT) 0.4 MG SL tablet SMARTSI Tablet(s) Sublingual      ondansetron (ZOFRAN-ODT) 4 MG TbDL Take 4 mg by mouth every 6 (six) hours as needed.       No current facility-administered medications for this visit.     Review of patient's allergies indicates:   Allergen Reactions    Iodine      Other reaction(s): Blisters       Review of Systems   Endocrine:        KIDNEY TRANSPLANT 2020   Musculoskeletal:  Positive for gait problem.        Cane   All other systems reviewed and are negative.      Objective:      Vitals:    23 0918   BP: 120/74   Pulse: 62   Resp: 16   Weight: 89.9 kg (198 lb 3.2 oz)   Height: 5' 8" (1.727 m)     Physical Exam  Vitals and nursing note reviewed. Exam conducted with a chaperone present.   Constitutional:       General: He is not in acute distress.     Appearance: Normal appearance. He is well-developed.   Cardiovascular:      Pulses:           Dorsalis pedis pulses are 2+ on the right side and 2+ on the left side.        Posterior tibial pulses are 1+ on the right side and 1+ on the left side.   Pulmonary:      Effort: Pulmonary effort is normal.   Musculoskeletal:         General: No tenderness.      Right foot: Decreased range of motion. Bunion (mild HAV and hammertoe 2nd right) present.      Left foot: Decreased range of motion. Prominent metatarsal heads present.   Feet:      Right foot:      Skin integrity: Dry skin present. No skin breakdown.      Toenail Condition: Right toenails are abnormally thick. Fungal disease present.     Left foot:      Skin integrity: Callus (Preulcerative callus sub 4th met head left foot, stable no discoloration) and dry skin (Increased dry skin small cracks and peeling with no areas of complication at this time) present. No skin breakdown.      Toenail Condition: " Left toenails are abnormally thick. Fungal disease present.  Skin:     Capillary Refill: Capillary refill takes 2 to 3 seconds.   Neurological:      Mental Status: He is alert.      Comments: Lack of sensation to distal digits, intact elsewhere b/l feet   Psychiatric:         Behavior: Behavior normal.         Thought Content: Thought content normal.                    Assessment:       1. Acquired deformity of joint of left foot    2. Pre-ulcerative calluses - Left Foot    3. Type II diabetes mellitus with neurological manifestations    4. Dry skin    5. Onychomycosis of toenail              Plan:           Advised patient this area has improved significantly.  There is very mild callus, no discoloration continue callus cushion daily with out medication.  Patient understands he is not to use any salicylic patches to this area or elsewhere on his feet  Reviewed pressure due to prominent 4th metatarsal head, this pressure is still present and they still need to monitor closely for any changes, best treatments to prevent recurrence callus cushion and good shoes with support/sturdy insoles utilized indoors and out  Pre ulcerative callus debrided, well healed at this time  Discussed with patient and wife the need for him to treat dry skin on his feet.  We discussed potential complications especially with diabetes, better care and maintenance of skin on his feet and lower legs  We discussed multiple over-the-counter treatment option should be done once daily until healed and then a few times weekly  Reviewed care and maintenance of nails, nails debrided. No complications  Reviewed diabetic education  Reviewed need for daily foot checks and instructed patient to contact the office with any area of redness or swelling which has not improved within 3 days.  Patient was in understanding and agreement with treatment plan.  I counseled the patient on their conditions, implications and medical management.  Instructed patient to  contact the office with any changes, questions, concerns, worsening of symptoms.   Total face to face time 20 minutes, exam, assessment, treatment, discussion, additional time for review of chart prior to and following appointment and visit documentation, consultation and coordination of care.    Follow up 2 months        This note was created using [employer] voice recognition software that occasionally misinterpreted phrases or words.

## 2023-11-28 ENCOUNTER — OFFICE VISIT (OUTPATIENT)
Dept: PODIATRY | Facility: CLINIC | Age: 73
End: 2023-11-28
Payer: MEDICARE

## 2023-11-28 VITALS
HEART RATE: 74 BPM | BODY MASS INDEX: 31.37 KG/M2 | SYSTOLIC BLOOD PRESSURE: 125 MMHG | RESPIRATION RATE: 18 BRPM | DIASTOLIC BLOOD PRESSURE: 70 MMHG | HEIGHT: 68 IN | WEIGHT: 207 LBS

## 2023-11-28 DIAGNOSIS — L84 PRE-ULCERATIVE CALLUSES: ICD-10-CM

## 2023-11-28 DIAGNOSIS — E11.49 TYPE II DIABETES MELLITUS WITH NEUROLOGICAL MANIFESTATIONS: ICD-10-CM

## 2023-11-28 DIAGNOSIS — B35.1 ONYCHOMYCOSIS OF TOENAIL: ICD-10-CM

## 2023-11-28 DIAGNOSIS — M21.962 ACQUIRED DEFORMITY OF JOINT OF LEFT FOOT: Primary | ICD-10-CM

## 2023-11-28 PROCEDURE — 99213 OFFICE O/P EST LOW 20 MIN: CPT | Mod: PBBFAC | Performed by: PODIATRIST

## 2023-11-28 PROCEDURE — 99999 PR PBB SHADOW E&M-EST. PATIENT-LVL III: CPT | Mod: PBBFAC,,, | Performed by: PODIATRIST

## 2023-11-28 PROCEDURE — 99999 PR PBB SHADOW E&M-EST. PATIENT-LVL III: ICD-10-PCS | Mod: PBBFAC,,, | Performed by: PODIATRIST

## 2023-11-28 PROCEDURE — 99213 OFFICE O/P EST LOW 20 MIN: CPT | Mod: S$PBB,,, | Performed by: PODIATRIST

## 2023-11-28 PROCEDURE — 99213 PR OFFICE/OUTPT VISIT, EST, LEVL III, 20-29 MIN: ICD-10-PCS | Mod: S$PBB,,, | Performed by: PODIATRIST

## 2023-11-28 RX ORDER — INSULIN DETEMIR 100 [IU]/ML
INJECTION, SOLUTION SUBCUTANEOUS
COMMUNITY

## 2023-11-28 RX ORDER — PREGABALIN 150 MG/1
CAPSULE ORAL
COMMUNITY
End: 2023-12-22

## 2023-11-28 RX ORDER — METOPROLOL TARTRATE 50 MG/1
TABLET ORAL
COMMUNITY
End: 2024-02-01

## 2023-11-28 RX ORDER — PANTOPRAZOLE SODIUM 40 MG/1
TABLET, DELAYED RELEASE ORAL
COMMUNITY

## 2023-11-28 RX ORDER — AMLODIPINE BESYLATE 10 MG/1
TABLET ORAL
COMMUNITY
End: 2024-02-01 | Stop reason: ALTCHOICE

## 2023-12-03 NOTE — PROGRESS NOTES
"Subjective:       Patient ID: Olu Gant Jr. is a 73 y.o. male.    Chief Complaint: Follow-up and Diabetes Mellitus  Patient with diabetes, neuropathy presents with his wife for follow-up preulcerative callus ball of the left foot.  Patient relates this area has been doing very well, he has been wearing his Crocs more often and it has helped significantly.  Relates no change in diabetes, has been well controlled this year, taking Lyrica for neuropathy pain.  Reports glucose was 87 this morning. No pain in his feet today      Past Medical History:   Diagnosis Date    Asbestos exposure - 1973 2/18/2014    Benign hypertension with ESRD (end-stage renal disease) 2/18/2014    Diabetes type 2 - since 1996 2/18/2014    DIALYSIS 2013    ESRD (end stage renal disease) - initiated dialysis 05/29/2013 2/18/2014    Gout, arthritis 2/18/2014    Hypothyroidism 2/18/2014    Irregular heart rhythm - unsure of Afib vs. A flutter 2/18/2014    Kidney transplanted 05/04/2020    Obesity 2/18/2014    Secondary hyperparathyroidism, renal 2/18/2014    Sleep apnea on Bipap 2/18/2014     Past Surgical History:   Procedure Laterality Date    AV FISTULA PLACEMENT      CHOLECYSTECTOMY      COLON SURGERY  02/2017    resection for "ischemic colon"    FRACTURE SURGERY      HIP FRACTURE SURGERY Left     INGUINAL HERNIA REPAIR      bilaterally    KIDNEY TRANSPLANT      pace maker      [email or web address]    TONSILLECTOMY           Current Outpatient Medications   Medication Sig Dispense Refill    allopurinoL (ZYLOPRIM) 100 MG tablet TAKE 1 TABLET(100 MG) BY MOUTH EVERY DAY 90 tablet 3    amLODIPine (NORVASC) 10 MG tablet Take by mouth.      apixaban (ELIQUIS) 5 mg Tab   TAKE 1 TABLET BY MOUTH TWICE DAILY      aspirin (ECOTRIN) 81 MG EC tablet 81 mg.      BD ZEB 2ND GEN PEN NEEDLE 32 gauge x 5/32" Ndle USE AS DIRECTED FOUR TIMES DAILY 84 each 3    calcium citrate (CALCITRATE) 200 mg (950 mg) tablet Take 1 tablet by mouth once daily.      cinacalcet " (SENSIPAR) 30 MG Tab TAKE 1 TABLET BY MOUTH ONCE DAILY with a meal      denosumab (PROLIA) 60 mg/mL Syrg Inject 60 mg into the skin every 6 (six) months.      ergocalciferol (ERGOCALCIFEROL) 50,000 unit Cap Take 1 capsule by mouth twice a week.      flash glucose sensor (FREESTYLE CLAIRE 2 SENSOR) Kit 1 each by Misc.(Non-Drug; Combo Route) route every 14 (fourteen) days. 1 kit 0    furosemide (LASIX) 20 MG tablet Take 20 mg by mouth every other day.      GVOKE HYPOPEN 2-PACK 1 mg/0.2 mL AtIn Inject 1 Units into the skin Daily.      insulin detemir U-100, Levemir, (LEVEMIR FLEXPEN) 100 unit/mL (3 mL) InPn pen   16 unit, SUBQ, Daily, # 10 mL, 3 Refill(s), Maintenance, Pharmacy: [facility] #[id number], 166, cm, 08/28/23 10:30:00 CDT, Height/Length Measured, 91.5, kg, 08/28/23 10:30:00 CDT, Weight Dosing      levothyroxine (SYNTHROID) 100 MCG tablet TAKE 1 TABLET(100 MCG) BY MOUTH EVERY DAY 90 tablet 3    losartan (COZAAR) 25 MG tablet Take 1 tablet (25 mg total) by mouth once daily. 90 tablet 3    metoprolol tartrate (LOPRESSOR) 50 MG tablet Take by mouth.      multivitamin (THERAGRAN) per tablet Take 1 tablet by mouth once daily.      NOVOLOG FLEXPEN U-100 INSULIN 100 unit/mL (3 mL) InPn pen INJECT 14 TO 20 UNITS UNDER THE SKIN AS NEEDED. MAX DAILY DOSE OF 60 UNITS PER DAY 15 mL 5    omeprazole (PRILOSEC) 20 MG capsule TAKE 1 CAPSULE BY MOUTH EVERY MORNING 30 MINUTES BEFORE BREAKFAST 90 capsule 3    pantoprazole (PROTONIX) 40 MG tablet Take by mouth.      predniSONE (DELTASONE) 5 MG tablet Take 5 mg by mouth once daily.      pregabalin (LYRICA) 150 MG capsule Take by mouth.      pregabalin (LYRICA) 75 MG capsule Take 1 capsule (75 mg total) by mouth every evening. 90 capsule 1    rosuvastatin (CRESTOR) 10 MG tablet Take 1 tablet (10 mg total) by mouth once daily. 90 tablet 3    sodium bicarbonate 650 MG tablet 1,950 mg.      TACROLIMUS XR, ENVARSUS, 0.75 MG ORAL TB24 0.75 mg Tb24 Take 1.5 mg by mouth Daily.       "insulin detemir U-100 (LEVEMIR U-100 INSULIN) 100 unit/mL injection Inject into the skin.      metoprolol succinate (TOPROL-XL) 50 MG 24 hr tablet Take 50 mg by mouth nightly.      nitroGLYCERIN (NITROSTAT) 0.4 MG SL tablet SMARTSI Tablet(s) Sublingual       No current facility-administered medications for this visit.     Review of patient's allergies indicates:   Allergen Reactions    Iodine      Other reaction(s): Blisters       Review of Systems   Endocrine:        KIDNEY TRANSPLANT 2020   Musculoskeletal:  Positive for gait problem.        Cane   All other systems reviewed and are negative.      Objective:      Vitals:    23 1416   BP: 125/70   Pulse: 74   Resp: 18   Weight: 93.9 kg (207 lb)   Height: 5' 8" (1.727 m)     Physical Exam  Vitals and nursing note reviewed. Exam conducted with a chaperone present.   Constitutional:       General: He is not in acute distress.     Appearance: Normal appearance. He is well-developed.   Cardiovascular:      Pulses:           Dorsalis pedis pulses are 2+ on the right side and 2+ on the left side.        Posterior tibial pulses are 1+ on the right side and 1+ on the left side.   Pulmonary:      Effort: Pulmonary effort is normal.   Musculoskeletal:         General: No tenderness.      Right foot: Decreased range of motion. Bunion (mild HAV and hammertoe 2nd right) present.      Left foot: Decreased range of motion. Prominent metatarsal heads present.   Feet:      Right foot:      Skin integrity: Dry skin present. No skin breakdown.      Toenail Condition: Right toenails are abnormally thick. Fungal disease present.     Left foot:      Skin integrity: Callus (Preulcerative callus sub 4th met head left foot, stable no discoloration) and dry skin present. No skin breakdown.      Toenail Condition: Left toenails are abnormally thick. Fungal disease present.  Skin:     Capillary Refill: Capillary refill takes 2 to 3 seconds.   Neurological:      Mental Status: He " is alert.      Comments: Lack of sensation to distal digits, intact elsewhere b/l feet   Psychiatric:         Behavior: Behavior normal.         Thought Content: Thought content normal.                          Assessment:       1. Acquired deformity of joint of left foot    2. Type II diabetes mellitus with neurological manifestations    3. Pre-ulcerative calluses - Left Foot    4. Onychomycosis of toenail            Plan:           Advised patient this area has continued to improved significantly.  Again there is very mild callus, no discoloration, continue most comfortable shoes to prevent recurrence.  Advised there is still callus developing in this area, he can wear callus pad daily if comfortable.  If this area builds up it can compress on the underlying nerve, it was causing a lot of pain even though it was her a small previously, needs to continue to treat on a daily basis  Reviewed better care and maintenance of dry skin, overall this would help the callus area as well.  We discussed over-the-counter CeraVe, Aquaphor, any diabetic foot lotion should be applied each night before bed  Callus debrided sub 4th met head left foot  Reviewed care and maintenance of nails, nails debrided. No complications  Reviewed diabetic education  Reviewed need for daily foot checks and instructed patient to contact the office with any area of redness or swelling which has not improved within 3 days.  Patient was in understanding and agreement with treatment plan.  I counseled the patient on their conditions, implications and medical management.  Instructed patient to contact the office with any changes, questions, concerns, worsening of symptoms.   Total face to face time 20 minutes, exam, assessment, treatment, discussion, additional time for review of chart prior to and following appointment and visit documentation, consultation and coordination of care.    Follow up 2 months        This note was created using [employer] voice  recognition software that occasionally misinterpreted phrases or words.

## 2024-01-30 ENCOUNTER — OFFICE VISIT (OUTPATIENT)
Dept: PODIATRY | Facility: CLINIC | Age: 74
End: 2024-01-30
Payer: MEDICARE

## 2024-01-30 VITALS
SYSTOLIC BLOOD PRESSURE: 113 MMHG | RESPIRATION RATE: 16 BRPM | WEIGHT: 200 LBS | DIASTOLIC BLOOD PRESSURE: 72 MMHG | HEART RATE: 71 BPM | BODY MASS INDEX: 30.31 KG/M2 | HEIGHT: 68 IN

## 2024-01-30 DIAGNOSIS — M19.071 ARTHRITIS OF BOTH FEET: ICD-10-CM

## 2024-01-30 DIAGNOSIS — L84 PRE-ULCERATIVE CALLUSES: ICD-10-CM

## 2024-01-30 DIAGNOSIS — E11.9 COMPREHENSIVE DIABETIC FOOT EXAMINATION, TYPE 2 DM, ENCOUNTER FOR: Primary | ICD-10-CM

## 2024-01-30 DIAGNOSIS — L85.3 DRY SKIN: ICD-10-CM

## 2024-01-30 DIAGNOSIS — M19.072 ARTHRITIS OF BOTH FEET: ICD-10-CM

## 2024-01-30 DIAGNOSIS — B35.1 ONYCHOMYCOSIS OF TOENAIL: ICD-10-CM

## 2024-01-30 DIAGNOSIS — E11.49 TYPE II DIABETES MELLITUS WITH NEUROLOGICAL MANIFESTATIONS: ICD-10-CM

## 2024-01-30 DIAGNOSIS — R20.8 LOSS OF PROTECTIVE SENSATION OF SKIN OF FOOT: ICD-10-CM

## 2024-01-30 PROCEDURE — 99213 OFFICE O/P EST LOW 20 MIN: CPT | Mod: PBBFAC | Performed by: PODIATRIST

## 2024-01-30 PROCEDURE — 99999 PR PBB SHADOW E&M-EST. PATIENT-LVL III: CPT | Mod: PBBFAC,,, | Performed by: PODIATRIST

## 2024-01-30 PROCEDURE — 99214 OFFICE O/P EST MOD 30 MIN: CPT | Mod: S$PBB,,, | Performed by: PODIATRIST

## 2024-01-30 RX ORDER — AMLODIPINE BESYLATE 5 MG/1
5 TABLET ORAL
COMMUNITY
Start: 2024-01-16

## 2024-01-30 RX ORDER — CALCITRIOL 0.25 UG/1
0.25 CAPSULE ORAL
COMMUNITY
Start: 2024-01-16

## 2024-02-02 NOTE — PROGRESS NOTES
"Subjective:       Patient ID: Olu Gant Jr. is a 73 y.o. male.    Chief Complaint: Diabetic Foot Exam  Patient presents with his wife for annual diabetic foot exam and follow-up preulcerative callus ball of the left foot.  Patient relates callus area continues to do very well.  There is a time when he was having severe pain in this area, wife checks it on a regular basis, reports no change.  He is wearing tennis shoes most of the time, sometimes Crocs, never barefoot, no pain  Relates diabetes has been doing very well, uses a [employer] continuous monitoring system.  Glucose was 95 this morning  Does relate last A1c was high and wife has him on a strict diet      Past Medical History:   Diagnosis Date    Asbestos exposure - 1973 2/18/2014    Benign hypertension with ESRD (end-stage renal disease) 2/18/2014    Diabetes type 2 - since 1996 2/18/2014    DIALYSIS 2013    ESRD (end stage renal disease) - initiated dialysis 05/29/2013 2/18/2014    Gout, arthritis 2/18/2014    Hypothyroidism 2/18/2014    Irregular heart rhythm - unsure of Afib vs. A flutter 2/18/2014    Kidney transplanted 05/04/2020    Obesity 2/18/2014    Secondary hyperparathyroidism, renal 2/18/2014    Sleep apnea on Bipap 2/18/2014     Past Surgical History:   Procedure Laterality Date    AV FISTULA PLACEMENT      CHOLECYSTECTOMY      COLON SURGERY  02/2017    resection for "ischemic colon"    FRACTURE SURGERY      HIP FRACTURE SURGERY Left     INGUINAL HERNIA REPAIR      bilaterally    KIDNEY TRANSPLANT      pace maker      [email or web address]    TONSILLECTOMY           Current Outpatient Medications   Medication Sig Dispense Refill    allopurinoL (ZYLOPRIM) 100 MG tablet TAKE 1 TABLET(100 MG) BY MOUTH EVERY DAY 90 tablet 3    amLODIPine (NORVASC) 5 MG tablet Take 5 mg by mouth.      apixaban (ELIQUIS) 5 mg Tab   TAKE 1 TABLET BY MOUTH TWICE DAILY      aspirin (ECOTRIN) 81 MG EC tablet 81 mg.      BD ZEB 2ND GEN PEN NEEDLE 32 gauge x 5/32" Ndle USE AS " DIRECTED FOUR TIMES DAILY 84 each 3    calcitRIOL (ROCALTROL) 0.25 MCG Cap Take 0.25 mcg by mouth.      calcium citrate (CALCITRATE) 200 mg (950 mg) tablet Take 1 tablet by mouth once daily.      cinacalcet (SENSIPAR) 30 MG Tab TAKE 1 TABLET BY MOUTH ONCE DAILY with a meal      denosumab (PROLIA) 60 mg/mL Syrg Inject 60 mg into the skin every 6 (six) months.      ergocalciferol (ERGOCALCIFEROL) 50,000 unit Cap Take 1 capsule by mouth twice a week.      flash glucose sensor (FREESTYLE CLAIRE 2 SENSOR) Kit 1 each by Misc.(Non-Drug; Combo Route) route every 14 (fourteen) days. 1 kit 0    furosemide (LASIX) 20 MG tablet Take 20 mg by mouth every other day.      GVOKE HYPOPEN 2-PACK 1 mg/0.2 mL AtIn Inject 1 Units into the skin Daily.      insulin detemir U-100 (LEVEMIR U-100 INSULIN) 100 unit/mL injection Inject into the skin.      insulin detemir U-100, Levemir, (LEVEMIR FLEXPEN) 100 unit/mL (3 mL) InPn pen   16 unit, SUBQ, Daily, # 10 mL, 3 Refill(s), Maintenance, Pharmacy: [id number], 166, cm, 08/28/23 10:30:00 CDT, Height/Length Measured, 91.5, kg, 08/28/23 10:30:00 CDT, Weight Dosing      levothyroxine (SYNTHROID) 100 MCG tablet TAKE 1 TABLET(100 MCG) BY MOUTH EVERY DAY 90 tablet 3    losartan (COZAAR) 25 MG tablet Take 1 tablet (25 mg total) by mouth once daily. 90 tablet 3    metoprolol succinate (TOPROL-XL) 50 MG 24 hr tablet Take 50 mg by mouth nightly.      multivitamin (THERAGRAN) per tablet Take 1 tablet by mouth once daily.      NOVOLOG FLEXPEN U-100 INSULIN 100 unit/mL (3 mL) InPn pen INJECT 14 TO 20 UNITS UNDER THE SKIN AS NEEDED. MAX DAILY DOSE OF 60 UNITS PER DAY 15 mL 5    omeprazole (PRILOSEC) 20 MG capsule TAKE 1 CAPSULE BY MOUTH EVERY MORNING 30 MINUTES BEFORE BREAKFAST 90 capsule 3    pantoprazole (PROTONIX) 40 MG tablet Take by mouth.      predniSONE (DELTASONE) 5 MG tablet Take 5 mg by mouth once daily.      pregabalin (LYRICA) 75 MG capsule Take 1 capsule (75 mg total) by mouth  "every evening. 90 capsule 1    rosuvastatin (CRESTOR) 10 MG tablet Take 1 tablet (10 mg total) by mouth once daily. 90 tablet 3    sodium bicarbonate 650 MG tablet 1,950 mg.      TACROLIMUS XR, ENVARSUS, 0.75 MG ORAL TB24 0.75 mg Tb24 Take 1.5 mg by mouth Daily.      nitroGLYCERIN (NITROSTAT) 0.4 MG SL tablet SMARTSI Tablet(s) Sublingual       No current facility-administered medications for this visit.     Review of patient's allergies indicates:   Allergen Reactions    Iodine      Other reaction(s): Blisters       Review of Systems   Endocrine:        KIDNEY TRANSPLANT 2020   Musculoskeletal:  Positive for gait problem.        Cane   All other systems reviewed and are negative.      Objective:      Vitals:    24 0921   BP: 113/72   Pulse: 71   Resp: 16   Weight: 90.7 kg (200 lb)   Height: 5' 8" (1.727 m)     Physical Exam  Vitals and nursing note reviewed. Exam conducted with a chaperone present.   Constitutional:       General: He is not in acute distress.     Appearance: Normal appearance. He is well-developed.   Cardiovascular:      Pulses:           Dorsalis pedis pulses are 2+ on the right side and 2+ on the left side.        Posterior tibial pulses are 1+ on the right side and 1+ on the left side.   Pulmonary:      Effort: Pulmonary effort is normal.   Musculoskeletal:         General: No tenderness.      Right foot: Decreased range of motion. Bunion (mild HAV and hammertoe 2nd right) present.      Left foot: Decreased range of motion. Prominent metatarsal heads present.   Feet:      Right foot:      Protective Sensation: 8 sites tested.  5 sites sensed.      Skin integrity: Dry skin present. No skin breakdown.      Toenail Condition: Right toenails are abnormally thick. Fungal disease present.     Left foot:      Protective Sensation: 8 sites tested.  5 sites sensed.      Skin integrity: Callus (Preulcerative callus sub 4th met head left foot,remains stable, no discoloration) and dry skin " present. No skin breakdown.      Toenail Condition: Left toenails are abnormally thick. Fungal disease present.  Skin:     General: Skin is dry.      Capillary Refill: Capillary refill takes 2 to 3 seconds.   Neurological:      Mental Status: He is alert.      Comments: Did not detect monofilament distal digits, intact elsewhere bilateral feet   Psychiatric:         Behavior: Behavior normal.         Thought Content: Thought content normal.                     Contains abnormal data Hemoglobin A1C        Component Ref Range & Units 2 mo ago  (11/30/23) 1 yr ago  (11/23/22)   Hemoglobin A1C 4.0 - 6.0 % 7.4 Abnormal  6.9 High              Assessment:       1. Comprehensive diabetic foot examination, type 2 DM, encounter for    2. Type II diabetes mellitus with neurological manifestations    3. Loss of protective sensation of skin of foot    4. Arthritis of both feet    5. Pre-ulcerative calluses - Left Foot    6. Dry skin    7. Onychomycosis of toenail            Plan:           Comprehensive diabetic pedal exam performed  Reviewed results of monofilament testing, lack of sensation to the toes, reviewed potential complications with patient and wife  We had a lengthy discussion regarding diabetic neuropathy.  Advised patient loss of sensation is a symptom of neuropathy  We discussed in the past how he has had burning and tingling pain, discussed Lyrica taken each evening and how these symptoms can fluctuate  Reviewed benefit of controled glucose/diabetes regarding potential foot problems especially neuropathy  Advised the best way to control further neuropathic pain in his feet is to continue to control his diabetes  Reviewed appropriate shoes,  especially indoors to protect feet, no flat shoes, slippers or walking in sock or bare feet  Preferred that he wear his tennis shoes over crocs            Advised patient ulcer/callus has continued to improve.  Explained there is mild callus, that indicates there is still  pressure in this area, it is important to continue to treat this skin, keep well hydrated and wear appropriate shoes with support  Preulcerative callus plantar left foot was debrided, no skin break or discoloration  Contact office immediately with any change  We discussed the importance of better hydration of skin which will also additionally help the area of callus  Monitor cracks on heels  Applied diabetic foot lotion daily  Reviewed care and maintenance of nails, nails debrided  Mild to moderate risk for complications due to lack of sensation in toes on previous history of foot ulcers   Reviewed need for daily foot checks and instructed patient to contact the office with any area of redness or swelling which has not improved 3 days.  Patient was in understanding and agreement with treatment plan.  I counseled the patient on their conditions, implications and medical management.  Instructed patient to contact the office with any changes, questions, concerns, worsening of symptoms.   Total face to face time 30 minutes, exam, assessment, treatment, discussion, additional time for review of chart prior to and following appointment and visit documentation, consultation and coordination of care.    Follow up 2 months        This note was created using M*Modal voice recognition software that occasionally misinterpreted phrases or words.

## 2024-03-26 ENCOUNTER — OFFICE VISIT (OUTPATIENT)
Dept: PODIATRY | Facility: CLINIC | Age: 74
End: 2024-03-26
Payer: MEDICARE

## 2024-03-26 VITALS
SYSTOLIC BLOOD PRESSURE: 125 MMHG | BODY MASS INDEX: 30.91 KG/M2 | DIASTOLIC BLOOD PRESSURE: 73 MMHG | HEIGHT: 68 IN | WEIGHT: 203.94 LBS | HEART RATE: 62 BPM | OXYGEN SATURATION: 95 %

## 2024-03-26 DIAGNOSIS — B35.1 ONYCHOMYCOSIS OF TOENAIL: ICD-10-CM

## 2024-03-26 DIAGNOSIS — M21.962 ACQUIRED DEFORMITY OF JOINT OF LEFT FOOT: Primary | ICD-10-CM

## 2024-03-26 DIAGNOSIS — E11.49 TYPE II DIABETES MELLITUS WITH NEUROLOGICAL MANIFESTATIONS: ICD-10-CM

## 2024-03-26 DIAGNOSIS — L84 PRE-ULCERATIVE CALLUSES: ICD-10-CM

## 2024-03-26 PROCEDURE — 99215 OFFICE O/P EST HI 40 MIN: CPT | Mod: PBBFAC | Performed by: PODIATRIST

## 2024-03-26 PROCEDURE — 99213 OFFICE O/P EST LOW 20 MIN: CPT | Mod: S$PBB,,, | Performed by: PODIATRIST

## 2024-03-26 PROCEDURE — 99999 PR PBB SHADOW E&M-EST. PATIENT-LVL V: CPT | Mod: PBBFAC,,, | Performed by: PODIATRIST

## 2024-03-26 RX ORDER — CYCLOBENZAPRINE HCL 10 MG
TABLET ORAL
COMMUNITY
Start: 2024-03-17

## 2024-03-26 RX ORDER — AZITHROMYCIN 250 MG/1
TABLET, FILM COATED ORAL
COMMUNITY
Start: 2024-03-15 | End: 2024-06-07

## 2024-03-26 RX ORDER — CEFDINIR 300 MG/1
CAPSULE ORAL
COMMUNITY
Start: 2024-03-15 | End: 2024-06-07

## 2024-03-26 NOTE — PROGRESS NOTES
"Subjective:       Patient ID: Olu Gant Jr. is a 73 y.o. male.    Chief Complaint: Diabetes Mellitus and Follow-up  Patient presents with his wife for follow-up preulcerative callus ball left foot, history diabetes with previous foot ulcer.  Patient relates left foot has been doing very well, no further pain due to area previous ulcer ball of the foot.  He has discontinued wearing his usual new balance tennis shoes completely.  Wearing different brand tennis shoes and he due to with extra padding.  Just had blood work for updated A1c.  Reports glucose has been very good, low 100s      Past Medical History:   Diagnosis Date    Asbestos exposure - 1973 2/18/2014    Benign hypertension with ESRD (end-stage renal disease) 2/18/2014    Diabetes type 2 - since 1996 2/18/2014    DIALYSIS 2013    ESRD (end stage renal disease) - initiated dialysis 05/29/2013 2/18/2014    Gout, arthritis 2/18/2014    Hypothyroidism 2/18/2014    Irregular heart rhythm - unsure of Afib vs. A flutter 2/18/2014    Kidney transplanted 05/04/2020    Obesity 2/18/2014    Secondary hyperparathyroidism, renal 2/18/2014    Sleep apnea on Bipap 2/18/2014     Past Surgical History:   Procedure Laterality Date    AV FISTULA PLACEMENT      CHOLECYSTECTOMY      COLON SURGERY  02/2017    resection for "ischemic colon"    FRACTURE SURGERY      HIP FRACTURE SURGERY Left     INGUINAL HERNIA REPAIR      bilaterally    KIDNEY TRANSPLANT      pace maker      [email or web address]    TONSILLECTOMY           Current Outpatient Medications   Medication Sig Dispense Refill    allopurinoL (ZYLOPRIM) 100 MG tablet TAKE 1 TABLET(100 MG) BY MOUTH EVERY DAY 90 tablet 3    amLODIPine (NORVASC) 5 MG tablet Take 5 mg by mouth.      apixaban (ELIQUIS) 5 mg Tab   TAKE 1 TABLET BY MOUTH TWICE DAILY      aspirin (ECOTRIN) 81 MG EC tablet 81 mg.      azithromycin (Z-ANTONI) 250 MG tablet       BD ZEB 2ND GEN PEN NEEDLE 32 gauge x 5/32" Ndle USE AS DIRECTED FOUR TIMES DAILY 84 each 3 "    calcitRIOL (ROCALTROL) 0.25 MCG Cap Take 0.25 mcg by mouth.      calcium citrate (CALCITRATE) 200 mg (950 mg) tablet Take 1 tablet by mouth once daily.      cefdinir (OMNICEF) 300 MG capsule       cinacalcet (SENSIPAR) 30 MG Tab TAKE 1 TABLET BY MOUTH ONCE DAILY with a meal      cyclobenzaprine (FLEXERIL) 10 MG tablet       denosumab (PROLIA) 60 mg/mL Syrg Inject 60 mg into the skin every 6 (six) months.      ergocalciferol (ERGOCALCIFEROL) 50,000 unit Cap Take 1 capsule by mouth twice a week.      flash glucose sensor (FREESTYLE CLAIRE 2 SENSOR) Kit 1 each by Misc.(Non-Drug; Combo Route) route every 14 (fourteen) days. 1 kit 0    fluticasone propionate (FLONASE) 50 mcg/actuation nasal spray 2 sprays (100 mcg total) by Each Nostril route once daily. 15.8 mL 1    furosemide (LASIX) 20 MG tablet Take 20 mg by mouth every other day.      GVOKE HYPOPEN 2-PACK 1 mg/0.2 mL AtIn Inject 1 Units into the skin Daily.      insulin detemir U-100 (LEVEMIR U-100 INSULIN) 100 unit/mL injection Inject into the skin.      insulin detemir U-100, Levemir, (LEVEMIR FLEXPEN) 100 unit/mL (3 mL) InPn pen   16 unit, SUBQ, Daily, # 10 mL, 3 Refill(s), Maintenance, Pharmacy: [facility] #[id number], 166, cm, 23 10:30:00 CDT, Height/Length Measured, 91.5, kg, 23 10:30:00 CDT, Weight Dosing      levothyroxine (SYNTHROID) 100 MCG tablet Take 1 tablet (100 mcg total) by mouth once daily. 90 tablet 3    losartan (COZAAR) 25 MG tablet Take 1 tablet (25 mg total) by mouth once daily. 90 tablet 3    metoprolol succinate (TOPROL-XL) 50 MG 24 hr tablet Take 50 mg by mouth nightly.      multivitamin (THERAGRAN) per tablet Take 1 tablet by mouth once daily.      mupirocin (BACTROBAN) 2 % ointment Apply topically 3 (three) times daily. 22 g 0    nitroGLYCERIN (NITROSTAT) 0.4 MG SL tablet SMARTSI Tablet(s) Sublingual      NOVOLOG FLEXPEN U-100 INSULIN 100 unit/mL (3 mL) InPn pen INJECT 14 TO 20 UNITS UNDER THE SKIN AS NEEDED. MAX  "DAILY DOSE OF 60 UNITS PER DAY 15 mL 5    omeprazole (PRILOSEC) 20 MG capsule TAKE 1 CAPSULE BY MOUTH EVERY MORNING 30 MINUTES BEFORE BREAKFAST 90 capsule 3    pantoprazole (PROTONIX) 40 MG tablet Take by mouth.      predniSONE (DELTASONE) 5 MG tablet Take 5 mg by mouth once daily.      pregabalin (LYRICA) 75 MG capsule Take 1 capsule (75 mg total) by mouth every evening. 90 capsule 1    rosuvastatin (CRESTOR) 10 MG tablet Take 1 tablet (10 mg total) by mouth once daily. 90 tablet 3    sodium bicarbonate 650 MG tablet 1,950 mg.      TACROLIMUS XR, ENVARSUS, 0.75 MG ORAL TB24 0.75 mg Tb24 Take 1.5 mg by mouth Daily.       No current facility-administered medications for this visit.     Review of patient's allergies indicates:   Allergen Reactions    Iodine      Other reaction(s): Blisters       Review of Systems   Cardiovascular:  Negative for leg swelling.   Endocrine:        KIDNEY TRANSPLANT 5/4/2020   Musculoskeletal:  Positive for gait problem.        Cane   All other systems reviewed and are negative.      Objective:      Vitals:    03/26/24 0825   BP: 125/73   Pulse: 62   SpO2: 95%   Weight: 92.5 kg (203 lb 14.8 oz)   Height: 5' 8" (1.727 m)     Physical Exam  Vitals and nursing note reviewed. Exam conducted with a chaperone present.   Constitutional:       General: He is not in acute distress.     Appearance: Normal appearance. He is well-developed.   Cardiovascular:      Pulses:           Dorsalis pedis pulses are 2+ on the right side and 2+ on the left side.        Posterior tibial pulses are 1+ on the right side and 1+ on the left side.   Pulmonary:      Effort: Pulmonary effort is normal.   Musculoskeletal:         General: No tenderness.      Right foot: Decreased range of motion. Bunion (mild HAV and hammertoe 2nd right) present.      Left foot: Decreased range of motion. Prominent metatarsal heads present.   Feet:      Right foot:      Skin integrity: Dry skin present.      Toenail Condition: Right " toenails are abnormally thick. Fungal disease present.     Left foot:      Protective Sensation:   5 sites sensed.      Skin integrity: Callus (Preulcerative callus sub 4th met head left foot, much improved, remains stable, no pain, no discoloration) and dry skin present.      Toenail Condition: Left toenails are abnormally thick. Fungal disease present.  Skin:     General: Skin is dry.      Capillary Refill: Capillary refill takes 2 to 3 seconds.   Neurological:      Mental Status: He is alert.   Psychiatric:         Behavior: Behavior normal.         Thought Content: Thought content normal.                        Assessment:       1. Acquired deformity of joint of left foot    2. Pre-ulcerative calluses - Left Foot    3. Type II diabetes mellitus with neurological manifestations    4. Onychomycosis of toenail              Plan:             Reviewed prominent metatarsal head/sub 4th met head left foot with much improved but continued development of callus  To accommodate this area we reviewed continued appropriate shoes,  especially indoors to support this area and protect feet  It is preferred he wear shoes indoors  Advised patient ulcer/callus remains much improved with no discoloration.  Explained there is mild callus developing which requires continued treatment, basically shoes that avoid pressure on this area and better care of dry skin  Reviewed care and maintenance of dry skin/callus  Preulcerative callus plantar left foot was debrided, no skin break or discoloration  Contact office immediately with any change  Reviewed care and maintenance of nails, thickness reduced  Reviewed diabetic education, potential complications  Reviewed need for daily foot checks and instructed patient to contact the office with any area of redness or swelling which has not improved in a few days.  Patient was in understanding and agreement with treatment plan.  I counseled the patient on their conditions, implications and  medical management.  Instructed patient to contact the office with any changes, questions, concerns, worsening of symptoms.   Total face to face time 20 minutes, exam, assessment, treatment, discussion, additional time for review of chart prior to and following appointment and visit documentation, consultation and coordination of care.    Follow up 2 months        This note was created using 3scale voice recognition software that occasionally misinterpreted phrases or words.

## 2024-06-07 ENCOUNTER — OFFICE VISIT (OUTPATIENT)
Dept: PODIATRY | Facility: CLINIC | Age: 74
End: 2024-06-07
Payer: MEDICARE

## 2024-06-07 VITALS
BODY MASS INDEX: 30.16 KG/M2 | HEART RATE: 69 BPM | HEIGHT: 68 IN | RESPIRATION RATE: 16 BRPM | SYSTOLIC BLOOD PRESSURE: 109 MMHG | WEIGHT: 199 LBS | DIASTOLIC BLOOD PRESSURE: 67 MMHG

## 2024-06-07 DIAGNOSIS — L84 PRE-ULCERATIVE CALLUSES: ICD-10-CM

## 2024-06-07 DIAGNOSIS — B35.1 ONYCHOMYCOSIS OF TOENAIL: ICD-10-CM

## 2024-06-07 DIAGNOSIS — M21.962 ACQUIRED DEFORMITY OF JOINT OF LEFT FOOT: Primary | ICD-10-CM

## 2024-06-07 DIAGNOSIS — E11.49 TYPE II DIABETES MELLITUS WITH NEUROLOGICAL MANIFESTATIONS: ICD-10-CM

## 2024-06-07 PROCEDURE — 99215 OFFICE O/P EST HI 40 MIN: CPT | Mod: PBBFAC | Performed by: PODIATRIST

## 2024-06-07 PROCEDURE — 99999 PR PBB SHADOW E&M-EST. PATIENT-LVL V: CPT | Mod: PBBFAC,,, | Performed by: PODIATRIST

## 2024-06-07 PROCEDURE — 99213 OFFICE O/P EST LOW 20 MIN: CPT | Mod: S$PBB,,, | Performed by: PODIATRIST

## 2024-06-07 RX ORDER — PEN NEEDLE, DIABETIC 30 GX3/16"
NEEDLE, DISPOSABLE MISCELLANEOUS
COMMUNITY
Start: 2024-03-27

## 2024-06-07 RX ORDER — INSULIN ASPART INJECTION 100 [IU]/ML
INJECTION, SOLUTION SUBCUTANEOUS
COMMUNITY
Start: 2024-04-12

## 2024-06-07 RX ORDER — FUROSEMIDE 40 MG/1
TABLET ORAL
COMMUNITY
Start: 2024-04-01

## 2024-06-07 RX ORDER — INSULIN LISPRO 100 [IU]/ML
INJECTION, SOLUTION INTRAVENOUS; SUBCUTANEOUS
COMMUNITY
Start: 2024-01-21

## 2024-06-07 NOTE — PROGRESS NOTES
"Subjective:       Patient ID: Olu Gant Jr. is a 74 y.o. male.    Chief Complaint: Foot Pain, Callouses, and Diabetes Mellitus  Patient presents with his wife for follow-up diabetes and preulcerative callus ball left foot.  Patient relates this was most likely due to his new balance tennis shoes, which he has been wearing for years, they are painful and he has stopped wearing them.  Wearing S[employer]chers arch fit and Hey dudes with extra support and has not had any pain  Confirms diabetes has been doing well, no changes    Past Medical History:   Diagnosis Date    Asbestos exposure - 1973 2/18/2014    Benign hypertension with ESRD (end-stage renal disease) 2/18/2014    Diabetes type 2 - since 1996 2/18/2014    DIALYSIS 2013    ESRD (end stage renal disease) - initiated dialysis 05/29/2013 2/18/2014    Gout, arthritis 2/18/2014    Hypothyroidism 2/18/2014    Irregular heart rhythm - unsure of Afib vs. A flutter 2/18/2014    Kidney transplanted 05/04/2020    Obesity 2/18/2014    Secondary hyperparathyroidism, renal 2/18/2014    Sleep apnea on Bipap 2/18/2014     Past Surgical History:   Procedure Laterality Date    AV FISTULA PLACEMENT      CHOLECYSTECTOMY      COLON SURGERY  02/2017    resection for "ischemic colon"    FRACTURE SURGERY      HIP FRACTURE SURGERY Left     INGUINAL HERNIA REPAIR      bilaterally    KIDNEY TRANSPLANT      pace maker      [email or web address]    TONSILLECTOMY           Current Outpatient Medications   Medication Sig Dispense Refill    allopurinoL (ZYLOPRIM) 100 MG tablet TAKE 1 TABLET(100 MG) BY MOUTH EVERY DAY 90 tablet 3    amLODIPine (NORVASC) 5 MG tablet Take 5 mg by mouth.      apixaban (ELIQUIS) 5 mg Tab   TAKE 1 TABLET BY MOUTH TWICE DAILY      aspirin (ECOTRIN) 81 MG EC tablet 81 mg.      calcitRIOL (ROCALTROL) 0.25 MCG Cap Take 0.25 mcg by mouth.      calcium citrate (CALCITRATE) 200 mg (950 mg) tablet Take 1 tablet by mouth once daily.      CEFDINIR ORAL Oral, 0 Refill(s)      " cinacalcet (SENSIPAR) 30 MG Tab TAKE 1 TABLET BY MOUTH ONCE DAILY with a meal      cyclobenzaprine (FLEXERIL) 10 MG tablet       denosumab (PROLIA) 60 mg/mL Syrg Inject 60 mg into the skin every 6 (six) months.      ergocalciferol (ERGOCALCIFEROL) 50,000 unit Cap Take 1 capsule by mouth twice a week.      FIASP FLEXTOUCH U-100 INSULIN 100 unit/mL (3 mL) InPn See Instructions, 7 units with breakfast, 11 units with lunch, 7 units with dinner., # 10 mL, 5 Refill(s), Maintenance, Pharmacy: [facility] #[id number], 166, cm, 03/22/24 8:23:00 CDT, Height/Length Measured, 90.7, kg, 03/22/24 8:23:00 CDT, Weigh...      flash glucose sensor (FREESTYLE CLAIRE 2 SENSOR) Kit 1 each by Misc.(Non-Drug; Combo Route) route every 14 (fourteen) days. 1 kit 0    fluticasone propionate (FLONASE) 50 mcg/actuation nasal spray 2 sprays (100 mcg total) by Each Nostril route once daily. 15.8 mL 1    GVOKE HYPOPEN 2-PACK 1 mg/0.2 mL AtIn Inject 1 Units into the skin Daily.      insulin aspart (NOVOLOG FLEXPEN U-100 INSULIN SUBQ) SUBQ, TID before meals, 0 Refill(s), Maintenance      insulin detemir U-100 (LEVEMIR U-100 INSULIN) 100 unit/mL injection Inject into the skin.      insulin detemir U-100, Levemir, (LEVEMIR FLEXPEN) 100 unit/mL (3 mL) InPn pen   16 unit, SUBQ, Daily, # 10 mL, 3 Refill(s), Maintenance, Pharmacy: [facility] #[id number], 166, cm, 08/28/23 10:30:00 CDT, Height/Length Measured, 91.5, kg, 08/28/23 10:30:00 CDT, Weight Dosing      insulin lispro (HUMALOG KWIKPEN INSULIN) 100 unit/mL pen See Instructions, Inject 7 units with breakfast, 11 units with lunch, 7 units with dinner.  Max daily dose 30 units daily, # 27 mL, 5 Refill(s), Maintenance, Pharmacy: [facility] #[id number], 166, cm, 01/16/24 15:07:00 CST, Height/Length Measured...      levothyroxine (SYNTHROID) 100 MCG tablet Take 1 tablet (100 mcg total) by mouth once daily. 90 tablet 3    losartan (COZAAR) 25 MG tablet Take 1 tablet (25 mg total) by mouth once  "daily. 90 tablet 3    metoprolol succinate (TOPROL-XL) 50 MG 24 hr tablet Take 50 mg by mouth nightly.      multivitamin (THERAGRAN) per tablet Take 1 tablet by mouth once daily.      mupirocin (BACTROBAN) 2 % ointment Apply topically 3 (three) times daily. 22 g 0    omeprazole (PRILOSEC) 20 MG capsule TAKE 1 CAPSULE BY MOUTH EVERY MORNING 30 MINUTES BEFORE BREAKFAST 90 capsule 3    pantoprazole (PROTONIX) 40 MG tablet Take by mouth.      pen needle, diabetic 32 gauge x 5/32" Ndle BD ZEB pen needles 32 ga, 4mm, See Instructions, use 4 daily for basal and bolus insulin., # 200 EA, 5 Refill(s), Pharmacy: [facility] #[id number], 166, cm, 24 8:23:00 CDT, Height/Length Measured, 90.7, kg, 24 8:23:00 CDT, Weight Dosing      predniSONE (DELTASONE) 5 MG tablet Take 5 mg by mouth once daily.      pregabalin (LYRICA) 75 MG capsule Take 1 capsule (75 mg total) by mouth every evening. 90 capsule 1    rosuvastatin (CRESTOR) 10 MG tablet TAKE 1 TABLET(10 MG) BY MOUTH EVERY DAY 90 tablet 3    sodium bicarbonate 650 MG tablet 1,950 mg.      TACROLIMUS XR, ENVARSUS, 0.75 MG ORAL TB24 0.75 mg Tb24 Take 1.5 mg by mouth Daily.      azithromycin (Z-ANTONI) 250 MG tablet  (Patient not taking: Reported on 2024)      BD ZEB 2ND GEN PEN NEEDLE 32 gauge x 5/32" Ndle USE AS DIRECTED FOUR TIMES DAILY (Patient not taking: Reported on 2024) 84 each 3    cefdinir (OMNICEF) 300 MG capsule  (Patient not taking: Reported on 2024)      furosemide (LASIX) 20 MG tablet Take 20 mg by mouth every other day.      furosemide (LASIX) 40 MG tablet       nitroGLYCERIN (NITROSTAT) 0.4 MG SL tablet SMARTSI Tablet(s) Sublingual (Patient not taking: Reported on 2024)      NOVOLOG FLEXPEN U-100 INSULIN 100 unit/mL (3 mL) InPn pen INJECT 14 TO 20 UNITS UNDER THE SKIN AS NEEDED. MAX DAILY DOSE OF 60 UNITS PER DAY (Patient not taking: Reported on 2024) 15 mL 5     No current facility-administered medications for this visit. " "    Review of patient's allergies indicates:   Allergen Reactions    Iodine      Other reaction(s): Blisters       Review of Systems   Cardiovascular:  Negative for leg swelling.   Endocrine:        KIDNEY TRANSPLANT 5/4/2020   Musculoskeletal:  Positive for gait problem.        Cane   All other systems reviewed and are negative.      Objective:      Vitals:    06/07/24 0823   BP: 109/67   Pulse: 69   Resp: 16   Weight: 90.3 kg (199 lb)   Height: 5' 8" (1.727 m)     Physical Exam  Vitals and nursing note reviewed. Exam conducted with a chaperone present.   Constitutional:       General: He is not in acute distress.     Appearance: Normal appearance. He is well-developed.   Cardiovascular:      Pulses:           Dorsalis pedis pulses are 2+ on the right side and 2+ on the left side.        Posterior tibial pulses are 1+ on the right side and 1+ on the left side.   Pulmonary:      Effort: Pulmonary effort is normal.   Musculoskeletal:         General: No tenderness.      Right foot: Decreased range of motion. Bunion (mild HAV and hammertoe 2nd right) present.      Left foot: Decreased range of motion. Prominent metatarsal heads present.   Feet:      Right foot:      Skin integrity: Dry skin present.      Toenail Condition: Right toenails are abnormally thick. Fungal disease present.     Left foot:      Skin integrity: Callus (Preulcerative callus sub 4th met head left foot, much improved, stable, no pain, no discoloration) and dry skin present.      Toenail Condition: Left toenails are abnormally thick. Fungal disease present.  Skin:     General: Skin is dry.      Capillary Refill: Capillary refill takes 2 to 3 seconds.   Neurological:      Mental Status: He is alert.   Psychiatric:         Thought Content: Thought content normal.                          Assessment:       1. Acquired deformity of joint of left foot    2. Pre-ulcerative calluses - Left Foot    3. 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 Onychomycosis of toenail          Plan:           Reviewed prominent metatarsal head/sub 4th met head left foot with patient and advised it remains much improved  Explained however he does continued to develop a considerably deep callus in this location, make sure he continues with shoes that offload pressure from this area indoors and out, avoid walking in sock or bare feet  Needs something on his feet at home  Advised patient this to area is still considered pre ulcerative callus since it has been an open ulcer in the past  Needs to be checked frequently  Reviewed care and maintenance of dry skin/callus  Preulcerative callus plantar left foot was debrided, no skin break or discoloration  Contact office immediately with any change  Reviewed care and maintenance of nails, thickness reduced  Reviewed diabetic education, potential complications  Reviewed need for daily foot checks and instructed patient to contact the office with any area of redness or swelling which has not improved in a few days.  Patient was in understanding and agreement with treatment plan.  I counseled the patient on their conditions, implications and medical management.  Instructed patient to contact the office with any changes, questions, concerns, worsening of symptoms.   Total face to face time 20 minutes, exam, assessment, treatment, discussion, additional time for review of chart prior to and following appointment and visit documentation, consultation and coordination of care.    Follow up 2 months        This note was created using M*Modal voice recognition software that occasionally misinterpreted phrases or words.

## 2024-08-12 ENCOUNTER — OFFICE VISIT (OUTPATIENT)
Dept: PODIATRY | Facility: CLINIC | Age: 74
End: 2024-08-12
Payer: MEDICARE

## 2024-08-12 VITALS
DIASTOLIC BLOOD PRESSURE: 75 MMHG | HEIGHT: 68 IN | BODY MASS INDEX: 30.17 KG/M2 | SYSTOLIC BLOOD PRESSURE: 122 MMHG | WEIGHT: 199.06 LBS | HEART RATE: 69 BPM

## 2024-08-12 DIAGNOSIS — E11.49 TYPE II DIABETES MELLITUS WITH NEUROLOGICAL MANIFESTATIONS: Primary | ICD-10-CM

## 2024-08-12 DIAGNOSIS — L97.522 ULCER OF LEFT FOOT WITH FAT LAYER EXPOSED: ICD-10-CM

## 2024-08-12 DIAGNOSIS — E11.9 COMPREHENSIVE DIABETIC FOOT EXAMINATION, TYPE 2 DM, ENCOUNTER FOR: ICD-10-CM

## 2024-08-12 PROCEDURE — 87070 CULTURE OTHR SPECIMN AEROBIC: CPT | Performed by: PODIATRIST

## 2024-08-12 PROCEDURE — 11042 DBRDMT SUBQ TIS 1ST 20SQCM/<: CPT | Mod: PBBFAC | Performed by: PODIATRIST

## 2024-08-12 PROCEDURE — 99999 PR PBB SHADOW E&M-EST. PATIENT-LVL V: CPT | Mod: PBBFAC,,, | Performed by: PODIATRIST

## 2024-08-12 PROCEDURE — 99215 OFFICE O/P EST HI 40 MIN: CPT | Mod: PBBFAC | Performed by: PODIATRIST

## 2024-08-12 PROCEDURE — 99214 OFFICE O/P EST MOD 30 MIN: CPT | Mod: 25,S$PBB,, | Performed by: PODIATRIST

## 2024-08-12 RX ORDER — HYDROCODONE BITARTRATE AND ACETAMINOPHEN 5; 325 MG/1; MG/1
TABLET ORAL
COMMUNITY
Start: 2024-08-11 | End: 2024-08-14

## 2024-08-12 RX ORDER — CEPHALEXIN 500 MG/1
500 CAPSULE ORAL
COMMUNITY
Start: 2024-08-11 | End: 2024-08-21

## 2024-08-12 RX ORDER — MOXIFLOXACIN HYDROCHLORIDE 400 MG/1
400 TABLET ORAL DAILY
Qty: 14 TABLET | Refills: 0 | Status: SHIPPED | OUTPATIENT
Start: 2024-08-12 | End: 2024-08-26

## 2024-08-13 NOTE — PROGRESS NOTES
"Subjective:       Patient ID: Olu Gant Jr. is a 74 y.o. male.    Chief Complaint: Callouses and Foot Ulcer  Patient presents with his wife for follow-up diabetes and preulcerative callus ball left foot.      Past Medical History:   Diagnosis Date    Asbestos exposure - 1973 2/18/2014    Benign hypertension with ESRD (end-stage renal disease) 2/18/2014    Diabetes type 2 - since 1996 2/18/2014    DIALYSIS 2013    ESRD (end stage renal disease) - initiated dialysis 05/29/2013 2/18/2014    Gout, arthritis 2/18/2014    Hypothyroidism 2/18/2014    Irregular heart rhythm - unsure of Afib vs. A flutter 2/18/2014    Kidney transplanted 05/04/2020    Obesity 2/18/2014    Secondary hyperparathyroidism, renal 2/18/2014    Sleep apnea on Bipap 2/18/2014     Past Surgical History:   Procedure Laterality Date    AV FISTULA PLACEMENT      CHOLECYSTECTOMY      COLON SURGERY  02/2017    resection for "ischemic colon"    FRACTURE SURGERY      HIP FRACTURE SURGERY Left     INGUINAL HERNIA REPAIR      bilaterally    KIDNEY TRANSPLANT      pace maker      [email or web address]    TONSILLECTOMY           Current Outpatient Medications   Medication Sig Dispense Refill    allopurinoL (ZYLOPRIM) 100 MG tablet TAKE 1 TABLET(100 MG) BY MOUTH EVERY DAY 90 tablet 3    amLODIPine (NORVASC) 5 MG tablet Take 5 mg by mouth.      apixaban (ELIQUIS) 5 mg Tab   TAKE 1 TABLET BY MOUTH TWICE DAILY      aspirin (ECOTRIN) 81 MG EC tablet 81 mg.      calcitRIOL (ROCALTROL) 0.25 MCG Cap Take 0.25 mcg by mouth.      calcium citrate (CALCITRATE) 200 mg (950 mg) tablet Take 1 tablet by mouth once daily.      cephALEXin (KEFLEX) 500 MG capsule 500 mg.      cinacalcet (SENSIPAR) 30 MG Tab TAKE 1 TABLET BY MOUTH ONCE DAILY with a meal      cyclobenzaprine (FLEXERIL) 10 MG tablet       denosumab (PROLIA) 60 mg/mL Syrg Inject 60 mg into the skin every 6 (six) months.      ergocalciferol (ERGOCALCIFEROL) 50,000 unit Cap Take 1 capsule by mouth twice a week.      FIASP " FLEXTOUCH U-100 INSULIN 100 unit/mL (3 mL) InPn See Instructions, 7 units with breakfast, 11 units with lunch, 7 units with dinner., # 10 mL, 5 Refill(s), Maintenance, Pharmacy: [facility] #[id number], 166, cm, 03/22/24 8:23:00 CDT, Height/Length Measured, 90.7, kg, 03/22/24 8:23:00 CDT, Weigh...      flash glucose sensor (FREESTYLE CLAIRE 2 SENSOR) Kit 1 each by Misc.(Non-Drug; Combo Route) route every 14 (fourteen) days. 1 kit 0    fluticasone propionate (FLONASE) 50 mcg/actuation nasal spray 2 sprays (100 mcg total) by Each Nostril route once daily. 15.8 mL 1    furosemide (LASIX) 20 MG tablet Take 20 mg by mouth every other day.      furosemide (LASIX) 40 MG tablet       GVOKE HYPOPEN 2-PACK 1 mg/0.2 mL AtIn Inject 1 Units into the skin Daily.      HYDROcodone-acetaminophen (NORCO) 5-325 mg per tablet 1 tab, Oral, q4h, X 3 day(s), # 12 tab, 0 Refill(s)      insulin aspart (NOVOLOG FLEXPEN U-100 INSULIN SUBQ) SUBQ, TID before meals, 0 Refill(s), Maintenance      insulin detemir U-100 (LEVEMIR U-100 INSULIN) 100 unit/mL injection Inject into the skin.      insulin detemir U-100, Levemir, (LEVEMIR FLEXPEN) 100 unit/mL (3 mL) InPn pen   16 unit, SUBQ, Daily, # 10 mL, 3 Refill(s), Maintenance, Pharmacy: [facility] #[id number], 166, cm, 08/28/23 10:30:00 CDT, Height/Length Measured, 91.5, kg, 08/28/23 10:30:00 CDT, Weight Dosing      insulin lispro (HUMALOG KWIKPEN INSULIN) 100 unit/mL pen See Instructions, Inject 7 units with breakfast, 11 units with lunch, 7 units with dinner.  Max daily dose 30 units daily, # 27 mL, 5 Refill(s), Maintenance, Pharmacy: [facility] #[id number], 166, cm, 01/16/24 15:07:00 CST, Height/Length Measured...      levothyroxine (SYNTHROID) 100 MCG tablet Take 1 tablet (100 mcg total) by mouth once daily. 90 tablet 3    losartan (COZAAR) 25 MG tablet Take 1 tablet (25 mg total) by mouth once daily. 90 tablet 3    metoprolol succinate (TOPROL-XL) 50 MG 24 hr tablet Take 50 mg by  "mouth nightly.      multivitamin (THERAGRAN) per tablet Take 1 tablet by mouth once daily.      mupirocin (BACTROBAN) 2 % ointment Apply topically 3 (three) times daily. 22 g 0    nitroGLYCERIN (NITROSTAT) 0.4 MG SL tablet       omeprazole (PRILOSEC) 20 MG capsule TAKE 1 CAPSULE BY MOUTH EVERY MORNING 30 MINUTES BEFORE BREAKFAST 90 capsule 3    pantoprazole (PROTONIX) 40 MG tablet Take by mouth.      pen needle, diabetic 32 gauge x 5/32" Ndle BD ZEB pen needles 32 ga, 4mm, See Instructions, use 4 daily for basal and bolus insulin., # 200 EA, 5 Refill(s), Pharmacy: [facility] #[id number], 166, cm, 03/22/24 8:23:00 CDT, Height/Length Measured, 90.7, kg, 03/22/24 8:23:00 CDT, Weight Dosing      predniSONE (DELTASONE) 5 MG tablet Take 5 mg by mouth once daily.      pregabalin (LYRICA) 75 MG capsule Take 1 capsule (75 mg total) by mouth every evening. 90 capsule 1    rosuvastatin (CRESTOR) 10 MG tablet TAKE 1 TABLET(10 MG) BY MOUTH EVERY DAY 90 tablet 3    sodium bicarbonate 650 MG tablet 1,950 mg.      TACROLIMUS XR, ENVARSUS, 0.75 MG ORAL TB24 0.75 mg Tb24 Take 1.5 mg by mouth Daily.      moxifloxacin (AVELOX) 400 mg tablet Take 1 tablet (400 mg total) by mouth once daily. for 14 days 14 tablet 0     No current facility-administered medications for this visit.     Review of patient's allergies indicates:   Allergen Reactions    Iodine      Other reaction(s): Blisters       Review of Systems   Cardiovascular:  Negative for leg swelling.   Endocrine:        KIDNEY TRANSPLANT 5/4/2020   Musculoskeletal:  Positive for gait problem.        Cane   Skin:  Positive for color change and wound.   All other systems reviewed and are negative.      Objective:      Vitals:    08/12/24 0855   BP: 122/75   BP Location: Right arm   Patient Position: Sitting   Pulse: 69   Weight: 90.3 kg (199 lb 1.2 oz)   Height: 5' 8" (1.727 m)     Physical Exam  Vitals and nursing note reviewed. Exam conducted with a chaperone present. "   Constitutional:       General: He is not in acute distress.     Appearance: Normal appearance. He is well-developed.   Cardiovascular:      Pulses:           Dorsalis pedis pulses are 1+ on the right side and 1+ on the left side.        Posterior tibial pulses are 1+ on the right side and 1+ on the left side.   Pulmonary:      Effort: Pulmonary effort is normal.   Musculoskeletal:         General: Tenderness present.      Right foot: Decreased range of motion. Bunion (mild HAV and hammertoe 2nd right) present.      Left foot: Decreased range of motion. Prominent metatarsal heads present.   Feet:      Right foot:      Protective Sensation: 4 sites tested.  2 sites sensed.      Skin integrity: Dry skin present.      Toenail Condition: Right toenails are abnormally thick. Fungal disease present.     Left foot:      Protective Sensation: 4 sites tested.  2 sites sensed.      Skin integrity: Ulcer, skin breakdown, callus and dry skin present.      Toenail Condition: Left toenails are abnormally thick. Fungal disease present.  Skin:     General: Skin is dry.      Capillary Refill: Capillary refill takes 2 to 3 seconds.      Findings: Erythema present.   Neurological:      Mental Status: He is alert.   Psychiatric:         Mood and Affect: Mood normal.         Behavior: Behavior normal.         Thought Content: Thought content normal.                                            Assessment:       1. Ulcer of left foot with fat layer exposed    2. Type II diabetes mellitus with neurological manifestations    3. Comprehensive diabetic foot examination, type 2 DM, encounter for          Plan:           Patient presents today with his wife he has had a chronic area of pre ulcerative callus formation underlying the 4th metatarsal left last had an area of open wound and infection 4 years ago he states the areas become very painful throbbing to the point where he can barely stand to walk this started to bother him about 3 days  ago he did go to the emergency room because of this and was placed on Keflex.  On evaluation patient has a large area of callus tissue with underlying ulceration this is a proximally 2 cm long by 1.5 cm wide there is blood underlying the edges of this callus formation I was able to debride a portion of the callus tissue because of the level of discomfort the patient was having debridement was somewhat limited.  Excisional debridement performed upon doing this there was a serous bloody drainage that emitted from the proximal portion of the ulceration I did perform a culture and sensitivity on this area I have started the patient on Avelox patient will clean the wound daily with Dakin solution applying silver alginate and a well-padded thick protective dressing patient's wife was in understanding and agreement with this patient will follow up with Dr. Ling bending this Friday for re-evaluation I am hopeful that now that the areas able to drain he will have less discomfort and the area will further be able to be debrided.  Patient understands he needs to keep the area dry and clean change the dressing every day we will adjust the patient's antibiotics pending culture and sensitivity results.This note was created using M*[employer] voice recognition software that occasionally misinterpreted phrases or words.

## 2024-08-13 NOTE — PROCEDURES
"Wound Debridement    Date/Time: 8/12/2024 8:45 AM    Performed by: Hammad Trujillo DPM  Authorized by: Hammad Trujillo DPM    Time out: Immediately prior to procedure a "time out" was called to verify the correct patient, procedure, equipment, support staff and site/side marked as required.    Consent Done?:  Yes (Verbal)    Preparation: Patient was prepped and draped in usual sterile fashion, Patient was prepped and draped with aseptic technique and Patient was prepped and draped with clean technique    Local anesthesia used?: No      Wound Details:    Location:  Left foot    Location:  Left 4th Metatarsal Head    Type of Debridement:  Excisional       Length (cm):  2       Area (sq cm):  3       Width (cm):  1.5       Percent Debrided (%):  100       Depth (cm):  0.3       Total Area Debrided (sq cm):  3    Depth of debridement:  Subcutaneous tissue    Tissue debrided:  Subcutaneous    Devitalized tissue debrided:  Biofilm, Callus, Exudate and Slough    Instruments:  Blade  Bleeding:  Minimal  Hemostasis Achieved: Yes  Method Used:  Pressure and Alginate  Patient tolerance:  Patient tolerated the procedure well with no immediate complications  Specimen Collected: Specimen sent to microbiology    "

## 2024-08-15 LAB — BACTERIA SPEC AEROBE CULT: NORMAL

## 2024-08-16 ENCOUNTER — OFFICE VISIT (OUTPATIENT)
Dept: PODIATRY | Facility: CLINIC | Age: 74
End: 2024-08-16
Payer: MEDICARE

## 2024-08-16 VITALS
BODY MASS INDEX: 30.59 KG/M2 | SYSTOLIC BLOOD PRESSURE: 112 MMHG | HEART RATE: 73 BPM | HEIGHT: 68 IN | DIASTOLIC BLOOD PRESSURE: 73 MMHG | WEIGHT: 201.81 LBS

## 2024-08-16 DIAGNOSIS — M21.962 ACQUIRED DEFORMITY OF JOINT OF LEFT FOOT: ICD-10-CM

## 2024-08-16 DIAGNOSIS — M79.672 FOOT PAIN, LEFT: ICD-10-CM

## 2024-08-16 DIAGNOSIS — L97.521 ULCER OF LEFT FOOT, LIMITED TO BREAKDOWN OF SKIN: Primary | ICD-10-CM

## 2024-08-16 DIAGNOSIS — E11.49 TYPE II DIABETES MELLITUS WITH NEUROLOGICAL MANIFESTATIONS: ICD-10-CM

## 2024-08-16 PROCEDURE — 99214 OFFICE O/P EST MOD 30 MIN: CPT | Mod: S$PBB,,, | Performed by: PODIATRIST

## 2024-08-16 PROCEDURE — 99215 OFFICE O/P EST HI 40 MIN: CPT | Mod: PBBFAC | Performed by: PODIATRIST

## 2024-08-16 PROCEDURE — 99999 PR PBB SHADOW E&M-EST. PATIENT-LVL V: CPT | Mod: PBBFAC,,, | Performed by: PODIATRIST

## 2024-08-16 RX ORDER — HUMAN INSULIN 100 [IU]/ML
INJECTION, SOLUTION SUBCUTANEOUS
COMMUNITY
Start: 2024-08-13

## 2024-08-18 NOTE — PROGRESS NOTES
"Subjective:       Patient ID: Olu Gant Jr. is a 74 y.o. male.    Chief Complaint: Follow-up, Diabetes Mellitus, and Foot Ulcer  Patient presents with his wife for follow-up.  Ulcer plantar left foot.  Patient relates area had been sore for a few days, very sore on 08/11 and went to [facility] ED that day. Saw Dr. Hammad Trujillo the next day and had some relief having the area debrided but was not able to debride thoroughly due to pain.  Patient confirms taking antibiotic, applying silver alginate and a bandage daily and staying off his feet completely over the last few days.  Relates pain has decreased.  Pain level 4/10      Past Medical History:   Diagnosis Date    Asbestos exposure - 1973 2/18/2014    Benign hypertension with ESRD (end-stage renal disease) 2/18/2014    Diabetes type 2 - since 1996 2/18/2014    DIALYSIS 2013    ESRD (end stage renal disease) - initiated dialysis 05/29/2013 2/18/2014    Gout, arthritis 2/18/2014    Hypothyroidism 2/18/2014    Irregular heart rhythm - unsure of Afib vs. A flutter 2/18/2014    Kidney transplanted 05/04/2020    Obesity 2/18/2014    Secondary hyperparathyroidism, renal 2/18/2014    Sleep apnea on Bipap 2/18/2014     Past Surgical History:   Procedure Laterality Date    AV FISTULA PLACEMENT      CHOLECYSTECTOMY      COLON SURGERY  02/2017    resection for "ischemic colon"    FRACTURE SURGERY      HIP FRACTURE SURGERY Left     INGUINAL HERNIA REPAIR      bilaterally    KIDNEY TRANSPLANT      pace maker      [email or web address]    TONSILLECTOMY           Current Outpatient Medications   Medication Sig Dispense Refill    allopurinoL (ZYLOPRIM) 100 MG tablet TAKE 1 TABLET(100 MG) BY MOUTH EVERY DAY 90 tablet 3    amLODIPine (NORVASC) 5 MG tablet Take 5 mg by mouth.      apixaban (ELIQUIS) 5 mg Tab   TAKE 1 TABLET BY MOUTH TWICE DAILY      aspirin (ECOTRIN) 81 MG EC tablet 81 mg.      calcitRIOL (ROCALTROL) 0.25 MCG Cap Take 0.25 mcg by mouth.      calcium citrate (CALCITRATE) " 200 mg (950 mg) tablet Take 1 tablet by mouth once daily.      cephALEXin (KEFLEX) 500 MG capsule 500 mg.      cinacalcet (SENSIPAR) 30 MG Tab TAKE 1 TABLET BY MOUTH ONCE DAILY with a meal      cyclobenzaprine (FLEXERIL) 10 MG tablet       denosumab (PROLIA) 60 mg/mL Syrg Inject 60 mg into the skin every 6 (six) months.      ergocalciferol (ERGOCALCIFEROL) 50,000 unit Cap Take 1 capsule by mouth twice a week.      FIASP FLEXTOUCH U-100 INSULIN 100 unit/mL (3 mL) InPn See Instructions, 7 units with breakfast, 11 units with lunch, 7 units with dinner., # 10 mL, 5 Refill(s), Maintenance, Pharmacy: [facility] #[id number], 166, cm, 03/22/24 8:23:00 CDT, Height/Length Measured, 90.7, kg, 03/22/24 8:23:00 CDT, Weigh...      flash glucose sensor (FREESTYLE CLAIRE 2 SENSOR) Kit 1 each by Misc.(Non-Drug; Combo Route) route every 14 (fourteen) days. 1 kit 0    fluticasone propionate (FLONASE) 50 mcg/actuation nasal spray 2 sprays (100 mcg total) by Each Nostril route once daily. 15.8 mL 1    furosemide (LASIX) 20 MG tablet Take 20 mg by mouth every other day.      furosemide (LASIX) 40 MG tablet       GVOKE HYPOPEN 2-PACK 1 mg/0.2 mL AtIn Inject 1 Units into the skin Daily.      insulin aspart (NOVOLOG FLEXPEN U-100 INSULIN SUBQ) SUBQ, TID before meals, 0 Refill(s), Maintenance      insulin detemir U-100 (LEVEMIR U-100 INSULIN) 100 unit/mL injection Inject into the skin.      insulin detemir U-100, Levemir, (LEVEMIR FLEXPEN) 100 unit/mL (3 mL) InPn pen   16 unit, SUBQ, Daily, # 10 mL, 3 Refill(s), Maintenance, Pharmacy: [facility] #[id number], 166, cm, 08/28/23 10:30:00 CDT, Height/Length Measured, 91.5, kg, 08/28/23 10:30:00 CDT, Weight Dosing      insulin lispro (HUMALOG KWIKPEN INSULIN) 100 unit/mL pen See Instructions, Inject 7 units with breakfast, 11 units with lunch, 7 units with dinner.  Max daily dose 30 units daily, # 27 mL, 5 Refill(s), Maintenance, Pharmacy: [facility] #[id number], 166, cm, 01/16/24  "15:07:00 CST, Height/Length Measured...      levothyroxine (SYNTHROID) 100 MCG tablet Take 1 tablet (100 mcg total) by mouth once daily. 90 tablet 3    losartan (COZAAR) 25 MG tablet Take 1 tablet (25 mg total) by mouth once daily. 90 tablet 3    metoprolol succinate (TOPROL-XL) 50 MG 24 hr tablet Take 50 mg by mouth nightly.      moxifloxacin (AVELOX) 400 mg tablet Take 1 tablet (400 mg total) by mouth once daily. for 14 days 14 tablet 0    multivitamin (THERAGRAN) per tablet Take 1 tablet by mouth once daily.      mupirocin (BACTROBAN) 2 % ointment Apply topically 3 (three) times daily. 22 g 0    nitroGLYCERIN (NITROSTAT) 0.4 MG SL tablet       NOVOLIN R FLEXPEN 100 unit/mL (3 mL) InPn pen Inject into the skin.      omeprazole (PRILOSEC) 20 MG capsule TAKE 1 CAPSULE BY MOUTH EVERY MORNING 30 MINUTES BEFORE BREAKFAST 90 capsule 3    pantoprazole (PROTONIX) 40 MG tablet Take by mouth.      pen needle, diabetic 32 gauge x 5/32" Ndle BD ZEB pen needles 32 ga, 4mm, See Instructions, use 4 daily for basal and bolus insulin., # 200 EA, 5 Refill(s), Pharmacy: [facility] #[id number], 166, cm, 03/22/24 8:23:00 CDT, Height/Length Measured, 90.7, kg, 03/22/24 8:23:00 CDT, Weight Dosing      predniSONE (DELTASONE) 5 MG tablet Take 5 mg by mouth once daily.      rosuvastatin (CRESTOR) 10 MG tablet TAKE 1 TABLET(10 MG) BY MOUTH EVERY DAY 90 tablet 3    sodium bicarbonate 650 MG tablet 1,950 mg.      TACROLIMUS XR, ENVARSUS, 0.75 MG ORAL TB24 0.75 mg Tb24 Take 1.5 mg by mouth Daily.      pregabalin (LYRICA) 75 MG capsule Take 1 capsule (75 mg total) by mouth every evening. 90 capsule 1     No current facility-administered medications for this visit.     Review of patient's allergies indicates:   Allergen Reactions    Iodine      Other reaction(s): Blisters       Review of Systems   Cardiovascular:  Negative for leg swelling.   Endocrine:        KIDNEY TRANSPLANT 5/4/2020   Musculoskeletal:  Positive for gait problem.        " "Cane   All other systems reviewed and are negative.      Objective:      Vitals:    08/16/24 0804   BP: 112/73   Pulse: 73   Weight: 91.5 kg (201 lb 12.8 oz)   Height: 5' 8" (1.727 m)     Physical Exam  Vitals and nursing note reviewed. Exam conducted with a chaperone present.   Constitutional:       General: He is not in acute distress.     Appearance: Normal appearance. He is well-developed.   Cardiovascular:      Pulses:           Dorsalis pedis pulses are 2+ on the right side and 2+ on the left side.        Posterior tibial pulses are 1+ on the right side and 1+ on the left side.   Pulmonary:      Effort: Pulmonary effort is normal.   Musculoskeletal:         General: Tenderness present.      Right foot: Decreased range of motion. Bunion (mild HAV and hammertoe 2nd right) present.      Left foot: Decreased range of motion. Prominent metatarsal heads present.   Feet:      Right foot:      Toenail Condition: Right toenails are abnormally thick. Fungal disease present.     Left foot:      Skin integrity: Ulcer (Ulcer sub 4th met head improved, dry but area of deeper tunneling noted, clear fluid, decreased pain, no erythema, edema) present.      Toenail Condition: Left toenails are abnormally thick. Fungal disease present.  Skin:     General: Skin is dry.      Capillary Refill: Capillary refill takes 2 to 3 seconds.   Psychiatric:         Thought Content: Thought content normal.               After debridement                       Assessment:       1. Ulcer of left foot, limited to breakdown of skin    2. Acquired deformity of joint of left foot    3. Foot pain, left    4. Type II diabetes mellitus with neurological manifestations            Plan:         Advised patient area has improved significantly  Explained culture was negative, he is to finish antibiotic as directed until gone  Advised patient able to see where a blister had started under his previous callus and had tunneled out to the area which was " initially debrided 4 days ago.  Explained a lot of the blister tissue will need to be cleaned from the surface so the open area will be slightly larger, however it is superficial and since the area can be debrided thoroughly today the wound will dry and heal much quicker  They need to check it every day, cleansed with Dakin's, apply silver and a bandage and continue to stay off his feet over the next week  Avoid getting wet in the shower    Wound care  Ulcer debrided sub 4th met head left foot  Flushed with Dakin's  Silver alginate, gauze and fabric bandage applied  Reviewed home going instructions  Reviewed diabetic education and potential complications especially regarding healing and potential for complications regarding infection  Reviewed signs of infection to monitor for   Contact office immediately with any changes  Patient was in understanding and agreement with treatment plan.  I counseled the patient on their conditions, implications and medical management.  Instructed patient to contact the office with any changes, questions, concerns, worsening of symptoms.   Total face to face time 30 minutes, exam, assessment, treatment, discussion, additional time for review of chart prior to and following appointment and visit documentation, consultation and coordination of care.    Follow up 1 week        This note was created using M*Modal voice recognition software that occasionally misinterpreted phrases or words.

## 2024-08-20 ENCOUNTER — OFFICE VISIT (OUTPATIENT)
Dept: PODIATRY | Facility: CLINIC | Age: 74
End: 2024-08-20
Payer: MEDICARE

## 2024-08-20 VITALS
WEIGHT: 200.63 LBS | DIASTOLIC BLOOD PRESSURE: 56 MMHG | RESPIRATION RATE: 18 BRPM | HEIGHT: 68 IN | HEART RATE: 69 BPM | BODY MASS INDEX: 30.41 KG/M2 | SYSTOLIC BLOOD PRESSURE: 109 MMHG

## 2024-08-20 DIAGNOSIS — M79.672 FOOT PAIN, LEFT: ICD-10-CM

## 2024-08-20 DIAGNOSIS — E11.49 TYPE II DIABETES MELLITUS WITH NEUROLOGICAL MANIFESTATIONS: ICD-10-CM

## 2024-08-20 DIAGNOSIS — M21.962 ACQUIRED DEFORMITY OF JOINT OF LEFT FOOT: ICD-10-CM

## 2024-08-20 DIAGNOSIS — L97.521 ULCER OF LEFT FOOT, LIMITED TO BREAKDOWN OF SKIN: Primary | ICD-10-CM

## 2024-08-20 PROCEDURE — 99215 OFFICE O/P EST HI 40 MIN: CPT | Mod: PBBFAC | Performed by: PODIATRIST

## 2024-08-20 PROCEDURE — 99999 PR PBB SHADOW E&M-EST. PATIENT-LVL V: CPT | Mod: PBBFAC,,, | Performed by: PODIATRIST

## 2024-08-20 PROCEDURE — 99213 OFFICE O/P EST LOW 20 MIN: CPT | Mod: S$PBB,,, | Performed by: PODIATRIST

## 2024-08-20 NOTE — PROGRESS NOTES
"Subjective:       Patient ID: Olu Gant Jr. is a 74 y.o. male.    Chief Complaint: Follow-up, Foot Ulcer, Foot Pain, and Diabetes Mellitus  Patient presents with his wife for follow-up ulcer plantar left foot.  Patient relates area is doing much better.  Wife confirms applying silver alginate with bandage daily, they are not getting it wet.  Patient has been up on his feet a little bit. Reports much less pain       Past Medical History:   Diagnosis Date    Asbestos exposure - 1973 2/18/2014    Benign hypertension with ESRD (end-stage renal disease) 2/18/2014    Diabetes type 2 - since 1996 2/18/2014    DIALYSIS 2013    ESRD (end stage renal disease) - initiated dialysis 05/29/2013 2/18/2014    Gout, arthritis 2/18/2014    Hypothyroidism 2/18/2014    Irregular heart rhythm - unsure of Afib vs. A flutter 2/18/2014    Kidney transplanted 05/04/2020    Obesity 2/18/2014    Secondary hyperparathyroidism, renal 2/18/2014    Sleep apnea on Bipap 2/18/2014     Past Surgical History:   Procedure Laterality Date    AV FISTULA PLACEMENT      CHOLECYSTECTOMY      COLON SURGERY  02/2017    resection for "ischemic colon"    FRACTURE SURGERY      HIP FRACTURE SURGERY Left     INGUINAL HERNIA REPAIR      bilaterally    KIDNEY TRANSPLANT      pace maker      [email or web address]    TONSILLECTOMY           Current Outpatient Medications   Medication Sig Dispense Refill    allopurinoL (ZYLOPRIM) 100 MG tablet TAKE 1 TABLET(100 MG) BY MOUTH EVERY DAY 90 tablet 3    amLODIPine (NORVASC) 5 MG tablet Take 5 mg by mouth.      apixaban (ELIQUIS) 5 mg Tab   TAKE 1 TABLET BY MOUTH TWICE DAILY      aspirin (ECOTRIN) 81 MG EC tablet 81 mg.      calcitRIOL (ROCALTROL) 0.25 MCG Cap Take 0.25 mcg by mouth.      calcium citrate (CALCITRATE) 200 mg (950 mg) tablet Take 1 tablet by mouth once daily.      cinacalcet (SENSIPAR) 30 MG Tab TAKE 1 TABLET BY MOUTH ONCE DAILY with a meal      cyclobenzaprine (FLEXERIL) 10 MG tablet       denosumab (PROLIA) 60 " mg/mL Syrg Inject 60 mg into the skin every 6 (six) months.      ergocalciferol (ERGOCALCIFEROL) 50,000 unit Cap Take 1 capsule by mouth twice a week.      FIASP FLEXTOUCH U-100 INSULIN 100 unit/mL (3 mL) InPn See Instructions, 7 units with breakfast, 11 units with lunch, 7 units with dinner., # 10 mL, 5 Refill(s), Maintenance, Pharmacy: [facility] #[id number], 166, cm, 03/22/24 8:23:00 CDT, Height/Length Measured, 90.7, kg, 03/22/24 8:23:00 CDT, Weigh...      flash glucose sensor (FREESTYLE CLAIRE 2 SENSOR) Kit 1 each by Misc.(Non-Drug; Combo Route) route every 14 (fourteen) days. 1 kit 0    fluticasone propionate (FLONASE) 50 mcg/actuation nasal spray 2 sprays (100 mcg total) by Each Nostril route once daily. 15.8 mL 1    furosemide (LASIX) 20 MG tablet Take 20 mg by mouth every other day.      furosemide (LASIX) 40 MG tablet       insulin aspart (NOVOLOG FLEXPEN U-100 INSULIN SUBQ) SUBQ, TID before meals, 0 Refill(s), Maintenance      insulin lispro (HUMALOG KWIKPEN INSULIN) 100 unit/mL pen See Instructions, Inject 7 units with breakfast, 11 units with lunch, 7 units with dinner.  Max daily dose 30 units daily, # 27 mL, 5 Refill(s), Maintenance, Pharmacy: [facility] #[id number], 166, cm, 01/16/24 15:07:00 CST, Height/Length Measured...      levothyroxine (SYNTHROID) 100 MCG tablet Take 1 tablet (100 mcg total) by mouth once daily. 90 tablet 3    losartan (COZAAR) 25 MG tablet Take 1 tablet (25 mg total) by mouth once daily. 90 tablet 3    metoprolol succinate (TOPROL-XL) 50 MG 24 hr tablet Take 50 mg by mouth nightly.      moxifloxacin (AVELOX) 400 mg tablet Take 1 tablet (400 mg total) by mouth once daily. for 14 days 14 tablet 0    multivitamin (THERAGRAN) per tablet Take 1 tablet by mouth once daily.      mupirocin (BACTROBAN) 2 % ointment Apply topically 3 (three) times daily. 22 g 0    NOVOLIN R FLEXPEN 100 unit/mL (3 mL) InPn pen Inject into the skin.      omeprazole (PRILOSEC) 20 MG capsule TAKE 1  "CAPSULE BY MOUTH EVERY MORNING 30 MINUTES BEFORE BREAKFAST 90 capsule 3    pantoprazole (PROTONIX) 40 MG tablet Take by mouth.      pen needle, diabetic 32 gauge x 5/32" Ndle BD ZEB pen needles 32 ga, 4mm, See Instructions, use 4 daily for basal and bolus insulin., # 200 EA, 5 Refill(s), Pharmacy: [facility] #[id number], 166, cm, 03/22/24 8:23:00 CDT, Height/Length Measured, 90.7, kg, 03/22/24 8:23:00 CDT, Weight Dosing      predniSONE (DELTASONE) 5 MG tablet Take 5 mg by mouth once daily.      rosuvastatin (CRESTOR) 10 MG tablet TAKE 1 TABLET(10 MG) BY MOUTH EVERY DAY 90 tablet 3    sodium bicarbonate 650 MG tablet 1,950 mg.      TACROLIMUS XR, ENVARSUS, 0.75 MG ORAL TB24 0.75 mg Tb24 Take 1.5 mg by mouth Daily.      cephALEXin (KEFLEX) 500 MG capsule 500 mg. (Patient not taking: Reported on 8/20/2024)      GVOKE HYPOPEN 2-PACK 1 mg/0.2 mL AtIn Inject 1 Units into the skin Daily.      insulin detemir U-100 (LEVEMIR U-100 INSULIN) 100 unit/mL injection Inject into the skin.      insulin detemir U-100, Levemir, (LEVEMIR FLEXPEN) 100 unit/mL (3 mL) InPn pen   16 unit, SUBQ, Daily, # 10 mL, 3 Refill(s), Maintenance, Pharmacy: [facility] #[id number], 166, cm, 08/28/23 10:30:00 CDT, Height/Length Measured, 91.5, kg, 08/28/23 10:30:00 CDT, Weight Dosing      nitroGLYCERIN (NITROSTAT) 0.4 MG SL tablet  (Patient not taking: Reported on 8/20/2024)      pregabalin (LYRICA) 75 MG capsule Take 1 capsule (75 mg total) by mouth every evening. 90 capsule 1     No current facility-administered medications for this visit.     Review of patient's allergies indicates:   Allergen Reactions    Iodine      Other reaction(s): Blisters       Review of Systems   Endocrine:        KIDNEY TRANSPLANT 5/4/2020   Musculoskeletal:  Positive for gait problem.        Cane   All other systems reviewed and are negative.      Objective:      Vitals:    08/20/24 0805   BP: (!) 109/56   Pulse: 69   Resp: 18   Weight: 91 kg (200 lb 9.9 oz) " "  Height: 5' 8" (1.727 m)     Physical Exam  Vitals and nursing note reviewed. Exam conducted with a chaperone present.   Constitutional:       General: He is not in acute distress.     Appearance: Normal appearance. He is well-developed.   Cardiovascular:      Pulses:           Dorsalis pedis pulses are 2+ on the right side and 2+ on the left side.        Posterior tibial pulses are 1+ on the right side and 1+ on the left side.   Pulmonary:      Effort: Pulmonary effort is normal.   Musculoskeletal:         General: No tenderness.      Right foot: Decreased range of motion. Bunion (mild HAV and hammertoe 2nd right) present.      Left foot: Decreased range of motion. Prominent metatarsal heads present.   Feet:      Right foot:      Toenail Condition: Right toenails are abnormally thick. Fungal disease present.     Left foot:      Skin integrity: Ulcer (much improved smaller dry abrasion type ulcer sub 4th met head without pain) present.      Toenail Condition: Left toenails are abnormally thick. Fungal disease present.  Skin:     General: Skin is dry.      Capillary Refill: Capillary refill takes 2 to 3 seconds.   Psychiatric:         Thought Content: Thought content normal.                                                                                                                                                                                                                                                                                                                                                                                                                                                                                                                                                                                                                                                                                                                                                                                     "                                                                                                                                                                                                                                                                                                                                                                                                                                                                                                                                                                                                                                                                                                                                                                                                                                                                                                                                                                                                                                                                                                                                                                                                                                                                                                                                                                                                                                                                                                                                                                                                                                                                                                                                                                                                                                                                                                                                                                                                                 Assessment:        1. Ulcer of left foot, limited to breakdown of skin    2. Acquired deformity of joint of left foot    3. Foot pain, left    4. Type II diabetes mellitus with neurological manifestations              Plan:         Advised a lot of dried blood which will just continue to come out as we trimmed the surrounding callus over the next 2 weeks  Explained area has again improved significantly, very superficial, dry  They are to continue silver alginate with cushioned bandage during the day while on his feet  In the evening and overnight they can leave it uncovered, air out  It must be again covered/bandage before he steps out of bed in the morning  Continue to keep it dry for best results and avoid getting wet in the showers    Wound care  Ulcer debrided sub 4th met head left foot, no pain upon debridement  Cleaned with with Dakin's  Silver alginate, gauze and fabric bandage applied  Reviewed home going instructions  Reviewed diabetic education and potential complications   Reviewed care and maintenance of dry skin, fungal nails, nails debrided, thickness reduced  Continue with reduced activity over the next week and if pain-free he can start doing more walking on it   Keep clean and dry  Reviewed signs of infection to monitor for   Contact office immediately with any changes  Patient was in understanding and agreement with treatment plan.  I counseled the patient on their conditions, implications and medical management.  Instructed patient to contact the office with any changes, questions, concerns, worsening of symptoms.   Total face to face time 20 minutes, exam, assessment, treatment, discussion, additional time for review of chart prior to and following appointment and visit documentation, consultation and coordination of care.    Follow up 2 weeks        This note was created using M*Modal voice recognition software that occasionally misinterpreted phrases or  words.

## 2024-09-04 ENCOUNTER — OFFICE VISIT (OUTPATIENT)
Dept: PODIATRY | Facility: CLINIC | Age: 74
End: 2024-09-04
Payer: MEDICARE

## 2024-09-04 VITALS
DIASTOLIC BLOOD PRESSURE: 60 MMHG | HEIGHT: 68 IN | BODY MASS INDEX: 30.46 KG/M2 | WEIGHT: 201 LBS | RESPIRATION RATE: 18 BRPM | HEART RATE: 69 BPM | SYSTOLIC BLOOD PRESSURE: 116 MMHG

## 2024-09-04 DIAGNOSIS — M21.962 ACQUIRED DEFORMITY OF JOINT OF LEFT FOOT: ICD-10-CM

## 2024-09-04 DIAGNOSIS — M79.672 FOOT PAIN, LEFT: ICD-10-CM

## 2024-09-04 DIAGNOSIS — L97.521 ULCER OF LEFT FOOT, LIMITED TO BREAKDOWN OF SKIN: Primary | ICD-10-CM

## 2024-09-04 DIAGNOSIS — E11.49 TYPE II DIABETES MELLITUS WITH NEUROLOGICAL MANIFESTATIONS: ICD-10-CM

## 2024-09-04 PROCEDURE — 99999 PR PBB SHADOW E&M-EST. PATIENT-LVL V: CPT | Mod: PBBFAC,,, | Performed by: PODIATRIST

## 2024-09-04 PROCEDURE — 99213 OFFICE O/P EST LOW 20 MIN: CPT | Mod: S$PBB,,, | Performed by: PODIATRIST

## 2024-09-04 PROCEDURE — 99215 OFFICE O/P EST HI 40 MIN: CPT | Mod: PBBFAC | Performed by: PODIATRIST

## 2024-09-04 RX ORDER — INSULIN ASPART 100 [IU]/ML
INJECTION, SOLUTION INTRAVENOUS; SUBCUTANEOUS
COMMUNITY
Start: 2024-08-28

## 2024-09-04 NOTE — PROGRESS NOTES
"Subjective:       Patient ID: Olu Gant Jr. is a 74 y.o. male.    Chief Complaint: Follow-up, Foot Ulcer, Diabetes Mellitus, and Foot Pain  Patient presents with his wife for follow-up ulcer plantar left foot.  Patient relates increased pain although his wife has been checking the area every dry and she relates it looks healed with no drainage on the bandage.  Confirms keeping it dry in the shower  Pain level 5/10      Past Medical History:   Diagnosis Date    Asbestos exposure - 1973 2/18/2014    Benign hypertension with ESRD (end-stage renal disease) 2/18/2014    Diabetes type 2 - since 1996 2/18/2014    DIALYSIS 2013    ESRD (end stage renal disease) - initiated dialysis 05/29/2013 2/18/2014    Gout, arthritis 2/18/2014    Hypothyroidism 2/18/2014    Irregular heart rhythm - unsure of Afib vs. A flutter 2/18/2014    Kidney transplanted 05/04/2020    Obesity 2/18/2014    Secondary hyperparathyroidism, renal 2/18/2014    Sleep apnea on Bipap 2/18/2014     Past Surgical History:   Procedure Laterality Date    AV FISTULA PLACEMENT      CHOLECYSTECTOMY      COLON SURGERY  02/2017    resection for "ischemic colon"    FRACTURE SURGERY      HIP FRACTURE SURGERY Left     INGUINAL HERNIA REPAIR      bilaterally    KIDNEY TRANSPLANT      pace maker      [email or web address]    TONSILLECTOMY           Current Outpatient Medications   Medication Sig Dispense Refill    allopurinoL (ZYLOPRIM) 100 MG tablet TAKE 1 TABLET(100 MG) BY MOUTH EVERY DAY 90 tablet 3    amLODIPine (NORVASC) 5 MG tablet Take 5 mg by mouth.      apixaban (ELIQUIS) 5 mg Tab   TAKE 1 TABLET BY MOUTH TWICE DAILY      aspirin (ECOTRIN) 81 MG EC tablet 81 mg.      calcitRIOL (ROCALTROL) 0.25 MCG Cap Take 0.25 mcg by mouth.      calcium citrate (CALCITRATE) 200 mg (950 mg) tablet Take 1 tablet by mouth once daily.      cinacalcet (SENSIPAR) 30 MG Tab TAKE 1 TABLET BY MOUTH ONCE DAILY with a meal      cyclobenzaprine (FLEXERIL) 10 MG tablet       denosumab " "(PROLIA) 60 mg/mL Syrg Inject 60 mg into the skin every 6 (six) months.      ergocalciferol (ERGOCALCIFEROL) 50,000 unit Cap Take 1 capsule by mouth twice a week.      flash glucose sensor (FREESTYLE CLAIRE 2 SENSOR) Kit 1 each by Misc.(Non-Drug; Combo Route) route every 14 (fourteen) days. 1 kit 0    fluticasone propionate (FLONASE) 50 mcg/actuation nasal spray 2 sprays (100 mcg total) by Each Nostril route once daily. 15.8 mL 1    furosemide (LASIX) 20 MG tablet Take 20 mg by mouth every other day.      furosemide (LASIX) 40 MG tablet       GVOKE HYPOPEN 2-PACK 1 mg/0.2 mL AtIn Inject 1 Units into the skin Daily.      insulin aspart U-100 (NOVOLOG) 100 unit/mL (3 mL) InPn pen Inject into the skin.      insulin detemir U-100, Levemir, (LEVEMIR FLEXPEN) 100 unit/mL (3 mL) InPn pen   16 unit, SUBQ, Daily, # 10 mL, 3 Refill(s), Maintenance, Pharmacy: [facility] #[id number], 166, cm, 08/28/23 10:30:00 CDT, Height/Length Measured, 91.5, kg, 08/28/23 10:30:00 CDT, Weight Dosing      levothyroxine (SYNTHROID) 100 MCG tablet Take 1 tablet (100 mcg total) by mouth once daily. 90 tablet 3    losartan (COZAAR) 25 MG tablet Take 1 tablet (25 mg total) by mouth once daily. 90 tablet 3    metoprolol succinate (TOPROL-XL) 50 MG 24 hr tablet Take 50 mg by mouth nightly.      multivitamin (THERAGRAN) per tablet Take 1 tablet by mouth once daily.      mupirocin (BACTROBAN) 2 % ointment Apply topically 3 (three) times daily. 22 g 0    omeprazole (PRILOSEC) 20 MG capsule TAKE 1 CAPSULE BY MOUTH EVERY MORNING 30 MINUTES BEFORE BREAKFAST 90 capsule 3    pantoprazole (PROTONIX) 40 MG tablet Take by mouth.      pen needle, diabetic 32 gauge x 5/32" Ndle BD ZEB pen needles 32 ga, 4mm, See Instructions, use 4 daily for basal and bolus insulin., # 200 EA, 5 Refill(s), Pharmacy: [facility] #[id number], 166, cm, 03/22/24 8:23:00 CDT, Height/Length Measured, 90.7, kg, 03/22/24 8:23:00 CDT, Weight Dosing      predniSONE (DELTASONE) 5 " "MG tablet Take 5 mg by mouth once daily.      rosuvastatin (CRESTOR) 10 MG tablet TAKE 1 TABLET(10 MG) BY MOUTH EVERY DAY 90 tablet 3    sodium bicarbonate 650 MG tablet 1,950 mg.      TACROLIMUS XR, ENVARSUS, 0.75 MG ORAL TB24 0.75 mg Tb24 Take 1.5 mg by mouth Daily.      nitroGLYCERIN (NITROSTAT) 0.4 MG SL tablet  (Patient not taking: Reported on 9/4/2024)      pregabalin (LYRICA) 75 MG capsule Take 1 capsule (75 mg total) by mouth every evening. 90 capsule 1     No current facility-administered medications for this visit.     Review of patient's allergies indicates:   Allergen Reactions    Iodine      Other reaction(s): Blisters       Review of Systems   Endocrine:        KIDNEY TRANSPLANT 5/4/2020   Musculoskeletal:  Positive for gait problem.        Cane   All other systems reviewed and are negative.      Objective:      Vitals:    09/04/24 0809   BP: 116/60   Pulse: 69   Resp: 18   Weight: 91.2 kg (201 lb)   Height: 5' 8" (1.727 m)     Physical Exam  Vitals and nursing note reviewed. Exam conducted with a chaperone present.   Constitutional:       General: He is not in acute distress.     Appearance: He is well-developed.   Cardiovascular:      Pulses:           Dorsalis pedis pulses are 2+ on the right side and 2+ on the left side.        Posterior tibial pulses are 1+ on the right side and 1+ on the left side.   Pulmonary:      Effort: Pulmonary effort is normal.   Musculoskeletal:         General: Tenderness present.      Right foot: Decreased range of motion. Bunion (mild HAV and hammertoe 2nd right) present.      Left foot: Decreased range of motion. Prominent metatarsal heads present.   Feet:      Right foot:      Toenail Condition: Right toenails are abnormally thick. Fungal disease present.     Left foot:      Skin integrity: Ulcer (ulcer sub 4th met head is healed, dry, firm scab which is mildly tender.  Much improved) present.      Toenail Condition: Left toenails are abnormally thick. Fungal disease " present.  Skin:     General: Skin is dry.      Capillary Refill: Capillary refill takes 2 to 3 seconds.                                                                                                                                                                                                                                                                                                                                                                                                                                                                                                                                                                                                                                                                                                                                                                                                                                                                                                                                                                                                                                                                                                                                                                                                                                                                                                                                                                                                                                                                                                                                                                                                                                                                                                                                                                                                                                                                                                                                                                                                                                                                                                                                                                                                                                                                                                                                                                                                                                                                                                                                                                                                                                                                                                                                                                                                 Assessment:       1. Ulcer of left foot, limited to breakdown of skin    2. Acquired deformity of joint of left foot    3. Foot pain, left    4. Type II diabetes mellitus with neurological manifestations          Plan:         Reassured patient ulcer is healed, dry, there is some dark discoloration due to previous bleeding which has formed a scab and he is feeling this hard skin on the bottom of the foot  We want to continue to keep this area cushioned with weight-bearing  Continue to decrease his activity so this can continue to heal    Wound care  Ulcer debrided sub 4th met head left foot, some of the discoloration was removed through debridement, no skin opening   Cleaned with with Dak[employer]  U shaped felt pad applied with fabric bandage  Samples dispensed and we discussed using either this offloading pad or Dr. Cagle's callus cushion, apply daily to relieve pressure while weight-bearing, remove each evening  Can get wet for a quick shower, no soaking, no ointment, keep the area dry and it is recommended they continue to clean it once a day with Dakin's  Check daily for any  changes  Reviewed home going instructions  Reviewed diabetic education and potential complications regarding this area as it has been an open ulcer twice now  Reviewed appropriate shoes indoors and out  Contact office immediately with any changes  Patient was in understanding and agreement with treatment plan.  I counseled the patient on their conditions, implications and medical management.  Instructed patient to contact the office with any changes, questions, concerns, worsening of symptoms.   Total face to face time 20 minutes, exam, assessment, treatment, discussion, additional time for review of chart prior to and following appointment and visit documentation, consultation and coordination of care.    Follow up 2 weeks        This note was created using [employer] voice recognition software that occasionally misinterpreted phrases or words.

## 2024-09-16 ENCOUNTER — OFFICE VISIT (OUTPATIENT)
Dept: PODIATRY | Facility: CLINIC | Age: 74
End: 2024-09-16
Payer: MEDICARE

## 2024-09-16 VITALS
SYSTOLIC BLOOD PRESSURE: 122 MMHG | HEART RATE: 69 BPM | BODY MASS INDEX: 30.56 KG/M2 | HEIGHT: 68 IN | DIASTOLIC BLOOD PRESSURE: 59 MMHG

## 2024-09-16 DIAGNOSIS — L97.521 ULCER OF LEFT FOOT, LIMITED TO BREAKDOWN OF SKIN: Primary | ICD-10-CM

## 2024-09-16 DIAGNOSIS — E11.49 TYPE II DIABETES MELLITUS WITH NEUROLOGICAL MANIFESTATIONS: ICD-10-CM

## 2024-09-16 DIAGNOSIS — M21.962 ACQUIRED DEFORMITY OF JOINT OF LEFT FOOT: ICD-10-CM

## 2024-09-16 DIAGNOSIS — L84 PRE-ULCERATIVE CALLUSES: ICD-10-CM

## 2024-09-16 PROCEDURE — 99213 OFFICE O/P EST LOW 20 MIN: CPT | Mod: S$PBB,,, | Performed by: PODIATRIST

## 2024-09-16 PROCEDURE — 99214 OFFICE O/P EST MOD 30 MIN: CPT | Mod: PBBFAC | Performed by: PODIATRIST

## 2024-09-16 PROCEDURE — 99999 PR PBB SHADOW E&M-EST. PATIENT-LVL IV: CPT | Mod: PBBFAC,,, | Performed by: PODIATRIST

## 2024-09-16 NOTE — PROGRESS NOTES
"Subjective:       Patient ID: Olu Gant Jr. is a 74 y.o. male.    Chief Complaint: Follow-up, Foot Ulcer, Foot Pain, and Diabetes Mellitus  Patient presents with his wife for follow-up ulcer sub 4th met head left foot.  Patient relates decreased pain, wearing [employer]. Has been trying to stay off his foot. No pain      Past Medical History:   Diagnosis Date    Asbestos exposure - 1973 2/18/2014    Benign hypertension with ESRD (end-stage renal disease) 2/18/2014    Diabetes type 2 - since 1996 2/18/2014    DIALYSIS 2013    ESRD (end stage renal disease) - initiated dialysis 05/29/2013 2/18/2014    Gout, arthritis 2/18/2014    Hypothyroidism 2/18/2014    Irregular heart rhythm - unsure of Afib vs. A flutter 2/18/2014    Kidney transplanted 05/04/2020    Obesity 2/18/2014    Secondary hyperparathyroidism, renal 2/18/2014    Sleep apnea on Bipap 2/18/2014     Past Surgical History:   Procedure Laterality Date    AV FISTULA PLACEMENT      CHOLECYSTECTOMY      COLON SURGERY  02/2017    resection for "ischemic colon"    FRACTURE SURGERY      HIP FRACTURE SURGERY Left     INGUINAL HERNIA REPAIR      bilaterally    KIDNEY TRANSPLANT      pace maker      [email or web address]    TONSILLECTOMY           Current Outpatient Medications   Medication Sig Dispense Refill    allopurinoL (ZYLOPRIM) 100 MG tablet TAKE 1 TABLET(100 MG) BY MOUTH EVERY DAY 90 tablet 3    amLODIPine (NORVASC) 5 MG tablet Take 5 mg by mouth.      apixaban (ELIQUIS) 5 mg Tab   TAKE 1 TABLET BY MOUTH TWICE DAILY      aspirin (ECOTRIN) 81 MG EC tablet 81 mg.      calcitRIOL (ROCALTROL) 0.25 MCG Cap Take 0.25 mcg by mouth.      calcium citrate (CALCITRATE) 200 mg (950 mg) tablet Take 1 tablet by mouth once daily.      cinacalcet (SENSIPAR) 30 MG Tab TAKE 1 TABLET BY MOUTH ONCE DAILY with a meal      cyclobenzaprine (FLEXERIL) 10 MG tablet       denosumab (PROLIA) 60 mg/mL Syrg Inject 60 mg into the skin every 6 (six) months.      ergocalciferol (ERGOCALCIFEROL) " "50,000 unit Cap Take 1 capsule by mouth twice a week.      flash glucose sensor (FREESTYLE CLAIRE 2 SENSOR) Kit 1 each by Misc.(Non-Drug; Combo Route) route every 14 (fourteen) days. 1 kit 0    fluticasone propionate (FLONASE) 50 mcg/actuation nasal spray 2 sprays (100 mcg total) by Each Nostril route once daily. 15.8 mL 1    furosemide (LASIX) 20 MG tablet Take 20 mg by mouth every other day.      furosemide (LASIX) 40 MG tablet       GVOKE HYPOPEN 2-PACK 1 mg/0.2 mL AtIn Inject 1 Units into the skin Daily.      insulin aspart U-100 (NOVOLOG) 100 unit/mL (3 mL) InPn pen Inject into the skin.      insulin detemir U-100, Levemir, (LEVEMIR FLEXPEN) 100 unit/mL (3 mL) InPn pen   16 unit, SUBQ, Daily, # 10 mL, 3 Refill(s), Maintenance, Pharmacy: [facility] #[id number], 166, cm, 08/28/23 10:30:00 CDT, Height/Length Measured, 91.5, kg, 08/28/23 10:30:00 CDT, Weight Dosing      levothyroxine (SYNTHROID) 100 MCG tablet Take 1 tablet (100 mcg total) by mouth once daily. 90 tablet 3    losartan (COZAAR) 25 MG tablet Take 1 tablet (25 mg total) by mouth once daily. 90 tablet 3    metoprolol succinate (TOPROL-XL) 50 MG 24 hr tablet Take 50 mg by mouth nightly.      multivitamin (THERAGRAN) per tablet Take 1 tablet by mouth once daily.      mupirocin (BACTROBAN) 2 % ointment Apply topically 3 (three) times daily. 22 g 0    omeprazole (PRILOSEC) 20 MG capsule TAKE 1 CAPSULE BY MOUTH EVERY MORNING 30 MINUTES BEFORE BREAKFAST 90 capsule 3    pantoprazole (PROTONIX) 40 MG tablet Take by mouth.      pen needle, diabetic 32 gauge x 5/32" Ndle BD ZEB pen needles 32 ga, 4mm, See Instructions, use 4 daily for basal and bolus insulin., # 200 EA, 5 Refill(s), Pharmacy: [facility] #[id number], 166, cm, 03/22/24 8:23:00 CDT, Height/Length Measured, 90.7, kg, 03/22/24 8:23:00 CDT, Weight Dosing      predniSONE (DELTASONE) 5 MG tablet Take 5 mg by mouth once daily.      rosuvastatin (CRESTOR) 10 MG tablet TAKE 1 TABLET(10 MG) BY " "MOUTH EVERY DAY 90 tablet 3    sodium bicarbonate 650 MG tablet 1,950 mg.      TACROLIMUS XR, ENVARSUS, 0.75 MG ORAL TB24 0.75 mg Tb24 Take 1.5 mg by mouth Daily.      nitroGLYCERIN (NITROSTAT) 0.4 MG SL tablet  (Patient not taking: Reported on 9/4/2024)      pregabalin (LYRICA) 75 MG capsule Take 1 capsule (75 mg total) by mouth every evening. 90 capsule 1     No current facility-administered medications for this visit.     Review of patient's allergies indicates:   Allergen Reactions    Iodine      Other reaction(s): Blisters       Review of Systems   Endocrine:        KIDNEY TRANSPLANT 5/4/2020   Musculoskeletal:  Positive for gait problem.        Cane   All other systems reviewed and are negative.      Objective:      Vitals:    09/16/24 0950   BP: (!) 122/59   Pulse: 69   Height: 5' 8" (1.727 m)     Physical Exam  Vitals and nursing note reviewed. Exam conducted with a chaperone present.   Constitutional:       General: He is not in acute distress.     Appearance: He is well-developed.   Cardiovascular:      Pulses:           Dorsalis pedis pulses are 1+ on the right side and 1+ on the left side.        Posterior tibial pulses are 1+ on the right side and 1+ on the left side.   Pulmonary:      Effort: Pulmonary effort is normal.   Musculoskeletal:         General: No tenderness.      Right foot: Decreased range of motion. Bunion (mild HAV and hammertoe 2nd right) present.      Left foot: Decreased range of motion. Prominent metatarsal heads present.   Feet:      Right foot:      Skin integrity: No callus.      Left foot:      Skin integrity: Ulcer (Pre ulcerative callus with improved bruising sub 4th met head) and callus present.   Skin:     General: Skin is dry.      Capillary Refill: Capillary refill takes 2 to 3 seconds.   Psychiatric:         Mood and Affect: Mood normal.         Thought Content: Thought content normal.                                                                                          "                                                                                                                                                                                                                                                                                                                                                                                                                                                                                                                                                                                                                                                                       Assessment:       1. Ulcer of left foot, limited to breakdown of skin    2. Pre-ulcerative calluses - Left Foot    3. Acquired deformity of joint of left foot    4. Type II diabetes mellitus with neurological manifestations            Plan:         Advised patient this area is healing very well, slow but healing from the inside out.  Bruising is slowly healing, resolving  We reviewed appropriate shoes indoors and out at all times    Wound care  Pre ulcerative callus with discoloration debrided sub 4th met head left foot, again some of the discoloration was removed through debridement, no skin opening   Cleaned with alcohol  Reviewed diabetic education and potential complications regarding this area  Check daily for any changes  Reviewed home going instructions  Contact office immediately with any changes  Patient was in understanding and agreement with treatment plan.  I counseled the patient on their conditions, implications and medical management.  Instructed patient to contact the office with any changes, questions, concerns, worsening of symptoms.   Total face to face time 20 minutes, exam, assessment, treatment, discussion, additional time for review of chart prior to and following appointment and visit documentation, consultation and coordination of care.    Follow up 2  weeks        This note was created using [employer] voice recognition software that occasionally misinterpreted phrases or words.

## 2024-12-24 ENCOUNTER — OFFICE VISIT (OUTPATIENT)
Dept: PODIATRY | Facility: CLINIC | Age: 74
End: 2024-12-24
Payer: MEDICARE

## 2024-12-24 VITALS
WEIGHT: 183.38 LBS | HEART RATE: 69 BPM | BODY MASS INDEX: 27.79 KG/M2 | DIASTOLIC BLOOD PRESSURE: 68 MMHG | SYSTOLIC BLOOD PRESSURE: 105 MMHG | HEIGHT: 68 IN | RESPIRATION RATE: 18 BRPM

## 2024-12-24 DIAGNOSIS — L97.521 ULCER OF LEFT FOOT, LIMITED TO BREAKDOWN OF SKIN: ICD-10-CM

## 2024-12-24 DIAGNOSIS — E11.49 TYPE II DIABETES MELLITUS WITH NEUROLOGICAL MANIFESTATIONS: ICD-10-CM

## 2024-12-24 DIAGNOSIS — L85.3 DRY SKIN: ICD-10-CM

## 2024-12-24 DIAGNOSIS — L84 FOOT CALLUS: ICD-10-CM

## 2024-12-24 DIAGNOSIS — L97.421 SKIN ULCER OF LEFT HEEL, LIMITED TO BREAKDOWN OF SKIN: Primary | ICD-10-CM

## 2024-12-24 DIAGNOSIS — B35.1 ONYCHOMYCOSIS OF TOENAIL: ICD-10-CM

## 2024-12-24 PROCEDURE — 99215 OFFICE O/P EST HI 40 MIN: CPT | Mod: PBBFAC | Performed by: PODIATRIST

## 2024-12-24 PROCEDURE — 99999 PR PBB SHADOW E&M-EST. PATIENT-LVL V: CPT | Mod: PBBFAC,,, | Performed by: PODIATRIST

## 2024-12-24 PROCEDURE — 99214 OFFICE O/P EST MOD 30 MIN: CPT | Mod: S$PBB,,, | Performed by: PODIATRIST

## 2024-12-24 RX ORDER — BENZONATATE 100 MG/1
200 CAPSULE ORAL
COMMUNITY
Start: 2024-10-31

## 2024-12-24 RX ORDER — METOPROLOL SUCCINATE 50 MG/1
1 TABLET, EXTENDED RELEASE ORAL NIGHTLY
COMMUNITY
Start: 2024-12-02

## 2024-12-24 RX ORDER — PANTOPRAZOLE SODIUM 40 MG/1
40 TABLET, DELAYED RELEASE ORAL
COMMUNITY
Start: 2024-12-06

## 2024-12-24 RX ORDER — DOXYCYCLINE 100 MG/1
100 CAPSULE ORAL 2 TIMES DAILY
COMMUNITY
Start: 2024-11-15

## 2024-12-24 RX ORDER — ATORVASTATIN CALCIUM 40 MG/1
1 TABLET, FILM COATED ORAL NIGHTLY
COMMUNITY
Start: 2024-10-14 | End: 2025-10-14

## 2024-12-24 RX ORDER — LEVOFLOXACIN 500 MG/1
500 TABLET, FILM COATED ORAL
COMMUNITY
Start: 2024-11-15

## 2024-12-24 RX ORDER — ACETAMINOPHEN 325 MG/1
650 TABLET ORAL
COMMUNITY
Start: 2024-12-06

## 2024-12-24 RX ORDER — CINACALCET 30 MG/1
30 TABLET, FILM COATED ORAL
COMMUNITY
Start: 2024-11-24

## 2024-12-24 RX ORDER — TAMSULOSIN HYDROCHLORIDE 0.4 MG/1
1 CAPSULE ORAL DAILY
COMMUNITY
Start: 2024-10-15 | End: 2025-01-13

## 2024-12-24 RX ORDER — AMLODIPINE BESYLATE 5 MG/1
2 TABLET ORAL DAILY
COMMUNITY
Start: 2024-10-14 | End: 2025-01-12

## 2024-12-24 RX ORDER — PETROLATUM,WHITE 41 %
OINTMENT (GRAM) TOPICAL
COMMUNITY
Start: 2024-12-06

## 2024-12-24 RX ORDER — HYDROCODONE BITARTRATE AND ACETAMINOPHEN 5; 325 MG/1; MG/1
TABLET ORAL
COMMUNITY
Start: 2024-12-06

## 2024-12-24 RX ORDER — CALCITRIOL 0.25 UG/1
0.25 CAPSULE ORAL
COMMUNITY
Start: 2024-11-24

## 2024-12-24 RX ORDER — SODIUM BICARBONATE 650 MG/1
1300 TABLET ORAL
COMMUNITY
Start: 2024-12-06

## 2024-12-24 RX ORDER — PREGABALIN 75 MG/1
75 CAPSULE ORAL
COMMUNITY
Start: 2024-08-28

## 2024-12-25 NOTE — PROGRESS NOTES
"Subjective:       Patient ID: Olu Gant Jr. is a 74 y.o. male.    Chief Complaint: Follow-up, Nail Problem, Callouses, Diabetes Mellitus, and Wound Check  Patient with diabetes presents with his wife for follow-up ulcer sub 4th met head left foot and a new area on the left heel  They relate he has been in an out of the hospital since October 1st, started with strep, progressed to pneumonia and CHF  Patient relates neither 1 of the areas on the left foot cause him pain but he really has not been walking.  He is just now getting set up for physical therapy at home.  Wife has been checking the heel every day and applying antibiotic ointment as a moisturizer, keeping it clean and uncovered, no bandage, keeping it dry        Past Medical History:   Diagnosis Date    Asbestos exposure - 1973 2/18/2014    Benign hypertension with ESRD (end-stage renal disease) 2/18/2014    Diabetes type 2 - since 1996 2/18/2014    DIALYSIS 2013    ESRD (end stage renal disease) - initiated dialysis 05/29/2013 2/18/2014    Gout, arthritis 2/18/2014    Hypothyroidism 2/18/2014    Irregular heart rhythm - unsure of Afib vs. A flutter 2/18/2014    Kidney transplanted 05/04/2020    Obesity 2/18/2014    Secondary hyperparathyroidism, renal 2/18/2014    Sleep apnea on Bipap 2/18/2014     Past Surgical History:   Procedure Laterality Date    AV FISTULA PLACEMENT      CHOLECYSTECTOMY      COLON SURGERY  02/2017    resection for "ischemic colon"    FRACTURE SURGERY      HIP FRACTURE SURGERY Left     INGUINAL HERNIA REPAIR      bilaterally    KIDNEY TRANSPLANT      pace maker      [email or web address]    TONSILLECTOMY           Current Outpatient Medications   Medication Sig Dispense Refill    acetaminophen (TYLENOL) 325 MG tablet 650 mg.      allopurinoL (ZYLOPRIM) 100 MG tablet TAKE 1 TABLET(100 MG) BY MOUTH EVERY DAY 90 tablet 3    amLODIPine (NORVASC) 5 MG tablet Take 5 mg by mouth.      amLODIPine (NORVASC) 5 MG tablet Take 2 tablets by mouth once " daily.      apixaban (ELIQUIS) 5 mg Tab   TAKE 1 TABLET BY MOUTH TWICE DAILY      aspirin (ECOTRIN) 81 MG EC tablet 81 mg.      atorvastatin (LIPITOR) 40 MG tablet Take 1 tablet by mouth every evening.      benzonatate (TESSALON) 100 MG capsule Take 200 mg by mouth.      calcitRIOL (ROCALTROL) 0.25 MCG Cap 0.25 mcg.      calcium citrate (CALCITRATE) 200 mg (950 mg) tablet Take 1 tablet by mouth once daily.      cinacalcet (SENSIPAR) 30 MG Tab TAKE 1 TABLET BY MOUTH ONCE DAILY with a meal      cinacalcet (SENSIPAR) 30 MG Tab 30 mg.      cyclobenzaprine (FLEXERIL) 10 MG tablet       denosumab (PROLIA) 60 mg/mL Syrg Inject 60 mg into the skin every 6 (six) months.      flash glucose sensor (FREESTYLE CLAIRE 2 SENSOR) Kit 1 each by Misc.(Non-Drug; Combo Route) route every 14 (fourteen) days. 1 kit 0    fluticasone propionate (FLONASE) 50 mcg/actuation nasal spray 2 sprays (100 mcg total) by Each Nostril route once daily. 15.8 mL 1    GVOKE HYPOPEN 2-PACK 1 mg/0.2 mL AtIn Inject 1 Units into the skin Daily.      HYDROcodone-acetaminophen (NORCO) 5-325 mg per tablet 1 tab, Oral, BID, PRN pain, moderate to severe (4-10), 0 Refill(s)      insulin aspart U-100 (NOVOLOG) 100 unit/mL (3 mL) InPn pen Inject into the skin.      insulin detemir U-100, Levemir, (LEVEMIR FLEXPEN) 100 unit/mL (3 mL) InPn pen   16 unit, SUBQ, Daily, # 10 mL, 3 Refill(s), Maintenance, Pharmacy: [facility] #[id number], 166, cm, 08/28/23 10:30:00 CDT, Height/Length Measured, 91.5, kg, 08/28/23 10:30:00 CDT, Weight Dosing      INSULIN LISPRO SUBQ 7 unit, SUBQ, TID before meals, 0 Refill(s), Maintenance      LACTULOSE ORAL 30 g.      levothyroxine (SYNTHROID) 100 MCG tablet Take 1 tablet (100 mcg total) by mouth once daily. 90 tablet 3    metoprolol succinate (TOPROL-XL) 50 MG 24 hr tablet Take 1 tablet by mouth every evening.      multivitamin (THERAGRAN) per tablet Take 1 tablet by mouth once daily.      mupirocin (BACTROBAN) 2 % ointment Apply  "topically 3 (three) times daily. 22 g 0    omeprazole (PRILOSEC) 20 MG capsule TAKE 1 CAPSULE BY MOUTH EVERY MORNING 30 MINUTES BEFORE BREAKFAST 90 capsule 3    pantoprazole (PROTONIX) 40 MG tablet 40 mg.      pen needle, diabetic 32 gauge x 5/32" Ndle BD ZEB pen needles 32 ga, 4mm, See Instructions, use 4 daily for basal and bolus insulin., # 200 EA, 5 Refill(s), Pharmacy: [facility] #[id number], 166, cm, 03/22/24 8:23:00 CDT, Height/Length Measured, 90.7, kg, 03/22/24 8:23:00 CDT, Weight Dosing      pregabalin (LYRICA) 75 MG capsule 75 mg.      rosuvastatin (CRESTOR) 10 MG tablet TAKE 1 TABLET(10 MG) BY MOUTH EVERY DAY 90 tablet 3    sodium bicarbonate 650 MG tablet 1,300 mg.      tacrolimus XR, ENVARSUS, (ENVARSUS XR) 0.75 mg Tb24 Take 2 tablets by mouth once daily.      tamsulosin (FLOMAX) 0.4 mg Cap Take 1 capsule by mouth once daily.      white petrolatum (AQUAPHOR HEALING) 41 % Oint 1 claire, Topical, Daily, 0 Refill(s)      calcitRIOL (ROCALTROL) 0.25 MCG Cap Take 0.25 mcg by mouth. (Patient not taking: Reported on 12/24/2024)      doxycycline (VIBRAMYCIN) 100 MG Cap Take 100 mg by mouth 2 (two) times daily.      ergocalciferol (ERGOCALCIFEROL) 50,000 unit Cap Take 1 capsule by mouth twice a week. (Patient not taking: Reported on 12/24/2024)      furosemide (LASIX) 20 MG tablet Take 20 mg by mouth every other day. (Patient not taking: Reported on 12/24/2024)      furosemide (LASIX) 40 MG tablet  (Patient not taking: Reported on 12/24/2024)      levoFLOXacin (LEVAQUIN) 500 MG tablet Take 500 mg by mouth.      losartan (COZAAR) 25 MG tablet Take 1 tablet (25 mg total) by mouth once daily. (Patient not taking: Reported on 12/24/2024) 90 tablet 3    metoprolol succinate (TOPROL-XL) 50 MG 24 hr tablet Take 50 mg by mouth nightly. (Patient not taking: Reported on 12/24/2024)      nitroGLYCERIN (NITROSTAT) 0.4 MG SL tablet  (Patient not taking: Reported on 12/24/2024)      pantoprazole (PROTONIX) 40 MG tablet " "Take by mouth. (Patient not taking: Reported on 12/24/2024)      predniSONE (DELTASONE) 5 MG tablet Take 5 mg by mouth once daily.      sodium bicarbonate 650 MG tablet 1,950 mg. (Patient not taking: Reported on 12/24/2024)      TACROLIMUS XR, ENVARSUS, 0.75 MG ORAL TB24 0.75 mg Tb24 Take 1.5 mg by mouth Daily. (Patient not taking: Reported on 12/24/2024)       No current facility-administered medications for this visit.     Review of patient's allergies indicates:   Allergen Reactions    Iodine      Other reaction(s): Blisters       Review of Systems   Endocrine:        KIDNEY TRANSPLANT 5/4/2020   Musculoskeletal:  Positive for gait problem.        Walker   All other systems reviewed and are negative.      Objective:      Vitals:    12/24/24 1016   BP: 105/68   Pulse: 69   Resp: 18   Weight: 83.2 kg (183 lb 6.4 oz)   Height: 5' 8" (1.727 m)     Physical Exam  Vitals and nursing note reviewed. Exam conducted with a chaperone present.   Constitutional:       General: He is not in acute distress.     Appearance: He is well-developed.   Cardiovascular:      Pulses:           Dorsalis pedis pulses are 1+ on the right side and 1+ on the left side.        Posterior tibial pulses are 1+ on the right side and 1+ on the left side.   Pulmonary:      Effort: Pulmonary effort is normal.   Musculoskeletal:         General: No tenderness.      Right foot: Decreased range of motion. Bunion (mild HAV and hammertoe 2nd right) present.      Left foot: Decreased range of motion. Prominent metatarsal heads present.   Feet:      Right foot:      Skin integrity: Callus (Small nontender callus sub 4th met head right foot) present.      Left foot:      Skin integrity: Ulcer (New area of ulceration limited to breakdown of the skin, peeling skin small granular opening plantar medial left heel.  Pre ulcerative callus with dried blood and bruising sub 4th met head has progressed, no pain, no skin opening or infection) and callus present. "   Skin:     General: Skin is dry.      Capillary Refill: Capillary refill takes 2 to 3 seconds.   Psychiatric:         Mood and Affect: Mood normal.         Behavior: Behavior normal.                                                                                                                                                                                                                                                                                                                                                                                                                                                                                                                                                                                                                                                                                                                                                                                                                                                                                                                                                                                                                                                                                                                                                                                                                                                                                                                                                                                                                                                                                                                                                                                                                                                                                                                                                                                                                                                                                                                                                                                                                                                                                                                                                                                                                                                                                                                                                                                                                                                                                                                                                                                                                                                                                                                                                                                                          Assessment:       1. Skin ulcer of left heel, limited to breakdown of skin    2. Ulcer of left foot, limited to breakdown of skin    3. Type II diabetes mellitus with neurological manifestations    4. Dry skin    5. Foot callus - Right Foot    6. Onychomycosis of toenail              Plan:         Reviewed heel ulcer and they understand this occurs from repetitive pressure when in the hospital  Apparently was very mild and outlying of the larger ulcer can be seen and well healed with a few deeper areas remaining  Advised they can continue to apply antibiotic ointment as a moisturizer and keep it air it out, if applying antibiotic ointment do not cover  It is preferred they apply lotion to the area before bedtime so it has all night to dry  It is preferred he wear socks in his shoes to cushion and protect this area  Continue to check daily and contact office immediately with any change  Advised patient the sub 4th met head ulcer left foot which usually causes him a lot of pain has actually worsened and  only pain-free because he is not walking a lot, advised it will start to cause him problems as he starts physical therapy  We reviewed care and treatment of this area  Pointed out developing similar callus same spot opposite foot  Had a lengthy discussion regarding appropriate shoes, avoid the hey dudes for therapy and wear tennis shoes      Wound care  Ulcer left heel lightly debrided, superficial, stable  Pre ulcerative callus with discoloration debrided sub 4th met head left foot, some of the discoloration but bruising is deep, no evidence of skin opening  Cleaned with alcohol  Fabric bandage with gauze applied  The heel was left uncovered but dispensed soft foam cup and showed how to apply to cushion the area, use as needed when sleeping, in shoes to avoid pressure  Reviewed diabetic education and potential complications regarding these areas  Reviewed care and maintenance of dry skin, apply Aquaphor to feet and legs before bed  Reviewed care and maintenance of nails, nails debrided, thickness reduced  Check daily for any changes  Reviewed home going instructions  Contact office immediately with any changes  Patient was in understanding and agreement with treatment plan.  I counseled the patient on their conditions, implications and medical management.  Instructed patient to contact the office with any changes, questions, concerns, worsening of symptoms.   Total face to face time 30 minutes, exam, assessment, treatment, discussion, additional time for review of chart prior to and following appointment and visit documentation, consultation and coordination of care.    Follow up 4 weeks        This note was created using M*Modal voice recognition software that occasionally misinterpreted phrases or words.

## 2025-01-14 ENCOUNTER — OFFICE VISIT (OUTPATIENT)
Dept: PODIATRY | Facility: CLINIC | Age: 75
End: 2025-01-14
Payer: MEDICARE

## 2025-01-14 VITALS
HEART RATE: 69 BPM | BODY MASS INDEX: 27.37 KG/M2 | SYSTOLIC BLOOD PRESSURE: 108 MMHG | HEIGHT: 68 IN | WEIGHT: 180.63 LBS | DIASTOLIC BLOOD PRESSURE: 68 MMHG

## 2025-01-14 DIAGNOSIS — E11.49 TYPE II DIABETES MELLITUS WITH NEUROLOGICAL MANIFESTATIONS: ICD-10-CM

## 2025-01-14 DIAGNOSIS — L97.521 ULCER OF LEFT FOOT, LIMITED TO BREAKDOWN OF SKIN: Primary | ICD-10-CM

## 2025-01-14 DIAGNOSIS — M21.962 ACQUIRED DEFORMITY OF JOINT OF LEFT FOOT: ICD-10-CM

## 2025-01-14 DIAGNOSIS — M89.8X7 PAIN IN METATARSUS OF LEFT FOOT: ICD-10-CM

## 2025-01-14 PROCEDURE — 99214 OFFICE O/P EST MOD 30 MIN: CPT | Mod: S$PBB,,, | Performed by: PODIATRIST

## 2025-01-14 PROCEDURE — 99215 OFFICE O/P EST HI 40 MIN: CPT | Mod: PBBFAC | Performed by: PODIATRIST

## 2025-01-14 PROCEDURE — 99999 PR PBB SHADOW E&M-EST. PATIENT-LVL V: CPT | Mod: PBBFAC,,, | Performed by: PODIATRIST

## 2025-01-14 RX ORDER — CALCIUM GLUCONATE 60(650) MG
650 TABLET ORAL
COMMUNITY
Start: 2025-01-06

## 2025-01-14 RX ORDER — FUROSEMIDE 20 MG/1
TABLET ORAL
COMMUNITY
Start: 2025-01-10

## 2025-01-14 RX ORDER — ASPIRIN 81 MG/1
81 TABLET ORAL
COMMUNITY
Start: 2024-12-30

## 2025-01-14 RX ORDER — FUROSEMIDE 40 MG/1
TABLET ORAL
COMMUNITY
Start: 2024-12-31

## 2025-01-14 RX ORDER — ERGOCALCIFEROL 1.25 MG/1
CAPSULE ORAL
COMMUNITY
Start: 2025-01-11

## 2025-01-14 NOTE — PROGRESS NOTES
"Subjective:       Patient ID: Olu Gant Jr. is a 74 y.o. male.    Chief Complaint: Follow-up, Wound Check, Diabetes Mellitus, and Nail Problem  Patient with diabetes presents with his wife for follow-up ulcer sub 4th met head left foot.  He was supposed to have a follow-up in 2 weeks patient relates it could not wait due to the amount of pain.  He has increased his activity significantly and doing physical therapy 3 times a week in his own therapy on the other days  Slowly improving his strength, was hospitalized multiple times October through December  He is wearing slippers socks at home most of the time.  Reports a pain level of 5/10 on the ball of the left foot        Past Medical History:   Diagnosis Date    Asbestos exposure - 1973 2/18/2014    Benign hypertension with ESRD (end-stage renal disease) 2/18/2014    Diabetes type 2 - since 1996 2/18/2014    DIALYSIS 2013    ESRD (end stage renal disease) - initiated dialysis 05/29/2013 2/18/2014    Gout, arthritis 2/18/2014    Hypothyroidism 2/18/2014    Irregular heart rhythm - unsure of Afib vs. A flutter 2/18/2014    Kidney transplanted 05/04/2020    Obesity 2/18/2014    Secondary hyperparathyroidism, renal 2/18/2014    Sleep apnea on Bipap 2/18/2014     Past Surgical History:   Procedure Laterality Date    AV FISTULA PLACEMENT      CHOLECYSTECTOMY      COLON SURGERY  02/2017    resection for "ischemic colon"    FRACTURE SURGERY      HIP FRACTURE SURGERY Left     INGUINAL HERNIA REPAIR      bilaterally    KIDNEY TRANSPLANT      pace maker      [email or web address]    TONSILLECTOMY           Current Outpatient Medications   Medication Sig Dispense Refill    acetaminophen (TYLENOL) 325 MG tablet 650 mg.      allopurinoL (ZYLOPRIM) 100 MG tablet TAKE 1 TABLET(100 MG) BY MOUTH EVERY DAY 90 tablet 3    amLODIPine (NORVASC) 5 MG tablet Take 5 mg by mouth.      apixaban (ELIQUIS) 5 mg Tab   TAKE 1 TABLET BY MOUTH TWICE DAILY      apixaban (ELIQUIS) 5 mg Tab 5 mg.      " aspirin (ECOTRIN) 81 MG EC tablet 81 mg.      atorvastatin (LIPITOR) 40 MG tablet Take 1 tablet by mouth every evening.      benzonatate (TESSALON) 100 MG capsule Take 200 mg by mouth.      calcitRIOL (ROCALTROL) 0.25 MCG Cap 0.25 mcg.      calcium gluconate 60 mg calcium (650 mg) Tab 650 mg.      cinacalcet (SENSIPAR) 30 MG Tab 30 mg.      cyclobenzaprine (FLEXERIL) 10 MG tablet       denosumab (PROLIA) 60 mg/mL Syrg Inject 60 mg into the skin every 6 (six) months.      doxycycline (VIBRAMYCIN) 100 MG Cap Take 100 mg by mouth 2 (two) times daily.      ergocalciferol (ERGOCALCIFEROL) 50,000 unit Cap Take by mouth.      flash glucose sensor (FREESTYLE CLAIRE 2 SENSOR) Kit 1 each by Misc.(Non-Drug; Combo Route) route every 14 (fourteen) days. 1 kit 0    fluticasone propionate (FLONASE) 50 mcg/actuation nasal spray 2 sprays (100 mcg total) by Each Nostril route once daily. 15.8 mL 1    furosemide (LASIX) 20 MG tablet See Instructions, TAKE 2 TABLETS BY MOUTH EVERY MORNING, TAKE 1 TABLET EVERY EVENING, # 270 tab, 0 Refill(s), Maintenance, Pharmacy: [facility] #[id number], 166, cm, 01/10/25 9:57:00 CST, Height/Length Measured, 82.7, kg, 01/10/25 9:57:00 CST, Weight Dosing      furosemide (LASIX) 40 MG tablet Take by mouth.      GVOKE HYPOPEN 2-PACK 1 mg/0.2 mL AtIn Inject 1 Units into the skin Daily.      HYDROcodone-acetaminophen (NORCO) 5-325 mg per tablet 1 tab, Oral, BID, PRN pain, moderate to severe (4-10), 0 Refill(s)      insulin aspart U-100 (NOVOLOG) 100 unit/mL (3 mL) InPn pen Inject into the skin.      insulin detemir U-100, Levemir, (LEVEMIR FLEXPEN) 100 unit/mL (3 mL) InPn pen   16 unit, SUBQ, Daily, # 10 mL, 3 Refill(s), Maintenance, Pharmacy: [facility] #[id number], 166, cm, 08/28/23 10:30:00 CDT, Height/Length Measured, 91.5, kg, 08/28/23 10:30:00 CDT, Weight Dosing      INSULIN LISPRO SUBQ 7 unit, SUBQ, TID before meals, 0 Refill(s), Maintenance      LACTULOSE ORAL 30 g.      levoFLOXacin  "(LEVAQUIN) 500 MG tablet Take 500 mg by mouth.      levothyroxine (SYNTHROID) 100 MCG tablet Take 1 tablet (100 mcg total) by mouth once daily. 90 tablet 3    metoprolol succinate (TOPROL-XL) 50 MG 24 hr tablet Take 1 tablet by mouth every evening.      multivitamin (THERAGRAN) per tablet Take 1 tablet by mouth once daily.      mupirocin (BACTROBAN) 2 % ointment Apply topically 3 (three) times daily. 22 g 0    nitroGLYCERIN (NITROSTAT) 0.4 MG SL tablet       omeprazole (PRILOSEC) 20 MG capsule TAKE 1 CAPSULE BY MOUTH EVERY MORNING 30 MINUTES BEFORE BREAKFAST 90 capsule 3    pantoprazole (PROTONIX) 40 MG tablet 40 mg.      pen needle, diabetic 32 gauge x 5/32" Ndle BD ZEB pen needles 32 ga, 4mm, See Instructions, use 4 daily for basal and bolus insulin., # 200 EA, 5 Refill(s), Pharmacy: [facility] #[id number], 166, cm, 03/22/24 8:23:00 CDT, Height/Length Measured, 90.7, kg, 03/22/24 8:23:00 CDT, Weight Dosing      predniSONE (DELTASONE) 5 MG tablet Take 5 mg by mouth once daily.      pregabalin (LYRICA) 75 MG capsule 75 mg.      rosuvastatin (CRESTOR) 10 MG tablet TAKE 1 TABLET(10 MG) BY MOUTH EVERY DAY 90 tablet 3    sodium bicarbonate 650 MG tablet 1,300 mg.      tacrolimus XR, ENVARSUS, (ENVARSUS XR) 0.75 mg Tb24 Take 2 tablets by mouth once daily.      white petrolatum (AQUAPHOR HEALING) 41 % Oint 1 claire, Topical, Daily, 0 Refill(s)      aspirin (ECOTRIN) 81 MG EC tablet 81 mg. (Patient not taking: Reported on 1/14/2025)      calcium citrate (CALCITRATE) 200 mg (950 mg) tablet Take 1 tablet by mouth once daily. (Patient not taking: Reported on 1/14/2025)      cinacalcet (SENSIPAR) 30 MG Tab TAKE 1 TABLET BY MOUTH ONCE DAILY with a meal (Patient not taking: Reported on 1/14/2025)       No current facility-administered medications for this visit.     Review of patient's allergies indicates:   Allergen Reactions    Iodine      Other reaction(s): Blisters       Review of Systems   Endocrine:        KIDNEY " "TRANSPLANT 5/4/2020   Musculoskeletal:  Positive for gait problem.        Walker   All other systems reviewed and are negative.      Objective:      Vitals:    01/14/25 1017   BP: 108/68   Pulse: 69   Weight: 81.9 kg (180 lb 9.6 oz)   Height: 5' 8" (1.727 m)     Physical Exam  Vitals and nursing note reviewed. Exam conducted with a chaperone present.   Constitutional:       General: He is not in acute distress.     Appearance: Normal appearance. He is well-developed.   Cardiovascular:      Pulses:           Dorsalis pedis pulses are 1+ on the right side and 1+ on the left side.        Posterior tibial pulses are 1+ on the right side and 1+ on the left side.   Musculoskeletal:         General: Tenderness present.      Right foot: Decreased range of motion. Bunion (mild HAV and hammertoe 2nd right) present.      Left foot: Decreased range of motion. Prominent metatarsal heads present.   Feet:      Right foot:      Skin integrity: Dry skin present.      Left foot:      Skin integrity: Ulcer (ulcer with increased callus very tender upon pressure, deep bruising sub 4th met head, no skin opening), callus and dry skin present. No erythema.   Skin:     General: Skin is dry.      Capillary Refill: Capillary refill takes 2 to 3 seconds.   Neurological:      Mental Status: He is alert.   Psychiatric:         Thought Content: Thought content normal.                                                                                                                                                                                                                                                                                                                                                                                                                                                                                                                                                                                                               "                                                                                                                                          Assessment:       1. Ulcer of left foot, limited to breakdown of skin    2. Acquired deformity of joint of left foot    3. Pain in metatarsus of left foot    4. Type II diabetes mellitus with neurological manifestations                Plan:           Explained to patient he needs to wear tennis shoes for physical therapy, Crocs or hey dudes around the house, discontinue slippers  Explained underlying deformity of this bone which is very prominent on the ball of the foot causing additional pressure and callus to develop in this location.  He has had it for a very long time and he needs to have proper support on around the house to not only treat current pain but prevent recurrence  Advised this areas still developing bruising/bleeding under the skin, there is no skin opening but because he has developed an ulcer in this location multiple times he is at high risk for complications  Use callus relief pad during the day  Apply lotion at night    Wound care  Ulcer debrided sub 4th met head left foot, again some of the discoloration was removed through debridement but bruising remains deep, no evidence of skin opening  Cleaned with alcohol  Fabric bandage applied  Reviewed potential complications, check area daily   Reviewed care and maintenance of dry skin, nails, nails debrided, thickness reduced  Reviewed diabetic education pertaining to feet  Contact office immediately with any changes  Patient was in understanding and agreement with treatment plan.  I counseled the patient on their conditions, implications and medical management.  Instructed patient to contact the office with any changes, questions, concerns, worsening of symptoms.   Total face to face time 20 minutes, exam, assessment, treatment, discussion, additional time for review of chart prior to and following appointment and  visit documentation, consultation and coordination of care.    Follow up 4 weeks        This note was created using [employer] voice recognition software that occasionally misinterpreted phrases or words.

## 2025-01-20 NOTE — LETTER
December 18, 2019      Brianna Elaine III, MD  [address]  [facility] [place] [address]           [facility]ound [facility]  [address] [facility] [place] [address]  Phone: [phone]  Fax: [phone]          Patient: Olu Gant   MR Number: [id number]   YOB: 1950   Date of Visit: 12/17/2019       Dear Dr. Brianna Elaine III:    Thank you for referring Olu Gant to me for evaluation. Attached you will find relevant portions of my assessment and plan of care.    If you have questions, please do not hesitate to call me. I look forward to following Olu Gant along with you.    Sincerely,    Anuradha Trujillo, DPGLORIA    Enclosure  CC:  No Recipients    If you would like to receive this communication electronically, please contact [email or web address] or [phone] to request more information on [facility] Link access.    For providers and/or their staff who would like to refer a patient to [facility], please contact us through our one-stop-shop provider referral line, [facility], at [phone].    If you feel you have received this communication in error or would no longer like to receive these types of communications, please e-mail [email or web address]         
What Is The Reason For Today's Visit?: Full Body Skin Examination
What Is The Reason For Today's Visit? (Being Monitored For X): concerning skin lesions on a periodic basis

## 2025-02-13 ENCOUNTER — OFFICE VISIT (OUTPATIENT)
Dept: PODIATRY | Facility: CLINIC | Age: 75
End: 2025-02-13
Payer: MEDICARE

## 2025-02-13 VITALS
SYSTOLIC BLOOD PRESSURE: 119 MMHG | HEIGHT: 68 IN | DIASTOLIC BLOOD PRESSURE: 67 MMHG | WEIGHT: 187.69 LBS | BODY MASS INDEX: 28.44 KG/M2 | HEART RATE: 69 BPM

## 2025-02-13 DIAGNOSIS — M89.8X7 PAIN IN METATARSUS OF LEFT FOOT: ICD-10-CM

## 2025-02-13 DIAGNOSIS — E11.9 COMPREHENSIVE DIABETIC FOOT EXAMINATION, TYPE 2 DM, ENCOUNTER FOR: Primary | ICD-10-CM

## 2025-02-13 DIAGNOSIS — E11.49 TYPE II DIABETES MELLITUS WITH NEUROLOGICAL MANIFESTATIONS: ICD-10-CM

## 2025-02-13 DIAGNOSIS — L84 FOOT CALLUS: ICD-10-CM

## 2025-02-13 DIAGNOSIS — B35.1 ONYCHOMYCOSIS OF TOENAIL: ICD-10-CM

## 2025-02-13 DIAGNOSIS — M21.962 ACQUIRED DEFORMITY OF JOINT OF LEFT FOOT: ICD-10-CM

## 2025-02-13 PROCEDURE — 99215 OFFICE O/P EST HI 40 MIN: CPT | Mod: PBBFAC | Performed by: PODIATRIST

## 2025-02-13 PROCEDURE — 99214 OFFICE O/P EST MOD 30 MIN: CPT | Mod: S$PBB,,, | Performed by: PODIATRIST

## 2025-02-13 PROCEDURE — 99999 PR PBB SHADOW E&M-EST. PATIENT-LVL V: CPT | Mod: PBBFAC,,, | Performed by: PODIATRIST

## 2025-02-13 NOTE — PROGRESS NOTES
"Subjective:       Patient ID: Olu Gant Jr. is a 74 y.o. male.    Chief Complaint: Foot Pain (Left foot), Diabetic Foot Exam, and Follow-up  Patient presents with his wife today for annual diabetic foot exam and follow-up ulcer sub 4th met head left foot.  Patient relates this has done pretty well over the last 3 weeks, pain over the last week.  Using callus cushion daily which helps.  States most comfortable shoe has are hey dudes, his wife added extra padding.   Will complete physical therapy of the next week or so, this was following few hospitalizations the end of last year  Patient relates he has been doing very well  Has a long history of type 2 diabetes which she states has been well-controlled  Pain level left foot 4/10         Past Medical History:   Diagnosis Date    Asbestos exposure - 1973 2/18/2014    Benign hypertension with ESRD (end-stage renal disease) 2/18/2014    Diabetes type 2 - since 1996 2/18/2014    DIALYSIS 2013    ESRD (end stage renal disease) - initiated dialysis 05/29/2013 2/18/2014    Gout, arthritis 2/18/2014    Hypothyroidism 2/18/2014    Irregular heart rhythm - unsure of Afib vs. A flutter 2/18/2014    Kidney transplanted 05/04/2020    Obesity 2/18/2014    Secondary hyperparathyroidism, renal 2/18/2014    Sleep apnea on Bipap 2/18/2014     Past Surgical History:   Procedure Laterality Date    AV FISTULA PLACEMENT      CHOLECYSTECTOMY      COLON SURGERY  02/2017    resection for "ischemic colon"    FRACTURE SURGERY      HIP FRACTURE SURGERY Left     INGUINAL HERNIA REPAIR      bilaterally    KIDNEY TRANSPLANT      pace maker      [email or web address]    TONSILLECTOMY           Current Outpatient Medications   Medication Sig Dispense Refill    acetaminophen (TYLENOL) 325 MG tablet 650 mg.      allopurinoL (ZYLOPRIM) 100 MG tablet TAKE 1 TABLET(100 MG) BY MOUTH EVERY DAY 90 tablet 3    amLODIPine (NORVASC) 5 MG tablet Take 5 mg by mouth.      apixaban (ELIQUIS) 5 mg Tab   TAKE 1 TABLET " BY MOUTH TWICE DAILY      apixaban (ELIQUIS) 5 mg Tab 5 mg.      aspirin (ECOTRIN) 81 MG EC tablet 81 mg.      atorvastatin (LIPITOR) 40 MG tablet Take 1 tablet by mouth every evening.      benzonatate (TESSALON) 100 MG capsule Take 200 mg by mouth.      calcitRIOL (ROCALTROL) 0.25 MCG Cap 0.25 mcg.      calcium gluconate 60 mg calcium (650 mg) Tab 650 mg.      cinacalcet (SENSIPAR) 30 MG Tab 30 mg.      cyclobenzaprine (FLEXERIL) 10 MG tablet       denosumab (PROLIA) 60 mg/mL Syrg Inject 60 mg into the skin every 6 (six) months.      doxycycline (VIBRAMYCIN) 100 MG Cap Take 100 mg by mouth 2 (two) times daily.      ergocalciferol (ERGOCALCIFEROL) 50,000 unit Cap Take by mouth.      flash glucose sensor (FREESTYLE CLAIRE 2 SENSOR) Kit 1 each by Misc.(Non-Drug; Combo Route) route every 14 (fourteen) days. 1 kit 0    fluticasone propionate (FLONASE) 50 mcg/actuation nasal spray 2 sprays (100 mcg total) by Each Nostril route once daily. 15.8 mL 1    furosemide (LASIX) 20 MG tablet See Instructions, TAKE 2 TABLETS BY MOUTH EVERY MORNING, TAKE 1 TABLET EVERY EVENING, # 270 tab, 0 Refill(s), Maintenance, Pharmacy: [id number], 166, cm, 01/10/25 9:57:00 CST, Height/Length Measured, 82.7, kg, 01/10/25 9:57:00 CST, Weight Dosing      furosemide (LASIX) 40 MG tablet Take by mouth.      GVOKE HYPOPEN 2-PACK 1 mg/0.2 mL AtIn Inject 1 Units into the skin Daily.      HYDROcodone-acetaminophen (NORCO) 5-325 mg per tablet 1 tab, Oral, BID, PRN pain, moderate to severe (4-10), 0 Refill(s)      insulin aspart U-100 (NOVOLOG) 100 unit/mL (3 mL) InPn pen Inject into the skin.      insulin detemir U-100, Levemir, (LEVEMIR FLEXPEN) 100 unit/mL (3 mL) InPn pen   16 unit, SUBQ, Daily, # 10 mL, 3 Refill(s), Maintenance, Pharmacy: [facility] #[id number], 166, cm, 08/28/23 10:30:00 CDT, Height/Length Measured, 91.5, kg, 08/28/23 10:30:00 CDT, Weight Dosing      INSULIN LISPRO SUBQ 7 unit, SUBQ, TID before meals, 0  "Refill(s), Maintenance      LACTULOSE ORAL 30 g.      levoFLOXacin (LEVAQUIN) 500 MG tablet Take 500 mg by mouth.      levothyroxine (SYNTHROID) 100 MCG tablet Take 1 tablet (100 mcg total) by mouth once daily. 90 tablet 3    metoprolol succinate (TOPROL-XL) 50 MG 24 hr tablet Take 1 tablet by mouth every evening.      multivitamin (THERAGRAN) per tablet Take 1 tablet by mouth once daily.      mupirocin (BACTROBAN) 2 % ointment Apply topically 3 (three) times daily. 22 g 0    nitroGLYCERIN (NITROSTAT) 0.4 MG SL tablet       omeprazole (PRILOSEC) 20 MG capsule TAKE 1 CAPSULE BY MOUTH EVERY MORNING 30 MINUTES BEFORE BREAKFAST 90 capsule 3    pantoprazole (PROTONIX) 40 MG tablet 40 mg.      pen needle, diabetic 32 gauge x 5/32" Ndle BD ZEB pen needles 32 ga, 4mm, See Instructions, use 4 daily for basal and bolus insulin., # 200 EA, 5 Refill(s), Pharmacy: [facility] #[id number], 166, cm, 03/22/24 8:23:00 CDT, Height/Length Measured, 90.7, kg, 03/22/24 8:23:00 CDT, Weight Dosing      predniSONE (DELTASONE) 5 MG tablet Take 5 mg by mouth once daily.      pregabalin (LYRICA) 75 MG capsule 75 mg.      rosuvastatin (CRESTOR) 10 MG tablet TAKE 1 TABLET(10 MG) BY MOUTH EVERY DAY 90 tablet 3    sodium bicarbonate 650 MG tablet 1,300 mg.      tacrolimus XR, ENVARSUS, (ENVARSUS XR) 0.75 mg Tb24 Take 2 tablets by mouth once daily.      white petrolatum (AQUAPHOR HEALING) 41 % Oint 1 claire, Topical, Daily, 0 Refill(s)       No current facility-administered medications for this visit.     Review of patient's allergies indicates:   Allergen Reactions    Iodine      Other reaction(s): Blisters       Review of Systems   Endocrine:        KIDNEY TRANSPLANT 5/4/2020   Musculoskeletal:  Positive for gait problem.        Walker   Neurological:  Positive for weakness.   All other systems reviewed and are negative.      Objective:      Vitals:    02/13/25 0812   BP: 119/67   BP Location: Right arm   Patient Position: Sitting   Pulse: 69 " "  Weight: 85.1 kg (187 lb 11.2 oz)   Height: 5' 8" (1.727 m)     Physical Exam  Vitals and nursing note reviewed. Exam conducted with a chaperone present.   Constitutional:       General: He is not in acute distress.     Appearance: Normal appearance. He is well-developed.   Cardiovascular:      Pulses:           Dorsalis pedis pulses are 1+ on the right side and 1+ on the left side.        Posterior tibial pulses are 1+ on the right side and 1+ on the left side.   Musculoskeletal:         General: Tenderness present.      Right foot: Decreased range of motion. Bunion (mild HAV and hammertoe 2nd right) present.      Left foot: Decreased range of motion. Prominent metatarsal heads present.      Comments: Fourth met head left   Feet:      Right foot:      Protective Sensation: 8 sites tested.  5 sites sensed.      Skin integrity: Dry skin present.      Toenail Condition: Right toenails are abnormally thick. Fungal disease present.     Left foot:      Protective Sensation: 8 sites tested.  3 sites sensed.      Skin integrity: Callus and dry skin present. No ulcer (Also sub 4th met head left foot completely heal following debridement, remains a high risk preulcerative callus) or erythema.      Toenail Condition: Left toenails are abnormally thick. Fungal disease present.  Skin:     General: Skin is dry.      Capillary Refill: Capillary refill takes 2 to 3 seconds.      Comments: Dark skin anterior lower legs   Neurological:      Mental Status: He is alert.      Sensory: Sensory deficit present.      Comments: Sensation diminished distal digits left greater than right with monofilament testing   Psychiatric:         Thought Content: Thought content normal.                                                                                                                                                                                                                                                                               "                                                                                                                                                                                                                                                                                                                                                                                                                                                                          Contains abnormal data HEMOGLOBIN A1C               Component Ref Range & Units 4 mo ago  (10/2/24) 1 yr ago  (11/30/23) 2 yr ago  (11/23/22) 2 yr ago  (5/16/22) 3 yr ago  (5/4/21)   Hemoglobin A1C 4.8 - 6.0 % 7.3 High  7.4 Abnormal  6.9 High  R 8.1 High   9.8 High    Estimated Average Glucose See Comment mg/dL 163               Assessment:       1. Comprehensive diabetic foot examination, type 2 DM, encounter for    2. Type II diabetes mellitus with neurological manifestations    3. Acquired deformity of joint of left foot    4. Pain in metatarsus of left foot    5. Foot callus - Right Foot    6. Onychomycosis of toenail            Plan:             Comprehensive diabetic pedal exam performed  Reviewed decreased sensation in both feet, mostly in the digits and the left is much more notable than the right  We reviewed potential complications and concerns regarding lack of sensation to front of the feet  Reviewed diabetic education  Reviewed diabetic neuropathy and can contribute to hypersensitivity sub 4th met head left.  Reviewed A1c  Reviewed benefit of controled glucose/diabetes regarding potential foot problems healing  We had a lengthy discussion regarding a better supportive shoe, start trying to transition into a tennis shoe for remaining physical therapy and then for part of each day gradually increasing the amount of time he wears the tennis shoe.  Explained thick sole for shock absorption is going to add more support than hey dudes for left foot  Today a  felt callus cushion was applied to the insole of the left shoe to help offload pressure in this area  Explained if it compresses in a week or 2 he can apply another 1 to the same location  This should be done to the sole of his tennis shoe as well  We also reviewed prominent bone under this area, puts him at high risk for recurrence and even once this area is healed and pain-free he should continue to use callus cushion to offload pressure    Wound care  Ulcer debrided sub 4th met head left foot  Remaining bruise, dried blood was completely removed through debridement with no skin opening  Is completely healed at this time  Reviewed potential complications, apply lotion each night, check area daily   Reviewed care and maintenance of dry skin, nails, nails debrided, thickness reduced  Reviewed diabetic education pertaining to feet and reviewed potential complications  Contact office immediately with any changes  Patient was in understanding and agreement with treatment plan.  I counseled the patient on their conditions, implications and medical management.  Instructed patient to contact the office with any changes, questions, concerns, worsening of symptoms.   Total face to face time 30 minutes, exam, assessment, treatment, discussion, additional time for review of chart prior to and following appointment and visit documentation, consultation and coordination of care.    Follow up 4 weeks        This note was created using M*Modal voice recognition software that occasionally misinterpreted phrases or words.

## 2025-03-27 ENCOUNTER — OFFICE VISIT (OUTPATIENT)
Dept: PODIATRY | Facility: CLINIC | Age: 75
End: 2025-03-27
Payer: MEDICARE

## 2025-03-27 VITALS
WEIGHT: 181.81 LBS | DIASTOLIC BLOOD PRESSURE: 85 MMHG | BODY MASS INDEX: 27.56 KG/M2 | RESPIRATION RATE: 18 BRPM | HEIGHT: 68 IN | HEART RATE: 69 BPM | SYSTOLIC BLOOD PRESSURE: 137 MMHG

## 2025-03-27 DIAGNOSIS — M21.962 ACQUIRED DEFORMITY OF JOINT OF LEFT FOOT: Primary | ICD-10-CM

## 2025-03-27 DIAGNOSIS — L84 FOOT CALLUS: ICD-10-CM

## 2025-03-27 DIAGNOSIS — E11.49 TYPE II DIABETES MELLITUS WITH NEUROLOGICAL MANIFESTATIONS: ICD-10-CM

## 2025-03-27 DIAGNOSIS — B35.1 ONYCHOMYCOSIS OF TOENAIL: ICD-10-CM

## 2025-03-27 PROCEDURE — 99999 PR PBB SHADOW E&M-EST. PATIENT-LVL V: CPT | Mod: PBBFAC,,, | Performed by: PODIATRIST

## 2025-03-27 PROCEDURE — 99215 OFFICE O/P EST HI 40 MIN: CPT | Mod: PBBFAC | Performed by: PODIATRIST

## 2025-03-27 PROCEDURE — 99213 OFFICE O/P EST LOW 20 MIN: CPT | Mod: S$PBB,,, | Performed by: PODIATRIST

## 2025-03-27 RX ORDER — INSULIN GLARGINE 100 [IU]/ML
INJECTION, SOLUTION SUBCUTANEOUS
COMMUNITY
Start: 2025-02-11

## 2025-03-27 RX ORDER — INSULIN DEGLUDEC 100 U/ML
INJECTION, SOLUTION SUBCUTANEOUS
COMMUNITY
Start: 2025-02-20

## 2025-03-27 RX ORDER — ACETAMINOPHEN AND CODEINE PHOSPHATE 120; 12 MG/5ML; MG/5ML
10 SOLUTION ORAL EVERY 4 HOURS PRN
COMMUNITY
Start: 2025-03-19 | End: 2025-03-27

## 2025-03-27 RX ORDER — FUROSEMIDE 20 MG/1
20 TABLET ORAL
COMMUNITY
Start: 2025-01-10

## 2025-03-27 NOTE — PROGRESS NOTES
"Subjective:       Patient ID: Olu Gant Jr. is a 74 y.o. male.    Chief Complaint: Follow-up, Callouses, and Nail Problem  Patient presents for follow-up due to diabetes, ulcer ball of the left foot  Patient used callus cushion for quite a while, when the pain resolved he discontinued use an it and wife has been applying lotion to the area for calluses  Relates callus so far has not really developed much and there is no pain  His hey dudes have been the most comfortable shoe and he has been wearing at times  Relates diabetes doing well, glucose 156 this morning, A1c just under or just over 7.0        Past Medical History:   Diagnosis Date    Asbestos exposure - 1973 2/18/2014    Benign hypertension with ESRD (end-stage renal disease) 2/18/2014    Diabetes type 2 - since 1996 2/18/2014    DIALYSIS 2013    ESRD (end stage renal disease) - initiated dialysis 05/29/2013 2/18/2014    Gout, arthritis 2/18/2014    Hypothyroidism 2/18/2014    Irregular heart rhythm - unsure of Afib vs. A flutter 2/18/2014    Kidney transplanted 05/04/2020    Obesity 2/18/2014    Secondary hyperparathyroidism, renal 2/18/2014    Sleep apnea on Bipap 2/18/2014     Past Surgical History:   Procedure Laterality Date    AV FISTULA PLACEMENT      CHOLECYSTECTOMY      COLON SURGERY  02/2017    resection for "ischemic colon"    FRACTURE SURGERY      HIP FRACTURE SURGERY Left     INGUINAL HERNIA REPAIR      bilaterally    KIDNEY TRANSPLANT      pace maker      [email or web address]    TONSILLECTOMY           Current Outpatient Medications   Medication Sig Dispense Refill    acetaminophen (TYLENOL) 325 MG tablet 650 mg.      allopurinoL (ZYLOPRIM) 100 MG tablet TAKE 1 TABLET(100 MG) BY MOUTH EVERY DAY 90 tablet 3    amLODIPine (NORVASC) 5 MG tablet Take 5 mg by mouth.      apixaban (ELIQUIS) 5 mg Tab   TAKE 1 TABLET BY MOUTH TWICE DAILY      aspirin (ECOTRIN) 81 MG EC tablet 81 mg.      atorvastatin (LIPITOR) 40 MG tablet Take 1 tablet by mouth every " evening.      benzonatate (TESSALON) 100 MG capsule Take 200 mg by mouth.      calcitRIOL (ROCALTROL) 0.25 MCG Cap 0.25 mcg.      calcium gluconate 60 mg calcium (650 mg) Tab 650 mg.      cinacalcet (SENSIPAR) 30 MG Tab 30 mg.      cyclobenzaprine (FLEXERIL) 10 MG tablet       denosumab (PROLIA) 60 mg/mL Syrg Inject 60 mg into the skin every 6 (six) months.      doxycycline (VIBRAMYCIN) 100 MG Cap Take 100 mg by mouth 2 (two) times daily.      ergocalciferol (ERGOCALCIFEROL) 50,000 unit Cap Take by mouth.      flash glucose sensor (FREESTYLE CLAIRE 2 SENSOR) Kit 1 each by Misc.(Non-Drug; Combo Route) route every 14 (fourteen) days. 1 kit 0    fluticasone propionate (FLONASE) 50 mcg/actuation nasal spray 2 sprays (100 mcg total) by Each Nostril route once daily. 15.8 mL 1    furosemide (LASIX) 20 MG tablet 20 mg.      HYDROcodone-acetaminophen (NORCO) 5-325 mg per tablet 1 tab, Oral, BID, PRN pain, moderate to severe (4-10), 0 Refill(s)      insulin aspart U-100 (NOVOLOG) 100 unit/mL (3 mL) InPn pen Inject into the skin.      insulin detemir U-100, Levemir, (LEVEMIR FLEXPEN) 100 unit/mL (3 mL) InPn pen   16 unit, SUBQ, Daily, # 10 mL, 3 Refill(s), Maintenance, Pharmacy: [facility] #[id number], 166, cm, 08/28/23 10:30:00 CDT, Height/Length Measured, 91.5, kg, 08/28/23 10:30:00 CDT, Weight Dosing      LACTULOSE ORAL 30 g.      levoFLOXacin (LEVAQUIN) 500 MG tablet Take 500 mg by mouth.      levothyroxine (SYNTHROID) 100 MCG tablet Take 1 tablet (100 mcg total) by mouth once daily. 90 tablet 3    metoprolol succinate (TOPROL-XL) 50 MG 24 hr tablet Take 1 tablet by mouth every evening.      multivitamin (THERAGRAN) per tablet Take 1 tablet by mouth once daily.      mupirocin (BACTROBAN) 2 % ointment Apply topically 3 (three) times daily. 22 g 0    omeprazole (PRILOSEC) 20 MG capsule TAKE 1 CAPSULE BY MOUTH EVERY MORNING 30 MINUTES BEFORE BREAKFAST 90 capsule 3    pantoprazole (PROTONIX) 40 MG tablet 40 mg.      pen  "needle, diabetic 32 gauge x 5/32" Ndle BD ZEB pen needles 32 ga, 4mm, See Instructions, use 4 daily for basal and bolus insulin., # 200 EA, 5 Refill(s), Pharmacy: [facility] #[id number], 166, cm, 03/22/24 8:23:00 CDT, Height/Length Measured, 90.7, kg, 03/22/24 8:23:00 CDT, Weight Dosing      predniSONE (DELTASONE) 5 MG tablet Take 5 mg by mouth once daily.      pregabalin (LYRICA) 75 MG capsule 75 mg.      rosuvastatin (CRESTOR) 10 MG tablet TAKE 1 TABLET(10 MG) BY MOUTH EVERY DAY 90 tablet 3    sodium bicarbonate 650 MG tablet 1,300 mg.      tacrolimus XR, ENVARSUS, (ENVARSUS XR) 0.75 mg Tb24 Take 2 tablets by mouth once daily.      white petrolatum (AQUAPHOR HEALING) 41 % Oint 1 claire, Topical, Daily, 0 Refill(s)      insulin degludec (TRESIBA FLEXTOUCH U-100) 100 unit/mL (3 mL) insulin pen 14 unit, SUBQ, Daily, # 15 mL, 3 Refill(s), Maintenance, [id number], Type 2 diabetes mellitus with polyneuropathy, 166, cm, 02/14/25 10:51:00 CST, Height/Length Measured, 84.9, kg, 02/14/25 10:51:00 CST, Weight Dosing      insulin glargine U-100, Lantus, 100 unit/mL (3 mL) SubQ InPn pen 14 unit, SUBQ, Daily, # 15 mL, 5 Refill(s), Maintenance, ROSSY, [id number], 166, cm, 01/31/25 10:26:00 CST, Height/Length Measured, 82.4, kg, 01/31/25 10:26:00 CST, Weight Dosing      INSULIN LISPRO SUBQ 7 unit, SUBQ, TID before meals, 0 Refill(s), Maintenance       No current facility-administered medications for this visit.     Review of patient's allergies indicates:   Allergen Reactions    Iodine      Other reaction(s): Blisters       Review of Systems   Endocrine:        KIDNEY TRANSPLANT 5/4/2020   Musculoskeletal:  Positive for gait problem.        Walker   Neurological:  Positive for weakness.   All other systems reviewed and are negative.      Objective:      Vitals:    03/27/25 0818   BP: 137/85   Pulse: 69   Resp: 18   Weight: 82.5 kg (181 lb 12.8 oz)   Height: 5' 8" (1.727 m) "     Physical Exam  Vitals and nursing note reviewed. Exam conducted with a chaperone present.   Constitutional:       General: He is not in acute distress.     Appearance: Normal appearance. He is well-developed.   Cardiovascular:      Pulses:           Dorsalis pedis pulses are 1+ on the right side and 1+ on the left side.        Posterior tibial pulses are 1+ on the right side and 1+ on the left side.   Musculoskeletal:         General: Tenderness present.      Right foot: Decreased range of motion. Bunion (mild HAV and hammertoe 2nd right) present.      Left foot: Decreased range of motion. Prominent metatarsal heads present.      Comments: Fourth met head left   Feet:      Right foot:      Skin integrity: Dry skin present.      Toenail Condition: Right toenails are abnormally thick. Fungal disease present.     Left foot:      Skin integrity: Callus (There is minimal callus an area of previous ulcer sub 4th met head left foot, healed and nontender) and dry skin present.      Toenail Condition: Left toenails are abnormally thick. Fungal disease present.  Skin:     General: Skin is dry.      Capillary Refill: Capillary refill takes 2 to 3 seconds.      Comments: Dark skin anterior lower legs   Neurological:      Mental Status: He is alert.   Psychiatric:         Thought Content: Thought content normal.                                                                                                                                                                                                                                                                                                                                                                                                                                                                                                                                                                                                                                                                                                                                                              Assessment:       1. Acquired deformity of joint of left foot    2. Foot callus - Right Foot    3. Type II diabetes mellitus with neurological manifestations    4. Onychomycosis of toenail              Plan:         Reviewed foot type and structure, deformity of the 4th metatarsal head which is plantar fax/remains in a downward position and very prominent on the ball of the foot  Reviewed with patient wife there is no fat pad or cushion underlying this area and it is at high risk for recurrence  Continue lotion daily  Continue shoes at all times even indoors, absolutely no flat shoes, no slippers or walking sock or bare feet  We reviewed potential complications regarding this area since it has been an open ulcer a few times  Explained if there is any excessive callus which develops, any bruising within callus or any increase in pain it needs to be treated promptly  Reviewed care of dry skin  Reviewed care and maintenance of fungal nails, nails debrided in thickness reduced  Reviewed diabetic education pertaining to feet and reviewed potential complications  Contact office immediately with any changes  Patient was in understanding and agreement with treatment plan.  I counseled the patient on their conditions, implications and medical management.  Instructed patient to contact the office with any changes, questions, concerns, worsening of symptoms.   Total face to face time 20 minutes, exam, assessment, treatment, discussion, additional time for review of chart prior to and following appointment and visit documentation, consultation and coordination of care.    Follow up 3 months        This note was created using M*Modal voice recognition software that occasionally misinterpreted phrases or words.

## 2025-04-29 ENCOUNTER — OFFICE VISIT (OUTPATIENT)
Dept: PODIATRY | Facility: CLINIC | Age: 75
End: 2025-04-29
Payer: MEDICARE

## 2025-04-29 VITALS
SYSTOLIC BLOOD PRESSURE: 127 MMHG | BODY MASS INDEX: 26.7 KG/M2 | DIASTOLIC BLOOD PRESSURE: 64 MMHG | RESPIRATION RATE: 18 BRPM | HEIGHT: 68 IN | WEIGHT: 176.19 LBS | HEART RATE: 69 BPM

## 2025-04-29 DIAGNOSIS — M21.962 ACQUIRED DEFORMITY OF JOINT OF LEFT FOOT: ICD-10-CM

## 2025-04-29 DIAGNOSIS — M89.8X7 PAIN IN METATARSUS OF LEFT FOOT: ICD-10-CM

## 2025-04-29 DIAGNOSIS — L84 PRE-ULCERATIVE CALLUSES: ICD-10-CM

## 2025-04-29 DIAGNOSIS — G57.92 NEURITIS OF LEFT FOOT: Primary | ICD-10-CM

## 2025-04-29 DIAGNOSIS — E11.49 TYPE II DIABETES MELLITUS WITH NEUROLOGICAL MANIFESTATIONS: ICD-10-CM

## 2025-04-29 PROCEDURE — 99999 PR PBB SHADOW E&M-EST. PATIENT-LVL V: CPT | Mod: PBBFAC,,, | Performed by: PODIATRIST

## 2025-04-29 PROCEDURE — 99215 OFFICE O/P EST HI 40 MIN: CPT | Mod: PBBFAC | Performed by: PODIATRIST

## 2025-04-29 PROCEDURE — 99213 OFFICE O/P EST LOW 20 MIN: CPT | Mod: S$PBB,,, | Performed by: PODIATRIST

## 2025-04-29 RX ORDER — PREGABALIN 75 MG/1
75 CAPSULE ORAL
COMMUNITY
Start: 2025-04-07

## 2025-04-29 RX ORDER — TACROLIMUS 1 MG/1
1 TABLET, EXTENDED RELEASE ORAL EVERY MORNING
COMMUNITY
Start: 2025-04-10

## 2025-04-30 NOTE — PROGRESS NOTES
"Subjective:       Patient ID: Olu Gant Jr. is a 74 y.o. male.    Chief Complaint: Callouses (Bottom Left Foot) and Foot Pain (Bottom Left Foot)  Patient with diabetes presents with his wife today with complaint of pain ball of the left foot.  Relates this area previous ulcer-chronic preulcerative callus has started to cause pain in the last 2 weeks.  Tried to wait until his follow-up appointment but pain level has been 6/10  Patient relates he has not been very active but he has been trying to walk more with his cane at home, he is hoping to discontinue using his walker  He has been wearing a dudes, finds they are most comfortable  Has been using a callus cushion    Past Medical History:   Diagnosis Date    Asbestos exposure - 1973 2/18/2014    Benign hypertension with ESRD (end-stage renal disease) 2/18/2014    Diabetes type 2 - since 1996 2/18/2014    DIALYSIS 2013    ESRD (end stage renal disease) - initiated dialysis 05/29/2013 2/18/2014    Gout, arthritis 2/18/2014    Hypothyroidism 2/18/2014    Irregular heart rhythm - unsure of Afib vs. A flutter 2/18/2014    Kidney transplanted 05/04/2020    Obesity 2/18/2014    Secondary hyperparathyroidism, renal 2/18/2014    Sleep apnea on Bipap 2/18/2014     Past Surgical History:   Procedure Laterality Date    AV FISTULA PLACEMENT      CHOLECYSTECTOMY      COLON SURGERY  02/2017    resection for "ischemic colon"    FRACTURE SURGERY      HIP FRACTURE SURGERY Left     INGUINAL HERNIA REPAIR      bilaterally    KIDNEY TRANSPLANT      pace maker      [email or web address]    TONSILLECTOMY           Current Outpatient Medications   Medication Sig Dispense Refill    acetaminophen (TYLENOL) 325 MG tablet 650 mg.      allopurinoL (ZYLOPRIM) 100 MG tablet TAKE 1 TABLET(100 MG) BY MOUTH EVERY DAY 90 tablet 3    amLODIPine (NORVASC) 5 MG tablet Take 5 mg by mouth.      apixaban (ELIQUIS) 5 mg Tab   TAKE 1 TABLET BY MOUTH TWICE DAILY      aspirin (ECOTRIN) 81 MG EC tablet 81 mg.      " "atorvastatin (LIPITOR) 40 MG tablet Take 1 tablet by mouth every evening.      benzonatate (TESSALON) 100 MG capsule Take 200 mg by mouth.      calcitRIOL (ROCALTROL) 0.25 MCG Cap 0.25 mcg.      calcium gluconate 60 mg calcium (650 mg) Tab 650 mg.      cinacalcet (SENSIPAR) 30 MG Tab 30 mg.      cyclobenzaprine (FLEXERIL) 10 MG tablet       denosumab (PROLIA) 60 mg/mL Syrg Inject 60 mg into the skin every 6 (six) months.      ergocalciferol (ERGOCALCIFEROL) 50,000 unit Cap Take by mouth.      flash glucose sensor (FREESTYLE CLAIRE 2 SENSOR) Kit 1 each by Misc.(Non-Drug; Combo Route) route every 14 (fourteen) days. 1 kit 0    fluticasone propionate (FLONASE) 50 mcg/actuation nasal spray 2 sprays (100 mcg total) by Each Nostril route once daily. 15.8 mL 1    furosemide (LASIX) 20 MG tablet 20 mg.      HYDROcodone-acetaminophen (NORCO) 5-325 mg per tablet 1 tab, Oral, BID, PRN pain, moderate to severe (4-10), 0 Refill(s)      insulin aspart U-100 (NOVOLOG) 100 unit/mL (3 mL) InPn pen Inject into the skin.      insulin detemir U-100, Levemir, (LEVEMIR FLEXPEN) 100 unit/mL (3 mL) InPn pen   16 unit, SUBQ, Daily, # 10 mL, 3 Refill(s), Maintenance, Pharmacy: [facility] #[id number], 166, cm, 08/28/23 10:30:00 CDT, Height/Length Measured, 91.5, kg, 08/28/23 10:30:00 CDT, Weight Dosing      LACTULOSE ORAL 30 g.      levothyroxine (SYNTHROID) 100 MCG tablet Take 1 tablet (100 mcg total) by mouth once daily. 90 tablet 3    metoprolol succinate (TOPROL-XL) 50 MG 24 hr tablet Take 1 tablet by mouth every evening.      multivitamin (THERAGRAN) per tablet Take 1 tablet by mouth once daily.      mupirocin (BACTROBAN) 2 % ointment Apply topically 3 (three) times daily. 22 g 0    omeprazole (PRILOSEC) 20 MG capsule TAKE 1 CAPSULE BY MOUTH EVERY MORNING 30 MINUTES BEFORE BREAKFAST 90 capsule 3    pantoprazole (PROTONIX) 40 MG tablet 40 mg.      pen needle, diabetic 32 gauge x 5/32" Ndle BD ZEB pen needles 32 ga, 4mm, See " Instructions, use 4 daily for basal and bolus insulin., # 200 EA, 5 Refill(s), Pharmacy: [facility] #[id number], 166, cm, 03/22/24 8:23:00 CDT, Height/Length Measured, 90.7, kg, 03/22/24 8:23:00 CDT, Weight Dosing      predniSONE (DELTASONE) 5 MG tablet Take 5 mg by mouth once daily.      pregabalin (LYRICA) 75 MG capsule 75 mg.      pregabalin (LYRICA) 75 MG capsule 75 mg.      rosuvastatin (CRESTOR) 10 MG tablet TAKE 1 TABLET(10 MG) BY MOUTH EVERY DAY 90 tablet 3    sodium bicarbonate 650 MG tablet 1,300 mg.      TACROLIMUS XR, ENVARSUS, 1 MG ORAL TB24 1 mg Tb24 Take 1 mg by mouth every morning.      white petrolatum (AQUAPHOR HEALING) 41 % Oint 1 claire, Topical, Daily, 0 Refill(s)      doxycycline (VIBRAMYCIN) 100 MG Cap Take 100 mg by mouth 2 (two) times daily. (Patient not taking: Reported on 4/29/2025)      insulin degludec (TRESIBA FLEXTOUCH U-100) 100 unit/mL (3 mL) insulin pen 14 unit, SUBQ, Daily, # 15 mL, 3 Refill(s), Maintenance, Pharmacy: [facility] #[id number], Type 2 diabetes mellitus with polyneuropathy, 166, cm, 02/14/25 10:51:00 CST, Height/Length Measured, 84.9, kg, 02/14/25 10:51:00 CST, Weight Dosing (Patient not taking: Reported on 4/29/2025)      insulin glargine U-100, Lantus, 100 unit/mL (3 mL) SubQ InPn pen 14 unit, SUBQ, Daily, # 15 mL, 5 Refill(s), Maintenance, ROSSY, Pharmacy: [facility] #[id number], 166, cm, 01/31/25 10:26:00 CST, Height/Length Measured, 82.4, kg, 01/31/25 10:26:00 CST, Weight Dosing (Patient not taking: Reported on 4/29/2025)      INSULIN LISPRO SUBQ 7 unit, SUBQ, TID before meals, 0 Refill(s), Maintenance (Patient not taking: Reported on 4/29/2025)      levoFLOXacin (LEVAQUIN) 500 MG tablet Take 500 mg by mouth. (Patient not taking: Reported on 4/29/2025)      tacrolimus XR, ENVARSUS, (ENVARSUS XR) 0.75 mg Tb24 Take 2 tablets by mouth once daily. (Patient not taking: Reported on 4/29/2025)       No current facility-administered medications for this visit.  "    Review of patient's allergies indicates:   Allergen Reactions    Iodine      Other reaction(s): Blisters       Review of Systems   Endocrine:        KIDNEY TRANSPLANT 5/4/2020   Musculoskeletal:  Positive for gait problem.        Walker   Neurological:  Positive for weakness.   All other systems reviewed and are negative.      Objective:      Vitals:    04/29/25 0902   BP: 127/64   Pulse: 69   Resp: 18   Weight: 79.9 kg (176 lb 3.2 oz)   Height: 5' 8" (1.727 m)     Physical Exam  Vitals and nursing note reviewed. Exam conducted with a chaperone present.   Constitutional:       General: He is not in acute distress.     Appearance: Normal appearance. He is well-developed.   Cardiovascular:      Pulses:           Dorsalis pedis pulses are 1+ on the right side and 1+ on the left side.        Posterior tibial pulses are 1+ on the right side and 1+ on the left side.   Musculoskeletal:         General: Tenderness present.      Right foot: Decreased range of motion. Bunion (mild HAV and hammertoe 2nd right) present.      Left foot: Decreased range of motion. Prominent metatarsal heads present.      Comments: Fourth met head left   Feet:      Right foot:      Skin integrity: Dry skin present.      Toenail Condition: Right toenails are abnormally thick. Fungal disease present.     Left foot:      Skin integrity: Callus (Tender preulcerative callus sub 4th met head left foot, no discoloration) and dry skin present.      Toenail Condition: Left toenails are abnormally thick. Fungal disease present.  Skin:     General: Skin is dry.      Capillary Refill: Capillary refill takes 2 to 3 seconds.      Comments: Dark skin anterior lower legs   Neurological:      Mental Status: He is alert.   Psychiatric:         Thought Content: Thought content normal.                                                                                                                                                                                      "                                                                                                                                                                                                                                                                                                                                                                                                                                                                                                                                                                Assessment:       1. Neuritis of left foot    2. Pain in metatarsus of left foot    3. Acquired deformity of joint of left foot    4. Pre-ulcerative calluses - Left Foot    5. Type II diabetes mellitus with neurological manifestations                Plan:         Advised patient due to prominent bone in this area and callus causing pressure on the underlying nerve, areas sensitive upon light pressure  Explained the hey dudes are not the best supportive shoe to offload pressure on the ball of the foot  I encouraged patient to returned to his tennis shoes, they provide better shock absorption, better support  We reviewed using arch supports in the tennis shoes  Reviewed using a diabetic insole in the tennis shoes  Instructed on application of over-the-counter Voltaren gel daily as directed for pain in this location, this is to address inflammation of the underlying nerve  Advised patient this preulcerative callus has no discoloration or skin breakdown which is a good sign but the head of the area treated before it progressed, however he is at risk for complications due to having an open ulcer in this area in the past  And he needs to be more aggressive offloading pressure in this area  Preulcerative callus debrided sub 4th met head left foot  U shaped felt callus cushion applied to offload pressure and samples dispensed  Instructed to check daily, contact office if any discoloration  develops, areas would need to be treated promptly if this occurs  Reviewed care and maintenance of skin  Reviewed diabetic education pertaining to feet and reviewed potential complications  Contact office immediately with any changes  Patient was in understanding and agreement with treatment plan.  I counseled the patient on their conditions, implications and medical management.  Instructed patient to contact the office with any changes, questions, concerns, worsening of symptoms.   Total face to face time 20 minutes, exam, assessment, treatment, discussion, additional time for review of chart prior to and following appointment and visit documentation, consultation and coordination of care.    Follow up 1 month        This note was created using M*Modal voice recognition software that occasionally misinterpreted phrases or words.

## 2025-05-26 ENCOUNTER — OFFICE VISIT (OUTPATIENT)
Dept: PODIATRY | Facility: CLINIC | Age: 75
End: 2025-05-26
Payer: MEDICARE

## 2025-05-26 VITALS
DIASTOLIC BLOOD PRESSURE: 71 MMHG | BODY MASS INDEX: 27.43 KG/M2 | SYSTOLIC BLOOD PRESSURE: 114 MMHG | HEART RATE: 69 BPM | RESPIRATION RATE: 18 BRPM | WEIGHT: 181 LBS | HEIGHT: 68 IN

## 2025-05-26 DIAGNOSIS — E11.49 TYPE II DIABETES MELLITUS WITH NEUROLOGICAL MANIFESTATIONS: ICD-10-CM

## 2025-05-26 DIAGNOSIS — S90.426A BLISTER OF SECOND TOE: ICD-10-CM

## 2025-05-26 DIAGNOSIS — M21.962 ACQUIRED DEFORMITY OF JOINT OF LEFT FOOT: Primary | ICD-10-CM

## 2025-05-26 DIAGNOSIS — L84 PRE-ULCERATIVE CALLUSES: ICD-10-CM

## 2025-05-26 DIAGNOSIS — M89.8X7 PAIN IN METATARSUS OF LEFT FOOT: ICD-10-CM

## 2025-05-26 PROCEDURE — 99999 PR PBB SHADOW E&M-EST. PATIENT-LVL V: CPT | Mod: PBBFAC,,, | Performed by: PODIATRIST

## 2025-05-26 PROCEDURE — 99215 OFFICE O/P EST HI 40 MIN: CPT | Mod: PBBFAC | Performed by: PODIATRIST

## 2025-05-26 PROCEDURE — 99213 OFFICE O/P EST LOW 20 MIN: CPT | Mod: S$PBB,,, | Performed by: PODIATRIST

## 2025-05-26 RX ORDER — TACROLIMUS 0.75 MG/1
1.5 TABLET, EXTENDED RELEASE ORAL
COMMUNITY
Start: 2025-05-07 | End: 2025-05-26 | Stop reason: ALTCHOICE

## 2025-05-26 NOTE — PROGRESS NOTES
"Subjective:       Patient ID: Olu Gant Jr. is a 74 y.o. male.    Chief Complaint: Follow-up, Foot Pain, and Diabetes Mellitus  Patient with diabetes presents with his wife today for follow-up pain ball left foot, acquired deformity joint/metatarsal head and preulcerative callus  Patient relates this areas much improved but if he goes without the U shaped callus cushion pain is worse  He has been wearing hey dudes with a extra cushioned insole and callus cushion place on the insole.  Does have pain but minimal as long as he has shoes on  Just noticed a blister on the top of the 2nd digit right foot in the treatment room, does not recall when this occurred  Patient relates his shoes are comfortable  He has tried a tennis shoes and Crocs in the past  Reports no change with diabetes, well-controlled      Past Medical History:   Diagnosis Date    Asbestos exposure - 1973 2/18/2014    Benign hypertension with ESRD (end-stage renal disease) 2/18/2014    Diabetes type 2 - since 1996 2/18/2014    DIALYSIS 2013    ESRD (end stage renal disease) - initiated dialysis 05/29/2013 2/18/2014    Gout, arthritis 2/18/2014    Hypothyroidism 2/18/2014    Irregular heart rhythm - unsure of Afib vs. A flutter 2/18/2014    Kidney transplanted 05/04/2020    Obesity 2/18/2014    Secondary hyperparathyroidism, renal 2/18/2014    Sleep apnea on Bipap 2/18/2014     Past Surgical History:   Procedure Laterality Date    AV FISTULA PLACEMENT      CHOLECYSTECTOMY      COLON SURGERY  02/2017    resection for "ischemic colon"    FRACTURE SURGERY      HIP FRACTURE SURGERY Left     INGUINAL HERNIA REPAIR      bilaterally    KIDNEY TRANSPLANT      pace maker      [email or web address]    TONSILLECTOMY           Current Outpatient Medications   Medication Sig Dispense Refill    acetaminophen (TYLENOL) 325 MG tablet 650 mg.      allopurinoL (ZYLOPRIM) 100 MG tablet TAKE 1 TABLET(100 MG) BY MOUTH EVERY DAY 90 tablet 3    amLODIPine (NORVASC) 5 MG tablet " Take 5 mg by mouth.      apixaban (ELIQUIS) 5 mg Tab   TAKE 1 TABLET BY MOUTH TWICE DAILY      aspirin (ECOTRIN) 81 MG EC tablet 81 mg.      atorvastatin (LIPITOR) 40 MG tablet Take 1 tablet by mouth every evening.      benzonatate (TESSALON) 100 MG capsule Take 200 mg by mouth.      calcitRIOL (ROCALTROL) 0.25 MCG Cap 0.25 mcg.      calcium gluconate 60 mg calcium (650 mg) Tab 650 mg.      cinacalcet (SENSIPAR) 30 MG Tab 30 mg.      cyclobenzaprine (FLEXERIL) 10 MG tablet       denosumab (PROLIA) 60 mg/mL Syrg Inject 60 mg into the skin every 6 (six) months.      ergocalciferol (ERGOCALCIFEROL) 50,000 unit Cap Take by mouth.      flash glucose sensor (FREESTYLE CLAIRE 2 SENSOR) Kit 1 each by Misc.(Non-Drug; Combo Route) route every 14 (fourteen) days. 1 kit 0    fluticasone propionate (FLONASE) 50 mcg/actuation nasal spray 2 sprays (100 mcg total) by Each Nostril route once daily. 15.8 mL 1    furosemide (LASIX) 20 MG tablet 20 mg.      HYDROcodone-acetaminophen (NORCO) 5-325 mg per tablet 1 tab, Oral, BID, PRN pain, moderate to severe (4-10), 0 Refill(s)      insulin aspart U-100 (NOVOLOG) 100 unit/mL (3 mL) InPn pen Inject into the skin.      insulin detemir U-100, Levemir, (LEVEMIR FLEXPEN) 100 unit/mL (3 mL) InPn pen   16 unit, SUBQ, Daily, # 10 mL, 3 Refill(s), Maintenance, Pharmacy: [facility] #[id number], 166, cm, 08/28/23 10:30:00 CDT, Height/Length Measured, 91.5, kg, 08/28/23 10:30:00 CDT, Weight Dosing      LACTULOSE ORAL 30 g.      levothyroxine (SYNTHROID) 100 MCG tablet Take 1 tablet (100 mcg total) by mouth once daily. 90 tablet 3    metoprolol succinate (TOPROL-XL) 50 MG 24 hr tablet Take 1 tablet by mouth every evening.      multivitamin (THERAGRAN) per tablet Take 1 tablet by mouth once daily.      mupirocin (BACTROBAN) 2 % ointment Apply topically 3 (three) times daily. 22 g 0    omeprazole (PRILOSEC) 20 MG capsule TAKE 1 CAPSULE BY MOUTH EVERY MORNING 30 MINUTES BEFORE BREAKFAST 90 capsule 3  "   pantoprazole (PROTONIX) 40 MG tablet 40 mg.      pen needle, diabetic 32 gauge x 5/32" Ndle BD ZEB pen needles 32 ga, 4mm, See Instructions, use 4 daily for basal and bolus insulin., # 200 EA, 5 Refill(s), Pharmacy: [facility] #[id number], 166, cm, 03/22/24 8:23:00 CDT, Height/Length Measured, 90.7, kg, 03/22/24 8:23:00 CDT, Weight Dosing      predniSONE (DELTASONE) 5 MG tablet Take 5 mg by mouth once daily.      pregabalin (LYRICA) 75 MG capsule 75 mg.      rosuvastatin (CRESTOR) 10 MG tablet TAKE 1 TABLET(10 MG) BY MOUTH EVERY DAY 90 tablet 3    sodium bicarbonate 650 MG tablet 1,300 mg.      TACROLIMUS XR, ENVARSUS, 1 MG ORAL TB24 1 mg Tb24 Take 1 mg by mouth every morning.      white petrolatum (AQUAPHOR HEALING) 41 % Oint 1 claire, Topical, Daily, 0 Refill(s)      pregabalin (LYRICA) 75 MG capsule 75 mg. (Patient not taking: Reported on 5/26/2025)      TACROLIMUS XR, ENVARSUS, 0.75 MG ORAL TB24 0.75 mg Tb24 Take 1.5 mg by mouth. (Patient not taking: Reported on 5/26/2025)       No current facility-administered medications for this visit.     Review of patient's allergies indicates:   Allergen Reactions    Iodine      Other reaction(s): Blisters       Review of Systems   Endocrine:        KIDNEY TRANSPLANT 5/4/2020   Musculoskeletal:  Positive for gait problem.        Walker   Neurological:  Positive for weakness.   All other systems reviewed and are negative.      Objective:      Vitals:    05/26/25 0849   BP: 114/71   Pulse: 69   Resp: 18   Weight: 82.1 kg (181 lb)   Height: 5' 8" (1.727 m)     Physical Exam  Vitals and nursing note reviewed. Exam conducted with a chaperone present.   Constitutional:       General: He is not in acute distress.     Appearance: Normal appearance. He is well-developed.   Cardiovascular:      Pulses:           Dorsalis pedis pulses are 1+ on the right side and 1+ on the left side.        Posterior tibial pulses are 1+ on the right side and 1+ on the left side. "   Musculoskeletal:         General: Tenderness present.      Right foot: Decreased range of motion. Bunion (mild HAV and hammertoe 2nd right) present.      Left foot: Decreased range of motion. Prominent metatarsal heads present.      Comments:  Tender under fourth met head left with pre ulcerative callus   Feet:      Right foot:      Skin integrity: Blister (Small very recent open superficial blisters 2nd digit right without pain or infection) and dry skin present.      Toenail Condition: Right toenails are abnormally thick. Fungal disease present.     Left foot:      Skin integrity: Callus (Tender preulcerative callus sub 4th met head left foot, with minimal bruising, no skin opening) and dry skin present.      Toenail Condition: Left toenails are abnormally thick. Fungal disease present.  Skin:     General: Skin is dry.      Capillary Refill: Capillary refill takes 2 to 3 seconds.      Comments: Dark skin anterior lower legs   Neurological:      Mental Status: He is alert.   Psychiatric:         Thought Content: Thought content normal.                                                                                                                                                                                                                                                                                                                                                                                                                                                                                                                                                                                                                                                                                                                                                                                                                                                                                                                                                                                                                                                                                                                                                                                                                                                                                                                                                                                                                                                                                                                                                                                                                                                                                                                                                                                                                                                                                                                                                                                                                                                                                                                                                                                                                                                                                                                                                                                                                                                                                                                                                                                                                                                                                                                                                                                      Assessment:       1. Acquired deformity of joint of left foot    2. Pain in metatarsus of left foot    3. Pre-ulcerative calluses - Left Foot    4. Blister of second  toe - Right Foot    5. Type II diabetes mellitus with neurological manifestations              Plan:           Reviewed with patient and wife underlying prominent bone in this cause of repetitive pressure in this area  Explained callus has improved significantly but he is very tender upon pressure of the underlying bone  We reviewed full length arch support, typically used for heel and arch pain to help take pressure off the ball of the foot  Patient was fitted for power step Pro [employer], U shaped callus pad applied to the top to help offload pressure  Removed the existing insoles from his hey dudes today and advised patient to try the arch supports for the rest of the day as long as they are comfortable  If comfortable to recommended he try him in tennis shoes  Reviewed treatments for pain, Voltaren gel for joint pain  Reviewed moisturizing callus at night so it thoroughly absorbed into the skin and dry before applying socks and shoes in the morning  Reviewed potential complications due to preulcerative callus  Preulcerative callus debrided sub 4th met head left foot  Patient walked about the treatment room with the arch supports and callus pad and related no pain  Reviewed what to monitor for regarding any changes in this area  Discussed diminished sensation in feet not feeling blister on the 2nd digit and it was highly recommended he utilize a wider shoe than the hey dudes he presents in today  Or he may need a pair which is a half size larger  Blister 2nd digit right foot cat cleansed with wound , Coban applied, instructed to remove this evening to air out overnight  Reviewed better care and maintenance of dry skin  Reviewed diabetic education pertaining to feet and reviewed potential complications  Check feet daily and contact office with any area of concern   Patient was in understanding and agreement with treatment plan.  I counseled the patient on their conditions, implications and medical  management.  Instructed patient to contact the office with any changes, questions, concerns, worsening of symptoms.   Total face to face time 30 minutes, exam, assessment, treatment, discussion, additional time for review of chart prior to and following appointment and visit documentation, consultation and coordination of care.    Follow up 1 month        This note was created using [employer]gent[employer] voice recognition software that occasionally misinterpreted phrases or words.

## 2025-06-27 ENCOUNTER — OFFICE VISIT (OUTPATIENT)
Dept: PODIATRY | Facility: CLINIC | Age: 75
End: 2025-06-27
Payer: MEDICARE

## 2025-06-27 VITALS
BODY MASS INDEX: 27.49 KG/M2 | HEIGHT: 68 IN | WEIGHT: 181.38 LBS | RESPIRATION RATE: 18 BRPM | HEART RATE: 69 BPM | SYSTOLIC BLOOD PRESSURE: 113 MMHG | DIASTOLIC BLOOD PRESSURE: 70 MMHG

## 2025-06-27 DIAGNOSIS — M21.962 ACQUIRED DEFORMITY OF JOINT OF LEFT FOOT: Primary | ICD-10-CM

## 2025-06-27 DIAGNOSIS — E11.49 TYPE II DIABETES MELLITUS WITH NEUROLOGICAL MANIFESTATIONS: ICD-10-CM

## 2025-06-27 DIAGNOSIS — L84 FOOT CALLUS: ICD-10-CM

## 2025-06-27 PROCEDURE — 99213 OFFICE O/P EST LOW 20 MIN: CPT | Mod: S$PBB,,, | Performed by: PODIATRIST

## 2025-06-27 PROCEDURE — 99999 PR PBB SHADOW E&M-EST. PATIENT-LVL V: CPT | Mod: PBBFAC,,, | Performed by: PODIATRIST

## 2025-06-27 PROCEDURE — 99215 OFFICE O/P EST HI 40 MIN: CPT | Mod: PBBFAC | Performed by: PODIATRIST

## 2025-06-27 RX ORDER — ALLOPURINOL 100 MG/1
100 TABLET ORAL
COMMUNITY
Start: 2025-05-08 | End: 2025-06-29 | Stop reason: SDUPTHER

## 2025-06-27 RX ORDER — INSULIN DEGLUDEC 100 U/ML
INJECTION, SOLUTION SUBCUTANEOUS
COMMUNITY
Start: 2025-05-27 | End: 2025-06-29 | Stop reason: ALTCHOICE

## 2025-06-27 RX ORDER — NITROGLYCERIN 0.4 MG/1
0.4 TABLET SUBLINGUAL
COMMUNITY
Start: 2025-06-20 | End: 2025-06-29

## 2025-06-29 NOTE — PROGRESS NOTES
"Subjective:       Patient ID: Olu Gant Jr. is a 75 y.o. male.    Chief Complaint: Follow-up, Callouses, Diabetes Mellitus, and Nail Problem  Patient with diabetes presents for follow-up preulcerative callus sub 4th met head left foot, an area of previous ulceration  Patient relates this areas been doing very well.  He has been applying a U shaped felt callus pad both to his foot and he has 1 on his power step insoles in his hey dude shoes  So far he relates area has remained pain-free    Past Medical History:   Diagnosis Date    Asbestos exposure - 1973 2/18/2014    Benign hypertension with ESRD (end-stage renal disease) 2/18/2014    Diabetes type 2 - since 1996 2/18/2014    DIALYSIS 2013    ESRD (end stage renal disease) - initiated dialysis 05/29/2013 2/18/2014    Gout, arthritis 2/18/2014    Hypothyroidism 2/18/2014    Irregular heart rhythm - unsure of Afib vs. A flutter 2/18/2014    Kidney transplanted 05/04/2020    Obesity 2/18/2014    Secondary hyperparathyroidism, renal 2/18/2014    Sleep apnea on Bipap 2/18/2014     Past Surgical History:   Procedure Laterality Date    AV FISTULA PLACEMENT      CHOLECYSTECTOMY      COLON SURGERY  02/2017    resection for "ischemic colon"    FRACTURE SURGERY      HIP FRACTURE SURGERY Left     INGUINAL HERNIA REPAIR      bilaterally    KIDNEY TRANSPLANT      pace maker      [email or web address]    TONSILLECTOMY           Current Outpatient Medications   Medication Sig Dispense Refill    acetaminophen (TYLENOL) 325 MG tablet 650 mg.      allopurinoL (ZYLOPRIM) 100 MG tablet TAKE 1 TABLET(100 MG) BY MOUTH EVERY DAY 90 tablet 3    amLODIPine (NORVASC) 5 MG tablet Take 5 mg by mouth.      apixaban (ELIQUIS) 5 mg Tab   TAKE 1 TABLET BY MOUTH TWICE DAILY      aspirin (ECOTRIN) 81 MG EC tablet 81 mg.      atorvastatin (LIPITOR) 40 MG tablet Take 1 tablet by mouth every evening.      benzonatate (TESSALON) 100 MG capsule Take 200 mg by mouth.      calcitRIOL (ROCALTROL) 0.25 MCG Cap " "0.25 mcg.      calcium gluconate 60 mg calcium (650 mg) Tab 650 mg.      cinacalcet (SENSIPAR) 30 MG Tab 30 mg.      cyclobenzaprine (FLEXERIL) 10 MG tablet       denosumab (PROLIA) 60 mg/mL Syrg Inject 60 mg into the skin every 6 (six) months.      ergocalciferol (ERGOCALCIFEROL) 50,000 unit Cap Take by mouth.      flash glucose sensor (FREESTYLE CLAIRE 2 SENSOR) Kit 1 each by Misc.(Non-Drug; Combo Route) route every 14 (fourteen) days. 1 kit 0    fluticasone propionate (FLONASE) 50 mcg/actuation nasal spray 2 sprays (100 mcg total) by Each Nostril route once daily. 15.8 mL 1    furosemide (LASIX) 20 MG tablet 20 mg.      HYDROcodone-acetaminophen (NORCO) 5-325 mg per tablet 1 tab, Oral, BID, PRN pain, moderate to severe (4-10), 0 Refill(s)      insulin aspart U-100 (NOVOLOG) 100 unit/mL (3 mL) InPn pen Inject into the skin.      insulin detemir U-100, Levemir, (LEVEMIR FLEXPEN) 100 unit/mL (3 mL) InPn pen   16 unit, SUBQ, Daily, # 10 mL, 3 Refill(s), Maintenance, Pharmacy: [facility] #[id number], 166, cm, 08/28/23 10:30:00 CDT, Height/Length Measured, 91.5, kg, 08/28/23 10:30:00 CDT, Weight Dosing      LACTULOSE ORAL 30 g.      levothyroxine (SYNTHROID) 100 MCG tablet Take 1 tablet (100 mcg total) by mouth once daily. 90 tablet 3    metoprolol succinate (TOPROL-XL) 50 MG 24 hr tablet Take 1 tablet by mouth every evening.      multivitamin (THERAGRAN) per tablet Take 1 tablet by mouth once daily.      mupirocin (BACTROBAN) 2 % ointment Apply topically 3 (three) times daily. 22 g 0    omeprazole (PRILOSEC) 20 MG capsule TAKE 1 CAPSULE BY MOUTH EVERY MORNING 30 MINUTES BEFORE BREAKFAST 90 capsule 3    pantoprazole (PROTONIX) 40 MG tablet 40 mg.      pen needle, diabetic 32 gauge x 5/32" Ndle BD ZEB pen needles 32 ga, 4mm, See Instructions, use 4 daily for basal and bolus insulin., # 200 EA, 5 Refill(s), Pharmacy: [facility] #[id number], 166, cm, 03/22/24 8:23:00 CDT, Height/Length Measured, 90.7, kg, " "03/22/24 8:23:00 CDT, Weight Dosing      predniSONE (DELTASONE) 5 MG tablet Take 5 mg by mouth once daily.      pregabalin (LYRICA) 75 MG capsule 75 mg.      rosuvastatin (CRESTOR) 10 MG tablet TAKE 1 TABLET(10 MG) BY MOUTH EVERY DAY 90 tablet 3    sodium bicarbonate 650 MG tablet 1,300 mg.      TACROLIMUS XR, ENVARSUS, 1 MG ORAL TB24 1 mg Tb24 Take 1 mg by mouth every morning.      white petrolatum (AQUAPHOR HEALING) 41 % Oint 1 claire, Topical, Daily, 0 Refill(s)       No current facility-administered medications for this visit.     Review of patient's allergies indicates:   Allergen Reactions    Iodine      Other reaction(s): Blisters       Review of Systems   Endocrine:        KIDNEY TRANSPLANT 5/4/2020   Musculoskeletal:  Positive for gait problem.        Walker   Neurological:  Positive for weakness.   All other systems reviewed and are negative.      Objective:      Vitals:    06/27/25 0815   BP: 113/70   Pulse: 69   Resp: 18   Weight: 82.3 kg (181 lb 6.4 oz)   Height: 5' 8" (1.727 m)     Physical Exam  Vitals and nursing note reviewed. Exam conducted with a chaperone present.   Constitutional:       Appearance: He is well-developed.   Cardiovascular:      Pulses:           Dorsalis pedis pulses are 1+ on the right side and 1+ on the left side.        Posterior tibial pulses are 1+ on the right side and 1+ on the left side.   Musculoskeletal:         General: No tenderness.      Right foot: Decreased range of motion. Bunion (mild HAV and hammertoe 2nd right) present.      Left foot: Decreased range of motion. Prominent metatarsal heads present.   Feet:      Right foot:      Skin integrity: Dry skin present.      Left foot:      Skin integrity: Callus (Minimal hyperkeratotic tissue, significant improvement with no discoloration or pain sub 4th met head left at this time) and dry skin present.   Skin:     General: Skin is dry.      Capillary Refill: Capillary refill takes 2 to 3 seconds.      Comments: Dark skin " anterior lower legs   Psychiatric:         Thought Content: Thought content normal.                                                                                                                                                                                                                                                                                                                                                                                                                                                                                                                                                                                                                                                                                                                                                                                                                                                                                                                                                                                                                                                                                                                                                                                                                                                                                                                                                                                                                                                                                                                                                                                                                                                                                                                                                                                                                                                                                                                                                                                                                                                                                                                                                                                                                                                                                                                                                                                                                                                                                                                                                                                                                                                                                                                                                                                Assessment:       1. Acquired deformity of joint of left foot    2. Type II diabetes mellitus with neurological manifestations    3. Foot callus - Right Foot              Plan:         We reviewed underlying bony deformity, very prominent 4th metatarsal head  Patient has done excellent job offloading pressure  Continued daily callus cushion to both areas but he needs to be very careful removing the callus cushion from the bottom of his foot every day as it can cause a skin tear  Reviewed care and maintenance of dry skin and application of Aquaphor to feet and legs every night  Mild callus debrided in this area sub 4th met head left foot  No discoloration or potential complications at this time  To avoid recurrence needs to continue shoes with offloading pressure all the time indoors as well  Still remains at risk for recurrence  Reviewed diabetic education pertaining to feet and reviewed potential complications  Check feet daily and contact office with any area of concern   Patient was in understanding and agreement with treatment plan.  I counseled the patient on their conditions, implications and medical  management.  Instructed patient to contact the office with any changes, questions, concerns, worsening of symptoms.   Total face to face time 20 minutes, exam, assessment, treatment, discussion, additional time for review of chart prior to and following appointment and visit documentation, consultation and coordination of care.    Follow up 4-6 weeks      This note was created using [employer] voice recognition software that occasionally misinterpreted phrases or words.

## 2025-08-05 ENCOUNTER — OFFICE VISIT (OUTPATIENT)
Dept: PODIATRY | Facility: CLINIC | Age: 75
End: 2025-08-05
Payer: MEDICARE

## 2025-08-05 VITALS
SYSTOLIC BLOOD PRESSURE: 113 MMHG | DIASTOLIC BLOOD PRESSURE: 71 MMHG | HEIGHT: 68 IN | HEART RATE: 69 BPM | BODY MASS INDEX: 27.58 KG/M2 | RESPIRATION RATE: 18 BRPM | WEIGHT: 182 LBS

## 2025-08-05 DIAGNOSIS — M89.8X7 PAIN IN METATARSUS OF LEFT FOOT: Primary | ICD-10-CM

## 2025-08-05 DIAGNOSIS — L84 PRE-ULCERATIVE CALLUSES: ICD-10-CM

## 2025-08-05 DIAGNOSIS — L85.3 DRY SKIN: ICD-10-CM

## 2025-08-05 DIAGNOSIS — M21.962 ACQUIRED DEFORMITY OF JOINT OF LEFT FOOT: ICD-10-CM

## 2025-08-05 DIAGNOSIS — E11.49 TYPE II DIABETES MELLITUS WITH NEUROLOGICAL MANIFESTATIONS: ICD-10-CM

## 2025-08-05 PROCEDURE — 99213 OFFICE O/P EST LOW 20 MIN: CPT | Mod: S$PBB,,, | Performed by: PODIATRIST

## 2025-08-05 PROCEDURE — 99999 PR PBB SHADOW E&M-EST. PATIENT-LVL V: CPT | Mod: PBBFAC,,, | Performed by: PODIATRIST

## 2025-08-05 PROCEDURE — 99215 OFFICE O/P EST HI 40 MIN: CPT | Mod: PBBFAC | Performed by: PODIATRIST

## 2025-08-05 NOTE — PROGRESS NOTES
"Subjective:       Patient ID: Olu Gant Jr. is a 75 y.o. male.    Chief Complaint: Follow-up, Callouses, Diabetes Mellitus, and Foot Pain  Patient with diabetes presents with complaint of pain ball of the left foot, sub 4th met head left foot, an area of previous ulceration  Patient relates he has been using pressure relief U shaped felt pad on the insole in his shoes and daily on the foot, the last when he apply just came off this past Sunday, 3 days ago  Just prior to it coming off he started to have increased pain in the ball of the left foot  He is wearing he do's with the additional cushion and offloading pressure, never barefoot  Has not applied any moisturizer for lotion to address dry skin feet  Relates diabetes has been doing well, his last A1c was slightly higher but he has had a few lows and had to take glucose tablets  He checks his glucose multiple times a day        Past Medical History:   Diagnosis Date    Asbestos exposure - 1973 2/18/2014    Benign hypertension with ESRD (end-stage renal disease) 2/18/2014    Diabetes type 2 - since 1996 2/18/2014    DIALYSIS 2013    ESRD (end stage renal disease) - initiated dialysis 05/29/2013 2/18/2014    Gout, arthritis 2/18/2014    Hypothyroidism 2/18/2014    Irregular heart rhythm - unsure of Afib vs. A flutter 2/18/2014    Kidney transplanted 05/04/2020    Obesity 2/18/2014    Secondary hyperparathyroidism, renal 2/18/2014    Sleep apnea on Bipap 2/18/2014     Past Surgical History:   Procedure Laterality Date    AV FISTULA PLACEMENT      CHOLECYSTECTOMY      COLON SURGERY  02/2017    resection for "ischemic colon"    FRACTURE SURGERY      HIP FRACTURE SURGERY Left     INGUINAL HERNIA REPAIR      bilaterally    KIDNEY TRANSPLANT      pace maker      [email or web address]    TONSILLECTOMY           Current Outpatient Medications   Medication Sig Dispense Refill    acetaminophen (TYLENOL) 325 MG tablet 650 mg.      allopurinoL (ZYLOPRIM) 100 MG tablet TAKE 1 " TABLET(100 MG) BY MOUTH EVERY DAY 90 tablet 3    amLODIPine (NORVASC) 5 MG tablet Take 5 mg by mouth.      apixaban (ELIQUIS) 5 mg Tab   TAKE 1 TABLET BY MOUTH TWICE DAILY      aspirin (ECOTRIN) 81 MG EC tablet 81 mg.      atorvastatin (LIPITOR) 40 MG tablet Take 1 tablet by mouth every evening.      benzonatate (TESSALON) 100 MG capsule Take 200 mg by mouth.      calcitRIOL (ROCALTROL) 0.25 MCG Cap 0.25 mcg.      calcium gluconate 60 mg calcium (650 mg) Tab 650 mg.      cinacalcet (SENSIPAR) 30 MG Tab 30 mg.      cyclobenzaprine (FLEXERIL) 10 MG tablet       denosumab (PROLIA) 60 mg/mL Syrg Inject 60 mg into the skin every 6 (six) months.      ergocalciferol (ERGOCALCIFEROL) 50,000 unit Cap Take by mouth.      flash glucose sensor (FREESTYLE CLAIRE 2 SENSOR) Kit 1 each by Misc.(Non-Drug; Combo Route) route every 14 (fourteen) days. 1 kit 0    fluticasone propionate (FLONASE) 50 mcg/actuation nasal spray 2 sprays (100 mcg total) by Each Nostril route once daily. 15.8 mL 1    furosemide (LASIX) 20 MG tablet 20 mg.      HYDROcodone-acetaminophen (NORCO) 5-325 mg per tablet 1 tab, Oral, BID, PRN pain, moderate to severe (4-10), 0 Refill(s)      insulin aspart U-100 (NOVOLOG) 100 unit/mL (3 mL) InPn pen Inject into the skin.      insulin detemir U-100, Levemir, (LEVEMIR FLEXPEN) 100 unit/mL (3 mL) InPn pen   16 unit, SUBQ, Daily, # 10 mL, 3 Refill(s), Maintenance, Pharmacy: [facility] #[id number], 166, cm, 08/28/23 10:30:00 CDT, Height/Length Measured, 91.5, kg, 08/28/23 10:30:00 CDT, Weight Dosing      LACTULOSE ORAL 30 g.      levothyroxine (SYNTHROID) 100 MCG tablet Take 1 tablet (100 mcg total) by mouth once daily. 90 tablet 3    metoprolol succinate (TOPROL-XL) 50 MG 24 hr tablet Take 1 tablet by mouth every evening.      multivitamin (THERAGRAN) per tablet Take 1 tablet by mouth once daily.      mupirocin (BACTROBAN) 2 % ointment Apply topically 3 (three) times daily. 22 g 0    omeprazole (PRILOSEC) 20 MG  "capsule TAKE 1 CAPSULE BY MOUTH EVERY MORNING 30 MINUTES BEFORE BREAKFAST 90 capsule 3    pantoprazole (PROTONIX) 40 MG tablet 40 mg.      pen needle, diabetic 32 gauge x 5/32" Ndle BD ZEB pen needles 32 ga, 4mm, See Instructions, use 4 daily for basal and bolus insulin., # 200 EA, 5 Refill(s), Pharmacy: [facility] #[id number], 166, cm, 03/22/24 8:23:00 CDT, Height/Length Measured, 90.7, kg, 03/22/24 8:23:00 CDT, Weight Dosing      predniSONE (DELTASONE) 5 MG tablet Take 5 mg by mouth once daily.      pregabalin (LYRICA) 75 MG capsule 75 mg.      rosuvastatin (CRESTOR) 10 MG tablet TAKE 1 TABLET(10 MG) BY MOUTH EVERY DAY 90 tablet 3    sodium bicarbonate 650 MG tablet 1,300 mg.      TACROLIMUS XR, ENVARSUS, 1 MG ORAL TB24 1 mg Tb24 Take 1 mg by mouth every morning.      white petrolatum (AQUAPHOR HEALING) 41 % Oint 1 claire, Topical, Daily, 0 Refill(s)       No current facility-administered medications for this visit.     Review of patient's allergies indicates:   Allergen Reactions    Iodine      Other reaction(s): Blisters       Review of Systems   Endocrine:        KIDNEY TRANSPLANT 5/4/2020   Musculoskeletal:  Positive for gait problem.        Walker   Neurological:  Positive for weakness.   All other systems reviewed and are negative.      Objective:      Vitals:    08/05/25 0804   BP: 113/71   Patient Position: Sitting   Pulse: 69   Resp: 18   Weight: 82.6 kg (182 lb)   Height: 5' 8" (1.727 m)     Physical Exam  Vitals and nursing note reviewed. Exam conducted with a chaperone present.   Constitutional:       Appearance: He is well-developed.   Cardiovascular:      Pulses:           Dorsalis pedis pulses are 1+ on the right side and 1+ on the left side.        Posterior tibial pulses are 1+ on the right side and 1+ on the left side.   Musculoskeletal:         General: No tenderness.      Right foot: Decreased range of motion. Bunion (mild HAV and hammertoe 2nd right) present.      Left foot: Decreased range " of motion. Prominent metatarsal heads present.   Feet:      Right foot:      Skin integrity: Dry skin present.      Left foot:      Skin integrity: Callus (very painful increased deep hyperkeratotic tissue sub 4th met head left without bruising) and dry skin (increased dry ski) present.   Skin:     General: Skin is dry.      Capillary Refill: Capillary refill takes 2 to 3 seconds.      Comments: Dark skin anterior lower legs   Psychiatric:         Thought Content: Thought content normal.                                                                                                                                                                                                                                                                                                                                                                                                                                                                                                                                                                                                                                                                                                                                                                                                                                                                                                                                                                                                                                                                                                                                                                                                                                                                                                                                                                                                                                                                                                                                                                                                                                                                                                                                                                                                                                                                                                                                                                                                                                                                                                                                                                                                                                                                                                                                                                                                                                                                                                                                                                                                                                                                                                                                                                                          Assessment:       1. Pain in metatarsus of left foot    2. Acquired deformity of joint of left foot    3. Pre-ulcerative calluses - Left Foot    4. Dry skin    5. Type II diabetes mellitus with neurological manifestations                Plan:             Reviewed consistent pressure due to prominent 4th met head left foot  We reviewed pain due to pressure of the underlying bone and callus  Advised patient the overall size of the callus is smaller but it is deeper and putting pressure on the underlying bone  It is important that he continues to use offloading callus cushion on the foot regularly, needs to change it out often as it will compress and a few days  We have discussed a change in tennis shoes recommending a  slip-on Skechers rather than [employer]  Explained he needs to dress the chronic dry skin which is contributing to the callus  Explained the dry skin has gotten worse since last visit instructed to apply hydrocortisone cream to callus lesion twice daily  Aquaphor to feet every night  Continued daily callus cushion to both insole and ball of the left foot  Deep painful hyperkeratotic lesion area sub 4th met head left foot   U shaped felt pad applied to foot  Reviewed care and maintenance  No discoloration or potential complications at this time  Still remains at risk for recurrence  Reviewed diabetic education pertaining to feet and reviewed potential complications  Check feet daily and contact office with any area of concern   Patient was in understanding and agreement with treatment plan.  I counseled the patient on their conditions, implications and medical management.  Instructed patient to contact the office with any changes, questions, concerns, worsening of symptoms.   Total face to face time 20 minutes, exam, assessment, treatment, discussion, additional time for review of chart prior to and following appointment and visit documentation, consultation and coordination of care.    Follow up 4 weeks      This note was created using [employer] voice recognition software that occasionally misinterpreted phrases or words.